# Patient Record
Sex: FEMALE | Race: WHITE | Employment: FULL TIME | URBAN - METROPOLITAN AREA
[De-identification: names, ages, dates, MRNs, and addresses within clinical notes are randomized per-mention and may not be internally consistent; named-entity substitution may affect disease eponyms.]

---

## 2019-12-31 ENCOUNTER — HOSPITAL ENCOUNTER (EMERGENCY)
Age: 49
Discharge: HOME | End: 2019-12-31
Attending: EMERGENCY MEDICINE
Payer: SELF-PAY

## 2019-12-31 ENCOUNTER — APPOINTMENT (OUTPATIENT)
Dept: CT IMAGING | Age: 49
End: 2019-12-31
Attending: EMERGENCY MEDICINE
Payer: SELF-PAY

## 2019-12-31 ENCOUNTER — APPOINTMENT (OUTPATIENT)
Dept: GENERAL RADIOLOGY | Age: 49
End: 2019-12-31
Attending: EMERGENCY MEDICINE
Payer: SELF-PAY

## 2019-12-31 VITALS
OXYGEN SATURATION: 100 % | DIASTOLIC BLOOD PRESSURE: 104 MMHG | HEART RATE: 78 BPM | TEMPERATURE: 98.6 F | BODY MASS INDEX: 40.48 KG/M2 | RESPIRATION RATE: 17 BRPM | HEIGHT: 62 IN | SYSTOLIC BLOOD PRESSURE: 176 MMHG | WEIGHT: 220 LBS

## 2019-12-31 DIAGNOSIS — N39.0 URINARY TRACT INFECTION WITHOUT HEMATURIA, SITE UNSPECIFIED: ICD-10-CM

## 2019-12-31 DIAGNOSIS — M54.6 ACUTE BILATERAL THORACIC BACK PAIN: Primary | ICD-10-CM

## 2019-12-31 LAB
ALBUMIN SERPL-MCNC: 3.8 G/DL (ref 3.4–5)
ALBUMIN/GLOB SERPL: 1 {RATIO} (ref 0.8–1.7)
ALP SERPL-CCNC: 118 U/L (ref 45–117)
ALT SERPL-CCNC: 88 U/L (ref 13–56)
ANION GAP SERPL CALC-SCNC: 9 MMOL/L (ref 3–18)
APPEARANCE UR: CLEAR
AST SERPL-CCNC: 54 U/L (ref 10–38)
BACTERIA URNS QL MICRO: ABNORMAL /HPF
BASOPHILS # BLD: 0.1 K/UL (ref 0–0.1)
BASOPHILS NFR BLD: 1 % (ref 0–2)
BILIRUB SERPL-MCNC: 0.4 MG/DL (ref 0.2–1)
BILIRUB UR QL: NEGATIVE
BNP SERPL-MCNC: 118 PG/ML (ref 0–450)
BUN SERPL-MCNC: 10 MG/DL (ref 7–18)
BUN/CREAT SERPL: 13 (ref 12–20)
CALCIUM SERPL-MCNC: 9.5 MG/DL (ref 8.5–10.1)
CHLORIDE SERPL-SCNC: 106 MMOL/L (ref 100–111)
CO2 SERPL-SCNC: 25 MMOL/L (ref 21–32)
COLOR UR: YELLOW
CREAT SERPL-MCNC: 0.76 MG/DL (ref 0.6–1.3)
DIFFERENTIAL METHOD BLD: ABNORMAL
EOSINOPHIL # BLD: 0.3 K/UL (ref 0–0.4)
EOSINOPHIL NFR BLD: 3 % (ref 0–5)
ERYTHROCYTE [DISTWIDTH] IN BLOOD BY AUTOMATED COUNT: 14.5 % (ref 11.6–14.5)
GLOBULIN SER CALC-MCNC: 3.8 G/DL (ref 2–4)
GLUCOSE SERPL-MCNC: 107 MG/DL (ref 74–99)
GLUCOSE UR STRIP.AUTO-MCNC: NEGATIVE MG/DL
HCG SERPL QL: NEGATIVE
HCT VFR BLD AUTO: 49 % (ref 35–45)
HGB BLD-MCNC: 16.5 G/DL (ref 12–16)
HGB UR QL STRIP: NEGATIVE
KETONES UR QL STRIP.AUTO: NEGATIVE MG/DL
LEUKOCYTE ESTERASE UR QL STRIP.AUTO: NEGATIVE
LIPASE SERPL-CCNC: 117 U/L (ref 73–393)
LYMPHOCYTES # BLD: 3.7 K/UL (ref 0.9–3.6)
LYMPHOCYTES NFR BLD: 38 % (ref 21–52)
MCH RBC QN AUTO: 30.6 PG (ref 24–34)
MCHC RBC AUTO-ENTMCNC: 33.7 G/DL (ref 31–37)
MCV RBC AUTO: 90.7 FL (ref 74–97)
MONOCYTES # BLD: 0.7 K/UL (ref 0.05–1.2)
MONOCYTES NFR BLD: 7 % (ref 3–10)
NEUTS SEG # BLD: 5 K/UL (ref 1.8–8)
NEUTS SEG NFR BLD: 51 % (ref 40–73)
NITRITE UR QL STRIP.AUTO: POSITIVE
PH UR STRIP: 6 [PH] (ref 5–8)
PLATELET # BLD AUTO: 247 K/UL (ref 135–420)
PMV BLD AUTO: 10.2 FL (ref 9.2–11.8)
POTASSIUM SERPL-SCNC: 4.1 MMOL/L (ref 3.5–5.5)
PROT SERPL-MCNC: 7.6 G/DL (ref 6.4–8.2)
PROT UR STRIP-MCNC: NEGATIVE MG/DL
RBC # BLD AUTO: 5.4 M/UL (ref 4.2–5.3)
RBC #/AREA URNS HPF: NEGATIVE /HPF (ref 0–5)
SODIUM SERPL-SCNC: 140 MMOL/L (ref 136–145)
SP GR UR REFRACTOMETRY: 1.02 (ref 1–1.03)
TROPONIN I SERPL-MCNC: <0.02 NG/ML (ref 0–0.04)
UROBILINOGEN UR QL STRIP.AUTO: 0.2 EU/DL (ref 0.2–1)
WBC # BLD AUTO: 9.7 K/UL (ref 4.6–13.2)
WBC URNS QL MICRO: ABNORMAL /HPF (ref 0–4)

## 2019-12-31 PROCEDURE — 71275 CT ANGIOGRAPHY CHEST: CPT

## 2019-12-31 PROCEDURE — 93005 ELECTROCARDIOGRAM TRACING: CPT

## 2019-12-31 PROCEDURE — 99285 EMERGENCY DEPT VISIT HI MDM: CPT

## 2019-12-31 PROCEDURE — 80053 COMPREHEN METABOLIC PANEL: CPT

## 2019-12-31 PROCEDURE — 74011250636 HC RX REV CODE- 250/636: Performed by: EMERGENCY MEDICINE

## 2019-12-31 PROCEDURE — 74011636320 HC RX REV CODE- 636/320: Performed by: EMERGENCY MEDICINE

## 2019-12-31 PROCEDURE — 81001 URINALYSIS AUTO W/SCOPE: CPT

## 2019-12-31 PROCEDURE — 96374 THER/PROPH/DIAG INJ IV PUSH: CPT

## 2019-12-31 PROCEDURE — 74011000258 HC RX REV CODE- 258: Performed by: EMERGENCY MEDICINE

## 2019-12-31 PROCEDURE — 84703 CHORIONIC GONADOTROPIN ASSAY: CPT

## 2019-12-31 PROCEDURE — 83880 ASSAY OF NATRIURETIC PEPTIDE: CPT

## 2019-12-31 PROCEDURE — 71046 X-RAY EXAM CHEST 2 VIEWS: CPT

## 2019-12-31 PROCEDURE — 83690 ASSAY OF LIPASE: CPT

## 2019-12-31 PROCEDURE — 84484 ASSAY OF TROPONIN QUANT: CPT

## 2019-12-31 PROCEDURE — 85025 COMPLETE CBC W/AUTO DIFF WBC: CPT

## 2019-12-31 RX ORDER — KETOROLAC TROMETHAMINE 30 MG/ML
15 INJECTION, SOLUTION INTRAMUSCULAR; INTRAVENOUS
Status: COMPLETED | OUTPATIENT
Start: 2019-12-31 | End: 2019-12-31

## 2019-12-31 RX ORDER — NITROFURANTOIN (MACROCRYSTALS) 100 MG/1
100 CAPSULE ORAL 2 TIMES DAILY
Qty: 6 CAP | Refills: 0 | Status: SHIPPED | OUTPATIENT
Start: 2019-12-31 | End: 2020-01-03

## 2019-12-31 RX ORDER — IBUPROFEN 800 MG/1
800 TABLET ORAL EVERY 8 HOURS
Qty: 15 TAB | Refills: 0 | Status: SHIPPED | OUTPATIENT
Start: 2019-12-31 | End: 2020-01-05

## 2019-12-31 RX ADMIN — SODIUM CHLORIDE 96 ML: 900 INJECTION, SOLUTION INTRAVENOUS at 12:52

## 2019-12-31 RX ADMIN — IOPAMIDOL 80 ML: 755 INJECTION, SOLUTION INTRAVENOUS at 12:51

## 2019-12-31 RX ADMIN — KETOROLAC TROMETHAMINE 15 MG: 30 INJECTION, SOLUTION INTRAMUSCULAR at 12:22

## 2019-12-31 NOTE — ED NOTES
I have reviewed discharge instructions with the patient. The patient verbalized understanding.   Pt discharged in NAD

## 2019-12-31 NOTE — DISCHARGE INSTRUCTIONS
Patient Education        Back Pain: Care Instructions  Your Care Instructions    Back pain has many possible causes. It is often related to problems with muscles and ligaments of the back. It may also be related to problems with the nerves, discs, or bones of the back. Moving, lifting, standing, sitting, or sleeping in an awkward way can strain the back. Sometimes you don't notice the injury until later. Arthritis is another common cause of back pain. Although it may hurt a lot, back pain usually improves on its own within several weeks. Most people recover in 12 weeks or less. Using good home treatment and being careful not to stress your back can help you feel better sooner. Follow-up care is a key part of your treatment and safety. Be sure to make and go to all appointments, and call your doctor if you are having problems. It's also a good idea to know your test results and keep a list of the medicines you take. How can you care for yourself at home? · Sit or lie in positions that are most comfortable and reduce your pain. Try one of these positions when you lie down:  ? Lie on your back with your knees bent and supported by large pillows. ? Lie on the floor with your legs on the seat of a sofa or chair. ? Lie on your side with your knees and hips bent and a pillow between your legs. ? Lie on your stomach if it does not make pain worse. · Do not sit up in bed, and avoid soft couches and twisted positions. Bed rest can help relieve pain at first, but it delays healing. Avoid bed rest after the first day of back pain. · Change positions every 30 minutes. If you must sit for long periods of time, take breaks from sitting. Get up and walk around, or lie in a comfortable position. · Try using a heating pad on a low or medium setting for 15 to 20 minutes every 2 or 3 hours. Try a warm shower in place of one session with the heating pad. · You can also try an ice pack for 10 to 15 minutes every 2 to 3 hours. Put a thin cloth between the ice pack and your skin. · Take pain medicines exactly as directed. ? If the doctor gave you a prescription medicine for pain, take it as prescribed. ? If you are not taking a prescription pain medicine, ask your doctor if you can take an over-the-counter medicine. · Take short walks several times a day. You can start with 5 to 10 minutes, 3 or 4 times a day, and work up to longer walks. Walk on level surfaces and avoid hills and stairs until your back is better. · Return to work and other activities as soon as you can. Continued rest without activity is usually not good for your back. · To prevent future back pain, do exercises to stretch and strengthen your back and stomach. Learn how to use good posture, safe lifting techniques, and proper body mechanics. When should you call for help? Call your doctor now or seek immediate medical care if:    · You have new or worsening numbness in your legs.     · You have new or worsening weakness in your legs. (This could make it hard to stand up.)     · You lose control of your bladder or bowels.    Watch closely for changes in your health, and be sure to contact your doctor if:    · You have a fever, lose weight, or don't feel well.     · You do not get better as expected. Where can you learn more? Go to http://jaqueline-vee.info/. Enter C531 in the search box to learn more about \"Back Pain: Care Instructions. \"  Current as of: June 26, 2019  Content Version: 12.2  © 4538-8042 [employer]. Care instructions adapted under license by [employer] (which disclaims liability or warranty for this information). If you have questions about a medical condition or this instruction, always ask your healthcare professional. [place] [address] any warranty or liability for your use of this information.          Patient Education        Urinary Tract Infection in Women: Care Instructions  Your Care Instructions    A urinary tract infection, or UTI, is a general term for an infection anywhere between the kidneys and the urethra (where urine comes out). Most UTIs are bladder infections. They often cause pain or burning when you urinate. UTIs are caused by bacteria and can be cured with antibiotics. Be sure to complete your treatment so that the infection goes away. Follow-up care is a key part of your treatment and safety. Be sure to make and go to all appointments, and call your doctor if you are having problems. It's also a good idea to know your test results and keep a list of the medicines you take. How can you care for yourself at home? · Take your antibiotics as directed. Do not stop taking them just because you feel better. You need to take the full course of antibiotics. · Drink extra water and other fluids for the next day or two. This may help wash out the bacteria that are causing the infection. (If you have kidney, heart, or liver disease and have to limit fluids, talk with your doctor before you increase your fluid intake.)  · Avoid drinks that are carbonated or have caffeine. They can irritate the bladder. · Urinate often. Try to empty your bladder each time. · To relieve pain, take a hot bath or lay a heating pad set on low over your lower belly or genital area. Never go to sleep with a heating pad in place. To prevent UTIs  · Drink plenty of water each day. This helps you urinate often, which clears bacteria from your system. (If you have kidney, heart, or liver disease and have to limit fluids, talk with your doctor before you increase your fluid intake.)  · Urinate when you need to. · Urinate right after you have sex. · Change sanitary pads often. · Avoid douches, bubble baths, feminine hygiene sprays, and other feminine hygiene products that have deodorants. · After going to the bathroom, wipe from front to back. When should you call for help?   Call your doctor now or seek immediate medical care if:    · Symptoms such as fever, chills, nausea, or vomiting get worse or appear for the first time.     · You have new pain in your back just below your rib cage. This is called flank pain.     · There is new blood or pus in your urine.     · You have any problems with your antibiotic medicine.    Watch closely for changes in your health, and be sure to contact your doctor if:    · You are not getting better after taking an antibiotic for 2 days.     · Your symptoms go away but then come back. Where can you learn more? Go to http://jaqueline-vee.info/. Enter R816 in the search box to learn more about \"Urinary Tract Infection in Women: Care Instructions. \"  Current as of: December 19, 2018  Content Version: 12.2  © 8188-0857 [employer]. Care instructions adapted under license by [employer] (which disclaims liability or warranty for this information). If you have questions about a medical condition or this instruction, always ask your healthcare professional. [address] any warranty or liability for your use of this information.

## 2019-12-31 NOTE — ED PROVIDER NOTES
EMERGENCY DEPARTMENT HISTORY AND PHYSICAL EXAM    12:06 PM      Date: 12/31/2019  Patient Name: Maureen Loera    History of Presenting Illness     Chief Complaint   Patient presents with    Abdominal Pain    Shortness of Breath    Cough    Urinary Frequency         History Provided By: patient    Additional History (Context): Maureen Loera is a 52 y.o. female presents with several days of shortness of breath right-sided flank pain and urinary frequency. No fever vomiting chest pain pain is moderate and waxing and waning no medications tried for this. The shortness of breath there is no cough. PCP: None    Chief Complaint:   Duration:    Timing:    Location:   Quality:   Severity:   Modifying Factors:   Associated Symptoms:           Past History     Past Medical History:  Past Medical History:   Diagnosis Date    Back pain     Displacement of lumbar intervertebral disc     DVT (deep venous thrombosis) (HCC)     High cholesterol     takes no meds for same.  Hypertension 05/02/2017    pt states she does not take meds for same.      Lumbar radiculitis     Lumbar sprain     Numbness     legs and arms       Past Surgical History:  Past Surgical History:   Procedure Laterality Date    HX APPENDECTOMY  2014    HX CHOLECYSTECTOMY  2013    HX GI      multiple esophageal surgeries in childhood    HX HEENT      \"surgery on esophagus\"    HX HYSTERECTOMY  2003    HX LUMBAR DISKECTOMY         Family History:  Family History   Problem Relation Age of Onset    Hypertension Mother     Hypertension Father     Heart Disease Father     Hypertension Maternal Grandmother     Heart Disease Maternal Grandmother     Hypertension Paternal Grandmother     Heart Disease Paternal Grandmother        Social History:  Social History     Tobacco Use    Smoking status: Current Every Day Smoker     Packs/day: 1.00     Years: 35.00     Pack years: 35.00    Smokeless tobacco: Former User     Quit date: 2/1/2017    Tobacco comment: pt states she smoked about 1.5 packs a day for 30-35 years and quit on 2-1-17   Substance Use Topics    Alcohol use: Yes     Alcohol/week: 4.0 standard drinks     Types: 4 Shots of liquor per week    Drug use: No       Allergies: Allergies   Allergen Reactions    Codeine Hives    Pcn [Penicillins] Shortness of Breath     Tongue swelling           Review of Systems     Review of Systems   Constitutional: Negative for diaphoresis and fever. HENT: Negative for congestion and sore throat. Eyes: Negative for pain and itching. Respiratory: Positive for shortness of breath. Negative for cough. Cardiovascular: Positive for chest pain. Negative for palpitations. Gastrointestinal: Negative for abdominal pain and diarrhea. Endocrine: Negative for polydipsia and polyuria. Genitourinary: Negative for dysuria and hematuria. Musculoskeletal: Positive for back pain. Negative for arthralgias and myalgias. Skin: Negative for rash and wound. Neurological: Negative for seizures and syncope. Hematological: Does not bruise/bleed easily. Psychiatric/Behavioral: Negative for agitation and hallucinations. Physical Exam       Patient Vitals for the past 12 hrs:   Temp Pulse Resp BP SpO2   12/31/19 1230  81 15 (!) 192/125 100 %   12/31/19 1200  74 24 (!) 196/132 98 %   12/31/19 1100  70 15 (!) 183/123 97 %   12/31/19 1045  65 14 (!) 170/93 98 %   12/31/19 1030  66 15 (!) 182/98 98 %   12/31/19 0945  69 22 (!) 175/109 97 %   12/31/19 0930  72 21 (!) 211/91 98 %   12/31/19 0915  67 18 169/86 97 %   12/31/19 0900  70 21 (!) 188/98 96 %   12/31/19 0841     99 %   12/31/19 0840    (!) 186/127    12/31/19 0821 98.6 °F (37 °C) 83 18 (!) 235/161 100 %       Physical Exam  Vitals signs and nursing note reviewed. Constitutional:       General: She is in acute distress (Moderate pain). Appearance: She is well-developed. HENT:      Head: Normocephalic and atraumatic. Eyes:      General: No scleral icterus. Conjunctiva/sclera: Conjunctivae normal.   Neck:      Musculoskeletal: Normal range of motion and neck supple. Vascular: No JVD. Cardiovascular:      Rate and Rhythm: Normal rate and regular rhythm. Heart sounds: Normal heart sounds. Comments: 4 intact extremity pulses  Pulmonary:      Effort: Pulmonary effort is normal.      Breath sounds: Normal breath sounds. Abdominal:      Palpations: Abdomen is soft. There is no mass. Tenderness: There is no tenderness. Musculoskeletal: Normal range of motion. Lymphadenopathy:      Cervical: No cervical adenopathy. Skin:     General: Skin is warm and dry. Neurological:      General: No focal deficit present. Mental Status: She is alert.            Diagnostic Study Results   Labs -  Recent Results (from the past 12 hour(s))   EKG, 12 LEAD, INITIAL    Collection Time: 12/31/19  8:39 AM   Result Value Ref Range    Ventricular Rate 73 BPM    Atrial Rate 73 BPM    P-R Interval 120 ms    QRS Duration 88 ms    Q-T Interval 404 ms    QTC Calculation (Bezet) 445 ms    Calculated P Axis 19 degrees    Calculated R Axis 24 degrees    Calculated T Axis 35 degrees    Diagnosis Normal sinus rhythm  Normal ECG      URINALYSIS W/ RFLX MICROSCOPIC    Collection Time: 12/31/19  9:00 AM   Result Value Ref Range    Color YELLOW      Appearance CLEAR      Specific gravity 1.017 1.005 - 1.030      pH (UA) 6.0 5.0 - 8.0      Protein NEGATIVE  NEG mg/dL    Glucose NEGATIVE  NEG mg/dL    Ketone NEGATIVE  NEG mg/dL    Bilirubin NEGATIVE  NEG      Blood NEGATIVE  NEG      Urobilinogen 0.2 0.2 - 1.0 EU/dL    Nitrites POSITIVE (A) NEG      Leukocyte Esterase NEGATIVE  NEG     CBC WITH AUTOMATED DIFF    Collection Time: 12/31/19  9:00 AM   Result Value Ref Range    WBC 9.7 4.6 - 13.2 K/uL    RBC 5.40 (H) 4.20 - 5.30 M/uL    HGB 16.5 (H) 12.0 - 16.0 g/dL    HCT 49.0 (H) 35.0 - 45.0 %    MCV 90.7 74.0 - 97.0 FL    MCH 30.6 24.0 - 34.0 PG    MCHC 33.7 31.0 - 37.0 g/dL    RDW 14.5 11.6 - 14.5 %    PLATELET 874 028 - 426 K/uL    MPV 10.2 9.2 - 11.8 FL    NEUTROPHILS 51 40 - 73 %    LYMPHOCYTES 38 21 - 52 %    MONOCYTES 7 3 - 10 %    EOSINOPHILS 3 0 - 5 %    BASOPHILS 1 0 - 2 %    ABS. NEUTROPHILS 5.0 1.8 - 8.0 K/UL    ABS. LYMPHOCYTES 3.7 (H) 0.9 - 3.6 K/UL    ABS. MONOCYTES 0.7 0.05 - 1.2 K/UL    ABS. EOSINOPHILS 0.3 0.0 - 0.4 K/UL    ABS. BASOPHILS 0.1 0.0 - 0.1 K/UL    DF AUTOMATED     METABOLIC PANEL, COMPREHENSIVE    Collection Time: 12/31/19  9:00 AM   Result Value Ref Range    Sodium 140 136 - 145 mmol/L    Potassium 4.1 3.5 - 5.5 mmol/L    Chloride 106 100 - 111 mmol/L    CO2 25 21 - 32 mmol/L    Anion gap 9 3.0 - 18 mmol/L    Glucose 107 (H) 74 - 99 mg/dL    BUN 10 7.0 - 18 MG/DL    Creatinine 0.76 0.6 - 1.3 MG/DL    BUN/Creatinine ratio 13 12 - 20      GFR est AA >60 >60 ml/min/1.73m2    GFR est non-AA >60 >60 ml/min/1.73m2    Calcium 9.5 8.5 - 10.1 MG/DL    Bilirubin, total 0.4 0.2 - 1.0 MG/DL    ALT (SGPT) 88 (H) 13 - 56 U/L    AST (SGOT) 54 (H) 10 - 38 U/L    Alk. phosphatase 118 (H) 45 - 117 U/L    Protein, total 7.6 6.4 - 8.2 g/dL    Albumin 3.8 3.4 - 5.0 g/dL    Globulin 3.8 2.0 - 4.0 g/dL    A-G Ratio 1.0 0.8 - 1.7     TROPONIN I    Collection Time: 12/31/19  9:00 AM   Result Value Ref Range    Troponin-I, QT <0.02 0.0 - 0.045 NG/ML   LIPASE    Collection Time: 12/31/19  9:00 AM   Result Value Ref Range    Lipase 117 73 - 393 U/L   HCG QL SERUM    Collection Time: 12/31/19  9:00 AM   Result Value Ref Range    HCG, Ql. NEGATIVE  NEG     NT-PRO BNP    Collection Time: 12/31/19  9:00 AM   Result Value Ref Range    NT pro- 0 - 450 PG/ML   URINE MICROSCOPIC ONLY    Collection Time: 12/31/19  9:00 AM   Result Value Ref Range    WBC 0 to 3 0 - 4 /hpf    RBC NEGATIVE  0 - 5 /hpf    Bacteria 4+ (A) NEG /hpf       Radiologic Studies -   CTA CHEST W OR W WO CONT   Final Result   Impression:   --------------      1.   No evidence of pulmonary thromboembolic disease. 2.  No infiltrate or other acute pulmonary abnormality. XR CHEST PA LAT   Final Result   Impression:      Slight coarsening of parenchymal markings in the left base which may reflect   interval atelectasis, scarring, or subtle infiltrate. Xr Chest Pa Lat    Result Date: 12/31/2019  HISTORY: -From Provider: cough -Additional: None Technique: CHEST PA AND LATERAL VIEWS Comparison study:02/23/10 FINDINGS: HEART AND MEDIASTINUM: Unremarkable. LUNGS AND PLEURAL SPACES: Slight coarsened parenchymal markings in the left base. No lyly consolidation, congestive heart failure, pleural effusion or pneumothorax. BONY THORAX AND SOFT TISSUES: Spondylosis. Impression: Slight coarsening of parenchymal markings in the left base which may reflect interval atelectasis, scarring, or subtle infiltrate. Cta Chest W Or W Wo Cont    Result Date: 12/31/2019  Examination:  CTA Chest CLINICAL HISTORY: Chest pain, acute, pulmonary embolism (PE) suspected -Additional: Cough, shortness of breath COMPARISON EXAMINATIONS: 02/24/10 TECHNIQUE: Thin section CT was performed through the chest during pulmonary arterial enhancement. MIP 3D reconstructions were generated to increase sensitivity in the detection of pulmonary emboli. One or more dose reduction techniques were used on this CT: automated exposure control, adjustment of the mAs and/or kVp according to patient size, and iterative reconstruction techniques. The specific techniques used on this CT exam have been documented in the patient's electronic medical record. Digital Imaging and Communications in Medicine (DICOM) format image data are available to nonaffiliated external healthcare facilities or entities on a secure, media free, reciprocally searchable basis with patient authorization for at least a 12-month period after this study. --- EXAM QUALITY --- 1. Overall exam quality-excellent: optimal 2.   Adequacy of pulmonary enhancement-optimal: more than enough enhancement to evaluate subsegmental vessels. Determined by main PA density 250 HU or greater 3. Adequacy of breath hold-optimal: no respiratory artifact 4. There are no significant noise, beam hardening, streak artifacts affecting scan quality. ---  PULMONARY CT ANGIOGRAM  --- PULMONARY VASCULATURE: The right and left central, primary segmental and subsegmental pulmonary arteries appear patent. There is no convincing evidence of intraluminal filling defect identified to suggest pulmonary embolism. THORACIC AORTA: Normal caliber thoracic aorta. No aortic aneurysm, intramural hematoma or dissection. ---  CT CHEST --- LUNG PARENCHYMA: There is mild bibasilar dependent atelectasis. The lung parenchyma appears otherwise clear. No pulmonary infiltration or consolidation identified. No pulmonary nodule or mass identified. PLEURAL SPACES: No fluid or pleural thickening. The fat-containing posterior right diaphragmatic hernia is again present and appears relatively stable MEDIASTINUM: No thoracic lymphadenopathy. No global cardiomegaly. No pericardial effusion. There is no evidence of right heart strain. The RV/LV ratio is normal. OSSEOUS STRUCTURES: No acute osseous abnormality. Mild thoracic degenerative disk disease. UPPER ABDOMEN: No acute abnormality. --------------    Impression: -------------- 1. No evidence of pulmonary thromboembolic disease. 2.  No infiltrate or other acute pulmonary abnormality. Medications ordered:   Medications   ketorolac (TORADOL) injection 15 mg (15 mg IntraVENous Given 12/31/19 1222)   iopamidol (ISOVUE-370) 76 % injection 100 mL (80 mL IntraVENous Given 12/31/19 1251)   sodium chloride 0.9 % bolus infusion 100 mL (96 mL IntraVENous Bolus 12/31/19 1252)         Medical Decision Making   Initial Medical Decision Making and DDx:  Doubt ACS, consider pneumonia PE none of which are present on evaluation.   Evidence of UTI do not suspect stone bowel obstruction bowel perforation diverticulitis appendicitis cholecystitis. Discussed antibiotics use of anti-inflammatories for presumed muscular back pain. ED Course: Progress Notes, Reevaluation, and Consults:  ED Course as of Dec 31 1328   Tue Dec 31, 2019   0907 Twelve-lead EKG at 839, normal sinus rhythm 73 no ischemic process. [CB]      ED Course User Index  [CB] Keyona Holder MD         I am the first provider for this patient. I reviewed the vital signs, available nursing notes, past medical history, past surgical history, family history and social history. Patient Vitals for the past 12 hrs:   Temp Pulse Resp BP SpO2   12/31/19 1230  81 15 (!) 192/125 100 %   12/31/19 1200  74 24 (!) 196/132 98 %   12/31/19 1100  70 15 (!) 183/123 97 %   12/31/19 1045  65 14 (!) 170/93 98 %   12/31/19 1030  66 15 (!) 182/98 98 %   12/31/19 0945  69 22 (!) 175/109 97 %   12/31/19 0930  72 21 (!) 211/91 98 %   12/31/19 0915  67 18 169/86 97 %   12/31/19 0900  70 21 (!) 188/98 96 %   12/31/19 0841     99 %   12/31/19 0840    (!) 186/127    12/31/19 0821 98.6 °F (37 °C) 83 18 (!) 235/161 100 %       Vital Signs-Reviewed the patient's vital signs. Pulse Oximetry Analysis, Cardiac Monitor, 12 lead ekg:      Interpreted by the EP. Records Reviewed: Nursing notes reviewed (Time of Review: 12:06 PM)    Procedures:   Critical Care Time:   Aspirin: (was aspirin given for stroke?)    Diagnosis     Clinical Impression:   1. Acute bilateral thoracic back pain    2.  Urinary tract infection without hematuria, site unspecified        Disposition: Discharged      Follow-up Information     Follow up With Specialties Details Why Contact [facility] EMERGENCY DEPT Emergency Medicine In 2 days  [address]  [phone]           Patient's Medications   Start Taking    IBUPROFEN (MOTRIN) 800 MG TABLET    Take 1 Tab by mouth every eight (8) hours for 5 days.    NITROFURANTOIN (MACRODANTIN) 100 MG CAPSULE    Take 1 Cap by mouth two (2) times a day for 3 days. PLEASE PRESCRIBE MACRODANTIN  MG TABLETS   Continue Taking    No medications on file   These Medications have changed    No medications on file   Stop Taking    LISINOPRIL-HYDROCHLOROTHIAZIDE (PRINZIDE, ZESTORETIC) 20-25 MG PER TABLET    Take 1 Tab by mouth two (2) times a day. VARENICLINE (CHANTIX STARTING MONTH BOX) 0.5 MG (11)- 1 MG (42) DSPK    Take 0.5 mg by mouth two (2) times daily (after meals).      _______________________________    Notes:    Crow Garcia MD using Dragon dictation      _______________________________

## 2020-01-01 LAB
ATRIAL RATE: 73 BPM
CALCULATED P AXIS, ECG09: 19 DEGREES
CALCULATED R AXIS, ECG10: 24 DEGREES
CALCULATED T AXIS, ECG11: 35 DEGREES
DIAGNOSIS, 93000: NORMAL
P-R INTERVAL, ECG05: 120 MS
Q-T INTERVAL, ECG07: 404 MS
QRS DURATION, ECG06: 88 MS
QTC CALCULATION (BEZET), ECG08: 445 MS
VENTRICULAR RATE, ECG03: 73 BPM

## 2020-02-23 ENCOUNTER — APPOINTMENT (OUTPATIENT)
Dept: GENERAL RADIOLOGY | Age: 50
End: 2020-02-23
Attending: EMERGENCY MEDICINE
Payer: SELF-PAY

## 2020-02-23 ENCOUNTER — HOSPITAL ENCOUNTER (OUTPATIENT)
Age: 50
Setting detail: OBSERVATION
Discharge: HOME | End: 2020-02-24
Attending: EMERGENCY MEDICINE | Admitting: HOSPITALIST
Payer: SELF-PAY

## 2020-02-23 DIAGNOSIS — J98.01 ACUTE BRONCHOSPASM: ICD-10-CM

## 2020-02-23 DIAGNOSIS — J06.9 ACUTE UPPER RESPIRATORY INFECTION: Primary | ICD-10-CM

## 2020-02-23 PROBLEM — E87.6 HYPOKALEMIA: Status: ACTIVE | Noted: 2020-02-23

## 2020-02-23 PROBLEM — E66.9 OBESITY (BMI 30-39.9): Status: ACTIVE | Noted: 2020-02-23

## 2020-02-23 PROBLEM — J20.9 ACUTE BRONCHITIS: Status: ACTIVE | Noted: 2020-02-23

## 2020-02-23 LAB
ALBUMIN SERPL-MCNC: 3.7 G/DL (ref 3.4–5)
ALBUMIN/GLOB SERPL: 0.8 {RATIO} (ref 0.8–1.7)
ALP SERPL-CCNC: 109 U/L (ref 45–117)
ALT SERPL-CCNC: 48 U/L (ref 13–56)
ANION GAP SERPL CALC-SCNC: 7 MMOL/L (ref 3–18)
APPEARANCE UR: CLEAR
AST SERPL-CCNC: 32 U/L (ref 10–38)
BASOPHILS # BLD: 0.1 K/UL (ref 0–0.1)
BASOPHILS NFR BLD: 1 % (ref 0–2)
BILIRUB SERPL-MCNC: 0.3 MG/DL (ref 0.2–1)
BILIRUB UR QL: NEGATIVE
BUN SERPL-MCNC: 7 MG/DL (ref 7–18)
BUN/CREAT SERPL: 7 (ref 12–20)
CALCIUM SERPL-MCNC: 8.7 MG/DL (ref 8.5–10.1)
CHLORIDE SERPL-SCNC: 103 MMOL/L (ref 100–111)
CO2 SERPL-SCNC: 27 MMOL/L (ref 21–32)
COLOR UR: YELLOW
CREAT SERPL-MCNC: 1 MG/DL (ref 0.6–1.3)
DIFFERENTIAL METHOD BLD: ABNORMAL
EOSINOPHIL # BLD: 0 K/UL (ref 0–0.4)
EOSINOPHIL NFR BLD: 0 % (ref 0–5)
ERYTHROCYTE [DISTWIDTH] IN BLOOD BY AUTOMATED COUNT: 15 % (ref 11.6–14.5)
FLUAV AG NPH QL IA: NEGATIVE
FLUBV AG NOSE QL IA: NEGATIVE
GLOBULIN SER CALC-MCNC: 4.7 G/DL (ref 2–4)
GLUCOSE SERPL-MCNC: 106 MG/DL (ref 74–99)
GLUCOSE UR STRIP.AUTO-MCNC: NEGATIVE MG/DL
HCT VFR BLD AUTO: 47 % (ref 35–45)
HGB BLD-MCNC: 15.7 G/DL (ref 12–16)
HGB UR QL STRIP: NEGATIVE
KETONES UR QL STRIP.AUTO: 15 MG/DL
LACTATE SERPL-SCNC: 1.5 MMOL/L (ref 0.4–2)
LEUKOCYTE ESTERASE UR QL STRIP.AUTO: NEGATIVE
LYMPHOCYTES # BLD: 2.5 K/UL (ref 0.9–3.6)
LYMPHOCYTES NFR BLD: 34 % (ref 21–52)
MCH RBC QN AUTO: 29.6 PG (ref 24–34)
MCHC RBC AUTO-ENTMCNC: 33.4 G/DL (ref 31–37)
MCV RBC AUTO: 88.7 FL (ref 74–97)
MONOCYTES # BLD: 0.9 K/UL (ref 0.05–1.2)
MONOCYTES NFR BLD: 12 % (ref 3–10)
NEUTS SEG # BLD: 3.8 K/UL (ref 1.8–8)
NEUTS SEG NFR BLD: 53 % (ref 40–73)
NITRITE UR QL STRIP.AUTO: NEGATIVE
PH UR STRIP: 5.5 [PH] (ref 5–8)
PLATELET # BLD AUTO: 204 K/UL (ref 135–420)
PMV BLD AUTO: 10.4 FL (ref 9.2–11.8)
POTASSIUM SERPL-SCNC: 3.4 MMOL/L (ref 3.5–5.5)
PROT SERPL-MCNC: 8.4 G/DL (ref 6.4–8.2)
PROT UR STRIP-MCNC: NEGATIVE MG/DL
RBC # BLD AUTO: 5.3 M/UL (ref 4.2–5.3)
SODIUM SERPL-SCNC: 137 MMOL/L (ref 136–145)
SP GR UR REFRACTOMETRY: 1.01 (ref 1–1.03)
UROBILINOGEN UR QL STRIP.AUTO: 0.2 EU/DL (ref 0.2–1)
WBC # BLD AUTO: 7.2 K/UL (ref 4.6–13.2)

## 2020-02-23 PROCEDURE — 81003 URINALYSIS AUTO W/O SCOPE: CPT

## 2020-02-23 PROCEDURE — 93005 ELECTROCARDIOGRAM TRACING: CPT

## 2020-02-23 PROCEDURE — 96375 TX/PRO/DX INJ NEW DRUG ADDON: CPT

## 2020-02-23 PROCEDURE — 94640 AIRWAY INHALATION TREATMENT: CPT

## 2020-02-23 PROCEDURE — 74011000250 HC RX REV CODE- 250: Performed by: EMERGENCY MEDICINE

## 2020-02-23 PROCEDURE — 99218 HC RM OBSERVATION: CPT

## 2020-02-23 PROCEDURE — 74011000250 HC RX REV CODE- 250

## 2020-02-23 PROCEDURE — 96376 TX/PRO/DX INJ SAME DRUG ADON: CPT

## 2020-02-23 PROCEDURE — 71045 X-RAY EXAM CHEST 1 VIEW: CPT

## 2020-02-23 PROCEDURE — 74011250636 HC RX REV CODE- 250/636: Performed by: HOSPITALIST

## 2020-02-23 PROCEDURE — 83605 ASSAY OF LACTIC ACID: CPT

## 2020-02-23 PROCEDURE — 99285 EMERGENCY DEPT VISIT HI MDM: CPT

## 2020-02-23 PROCEDURE — 87040 BLOOD CULTURE FOR BACTERIA: CPT

## 2020-02-23 PROCEDURE — 94761 N-INVAS EAR/PLS OXIMETRY MLT: CPT

## 2020-02-23 PROCEDURE — 74011250637 HC RX REV CODE- 250/637: Performed by: HOSPITALIST

## 2020-02-23 PROCEDURE — 74011000250 HC RX REV CODE- 250: Performed by: HOSPITALIST

## 2020-02-23 PROCEDURE — 74011250637 HC RX REV CODE- 250/637: Performed by: EMERGENCY MEDICINE

## 2020-02-23 PROCEDURE — 96372 THER/PROPH/DIAG INJ SC/IM: CPT

## 2020-02-23 PROCEDURE — 74011250636 HC RX REV CODE- 250/636: Performed by: EMERGENCY MEDICINE

## 2020-02-23 PROCEDURE — 80053 COMPREHEN METABOLIC PANEL: CPT

## 2020-02-23 PROCEDURE — 96365 THER/PROPH/DIAG IV INF INIT: CPT

## 2020-02-23 PROCEDURE — 85025 COMPLETE CBC W/AUTO DIFF WBC: CPT

## 2020-02-23 PROCEDURE — 87804 INFLUENZA ASSAY W/OPTIC: CPT

## 2020-02-23 RX ORDER — IPRATROPIUM BROMIDE AND ALBUTEROL SULFATE 2.5; .5 MG/3ML; MG/3ML
3 SOLUTION RESPIRATORY (INHALATION) ONCE
Status: COMPLETED | OUTPATIENT
Start: 2020-02-23 | End: 2020-02-23

## 2020-02-23 RX ORDER — DOXYCYCLINE 100 MG/1
100 CAPSULE ORAL EVERY 12 HOURS
Status: DISCONTINUED | OUTPATIENT
Start: 2020-02-23 | End: 2020-02-24 | Stop reason: HOSPADM

## 2020-02-23 RX ORDER — SODIUM CHLORIDE 0.9 % (FLUSH) 0.9 %
5-10 SYRINGE (ML) INJECTION AS NEEDED
Status: DISCONTINUED | OUTPATIENT
Start: 2020-02-23 | End: 2020-02-24 | Stop reason: HOSPADM

## 2020-02-23 RX ORDER — MAGNESIUM SULFATE HEPTAHYDRATE 40 MG/ML
2 INJECTION, SOLUTION INTRAVENOUS ONCE
Status: COMPLETED | OUTPATIENT
Start: 2020-02-23 | End: 2020-02-23

## 2020-02-23 RX ORDER — IPRATROPIUM BROMIDE AND ALBUTEROL SULFATE 2.5; .5 MG/3ML; MG/3ML
3 SOLUTION RESPIRATORY (INHALATION)
Status: COMPLETED | OUTPATIENT
Start: 2020-02-23 | End: 2020-02-23

## 2020-02-23 RX ORDER — IPRATROPIUM BROMIDE AND ALBUTEROL SULFATE 2.5; .5 MG/3ML; MG/3ML
3 SOLUTION RESPIRATORY (INHALATION)
Status: DISCONTINUED | OUTPATIENT
Start: 2020-02-23 | End: 2020-02-24 | Stop reason: HOSPADM

## 2020-02-23 RX ORDER — IPRATROPIUM BROMIDE AND ALBUTEROL SULFATE 2.5; .5 MG/3ML; MG/3ML
SOLUTION RESPIRATORY (INHALATION)
Status: COMPLETED
Start: 2020-02-23 | End: 2020-02-23

## 2020-02-23 RX ORDER — BENZONATATE 100 MG/1
100 CAPSULE ORAL
Status: DISCONTINUED | OUTPATIENT
Start: 2020-02-23 | End: 2020-02-24 | Stop reason: HOSPADM

## 2020-02-23 RX ORDER — ACETAMINOPHEN 500 MG
1000 TABLET ORAL
Status: COMPLETED | OUTPATIENT
Start: 2020-02-23 | End: 2020-02-23

## 2020-02-23 RX ORDER — ACETAMINOPHEN 325 MG/1
650 TABLET ORAL
Status: DISCONTINUED | OUTPATIENT
Start: 2020-02-23 | End: 2020-02-24 | Stop reason: HOSPADM

## 2020-02-23 RX ORDER — GUAIFENESIN 600 MG/1
600 TABLET, EXTENDED RELEASE ORAL EVERY 12 HOURS
Status: DISCONTINUED | OUTPATIENT
Start: 2020-02-23 | End: 2020-02-24 | Stop reason: HOSPADM

## 2020-02-23 RX ORDER — KETOROLAC TROMETHAMINE 30 MG/ML
15 INJECTION, SOLUTION INTRAMUSCULAR; INTRAVENOUS
Status: COMPLETED | OUTPATIENT
Start: 2020-02-23 | End: 2020-02-23

## 2020-02-23 RX ORDER — ENOXAPARIN SODIUM 100 MG/ML
40 INJECTION SUBCUTANEOUS EVERY 24 HOURS
Status: DISCONTINUED | OUTPATIENT
Start: 2020-02-23 | End: 2020-02-24 | Stop reason: HOSPADM

## 2020-02-23 RX ADMIN — ACETAMINOPHEN 1000 MG: 500 TABLET, FILM COATED ORAL at 10:36

## 2020-02-23 RX ADMIN — ACETAMINOPHEN 650 MG: 325 TABLET, FILM COATED ORAL at 22:19

## 2020-02-23 RX ADMIN — IPRATROPIUM BROMIDE AND ALBUTEROL SULFATE 3 ML: .5; 3 SOLUTION RESPIRATORY (INHALATION) at 15:25

## 2020-02-23 RX ADMIN — IPRATROPIUM BROMIDE AND ALBUTEROL SULFATE 3 ML: .5; 3 SOLUTION RESPIRATORY (INHALATION) at 13:06

## 2020-02-23 RX ADMIN — KETOROLAC TROMETHAMINE 15 MG: 30 INJECTION, SOLUTION INTRAMUSCULAR at 11:29

## 2020-02-23 RX ADMIN — IPRATROPIUM BROMIDE AND ALBUTEROL SULFATE 3 ML: .5; 3 SOLUTION RESPIRATORY (INHALATION) at 11:57

## 2020-02-23 RX ADMIN — IPRATROPIUM BROMIDE AND ALBUTEROL SULFATE 3 ML: .5; 3 SOLUTION RESPIRATORY (INHALATION) at 10:27

## 2020-02-23 RX ADMIN — IPRATROPIUM BROMIDE AND ALBUTEROL SULFATE 3 ML: .5; 3 SOLUTION RESPIRATORY (INHALATION) at 11:28

## 2020-02-23 RX ADMIN — IPRATROPIUM BROMIDE AND ALBUTEROL SULFATE 3 ML: .5; 3 SOLUTION RESPIRATORY (INHALATION) at 20:30

## 2020-02-23 RX ADMIN — ENOXAPARIN SODIUM 40 MG: 40 INJECTION, SOLUTION INTRAVENOUS; SUBCUTANEOUS at 14:56

## 2020-02-23 RX ADMIN — GUAIFENESIN 600 MG: 600 TABLET, EXTENDED RELEASE ORAL at 13:40

## 2020-02-23 RX ADMIN — SODIUM CHLORIDE 1000 ML: 900 INJECTION, SOLUTION INTRAVENOUS at 10:23

## 2020-02-23 RX ADMIN — BENZONATATE 100 MG: 100 CAPSULE ORAL at 18:13

## 2020-02-23 RX ADMIN — ACETAMINOPHEN 650 MG: 325 TABLET, FILM COATED ORAL at 14:56

## 2020-02-23 RX ADMIN — METHYLPREDNISOLONE SODIUM SUCCINATE 40 MG: 40 INJECTION, POWDER, FOR SOLUTION INTRAMUSCULAR; INTRAVENOUS at 23:28

## 2020-02-23 RX ADMIN — MAGNESIUM SULFATE HEPTAHYDRATE 2 G: 40 INJECTION, SOLUTION INTRAVENOUS at 10:36

## 2020-02-23 RX ADMIN — METHYLPREDNISOLONE SODIUM SUCCINATE 40 MG: 40 INJECTION, POWDER, FOR SOLUTION INTRAMUSCULAR; INTRAVENOUS at 18:13

## 2020-02-23 RX ADMIN — SODIUM CHLORIDE 1000 ML: 900 INJECTION, SOLUTION INTRAVENOUS at 13:00

## 2020-02-23 RX ADMIN — METHYLPREDNISOLONE SODIUM SUCCINATE 125 MG: 125 INJECTION, POWDER, FOR SOLUTION INTRAMUSCULAR; INTRAVENOUS at 10:24

## 2020-02-23 RX ADMIN — DOXYCYCLINE 100 MG: 100 CAPSULE ORAL at 13:40

## 2020-02-23 RX ADMIN — IPRATROPIUM BROMIDE AND ALBUTEROL SULFATE 3 ML: .5; 3 SOLUTION RESPIRATORY (INHALATION) at 23:42

## 2020-02-23 RX ADMIN — Medication 10 ML: at 23:29

## 2020-02-23 RX ADMIN — GUAIFENESIN 600 MG: 600 TABLET, EXTENDED RELEASE ORAL at 22:17

## 2020-02-23 RX ADMIN — IPRATROPIUM BROMIDE AND ALBUTEROL SULFATE 3 ML: 2.5; .5 SOLUTION RESPIRATORY (INHALATION) at 10:27

## 2020-02-23 RX ADMIN — DOXYCYCLINE 100 MG: 100 CAPSULE ORAL at 22:17

## 2020-02-23 RX ADMIN — IPRATROPIUM BROMIDE AND ALBUTEROL SULFATE 3 ML: .5; 3 SOLUTION RESPIRATORY (INHALATION) at 13:40

## 2020-02-23 NOTE — PROGRESS NOTES
1409: TRANSFER - IN REPORT:    Verbal report received from [facility] (name) on Margie Zamora  being received from ED(unit) for routine progression of care      Report consisted of patients Situation, Background, Assessment and   Recommendations(SBAR). Information from the following report(s) SBAR, Kardex, Procedure Summary, Intake/Output, MAR and Cardiac Rhythm SR/ST was reviewed with the receiving nurse. Opportunity for questions and clarification was provided. Assessment completed upon patients arrival to unit and care assumed. 1435: Patient arrived to the floor. Patient changed into gown, telemetry placed on patient and Vitals taken. Assessment and admission documentation complete. Medications given to patient. Patient educated to call prior to getting out of bed. Gripper socks placed on patient. Patient resting in bed, call light in reach and bed locked and in lowest position. 1515: Assisted patient to bedside coomode and back. 1555: Spoke with MD and informed him for K 3.4. no further orders were given. 1645: Patient resting in bed, call light in reach and bed locked and in lowest position. 1725: Patient resting in bed, call light in reach and bed locked and in lowest position. 1815: Medications given. Patient resting in bed, call light in reach and bed locked and in lowest position. 1915: Bedside shift change report given to Destiny HENRY  (oncoming nurse) by Jl Beasley RN  (offgoing nurse).  Report included the following information SBAR, Kardex, Procedure Summary, Intake/Output, MAR and Cardiac Rhythm SR.

## 2020-02-23 NOTE — PROGRESS NOTES
CM Consult for medical follow-up. Met with patient at Bedside. She verified face sheet information is correct. Does not have insurance. Says doesn't qualify. Is . Presented Care [facility] or [facility] to pt and she declined. She prefers to have appt at [facility] and is aware she will be required to pay copay/be billed. Handout with appt date/time/address/contact info given to patient as well as placed on follow-up.  Referred to Med assist.    Date/time: Wednesday, February 26, 2020 08:30 AM  Department [facility]_MAIN OFFICE[facility] ( [facility])  Appointment type NEW PT  Provider Shiv Milian RN    Outcomes Manager  [phone]  [phone]'

## 2020-02-23 NOTE — PROGRESS NOTES
Reason for Admission:   Acute Bronchitis                   RUR Score:     ED interview. No RUR              Plan for utilizing home health:   Unlikely                    Current Advanced Directive/Advance Care Plan:  Not on File                         Transition of Care Plan:  Home with spouse. Outpatient F/U    PCP (NEW PT APPT) made for 2/26/20@ 08:30. Pt has the appt info and also placed on follow-up. Pt lives with her spouse. She is independent with Ambulation and ADL's Is Self Pay. Referred to [address] [place] [address] earlier today and is aware that will have copay/cost of appt. NOK: Umu Carrera (spouse)    Patient has designated aJsbir Mart(spouse)  to participate in his/her discharge plan and to receive any needed information. Phone number:   [phone]      Patient and/or next of kin has been given the Outpatient Observation Information and Notification letter and all questions answered. Copy given to patient and copy to chart. Care Management Interventions  PCP Verified by CM: Yes(Dr Rosenberg(New pt appt made for 2/26/2020)  Mode of Transport at Discharge: (spouse)  Transition of Care Consult (CM Consult): Discharge Planning  Current Support Network: Lives with Spouse  Confirm Follow Up Transport: Family  The Plan for Transition of Care is Related to the Following Treatment Goals : PLAN Home  The Patient and/or Patient Representative was Provided with a Choice of Provider and Agrees with the Discharge Plan?: No  Name of the Patient Representative Who was Provided with a Choice of Provider and Agrees with the Discharge Plan: Agrees wit plan as home.  No provider choice at this time  Freedom of Choice List was Provided with Basic Dialogue that Supports the Patient's Individualized Plan of Care/Goals, Treatment Preferences and Shares the Quality Data Associated with the Providers?: No  Discharge Location  Discharge Placement: Home      Fabian Chaney RN    Outcomes Manager  [phone]  [phone]

## 2020-02-23 NOTE — ED TRIAGE NOTES
Pt reports non-productive cough times 2 days and shortness of breath that started last night. Pt also states she had a fever of 103 last night as well. Audible wheezing noted during triage.

## 2020-02-23 NOTE — ED NOTES
TRANSFER - ED to INPATIENT REPORT:      SBAR report made available to receiving floor on this patient being transferred to [address] [place] [address] ([facility])  for routine progression of care       Admitting diagnosis Acute bronchitis [J20.9]    Information from the following report(s) SBAR, ED Summary and MAR was made available to receiving floor. Lines:   Peripheral IV 02/23/20 Right Antecubital (Active)   Site Assessment Clean, dry, & intact 2/23/2020 10:16 AM   Phlebitis Assessment 0 2/23/2020 10:16 AM   Infiltration Assessment 0 2/23/2020 10:16 AM   Dressing Status Clean, dry, & intact 2/23/2020 10:16 AM   Dressing Type Transparent 2/23/2020 10:16 AM   Hub Color/Line Status Green;Patent; Flushed 2/23/2020 10:16 AM       Peripheral IV 02/23/20 Left Hand (Active)   Site Assessment Clean, dry, & intact 2/23/2020 10:16 AM   Phlebitis Assessment 0 2/23/2020 10:16 AM   Infiltration Assessment 0 2/23/2020 10:16 AM   Dressing Status Clean, dry, & intact 2/23/2020 10:16 AM   Dressing Type Transparent 2/23/2020 10:16 AM   Hub Color/Line Status Pink;Patent; Flushed 2/23/2020 10:16 AM        Patient is oriented to time, place, person and situation   Patient is  continent     Valuables transported with patient       MAP (Monitor): 102 =Monitored (most recent)  Vitals w/ MEWS Score (last day)     Date/Time MEWS Score Pulse Resp Temp BP Level of Consciousness SpO2    02/23/20 1359  2  90  24  98 °F (36.7 °C)  157/86  Alert  95 %    02/23/20 1345    94  (!) 32    157/86        02/23/20 1330    94  26    (!) 162/92        02/23/20 1315    94  29    162/82        02/23/20 1300    90  (!) 34    153/84    97 %    02/23/20 1245    94  27            02/23/20 1228        98 °F (36.7 °C)          02/23/20 1200    93      140/67    97 %    02/23/20 1130    97      155/83    96 %    02/23/20 1100    87          95 %    02/23/20 1045    81      150/82    93 %    02/23/20 1030    89      145/81    93 % 02/23/20 1015    93      128/70    96 %    02/23/20 09:56:44  5  (!) 104  24  (!) 101.3 °F (38.5 °C)  (!) 146/125  Alert  96 %

## 2020-02-23 NOTE — H&P
Internal Medicine History and Physical          Subjective     HPI: Olayinka Hutchison is a 52 y.o. female with no sig PMHx who presented to the ED with the acute onset of SOB. The patient admits to about 3 weeks of a non-productive cough without SOB. About 3 days ago she began to feel short of breath. This worsened daily up until presentation today. She states the breathing is the worst when exerting herself. She smokes about a pack a day but hasn't smoked for 5 days now. She admits to fever at home today. She denies any sick contacts. She has never had any pulmonary problems in the past. She feels congested but unable to bring anything up. She denies any recent travels. In the ED, she was not septic. She was not hypoxic but O2 sats hovered around 94% on room air. She was very tachypneic with accessory muscle use. She was given four duonebs in ED without much improvement in her respiratory rate. Influenza was negative. CXR was normal. Will admit under observation. PMHx:  Past Medical History:   Diagnosis Date    Back pain     Displacement of lumbar intervertebral disc     DVT (deep venous thrombosis) (HCC)     High cholesterol     takes no meds for same.  Hypertension 05/02/2017    pt states she does not take meds for same.      Lumbar radiculitis     Lumbar sprain     Numbness     legs and arms       PSurgHx:  Past Surgical History:   Procedure Laterality Date    HX APPENDECTOMY  2014    HX CHOLECYSTECTOMY  2013    HX GI      multiple esophageal surgeries in childhood    HX HEENT      \"surgery on esophagus\"    HX HYSTERECTOMY  2003    HX LUMBAR DISKECTOMY         SocialHx:  Social History     Socioeconomic History    Marital status: LEGALLY      Spouse name: Not on file    Number of children: Not on file    Years of education: Not on file    Highest education level: Not on file   Occupational History    Not on file   Social Needs    Financial resource strain: Not on file   [employer] insecurity:     Worry: Not on file     Inability: Not on file    Transportation needs:     Medical: Not on file     Non-medical: Not on file   Tobacco Use    Smoking status: Current Every Day Smoker     Packs/day: 1.00     Years: 35.00     Pack years: 35.00    Smokeless tobacco: Former User     Quit date: 2/1/2017    Tobacco comment: pt states she smoked about 1.5 packs a day for 30-35 years and quit on 2-1-17   Substance and Sexual Activity    Alcohol use: Yes     Alcohol/week: 4.0 standard drinks     Types: 4 Shots of liquor per week    Drug use: No    Sexual activity: Not on file   Lifestyle    Physical activity:     Days per week: Not on file     Minutes per session: Not on file    Stress: Not on file   Relationships    Social connections:     Talks on phone: Not on file     Gets together: Not on file     Attends Mu-ism service: Not on file     Active member of club or organization: Not on file     Attends meetings of clubs or organizations: Not on file     Relationship status: Not on file    Intimate partner violence:     Fear of current or ex partner: Not on file     Emotionally abused: Not on file     Physically abused: Not on file     Forced sexual activity: Not on file   Other Topics Concern    Not on file   Social History Narrative    Not on file       Allergies:   Allergies   Allergen Reactions    Codeine Hives    Pcn [Penicillins] Shortness of Breath     Tongue swelling         FamilyHx:  Family History   Problem Relation Age of Onset    Hypertension Mother     Hypertension Father     Heart Disease Father     Hypertension Maternal Grandmother     Heart Disease Maternal Grandmother     Hypertension Paternal Grandmother     Heart Disease Paternal Grandmother        None       Review of Systems:  CONST: Fever, weight loss, fatigue or chills  HEENT: Recent changes in vision, vertigo, epistaxis, dysphagia and hoarseness  CV: Chest pain, palpitations, edema and varicosities  RESP: Cough, shortness of breath, wheezing, hemoptysis, snoring and reactive airway disease  GI: Nausea, vomiting, abdominal pain, change in bowel habits, hematochezia, melena, and GERD   : Hematuria, dysuria, frequency, urgency, nocturia and stress urinary incontinence   MS: Weakness, joint pain and arthritis  ENDO: Polyuria, polydipsia, polyphagia, poor wound healing, heat intolerance, cold intolerance  LYMPH/HEME: Anemia, bruising and history of blood transfusions  INTEG: Dermatitis, abnormal moles  NEURO: Dizziness, headache, fainting, seizures and stroke  PSYCH: Anxiety and depression      Objective      Visit Vitals  /67   Pulse 93   Temp 98 °F (36.7 °C)   Resp 24   Wt 104.3 kg (230 lb)   SpO2 97%   BMI 42.07 kg/m²       Physical Exam:  General Appearance: Conversational dyspnea, tachypneic  ENT: normocephalic/atraumatic, moist mucus membranes  Lungs: Diffuse exp wheeze w/ poor air movement, no crackles  Cardiovascular: RRR, no m/r/g, capillary refill < 2 sec, B/L DP/PT pulses +3/4  Abdomen: soft, non-tender, normal bowel sounds  Extremities: no cyanosis, no peripheral edema  Neuro: moves all extremities, no focal deficits  Psych: appropriate affect, alert and oriented to person, place and time    Laboratory Studies:  CMP:   Lab Results   Component Value Date/Time     02/23/2020 10:10 AM    K 3.4 (L) 02/23/2020 10:10 AM     02/23/2020 10:10 AM    CO2 27 02/23/2020 10:10 AM    AGAP 7 02/23/2020 10:10 AM     (H) 02/23/2020 10:10 AM    BUN 7 02/23/2020 10:10 AM    CREA 1.00 02/23/2020 10:10 AM    GFRAA >60 02/23/2020 10:10 AM    GFRNA 59 (L) 02/23/2020 10:10 AM    CA 8.7 02/23/2020 10:10 AM    ALB 3.7 02/23/2020 10:10 AM    TP 8.4 (H) 02/23/2020 10:10 AM    GLOB 4.7 (H) 02/23/2020 10:10 AM    AGRAT 0.8 02/23/2020 10:10 AM    SGOT 32 02/23/2020 10:10 AM    ALT 48 02/23/2020 10:10 AM     CBC:   Lab Results   Component Value Date/Time    WBC 7.2 02/23/2020 10:10 AM    HGB 15.7 02/23/2020 10:10 AM    HCT 47.0 (H) 02/23/2020 10:10 AM     02/23/2020 10:10 AM     All Cardiac Markers in the last 24 hours: No results found for: CPK, CK, CKMMB, CKMB, RCK3, CKMBT, CKNDX, CKND1, JAYSON, TROPT, TROIQ, DANYELLE, TROPT, TNIPOC, BNP, BNPP    Imaging Reviewed:  Xr Chest Port    Result Date: 2/23/2020  EXAM: One view chest x-ray CLINICAL INDICATION/HISTORY: Dyspnea and chest pain. COMPARISON: 12/31/2019. TECHNIQUE: Single AP view of the chest was obtained. _______________ FINDINGS: HEART, VESSELS, MEDIASTINUM: Heart size is normal. No vascular congestion. There is atherosclerosis of the thoracic aorta. LUNGS, PLEURAL SPACES: The lungs are clear. No effusion or pneumothorax. BONY THORAX, SOFT TISSUES: Unremarkable. _______________     IMPRESSION: No acute cardiopulmonary process. Assessment/Plan     Active Hospital Problems    Diagnosis Date Noted    Acute bronchitis 02/23/2020    Hypokalemia 02/23/2020    Obesity (BMI 30-39.9) 02/23/2020     - Suspect bacterial bronchitis, possible atypical infection   - Duonebs q4h  - IV steroids  - Doxy for 5 days  - Tessalon  - Repldorian rodriguez  - Recommend outpatient pulm follow up for PFTs as this may point to underlying COPD diagnosis given hx of smoking  - Tobacco cessation  - Cont acceptable home medications for chronic conditions   - DVT protocol    I have personally reviewed all pertinent labs, films and EKGs that have officially resulted. I reviewed available electronic documentation outlining the initial presentation as well as the emergency room physician's encounter.     Lizbet Crisostomo DO  Internal Medicine, Hospitalist  Pager: [phone] [facility]

## 2020-02-23 NOTE — ED PROVIDER NOTES
EMERGENCY DEPARTMENT HISTORY AND PHYSICAL EXAM    10:05 AM      Date: 2/23/2020  Patient Name: Ronald Calvillo    History of Presenting Illness     Chief Complaint   Patient presents with    Shortness of Breath    Cough         History Provided By: Patient      Additional History (Context): Ronald Calvillo is a 52 y.o. female who presents with cough. Patient states she has been sick for approximately 2 weeks that she has had increased cough and fever for the last few days she denies any productive cough states she is having chest pain with that she denies any nausea vomiting associated with it. She is remarkable for tobacco she has not been able to smoke because she is so short of breath over the last days. Denies any other recreational drug use. PCP: Barrett Villatoro MD      Current Facility-Administered Medications   Medication Dose Route Frequency Provider Last Rate Last Dose    sodium chloride (NS) flush 5-10 mL  5-10 mL IntraVENous PRN Benitez Roblero MD        sodium chloride 0.9 % bolus infusion 1,000 mL  1,000 mL IntraVENous ONCE Benitez Roblero MD 1,000 mL/hr at 02/23/20 1300 1,000 mL at 02/23/20 1300    doxycycline (MONODOX) capsule 100 mg  100 mg Oral Q12H Para Guile H, DO        albuterol-ipratropium (DUO-NEB) 2.5 MG-0.5 MG/3 ML  3 mL Nebulization Q4H RT Para Guile H, DO        methylPREDNISolone (PF) (SOLU-MEDROL) injection 40 mg  40 mg IntraVENous Q6H Para Guile H, DO        benzonatate (TESSALON) capsule 100 mg  100 mg Oral TID PRN Marleen Cantu DO        guaiFENesin ER [facility]) tablet 600 mg  600 mg Oral Q12H Marleen Cantu DO           Past History     Past Medical History:  Past Medical History:   Diagnosis Date    Back pain     Displacement of lumbar intervertebral disc     DVT (deep venous thrombosis) (HCC)     High cholesterol     takes no meds for same.  Hypertension 05/02/2017    pt states she does not take meds for same.      Lumbar radiculitis     Lumbar sprain     Numbness     legs and arms       Past Surgical History:  Past Surgical History:   Procedure Laterality Date    HX APPENDECTOMY  2014    HX CHOLECYSTECTOMY  2013    HX GI      multiple esophageal surgeries in childhood    HX HEENT      \"surgery on esophagus\"    HX HYSTERECTOMY  2003    HX LUMBAR DISKECTOMY         Family History:  Family History   Problem Relation Age of Onset    Hypertension Mother     Hypertension Father     Heart Disease Father     Hypertension Maternal Grandmother     Heart Disease Maternal Grandmother     Hypertension Paternal Grandmother     Heart Disease Paternal Grandmother        Social History:  Social History     Tobacco Use    Smoking status: Current Every Day Smoker     Packs/day: 1.00     Years: 35.00     Pack years: 35.00    Smokeless tobacco: Former User     Quit date: 2/1/2017    Tobacco comment: pt states she smoked about 1.5 packs a day for 30-35 years and quit on 2-1-17   Substance Use Topics    Alcohol use: Yes     Alcohol/week: 4.0 standard drinks     Types: 4 Shots of liquor per week    Drug use: No       Allergies: Allergies   Allergen Reactions    Codeine Hives    Pcn [Penicillins] Shortness of Breath     Tongue swelling           Review of Systems       Review of Systems   Constitutional: Positive for activity change, appetite change, chills, fatigue and fever. Negative for diaphoresis. HENT: Positive for congestion. Eyes: Positive for visual disturbance. Respiratory: Positive for cough, chest tightness, shortness of breath and wheezing. Cardiovascular: Positive for chest pain. Gastrointestinal: Negative for abdominal pain, diarrhea, nausea and vomiting. Musculoskeletal: Negative for back pain. Skin: Negative for rash. Neurological: Negative for dizziness, syncope and weakness. All other systems reviewed and are negative.         Physical Exam     Visit Vitals  /67   Pulse 93   Temp 98 °F (36.7 °C)   Resp 24   Wt 104.3 kg (230 lb)   SpO2 97%   BMI 42.07 kg/m²       Physical Exam  Vitals signs and nursing note reviewed. Constitutional:       General: She is not in acute distress. Appearance: She is well-developed. She is not ill-appearing or toxic-appearing. HENT:      Head: Normocephalic and atraumatic. Mouth/Throat:      Mouth: Mucous membranes are moist.   Eyes:      General: No scleral icterus. Conjunctiva/sclera: Conjunctivae normal.      Pupils: Pupils are equal, round, and reactive to light. Neck:      Musculoskeletal: Normal range of motion and neck supple. Cardiovascular:      Rate and Rhythm: Normal rate and regular rhythm. Heart sounds: Normal heart sounds. No murmur. Pulmonary:      Effort: Tachypnea and accessory muscle usage present. No respiratory distress. Breath sounds: Wheezing present. No rhonchi or rales. Abdominal:      General: Bowel sounds are normal. There is no distension. Palpations: Abdomen is soft. Tenderness: There is no abdominal tenderness. Musculoskeletal:      Right lower leg: No edema. Left lower leg: No edema. Lymphadenopathy:      Cervical: No cervical adenopathy. Skin:     General: Skin is warm and dry. Findings: No rash. Neurological:      Mental Status: She is alert and oriented to person, place, and time.       Coordination: Coordination normal.   Psychiatric:         Behavior: Behavior normal.           Diagnostic Study Results     Labs -  Recent Results (from the past 12 hour(s))   EKG, 12 LEAD, INITIAL    Collection Time: 02/23/20  9:46 AM   Result Value Ref Range    Ventricular Rate 100 BPM    Atrial Rate 100 BPM    P-R Interval 128 ms    QRS Duration 78 ms    Q-T Interval 348 ms    QTC Calculation (Bezet) 448 ms    Calculated P Axis 39 degrees    Calculated R Axis 36 degrees    Calculated T Axis 42 degrees    Diagnosis       Normal sinus rhythm  Normal ECG  When compared with ECG of 31-DEC-2019 08:39,  No significant change was found     LACTIC ACID    Collection Time: 02/23/20 10:10 AM   Result Value Ref Range    Lactic acid 1.5 0.4 - 2.0 MMOL/L   METABOLIC PANEL, COMPREHENSIVE    Collection Time: 02/23/20 10:10 AM   Result Value Ref Range    Sodium 137 136 - 145 mmol/L    Potassium 3.4 (L) 3.5 - 5.5 mmol/L    Chloride 103 100 - 111 mmol/L    CO2 27 21 - 32 mmol/L    Anion gap 7 3.0 - 18 mmol/L    Glucose 106 (H) 74 - 99 mg/dL    BUN 7 7.0 - 18 MG/DL    Creatinine 1.00 0.6 - 1.3 MG/DL    BUN/Creatinine ratio 7 (L) 12 - 20      GFR est AA >60 >60 ml/min/1.73m2    GFR est non-AA 59 (L) >60 ml/min/1.73m2    Calcium 8.7 8.5 - 10.1 MG/DL    Bilirubin, total 0.3 0.2 - 1.0 MG/DL    ALT (SGPT) 48 13 - 56 U/L    AST (SGOT) 32 10 - 38 U/L    Alk. phosphatase 109 45 - 117 U/L    Protein, total 8.4 (H) 6.4 - 8.2 g/dL    Albumin 3.7 3.4 - 5.0 g/dL    Globulin 4.7 (H) 2.0 - 4.0 g/dL    A-G Ratio 0.8 0.8 - 1.7     CBC WITH AUTOMATED DIFF    Collection Time: 02/23/20 10:10 AM   Result Value Ref Range    WBC 7.2 4.6 - 13.2 K/uL    RBC 5.30 4.20 - 5.30 M/uL    HGB 15.7 12.0 - 16.0 g/dL    HCT 47.0 (H) 35.0 - 45.0 %    MCV 88.7 74.0 - 97.0 FL    MCH 29.6 24.0 - 34.0 PG    MCHC 33.4 31.0 - 37.0 g/dL    RDW 15.0 (H) 11.6 - 14.5 %    PLATELET 717 287 - 779 K/uL    MPV 10.4 9.2 - 11.8 FL    NEUTROPHILS 53 40 - 73 %    LYMPHOCYTES 34 21 - 52 %    MONOCYTES 12 (H) 3 - 10 %    EOSINOPHILS 0 0 - 5 %    BASOPHILS 1 0 - 2 %    ABS. NEUTROPHILS 3.8 1.8 - 8.0 K/UL    ABS. LYMPHOCYTES 2.5 0.9 - 3.6 K/UL    ABS. MONOCYTES 0.9 0.05 - 1.2 K/UL    ABS. EOSINOPHILS 0.0 0.0 - 0.4 K/UL    ABS.  BASOPHILS 0.1 0.0 - 0.1 K/UL    DF AUTOMATED     INFLUENZA A & B AG (RAPID TEST)    Collection Time: 02/23/20 10:20 AM   Result Value Ref Range    Influenza A Antigen NEGATIVE  NEG      Influenza B Antigen NEGATIVE  NEG     URINALYSIS W/ RFLX MICROSCOPIC    Collection Time: 02/23/20 12:40 PM   Result Value Ref Range    Color YELLOW      Appearance CLEAR Specific gravity 1.011 1.005 - 1.030      pH (UA) 5.5 5.0 - 8.0      Protein NEGATIVE  NEG mg/dL    Glucose NEGATIVE  NEG mg/dL    Ketone 15 (A) NEG mg/dL    Bilirubin NEGATIVE  NEG      Blood NEGATIVE  NEG      Urobilinogen 0.2 0.2 - 1.0 EU/dL    Nitrites NEGATIVE  NEG      Leukocyte Esterase NEGATIVE  NEG         Radiologic Studies -   XR CHEST PORT   Final Result   IMPRESSION:      No acute cardiopulmonary process. Medical Decision Making   I am the first provider for this patient. I reviewed the vital signs, available nursing notes, past medical history, past surgical history, family history and social history. Vital Signs-Reviewed the patient's vital signs. EKG: EKG shows sinus rhythm at a rate of 100 no acute ST-T wave changes QTC is 448    Records Reviewed: Nursing Notes and Old Medical Records (Time of Review: 10:05 AM)    ED Course: Progress Notes, Reevaluation, and Consults:      Provider Notes (Medical Decision Making):   MDM  Number of Diagnoses or Management Options  Acute bronchospasm:   Acute upper respiratory infection:   Diagnosis management comments: Fever chills nonproductive cough with increased wheeze tachypneic tachycardic at present. I will start fluids Tylenol rapid flu evaluate possible pneumonia. Treat bronchospasm Solu-Medrol duo nebs magnesium    12:33 PM  Heart rate improved to 95 patient still has an increased respiratory rate in the mid 20s with continued expiratory wheeze oxygen sats anywhere from 92 to 94% patient has continued accessory muscle use. Will give fourth neb repeat fluid bolus in the emergency department consider admission    12:48 PM  Pt becomes more dyspneic with movement continue wheeze discussed with Dr. Ameirca Dumont for possible admission    1:13 PM  Pt admitted per Dr America Dumont.         Amount and/or Complexity of Data Reviewed  Clinical lab tests: ordered and reviewed  Tests in the radiology section of CPT®: ordered and reviewed            Critical Care Time:       Diagnosis     Clinical Impression:   1. Acute upper respiratory infection    2. Acute bronchospasm        Disposition: admit    Follow-up Information     Follow up With Specialties Details Why Contact Ephraim Street MD Internal Medicine Go on 2/26/2020 APPT TIME 08:30 AM. Arrive 15 min early. Bring ID and prepare to pay copayment Rita  [facility] [employer]  [phone]             Patient's Medications    No medications on file     _______________________________    Please note that this dictation was completed with [employer], the computer voice recognition software. Quite often unanticipated grammatical, syntax, homophones, and other interpretive errors are inadvertently transcribed by the computer software. Please disregard these errors. Please excuse any errors that have escaped final proofreading.

## 2020-02-23 NOTE — ED NOTES
Pt transported to [phone] for admission. Pt received by Stephen HENRY. Pt in no distress at time of admission.

## 2020-02-24 VITALS
BODY MASS INDEX: 41.5 KG/M2 | WEIGHT: 225.53 LBS | HEIGHT: 62 IN | TEMPERATURE: 97.9 F | DIASTOLIC BLOOD PRESSURE: 85 MMHG | RESPIRATION RATE: 20 BRPM | OXYGEN SATURATION: 94 % | HEART RATE: 78 BPM | SYSTOLIC BLOOD PRESSURE: 158 MMHG

## 2020-02-24 LAB
ATRIAL RATE: 100 BPM
ATRIAL RATE: 81 BPM
CALCULATED P AXIS, ECG09: 39 DEGREES
CALCULATED P AXIS, ECG09: 39 DEGREES
CALCULATED R AXIS, ECG10: 36 DEGREES
CALCULATED R AXIS, ECG10: 43 DEGREES
CALCULATED T AXIS, ECG11: 42 DEGREES
CALCULATED T AXIS, ECG11: 43 DEGREES
DIAGNOSIS, 93000: NORMAL
DIAGNOSIS, 93000: NORMAL
P-R INTERVAL, ECG05: 128 MS
P-R INTERVAL, ECG05: 140 MS
Q-T INTERVAL, ECG07: 348 MS
Q-T INTERVAL, ECG07: 426 MS
QRS DURATION, ECG06: 78 MS
QRS DURATION, ECG06: 92 MS
QTC CALCULATION (BEZET), ECG08: 448 MS
QTC CALCULATION (BEZET), ECG08: 494 MS
VENTRICULAR RATE, ECG03: 100 BPM
VENTRICULAR RATE, ECG03: 81 BPM

## 2020-02-24 PROCEDURE — 74011250636 HC RX REV CODE- 250/636: Performed by: HOSPITALIST

## 2020-02-24 PROCEDURE — 74011250637 HC RX REV CODE- 250/637: Performed by: INTERNAL MEDICINE

## 2020-02-24 PROCEDURE — 94761 N-INVAS EAR/PLS OXIMETRY MLT: CPT

## 2020-02-24 PROCEDURE — 96376 TX/PRO/DX INJ SAME DRUG ADON: CPT

## 2020-02-24 PROCEDURE — 74011000250 HC RX REV CODE- 250: Performed by: HOSPITALIST

## 2020-02-24 PROCEDURE — 94640 AIRWAY INHALATION TREATMENT: CPT

## 2020-02-24 PROCEDURE — 99218 HC RM OBSERVATION: CPT

## 2020-02-24 PROCEDURE — 74011250637 HC RX REV CODE- 250/637: Performed by: HOSPITALIST

## 2020-02-24 PROCEDURE — 90471 IMMUNIZATION ADMIN: CPT

## 2020-02-24 PROCEDURE — 90686 IIV4 VACC NO PRSV 0.5 ML IM: CPT | Performed by: HOSPITALIST

## 2020-02-24 RX ORDER — DOXYCYCLINE 100 MG/1
100 CAPSULE ORAL EVERY 12 HOURS
Qty: 10 CAP | Refills: 0 | Status: SHIPPED | OUTPATIENT
Start: 2020-02-24 | End: 2020-02-29

## 2020-02-24 RX ORDER — BENZONATATE 100 MG/1
100 CAPSULE ORAL
Qty: 30 CAP | Refills: 0 | Status: SHIPPED | OUTPATIENT
Start: 2020-02-24 | End: 2020-03-02

## 2020-02-24 RX ADMIN — IPRATROPIUM BROMIDE AND ALBUTEROL SULFATE 3 ML: .5; 3 SOLUTION RESPIRATORY (INHALATION) at 12:59

## 2020-02-24 RX ADMIN — BENZOCAINE AND MENTHOL 1 LOZENGE: 15; 3.6 LOZENGE ORAL at 01:26

## 2020-02-24 RX ADMIN — METHYLPREDNISOLONE SODIUM SUCCINATE 40 MG: 40 INJECTION, POWDER, FOR SOLUTION INTRAMUSCULAR; INTRAVENOUS at 06:45

## 2020-02-24 RX ADMIN — ACETAMINOPHEN 650 MG: 325 TABLET, FILM COATED ORAL at 06:47

## 2020-02-24 RX ADMIN — BENZOCAINE AND MENTHOL 1 LOZENGE: 15; 3.6 LOZENGE ORAL at 11:13

## 2020-02-24 RX ADMIN — BENZONATATE 100 MG: 100 CAPSULE ORAL at 06:45

## 2020-02-24 RX ADMIN — BENZOCAINE AND MENTHOL 1 LOZENGE: 15; 3.6 LOZENGE ORAL at 06:51

## 2020-02-24 RX ADMIN — GUAIFENESIN 600 MG: 600 TABLET, EXTENDED RELEASE ORAL at 09:11

## 2020-02-24 RX ADMIN — INFLUENZA VIRUS VACCINE 0.5 ML: 15; 15; 15; 15 SUSPENSION INTRAMUSCULAR at 09:11

## 2020-02-24 RX ADMIN — METHYLPREDNISOLONE SODIUM SUCCINATE 40 MG: 40 INJECTION, POWDER, FOR SOLUTION INTRAMUSCULAR; INTRAVENOUS at 11:13

## 2020-02-24 RX ADMIN — IPRATROPIUM BROMIDE AND ALBUTEROL SULFATE 3 ML: .5; 3 SOLUTION RESPIRATORY (INHALATION) at 04:56

## 2020-02-24 RX ADMIN — IPRATROPIUM BROMIDE AND ALBUTEROL SULFATE 3 ML: .5; 3 SOLUTION RESPIRATORY (INHALATION) at 10:15

## 2020-02-24 RX ADMIN — DOXYCYCLINE 100 MG: 100 CAPSULE ORAL at 11:13

## 2020-02-24 NOTE — PROGRESS NOTES
Respiratory Therapy Assessment Care Plan    Patient:  Mike Lynch 52 y.o. female 2/24/2020 10:21 AM    Acute bronchitis [J20.9]      Chest X-RAY:   Results from Hospital Encounter encounter on 02/23/20   XR CHEST PORT    Impression IMPRESSION:    No acute cardiopulmonary process. Results from Hospital Encounter encounter on 12/31/19   XR CHEST PA LAT    Impression Impression:    Slight coarsening of parenchymal markings in the left base which may reflect  interval atelectasis, scarring, or subtle infiltrate. Results from [place] encounter on 07/13/19   XR CHEST SNGL V    Impression Impression: Normal study.              Vital Signs:     Visit Vitals  BP (!) 157/112 (BP 1 Location: Left arm, BP Patient Position: At rest)   Pulse 76   Temp 97.8 °F (36.6 °C)   Resp 20   Ht 5' 2\" (1.575 m)   Wt 102.3 kg (225 lb 8.5 oz)   SpO2 95%   BMI 41.25 kg/m²         Indications for treatment: Acute Bronchitis with Hacking Cough / Smoker      Plan of care: Duoneb Q4 / Smoking Sensation         Goal: Improved Cough / patient to consider smoking sensation

## 2020-02-24 NOTE — PROGRESS NOTES
To Whom it May Concern,    Ms Joey Obando was admitted at [facility] for Medical care from 2/23/20 until 2/24/20. She can return to work 2/26/20. Please call for questions, [phone].     Sincerely,        Hernan Hernandez MD

## 2020-02-24 NOTE — PROGRESS NOTES
Discharge instructions provided to pt on behalf of primary nurse Gena Arndt RN. Written prescriptions for tessalon pearls and doxycycline given to the pt. Follow up appointment reviewed. Peripheral iv and telemetry monitor removed. Pt calling someone to pick her up.

## 2020-02-24 NOTE — ROUTINE PROCESS
1918: Assumed care. AAOX4. On RA. HUGHES noted . Denies any pain at this time. Call light within reach. 2219: Due meds given. Medicated for headache.  
 
2225: Patient c/o sore throat. Paged MD on call. 2328: Due med given. 0010: No change from previous assessment. 7073: Dr. Laya Guzman called back. Order received for Lozenges. 0200: Sleeping. 4068: No change from previous assessment. 6075: Slept good thru night. Needs attended. Due meds given. Medicated for cough, chest discomfort from coughing & for sore throat. 5440: Bedside and Verbal shift change report given to Gena RN (oncoming nurse) by me (offgoing nurse). Report included the following information SBAR, Kardex, Intake/Output, MAR and Recent Results.

## 2020-02-24 NOTE — PROGRESS NOTES
Problem: Falls - Risk of  Goal: *Absence of Falls  Description  Document Judyt President Fall Risk and appropriate interventions in the flowsheet.   Outcome: Progressing Towards Goal  Note: Fall Risk Interventions:       Mentation Interventions: Adequate sleep, hydration, pain control, Door open when patient unattended, Room close to nurse's station         Elimination Interventions: Call light in reach              Problem: Patient Education: Go to Patient Education Activity  Goal: Patient/Family Education  Outcome: Progressing Towards Goal     Problem: Pain  Goal: *Control of Pain  Outcome: Progressing Towards Goal     Problem: Patient Education: Go to Patient Education Activity  Goal: Patient/Family Education  Outcome: Progressing Towards Goal     Problem: Breathing Pattern - Ineffective  Goal: *Absence of hypoxia  Outcome: Progressing Towards Goal  Goal: *Use of effective breathing techniques  Outcome: Progressing Towards Goal     Problem: Patient Education: Go to Patient Education Activity  Goal: Patient/Family Education  Outcome: Progressing Towards Goal

## 2020-02-24 NOTE — PROGRESS NOTES
Bedside shift change report given to CARL Avery (oncoming nurse) by Jannet Blanc RN (offgoing nurse). Report included the following information SBAR, Kardex, Intake/Output, MAR and Recent Results.      0911 -- AM medications given, well tolerated, will continue to monitor.      1150 -- Reassessment completed, no change in patient condition, will continue to monitor.      1407 -- Patient discharged.

## 2020-02-24 NOTE — PROGRESS NOTES
Problem: Discharge Planning  Goal: *Discharge to safe environment  Outcome: Resolved/Met    Home    Care Management Interventions  PCP Verified by CM: Yes(Dr Rosenberg(New pt appt made for 2/26/2020)  Mode of Transport at Discharge: (spouse)  Transition of Care Consult (CM Consult): Other(home)  Current Support Network: Lives with Spouse  Confirm Follow Up Transport: Family  The Plan for Transition of Care is Related to the Following Treatment Goals : home  The Patient and/or Patient Representative was Provided with a Choice of Provider and Agrees with the Discharge Plan?: No  Name of the Patient Representative Who was Provided with a Choice of Provider and Agrees with the Discharge Plan: Agrees wit plan as home.  No provider choice at this time  Freedom of Choice List was Provided with Basic Dialogue that Supports the Patient's Individualized Plan of Care/Goals, Treatment Preferences and Shares the Quality Data Associated with the Providers?: No  Discharge Location  Discharge Placement: Home

## 2020-02-24 NOTE — PROGRESS NOTES
conducted an initial consultation and Spiritual Assessment for Flo Luna, who is a 52 y.o.,female. Patients Primary Language is: [place]. According to the patients EMR Gnosticist Affiliation is: No Scientologist. The reason the Patient came to the hospital is:   Patient Active Problem List    Diagnosis Date Noted    Acute bronchitis 02/23/2020    Hypokalemia 02/23/2020    Obesity (BMI 30-39.9) 02/23/2020        The  provided the following Interventions:  Initiated a relationship of care and support with patient in room 3036 today. Listened empathically as patient talked about her being here and how tired she currently is between the constant visits buy the staff and her own not being able to sleep due to the coughing. She said \"I just need to lay down and get some good sleep\" then I might consider getting better. Provided information about Spiritual Care Services. Offered prayer and assurance of continued prayers on patients behalf. The following outcomes were achieved:  Patient shared limited information about her medical narrative and spiritual journey/beliefs. Patient processed feeling about current hospitalization. Patient expressed gratitude for pastoral care visit. Assessment:  Patient does not have any Rastafarian/cultural needs that will affect patients preferences in health care. There are no further spiritual or Rastafarian issues which require Spiritual Care Services interventions at this time. Plan:  Chaplains will continue to follow and will provide pastoral care on an as needed/requested basis    . Matt Tran   Spiritual Care   [phone]

## 2020-02-24 NOTE — DISCHARGE INSTRUCTIONS
DISCHARGE SUMMARY from Nurse    PATIENT INSTRUCTIONS:    After general anesthesia or intravenous sedation, for 24 hours or while taking prescription Narcotics:  · Limit your activities  · Do not drive and operate hazardous machinery  · Do not make important personal or business decisions  · Do  not drink alcoholic beverages  · If you have not urinated within 8 hours after discharge, please contact your surgeon on call. Report the following to your surgeon:  · Excessive pain, swelling, redness or odor of or around the surgical area  · Temperature over 100.5  · Nausea and vomiting lasting longer than 4 hours or if unable to take medications  · Any signs of decreased circulation or nerve impairment to extremity: change in color, persistent  numbness, tingling, coldness or increase pain  · Any questions    What to do at Home:  Recommended activity: Activity as tolerated    If you experience any of the following symptoms fever, chills, or worsening shortness of breath please follow up with primary care physician/emergency room. *  Please give a list of your current medications to your Primary Care Provider. *  Please update this list whenever your medications are discontinued, doses are      changed, or new medications (including over-the-counter products) are added. *  Please carry medication information at all times in case of emergency situations. These are general instructions for a healthy lifestyle:    No smoking/ No tobacco products/ Avoid exposure to second hand smoke  Surgeon General's Warning:  Quitting smoking now greatly reduces serious risk to your health.     Obesity, smoking, and sedentary lifestyle greatly increases your risk for illness    A healthy diet, regular physical exercise & weight monitoring are important for maintaining a healthy lifestyle    You may be retaining fluid if you have a history of heart failure or if you experience any of the following symptoms:  Weight gain of 3 pounds or more overnight or 5 pounds in a week, increased swelling in our hands or feet or shortness of breath while lying flat in bed. Please call your doctor as soon as you notice any of these symptoms; do not wait until your next office visit. The discharge information has been reviewed with the patient. The patient verbalized understanding. Discharge medications reviewed with the patient and appropriate educational materials and side effects teaching were provided. Patient armband removed and shredded.

## 2020-02-26 ENCOUNTER — OFFICE VISIT (OUTPATIENT)
Dept: FAMILY MEDICINE CLINIC | Age: 50
End: 2020-02-26

## 2020-02-26 ENCOUNTER — HOSPITAL ENCOUNTER (OUTPATIENT)
Dept: LAB | Age: 50
Discharge: HOME | End: 2020-02-26
Payer: SELF-PAY

## 2020-02-26 VITALS
DIASTOLIC BLOOD PRESSURE: 74 MMHG | WEIGHT: 220 LBS | HEART RATE: 89 BPM | RESPIRATION RATE: 16 BRPM | OXYGEN SATURATION: 94 % | BODY MASS INDEX: 40.48 KG/M2 | HEIGHT: 62 IN | TEMPERATURE: 97.4 F | SYSTOLIC BLOOD PRESSURE: 122 MMHG

## 2020-02-26 DIAGNOSIS — R06.2 WHEEZING: Primary | ICD-10-CM

## 2020-02-26 DIAGNOSIS — Z86.718 HISTORY OF DVT IN ADULTHOOD: ICD-10-CM

## 2020-02-26 DIAGNOSIS — R06.2 WHEEZING: ICD-10-CM

## 2020-02-26 DIAGNOSIS — R06.02 SHORTNESS OF BREATH: ICD-10-CM

## 2020-02-26 LAB
ANION GAP SERPL CALC-SCNC: 7 MMOL/L (ref 3–18)
BNP SERPL-MCNC: 230 PG/ML (ref 0–450)
BUN SERPL-MCNC: 13 MG/DL (ref 7–18)
BUN/CREAT SERPL: 13 (ref 12–20)
CALCIUM SERPL-MCNC: 8.7 MG/DL (ref 8.5–10.1)
CHLORIDE SERPL-SCNC: 105 MMOL/L (ref 100–111)
CO2 SERPL-SCNC: 27 MMOL/L (ref 21–32)
CREAT SERPL-MCNC: 0.97 MG/DL (ref 0.6–1.3)
D DIMER PPP FEU-MCNC: 1.46 UG/ML(FEU)
GLUCOSE SERPL-MCNC: 102 MG/DL (ref 74–99)
POTASSIUM SERPL-SCNC: 3.9 MMOL/L (ref 3.5–5.5)
SODIUM SERPL-SCNC: 139 MMOL/L (ref 136–145)

## 2020-02-26 PROCEDURE — 85379 FIBRIN DEGRADATION QUANT: CPT

## 2020-02-26 PROCEDURE — 80048 BASIC METABOLIC PNL TOTAL CA: CPT

## 2020-02-26 PROCEDURE — 83880 ASSAY OF NATRIURETIC PEPTIDE: CPT

## 2020-02-26 PROCEDURE — 36415 COLL VENOUS BLD VENIPUNCTURE: CPT

## 2020-02-26 RX ORDER — METHYLPREDNISOLONE 4 MG/1
TABLET ORAL
Qty: 1 DOSE PACK | Refills: 0 | Status: SHIPPED | OUTPATIENT
Start: 2020-02-26 | End: 2020-03-15

## 2020-02-26 RX ORDER — ALBUTEROL SULFATE 0.83 MG/ML
2.5 SOLUTION RESPIRATORY (INHALATION) ONCE
Qty: 1 EACH | Refills: 0
Start: 2020-02-26 | End: 2020-02-26

## 2020-02-26 NOTE — PROGRESS NOTES
1. Have you been to the ER, urgent care clinic since your last visit? Hospitalized since your last visit? Yes, went to [facility] on 2/23/2020 for SOB and cough. 2. Have you seen or consulted any other health care providers outside of the [facility] since your last visit? Include any pap smears or colon screening.  No.    Chief Complaint   [facility]   [facility]   [facility] Follow Up     went to [facility] on [facility]/2020     Breathing Problem    Cough

## 2020-02-26 NOTE — LETTER
NOTIFICATION RETURN TO WORK / SCHOOL 
 
2/26/2020 9:21 AM 
 
 
To Whom It May Concern: 
 
Van Mason is currently under the care of Nara Motta. Patient was seen in my office on 2/26/19 and may not return to work until 2/29/20 If there are questions or concerns please have the patient contact our office.  
 
 
 
Sincerely, 
 
 
 
 
Darreld MD Lew

## 2020-02-26 NOTE — PROGRESS NOTES
Marianna Allen is a 52 y.o. female and presents with New Patient; Hospital Follow Up (went to [facility] on 2/23/2020. x2weeks); Breathing Problem; and Cough (right side pain when she cough and chest. )       2 weeks of sob and cough- given abx and inhaler. No improvement. +tobacco.  She reports feeling sick with fever at the start of this but these have now resolved.   + h/o DVT but no PE per pt. this was in distant past.  She was treated with anticoagulants at that point she reports but was able to come off of them. No recurrent episodes and she has had 3 CTA exams done in her chart over approximately the past 10 years that I am able to review which did not show a pulmonary embolus. No lower extremity swelling. No period of immobilization or long travel prior to this  CTA chest done 12/31/19- no PE  Also xray 2/23/20 was normal.       Diagnoses and all orders for this visit:    1. Wheezing-shortness of breath appears to be due to her active wheezing and bronchoconstriction at this point. She does not appear to have an active DVT on exam and oxygen levels are acceptable so will treat with bronchodilators and steroid pack and if she experiences any worsening she will go to the emergency room. We will however check a d-dimer as well  -     albuterol (PROVENTIL VENTOLIN) 2.5 mg /3 mL (0.083 %) nebu; 3 mL by Nebulization route once for 1 dose. -     ALBUTEROL, INHAL. SOL., FDA-APPROVED FINAL, NON-COMPOUND UNIT DOSE, 1 MG  -     INHAL RX, AIRWAY OBST/DX SPUTUM INDUCT  -     methylPREDNISolone (MEDROL DOSEPACK) 4 mg tablet; Use as per package directions  -     D DIMER; Future  -     NT-PRO BNP; Future  -     METABOLIC PANEL, BASIC; Future    2. History of DVT in adulthood  -     D DIMER; Future    3. Shortness of breath  -     D DIMER; Future  -     NT-PRO BNP;  Future  -     METABOLIC PANEL, BASIC; Future        RTC in 2 weeks  Orders Placed This Encounter    INHAL RX, AIRWAY OBST/DX SPUTUM INDUCT ([id number])    D DIMER     Standing Status:   Future     Number of Occurrences:   1     Standing Expiration Date:   2021    NT-PRO BNP     Standing Status:   Future     Number of Occurrences:   1     Standing Expiration Date:   8191    METABOLIC PANEL, BASIC     Standing Status:   Future     Number of Occurrences:   1     Standing Expiration Date:   2021    ALBUTEROL, INHAL. LENMishel()     Order Specific Question:   Dose     Answer:   2.5mg/3ml     Order Specific Question:   Site     Answer:   OTHER     Comments:   Oral Inhalation     Order Specific Question:   Expiration Date     Answer:   2021     Order Specific Question:   Lot#     Answer:   [id number]     Order Specific Question:        Answer:   Nephron     Order Specific Question:   Charge Quantity? Answer:   1     Order Specific Question:   Perfomed by/Witnessed by: Answer:   Ana Jeffries CMA     Order Specific Question:   NDC#     Answer:   [id number] []    albuterol (PROVENTIL VENTOLIN) 2.5 mg /3 mL (0.083 %) nebu     Sig: 3 mL by Nebulization route once for 1 dose. Dispense:  1 Each     Refill:  0    methylPREDNISolone (MEDROL DOSEPACK) 4 mg tablet     Sig: Use as per package directions     Dispense:  1 Dose Pack     Refill:  0               ROS:        SH:  Social History     Tobacco Use    Smoking status: Former Smoker     Packs/day: 1.00     Years: 35.00     Pack years: 35.00     Last attempt to quit: 2020     Years since quittin.0    Smokeless tobacco: Former User     Quit date: 2017    Tobacco comment: pt states she smoked about 1.5 packs a day for 30-35 years and quit on 17   Substance Use Topics    Alcohol use:  Yes     Alcohol/week: 4.0 standard drinks     Types: 4 Shots of liquor per week    Drug use: No         Medications/Allergies:  Current Outpatient Medications on File Prior to Visit   Medication Sig Dispense Refill    benzonatate (TESSALON) 100 mg capsule Take 1 Cap by mouth three (3) times daily as needed for Cough for up to 7 days. 30 Cap 0    doxycycline (MONODOX) 100 mg capsule Take 1 Cap by mouth every twelve (12) hours for 5 days. 10 Cap 0     No current facility-administered medications on file prior to visit. Allergies   Allergen Reactions    Codeine Hives    Pcn [Penicillins] Shortness of Breath     Tongue swelling         Objective:  Visit Vitals  /74   Pulse 89   Temp 97.4 °F (36.3 °C) (Oral)   Resp 16   Ht 5' 2\" (1.575 m)   Wt 220 lb (99.8 kg)   SpO2 93%   BMI 40.24 kg/m²    Body mass index is 40.24 kg/m². Constitutional: Well developed, nourished, no distress, alert, obese   HENT: Exterior ears and tympanic membranes normal bilaterally. Supple neck. No thyromegaly or lymphadenopathy. Oropharynx clear and moist mucous membranes. Eyes: Conjunctiva normal. PERRL. CV: S1, S2.  RRR. No murmurs/rubs. No thrills palpated. No carotid bruits. Intact distal pulses. No edema. Pulm: No abnormalities on inspection. Bilateral expiratory wheezes but no crackles Normal effort. GI: Soft, nontender, nondistended. Normal active bowel sounds. No  masses on palpation. No hepatosplenomegaly.

## 2020-02-26 NOTE — PATIENT INSTRUCTIONS
Wheezing or Bronchoconstriction: Care Instructions  Your Care Instructions  Wheezing is a whistling noise made during breathing. It occurs when the small airways, or bronchial tubes, that lead to your lungs swell or contract (spasm) and become narrow. This narrowing is called bronchoconstriction. When your airways constrict, it is hard for air to pass through and this makes it hard for you to breathe. Wheezing and bronchoconstriction can be caused by many problems, including:  · An infection such as the flu or a cold. · Allergies such as hay fever. · Diseases such as asthma or chronic obstructive pulmonary disease. · Smoking. Treatment for your wheezing depends on what is causing the problem. Your wheezing may get better without treatment. But you may need to pay attention to things that cause your wheezing and avoid them. Or you may need medicine to help treat the wheezing and to reduce the swelling or to relieve spasms in your lungs. Follow-up care is a key part of your treatment and safety. Be sure to make and go to all appointments, and call your doctor if you are having problems. It is also a good idea to know your test results and keep a list of the medicines you take. How can you care for yourself at home? · Take your medicine exactly as prescribed. Call your doctor if you think you are having a problem with your medicine. You will get more details on the specific medicine your doctor prescribes. · If your doctor prescribed antibiotics, take them as directed. Do not stop taking them just because you feel better. You need to take the full course of antibiotics. · Breathe moist air from a humidifier, hot shower, or sink filled with hot water. This may help ease your symptoms and make it easier for you to breathe. · If you have congestion in your nose and throat, drinking plenty of fluids, especially hot fluids, may help relieve your symptoms.  If you have kidney, heart, or liver disease and have to limit fluids, talk with your doctor before you increase the amount of fluids you drink. · If you have mucus in your airways, it may help to breathe deeply and cough. · Do not smoke or allow others to smoke around you. Smoking can make your wheezing worse. If you need help quitting, talk to your doctor about stop-smoking programs and medicines. These can increase your chances of quitting for good. · Avoid things that may cause your wheezing. These may include colds, smoke, air pollution, dust, pollen, pets, cockroaches, stress, and cold air. When should you call for help? Call 911 anytime you think you may need emergency care. For example, call if:    · You have severe trouble breathing.     · You passed out (lost consciousness).    Call your doctor now or seek immediate medical care if:    · You cough up yellow, dark brown, or bloody mucus (sputum).     · You have new or worse shortness of breath.     · Your wheezing is not getting better or it gets worse after you start taking your medicine.    Watch closely for changes in your health, and be sure to contact your doctor if:    · You do not get better as expected. Where can you learn more? Go to http://jaqueline-vee.info/. Enter 454 4294 in the search box to learn more about \"Wheezing or Bronchoconstriction: Care Instructions. \"  Current as of: June 9, 2019  Content Version: 12.2  © 4562-8252 [employer]. Care instructions adapted under license by [employer] (which disclaims liability or warranty for this information). If you have questions about a medical condition or this instruction, always ask your healthcare professional. [place] [address] any warranty or liability for your use of this information. Shortness of Breath: Care Instructions  Your Care Instructions  Shortness of breath has many causes. Sometimes conditions such as anxiety can lead to shortness of breath.  Some people get mild shortness of breath when they exercise. Trouble breathing also can be a symptom of a serious problem, such as asthma, lung disease, emphysema, heart problems, and pneumonia. If your shortness of breath continues, you may need tests and treatment. Watch for any changes in your breathing and other symptoms. Follow-up care is a key part of your treatment and safety. Be sure to make and go to all appointments, and call your doctor if you are having problems. It's also a good idea to know your test results and keep a list of the medicines you take. How can you care for yourself at home? · Do not smoke or allow others to smoke around you. If you need help quitting, talk to your doctor about stop-smoking programs and medicines. These can increase your chances of quitting for good. · Get plenty of rest and sleep. · Take your medicines exactly as prescribed. Call your doctor if you think you are having a problem with your medicine. · Find healthy ways to deal with stress. ? Exercise daily. ? Get plenty of sleep. ? Eat regularly and well. When should you call for help? Call 911 anytime you think you may need emergency care. For example, call if:    · You have severe shortness of breath.     · You have symptoms of a heart attack. These may include:  ? Chest pain or pressure, or a strange feeling in the chest.  ? Sweating. ? Shortness of breath. ? Nausea or vomiting. ? Pain, pressure, or a strange feeling in the back, neck, jaw, or upper belly or in one or both shoulders or arms. ? Lightheadedness or sudden weakness. ? A fast or irregular heartbeat. After you call 911, the  may tell you to chew 1 adult-strength or 2 to 4 low-dose aspirin. Wait for an ambulance. Do not try to drive yourself.    Call your doctor now or seek immediate medical care if:    · Your shortness of breath gets worse or you start to wheeze.  Wheezing is a high-pitched sound when you breathe.     · You wake up at night out of breath or have to prop your head up on several pillows to breathe.     · You are short of breath after only light activity or while at rest.    Watch closely for changes in your health, and be sure to contact your doctor if:    · You do not get better over the next 1 to 2 days. Where can you learn more? Go to http://jaqueline-vee.info/. Enter S780 in the search box to learn more about \"Shortness of Breath: Care Instructions. \"  Current as of: June 9, 2019  Content Version: 12.2  © 4715-7589 [employer]. Care instructions adapted under license by [employer] (which disclaims liability or warranty for this information). If you have questions about a medical condition or this instruction, always ask your healthcare professional. [address] any warranty or liability for your use of this information.

## 2020-02-28 ENCOUNTER — OFFICE VISIT (OUTPATIENT)
Dept: FAMILY MEDICINE CLINIC | Age: 50
End: 2020-02-28

## 2020-02-28 VITALS
DIASTOLIC BLOOD PRESSURE: 80 MMHG | HEART RATE: 76 BPM | RESPIRATION RATE: 16 BRPM | BODY MASS INDEX: 40.48 KG/M2 | HEIGHT: 62 IN | WEIGHT: 220 LBS | OXYGEN SATURATION: 94 % | TEMPERATURE: 97.1 F | SYSTOLIC BLOOD PRESSURE: 134 MMHG

## 2020-02-28 DIAGNOSIS — E66.01 OBESITY, MORBID (HCC): ICD-10-CM

## 2020-02-28 RX ORDER — ALBUTEROL SULFATE 90 UG/1
2 AEROSOL, METERED RESPIRATORY (INHALATION)
COMMUNITY
End: 2020-04-22 | Stop reason: SDUPTHER

## 2020-02-28 RX ORDER — SULFAMETHOXAZOLE AND TRIMETHOPRIM 800; 160 MG/1; MG/1
1 TABLET ORAL 2 TIMES DAILY
Qty: 14 TAB | Refills: 0 | Status: SHIPPED | OUTPATIENT
Start: 2020-02-28 | End: 2020-03-06

## 2020-02-28 NOTE — PATIENT INSTRUCTIONS
Please add the advair discus one puff twice per day for 2 weeks. Sulfamethoxazole/Trimethoprim (By mouth) Sulfamethoxazole (sul-fa-meth-OX-a-zole), Trimethoprim (trye-METH-oh-prim) Treats or prevents infections. Brand Name(s): Bactrim, Bactrim DS, SMZ-TMP Pediatric, Sulfatrim, Sulfatrim Pediatric There may be other brand names for this medicine. When This Medicine Should Not Be Used: This medicine is not right for everyone. Do not use it if you had an allergic reaction to trimethoprim, sulfamethoxazole, or any sulfa drug. Do not use this medicine if you are pregnant, if you have anemia caused by low levels of folic acid, or if you have a history of drug-induced thrombocytopenia. How to Use This Medicine:  
Liquid, Tablet · Your doctor will tell you how much medicine to use. Do not use more than directed. · Measure the oral liquid medicine with a marked measuring spoon, oral syringe, or medicine cup. · Drink extra fluids so you will urinate more often and help prevent kidney problems. · Take all of the medicine in your prescription to clear up your infection, even if you feel better after the first few doses. · Missed dose: Take a dose as soon as you remember. If it is almost time for your next dose, wait until then and take a regular dose. Do not take extra medicine to make up for a missed dose. · Store the medicine in a closed container at room temperature, away from heat, moisture, and direct light. Do not freeze the oral liquid. Drugs and Foods to Avoid: Ask your doctor or pharmacist before using any other medicine, including over-the-counter medicines, vitamins, and herbal products. · Some medicines can affect how this medicine works. Tell your doctor if you also use the following:  
¨ amantadine, cyclosporine, digoxin, indomethacin, memantine, methotrexate, phenytoin, pyrimethamine, or warfarin ¨ an ACE inhibitor, diabetes medicine (glipizide, glyburide, metformin, pioglitazone, repaglinide, rosiglitazone), a diuretic (water pill, such as hydrochlorothiazide), or a tricyclic antidepressant Warnings While Using This Medicine: · It is not safe to take this medicine during pregnancy. It could harm an unborn baby. Tell your doctor right away if you become pregnant. · Tell your doctor if you are breastfeeding, or if you have kidney disease, liver disease, diabetes, malabsorption or malnutrition, folate deficiency, porphyria, thyroid problems, or a history of alcoholism. Tell your doctor if you have asthma or severe allergies, especially if you are allergic to any medicines. It is important for your doctor to know if you have HIV or AIDS, because this medicine might work differently for you. · This medicine may cause a severe allergic reaction. · This medicine may lower the number of platelets in your body, which are necessary for proper blood clotting. This may cause you to bleed or get infections more easily. Talk with your doctor if you have concerns about this. · This medicine can cause diarrhea. Call your doctor if the diarrhea becomes severe, does not stop, or is bloody. Do not take any medicine to stop diarrhea until you have talked to your doctor. Diarrhea can occur 2 months or more after you stop taking this medicine. · Tell any doctor or dentist who treats you that you are using this medicine. This medicine may affect certain medical test results. · Your doctor will do lab tests at regular visits to check on the effects of this medicine. Keep all appointments. · Keep all medicine out of the reach of children. Never share your medicine with anyone. Possible Side Effects While Using This Medicine:  
Call your doctor right away if you notice any of these side effects: · Allergic reaction: Itching or hives, swelling in your face or hands, swelling or tingling in your mouth or throat, chest tightness, trouble breathing · Blistering, peeling, or red skin rash · Dark urine or pale stools, nausea, vomiting, loss of appetite, stomach pain, yellow skin or eyes · Chest pain, cough, or trouble breathing · Confusion, weakness · Muscle twitching · Severe diarrhea, stomach pain, cramps, bloating · Skin rash, purple spots on your skin, or very pale or yellow skin · Sore throat, fever, muscle pain · Uneven heartbeat, numbness or tingling in your hands, feet, or lips · Unusual bleeding, bruising, or weakness If you notice these less serious side effects, talk with your doctor: · Mild nausea, vomiting, or loss of appetite If you notice other side effects that you think are caused by this medicine, tell your doctor. Call your doctor for medical advice about side effects. You may report side effects to FDA at 0-609-SHG-5000 © 2017 [facility] Information is for End User's use only and may not be sold, redistributed or otherwise used for commercial purposes. The above information is an  only. It is not intended as medical advice for individual conditions or treatments. Talk to your doctor, nurse or pharmacist before following any medical regimen to see if it is safe and effective for you. Reactive Airway Disease: Care Instructions Your Care Instructions Reactive airway disease is a breathing problem that appears as wheezing, a whistling noise in your airways. It may be caused by a viral or bacterial infection, allergies, tobacco smoke, or something else in the environment. When you are around these triggers, your body releases chemicals that make the airways get tight. Reactive airway disease is a lot like asthma. Both can cause wheezing. But asthma is ongoing, while reactive airway disease may occur only now and then. Tests can be done to tell whether you have asthma. You may take the same medicines used to treat asthma. Good home care and follow-up care with your doctor can help you recover. Follow-up care is a key part of your treatment and safety. Be sure to make and go to all appointments, and call your doctor if you are having problems. It's also a good idea to know your test results and keep a list of the medicines you take. How can you care for yourself at home? · Take your medicines exactly as prescribed. Call your doctor if you think you are having a problem with your medicine. · Do not smoke or allow others to smoke around you. If you need help quitting, talk to your doctor about stop-smoking programs and medicines. These can increase your chances of quitting for good. · If you know what caused your wheezing (such as perfume or the odor of household chemicals), try to avoid it in the future. · Wash your hands several times a day, and consider using hand gels or wipes that contain alcohol. This can prevent colds and other infections. When should you call for help? Call 911 anytime you think you may need emergency care. For example, call if: 
  · You have severe trouble breathing.  
 Watch closely for changes in your health, and be sure to contact your doctor if: 
  · You cough up yellow, dark brown, or bloody mucus.  
  · You have a fever.  
  · Your wheezing gets worse. Where can you learn more? Go to http://jaqueline-vee.info/. Enter G498 in the search box to learn more about \"Reactive Airway Disease: Care Instructions. \" Current as of: June 9, 2019 Content Version: 12.2 © 3239-8194 [employer]. Care instructions adapted under license by [employer] (which disclaims liability or warranty for this information). If you have questions about a medical condition or this instruction, always ask your healthcare professional. [place] [address] any warranty or liability for your use of this information.

## 2020-02-28 NOTE — PROGRESS NOTES
Stephanie Corrales is a 52 y.o. female and presents with Follow Up Chronic Condition; Wheezing (coughing. ); Shortness of Breath (no changes. ); Other (h/0 DVT); Results (labs); and Sore Throat (dry throat)       Subjective:  Continues to feel shortness of breath but wheezing has improved. Throat is mildly sore but no other new symptoms. No fevers or chills no chest pain. No lower extremity edema. She still feels unable to return to work at [employer]      Assessment/Plan:    Wells score only 1.5 (I feel wheezing/reactive airway is better explanation for sob)-we will continue to treat as reactive airway disease. She has Advair from her  at home and will use this for 2 weeks. She will return for any new symptoms or worsening. We will give course Bactrim given ongoing shortness of breath but chest x-ray did not reveal pneumonia. Oxygen levels remain acceptable today      Diagnoses and all orders for this visit:    1. Obesity, morbid ([address]    Other orders  -     trimethoprim-sulfamethoxazole (BACTRIM DS, SEPTRA DS) 160-800 mg per tablet; Take 1 Tab by mouth two (2) times a day for 7 days. RTC in   Orders Placed This Encounter    albuterol (PROVENTIL HFA, VENTOLIN HFA, PROAIR HFA) 90 mcg/actuation inhaler     Sig: Take 2 Puffs by inhalation every four (4) hours as needed for Wheezing.  trimethoprim-sulfamethoxazole (BACTRIM DS, SEPTRA DS) 160-800 mg per tablet     Sig: Take 1 Tab by mouth two (2) times a day for 7 days. Dispense:  14 Tab     Refill:  0               ROS:  Negative except as mentioned above  Cardiac- no chest pain or palpitations  GI- no n/v or diarrhea.       SH:  Social History     Tobacco Use    Smoking status: Former Smoker     Packs/day: 1.00     Years: 35.00     Pack years: 35.00     Last attempt to quit: 2020     Years since quittin.0    Smokeless tobacco: Former User     Quit date: 2017    Tobacco comment: pt states she smoked about 1.5 packs a day for 30-35 years and quit on 2-1-17   Substance Use Topics    Alcohol use: Yes     Alcohol/week: 4.0 standard drinks     Types: 4 Shots of liquor per week    Drug use: No         Medications/Allergies:  Current Outpatient Medications on File Prior to Visit   Medication Sig Dispense Refill    albuterol (PROVENTIL HFA, VENTOLIN HFA, PROAIR HFA) 90 mcg/actuation inhaler Take 2 Puffs by inhalation every four (4) hours as needed for Wheezing.  methylPREDNISolone (MEDROL DOSEPACK) 4 mg tablet Use as per package directions 1 Dose Pack 0    benzonatate (TESSALON) 100 mg capsule Take 1 Cap by mouth three (3) times daily as needed for Cough for up to 7 days. 30 Cap 0    doxycycline (MONODOX) 100 mg capsule Take 1 Cap by mouth every twelve (12) hours for 5 days. 10 Cap 0     No current facility-administered medications on file prior to visit. Allergies   Allergen Reactions    Codeine Hives    Pcn [Penicillins] Shortness of Breath     Tongue swelling         Objective:  Visit Vitals  /80   Pulse 76   Temp 97.1 °F (36.2 °C) (Oral)   Resp 16   Ht 5' 2\" (1.575 m)   Wt 220 lb (99.8 kg)   SpO2 94%   BMI 40.24 kg/m²    Body mass index is 40.24 kg/m². Constitutional: Well developed, nourished, no distress, alert, nontoxic   HENT: Exterior ears and tympanic membranes normal bilaterally. Supple neck. No thyromegaly or lymphadenopathy. Oropharynx clear and moist mucous membranes. Eyes: Conjunctiva normal. PERRL. CV: S1, S2.  RRR. No murmurs/rubs. No thrills palpated. No carotid bruits. Intact distal pulses. No edema. Pulm: No abnormalities on inspection. Clear to auscultation bilaterally. No wheezing/rhonchi. Normal effort. GI: Soft, nontender, nondistended. Normal active bowel sounds. No  masses on palpation. No hepatosplenomegaly.

## 2020-02-28 NOTE — PROGRESS NOTES
1. Have you been to the ER, urgent care clinic since your last visit? Hospitalized since your last visit? No.     2. Have you seen or consulted any other health care providers outside of the [address] [place] [address] since your last visit? Include any pap smears or colon screening. No.     Chief Complaint   Patient presents with    Follow Up Chronic Condition    Wheezing     coughing.  Shortness of Breath     no changes.      Other     h/0 DVT    Results     labs    Sore Throat     dry throat

## 2020-02-29 LAB
BACTERIA SPEC CULT: NORMAL
BACTERIA SPEC CULT: NORMAL
SERVICE CMNT-IMP: NORMAL
SERVICE CMNT-IMP: NORMAL

## 2020-03-14 NOTE — DISCHARGE SUMMARY
[address]    Name:  Werner Bang  MR#:   [id number]  :  1970  ACCOUNT #:  [de-identified]  ADMIT DATE:  2020  DISCHARGE DATE:  2020    ADMITTING DIAGNOSES:  Acute bronchitis. HISTORY OF PRESENT ILLNESS:  The patient is a 63-year-old female who presented to the emergency department with shortness of breath. This has been going on for about 3 weeks prior to admission. It worsened significantly on the day of admission. In the emergency department, she had tachypnea and was using accessory muscles for breathing. She was admitted for ongoing management. HOSPITAL COURSE:  The patient was treated with antibiotics and IV fluids. Her respiratory status improved rapidly. Her tachycardia also improved. By , she was considered to be ready for discharge. She was discharged home. DISCHARGE DIAGNOSES:  1. Bronchitis, improving. 2.  Tachycardia, improved. CONDITION ON DISCHARGE:  Improved. ACTIVITY:  Ad alyse. FOLLOWUP:  Followup is with her primary care provider in one week, the patient will arrange. DISCHARGE MEDICATIONS:  1. Tessalon 100 mg by mouth three times daily as needed for cough. 2.  Doxycycline 100 mg by mouth two times daily for five days. DIET:  Regular. DISPOSITION:  Home.     Total Discharge time 35 minutes      Jeni Faria MD      [id number]/  D:  2020 22:08  T:  2020 4:35  JOB #:  [id number]  CC:  Lore Osorio MD

## 2020-03-15 ENCOUNTER — HOSPITAL ENCOUNTER (INPATIENT)
Age: 50
LOS: 2 days | Discharge: HOME | DRG: 246 | End: 2020-03-17
Attending: EMERGENCY MEDICINE | Admitting: HOSPITALIST
Payer: SELF-PAY

## 2020-03-15 ENCOUNTER — APPOINTMENT (OUTPATIENT)
Dept: GENERAL RADIOLOGY | Age: 50
DRG: 246 | End: 2020-03-15
Attending: PHYSICIAN ASSISTANT
Payer: SELF-PAY

## 2020-03-15 DIAGNOSIS — I21.09 ST ELEVATION MYOCARDIAL INFARCTION (STEMI) OF ANTERIOR WALL (HCC): Primary | ICD-10-CM

## 2020-03-15 DIAGNOSIS — R77.8 ELEVATED TROPONIN I LEVEL: ICD-10-CM

## 2020-03-15 DIAGNOSIS — R07.9 ACUTE CHEST PAIN: ICD-10-CM

## 2020-03-15 DIAGNOSIS — I21.3 STEMI (ST ELEVATION MYOCARDIAL INFARCTION) (HCC): ICD-10-CM

## 2020-03-15 PROBLEM — Z72.0 TOBACCO USE: Status: ACTIVE | Noted: 2020-03-15

## 2020-03-15 PROBLEM — I10 HYPERTENSION: Status: ACTIVE | Noted: 2017-05-02

## 2020-03-15 LAB
ALBUMIN SERPL-MCNC: 3.6 G/DL (ref 3.4–5)
ALBUMIN SERPL-MCNC: 3.7 G/DL (ref 3.4–5)
ALBUMIN/GLOB SERPL: 0.9 {RATIO} (ref 0.8–1.7)
ALBUMIN/GLOB SERPL: 0.9 {RATIO} (ref 0.8–1.7)
ALP SERPL-CCNC: 106 U/L (ref 45–117)
ALP SERPL-CCNC: 107 U/L (ref 45–117)
ALT SERPL-CCNC: 50 U/L (ref 13–56)
ALT SERPL-CCNC: 50 U/L (ref 13–56)
ANION GAP SERPL CALC-SCNC: 5 MMOL/L (ref 3–18)
ANION GAP SERPL CALC-SCNC: 8 MMOL/L (ref 3–18)
APPEARANCE UR: CLEAR
AST SERPL-CCNC: 46 U/L (ref 10–38)
AST SERPL-CCNC: 49 U/L (ref 10–38)
BASOPHILS # BLD: 0.1 K/UL (ref 0–0.1)
BASOPHILS NFR BLD: 1 % (ref 0–2)
BILIRUB DIRECT SERPL-MCNC: 0.1 MG/DL (ref 0–0.2)
BILIRUB SERPL-MCNC: 0.5 MG/DL (ref 0.2–1)
BILIRUB SERPL-MCNC: 0.5 MG/DL (ref 0.2–1)
BILIRUB UR QL: NEGATIVE
BUN SERPL-MCNC: 7 MG/DL (ref 7–18)
BUN SERPL-MCNC: 9 MG/DL (ref 7–18)
BUN/CREAT SERPL: 12 (ref 12–20)
BUN/CREAT SERPL: 9 (ref 12–20)
CALCIUM SERPL-MCNC: 8.3 MG/DL (ref 8.5–10.1)
CALCIUM SERPL-MCNC: 9.1 MG/DL (ref 8.5–10.1)
CHLORIDE SERPL-SCNC: 104 MMOL/L (ref 100–111)
CHLORIDE SERPL-SCNC: 108 MMOL/L (ref 100–111)
CO2 SERPL-SCNC: 21 MMOL/L (ref 21–32)
CO2 SERPL-SCNC: 26 MMOL/L (ref 21–32)
COLOR UR: YELLOW
CREAT SERPL-MCNC: 0.57 MG/DL (ref 0.6–1.3)
CREAT SERPL-MCNC: 1.03 MG/DL (ref 0.6–1.3)
DIFFERENTIAL METHOD BLD: ABNORMAL
EOSINOPHIL # BLD: 0.1 K/UL (ref 0–0.4)
EOSINOPHIL NFR BLD: 1 % (ref 0–5)
ERYTHROCYTE [DISTWIDTH] IN BLOOD BY AUTOMATED COUNT: 15.1 % (ref 11.6–14.5)
ERYTHROCYTE [DISTWIDTH] IN BLOOD BY AUTOMATED COUNT: 15.2 % (ref 11.6–14.5)
GLOBULIN SER CALC-MCNC: 3.8 G/DL (ref 2–4)
GLOBULIN SER CALC-MCNC: 4.1 G/DL (ref 2–4)
GLUCOSE SERPL-MCNC: 117 MG/DL (ref 74–99)
GLUCOSE SERPL-MCNC: 85 MG/DL (ref 74–99)
GLUCOSE UR STRIP.AUTO-MCNC: NEGATIVE MG/DL
HCG SERPL-ACNC: <1 MIU/ML (ref 0–10)
HCG UR QL: NEGATIVE
HCT VFR BLD AUTO: 41.9 % (ref 35–45)
HCT VFR BLD AUTO: 44.4 % (ref 35–45)
HGB BLD-MCNC: 14 G/DL (ref 12–16)
HGB BLD-MCNC: 14.6 G/DL (ref 12–16)
HGB UR QL STRIP: NEGATIVE
KETONES UR QL STRIP.AUTO: NEGATIVE MG/DL
LEUKOCYTE ESTERASE UR QL STRIP.AUTO: NEGATIVE
LYMPHOCYTES # BLD: 2 K/UL (ref 0.9–3.6)
LYMPHOCYTES NFR BLD: 20 % (ref 21–52)
MCH RBC QN AUTO: 29.3 PG (ref 24–34)
MCH RBC QN AUTO: 29.7 PG (ref 24–34)
MCHC RBC AUTO-ENTMCNC: 32.9 G/DL (ref 31–37)
MCHC RBC AUTO-ENTMCNC: 33.4 G/DL (ref 31–37)
MCV RBC AUTO: 88.8 FL (ref 74–97)
MCV RBC AUTO: 89.2 FL (ref 74–97)
MONOCYTES # BLD: 0.8 K/UL (ref 0.05–1.2)
MONOCYTES NFR BLD: 8 % (ref 3–10)
NEUTS SEG # BLD: 7.2 K/UL (ref 1.8–8)
NEUTS SEG NFR BLD: 70 % (ref 40–73)
NITRITE UR QL STRIP.AUTO: NEGATIVE
PH UR STRIP: 6.5 [PH] (ref 5–8)
PLATELET # BLD AUTO: 223 K/UL (ref 135–420)
PLATELET # BLD AUTO: 242 K/UL (ref 135–420)
PMV BLD AUTO: 10.2 FL (ref 9.2–11.8)
PMV BLD AUTO: 10.4 FL (ref 9.2–11.8)
POTASSIUM SERPL-SCNC: 3.9 MMOL/L (ref 3.5–5.5)
POTASSIUM SERPL-SCNC: 4 MMOL/L (ref 3.5–5.5)
PROT SERPL-MCNC: 7.4 G/DL (ref 6.4–8.2)
PROT SERPL-MCNC: 7.8 G/DL (ref 6.4–8.2)
PROT UR STRIP-MCNC: NEGATIVE MG/DL
RBC # BLD AUTO: 4.72 M/UL (ref 4.2–5.3)
RBC # BLD AUTO: 4.98 M/UL (ref 4.2–5.3)
SODIUM SERPL-SCNC: 135 MMOL/L (ref 136–145)
SODIUM SERPL-SCNC: 137 MMOL/L (ref 136–145)
SP GR UR REFRACTOMETRY: >1.03 (ref 1–1.03)
TROPONIN I BLD-MCNC: 0.93 NG/ML (ref 0–0.08)
TROPONIN I SERPL-MCNC: 2.8 NG/ML (ref 0–0.04)
TROPONIN I SERPL-MCNC: 74.6 NG/ML (ref 0–0.04)
TROPONIN I SERPL-MCNC: 90.6 NG/ML (ref 0–0.04)
TSH SERPL DL<=0.05 MIU/L-ACNC: 1.2 UIU/ML (ref 0.36–3.74)
UROBILINOGEN UR QL STRIP.AUTO: 0.2 EU/DL (ref 0.2–1)
WBC # BLD AUTO: 10.1 K/UL (ref 4.6–13.2)
WBC # BLD AUTO: 11 K/UL (ref 4.6–13.2)

## 2020-03-15 PROCEDURE — 77030013761 HC KT HRT LFT ANGI -B: Performed by: INTERNAL MEDICINE

## 2020-03-15 PROCEDURE — C1874 STENT, COATED/COV W/DEL SYS: HCPCS | Performed by: INTERNAL MEDICINE

## 2020-03-15 PROCEDURE — 74011000250 HC RX REV CODE- 250: Performed by: INTERNAL MEDICINE

## 2020-03-15 PROCEDURE — 84443 ASSAY THYROID STIM HORMONE: CPT

## 2020-03-15 PROCEDURE — 85025 COMPLETE CBC W/AUTO DIFF WBC: CPT

## 2020-03-15 PROCEDURE — 74011250637 HC RX REV CODE- 250/637: Performed by: PHYSICIAN ASSISTANT

## 2020-03-15 PROCEDURE — 74011636320 HC RX REV CODE- 636/320: Performed by: INTERNAL MEDICINE

## 2020-03-15 PROCEDURE — 74011250637 HC RX REV CODE- 250/637: Performed by: INTERNAL MEDICINE

## 2020-03-15 PROCEDURE — B2151ZZ FLUOROSCOPY OF LEFT HEART USING LOW OSMOLAR CONTRAST: ICD-10-PCS | Performed by: INTERNAL MEDICINE

## 2020-03-15 PROCEDURE — 74011250636 HC RX REV CODE- 250/636: Performed by: INTERNAL MEDICINE

## 2020-03-15 PROCEDURE — 99152 MOD SED SAME PHYS/QHP 5/>YRS: CPT | Performed by: INTERNAL MEDICINE

## 2020-03-15 PROCEDURE — 77030012597: Performed by: INTERNAL MEDICINE

## 2020-03-15 PROCEDURE — 4A023N7 MEASUREMENT OF CARDIAC SAMPLING AND PRESSURE, LEFT HEART, PERCUTANEOUS APPROACH: ICD-10-PCS | Performed by: INTERNAL MEDICINE

## 2020-03-15 PROCEDURE — 77030016699 HC CATH ANGI DX INFN1 CARD -A: Performed by: INTERNAL MEDICINE

## 2020-03-15 PROCEDURE — 74011250636 HC RX REV CODE- 250/636

## 2020-03-15 PROCEDURE — 77030013797 HC KT TRNSDUC PRSSR EDWD -A: Performed by: INTERNAL MEDICINE

## 2020-03-15 PROCEDURE — 81003 URINALYSIS AUTO W/O SCOPE: CPT

## 2020-03-15 PROCEDURE — 84702 CHORIONIC GONADOTROPIN TEST: CPT

## 2020-03-15 PROCEDURE — 65660000001 HC RM ICU INTERMED STEPDOWN

## 2020-03-15 PROCEDURE — 80053 COMPREHEN METABOLIC PANEL: CPT

## 2020-03-15 PROCEDURE — 36415 COLL VENOUS BLD VENIPUNCTURE: CPT

## 2020-03-15 PROCEDURE — 77030019905 HC CATH URETH INTMIT MDII -A

## 2020-03-15 PROCEDURE — C1769 GUIDE WIRE: HCPCS | Performed by: INTERNAL MEDICINE

## 2020-03-15 PROCEDURE — 81025 URINE PREGNANCY TEST: CPT

## 2020-03-15 PROCEDURE — 85027 COMPLETE CBC AUTOMATED: CPT

## 2020-03-15 PROCEDURE — 96374 THER/PROPH/DIAG INJ IV PUSH: CPT

## 2020-03-15 PROCEDURE — 80076 HEPATIC FUNCTION PANEL: CPT

## 2020-03-15 PROCEDURE — 74011250637 HC RX REV CODE- 250/637: Performed by: EMERGENCY MEDICINE

## 2020-03-15 PROCEDURE — 84484 ASSAY OF TROPONIN QUANT: CPT

## 2020-03-15 PROCEDURE — 77030008543 HC TBNG MON PRSS MRTM -A: Performed by: INTERNAL MEDICINE

## 2020-03-15 PROCEDURE — 51798 US URINE CAPACITY MEASURE: CPT

## 2020-03-15 PROCEDURE — C1894 INTRO/SHEATH, NON-LASER: HCPCS | Performed by: INTERNAL MEDICINE

## 2020-03-15 PROCEDURE — 92941 PRQ TRLML REVSC TOT OCCL AMI: CPT | Performed by: INTERNAL MEDICINE

## 2020-03-15 PROCEDURE — 99153 MOD SED SAME PHYS/QHP EA: CPT | Performed by: INTERNAL MEDICINE

## 2020-03-15 PROCEDURE — 74011250636 HC RX REV CODE- 250/636: Performed by: EMERGENCY MEDICINE

## 2020-03-15 PROCEDURE — 77030012468 HC VLV BLEEDBK CNTRL ABBT -B: Performed by: INTERNAL MEDICINE

## 2020-03-15 PROCEDURE — 71045 X-RAY EXAM CHEST 1 VIEW: CPT

## 2020-03-15 PROCEDURE — 74011250637 HC RX REV CODE- 250/637: Performed by: HOSPITALIST

## 2020-03-15 PROCEDURE — C1725 CATH, TRANSLUMIN NON-LASER: HCPCS | Performed by: INTERNAL MEDICINE

## 2020-03-15 PROCEDURE — C1760 CLOSURE DEV, VASC: HCPCS | Performed by: INTERNAL MEDICINE

## 2020-03-15 PROCEDURE — 93454 CORONARY ARTERY ANGIO S&I: CPT | Performed by: INTERNAL MEDICINE

## 2020-03-15 PROCEDURE — 77030003629 HC NDL PERC VASC COOK -A: Performed by: INTERNAL MEDICINE

## 2020-03-15 PROCEDURE — 93005 ELECTROCARDIOGRAM TRACING: CPT

## 2020-03-15 PROCEDURE — 027035Z DILATION OF CORONARY ARTERY, ONE ARTERY WITH TWO DRUG-ELUTING INTRALUMINAL DEVICES, PERCUTANEOUS APPROACH: ICD-10-PCS | Performed by: INTERNAL MEDICINE

## 2020-03-15 PROCEDURE — B2111ZZ FLUOROSCOPY OF MULTIPLE CORONARY ARTERIES USING LOW OSMOLAR CONTRAST: ICD-10-PCS | Performed by: INTERNAL MEDICINE

## 2020-03-15 PROCEDURE — 77030013519 HC DEV INFL BASIX MRTM -B: Performed by: INTERNAL MEDICINE

## 2020-03-15 PROCEDURE — 74011000258 HC RX REV CODE- 258: Performed by: INTERNAL MEDICINE

## 2020-03-15 PROCEDURE — 96375 TX/PRO/DX INJ NEW DRUG ADDON: CPT

## 2020-03-15 PROCEDURE — C1887 CATHETER, GUIDING: HCPCS | Performed by: INTERNAL MEDICINE

## 2020-03-15 PROCEDURE — 99285 EMERGENCY DEPT VISIT HI MDM: CPT

## 2020-03-15 DEVICE — XIENCE SIERRA™ EVEROLIMUS ELUTING CORONARY STENT SYSTEM 3.00 MM X 33 MM / RAPID-EXCHANGE
Type: IMPLANTABLE DEVICE | Status: FUNCTIONAL
Brand: XIENCE SIERRA™

## 2020-03-15 DEVICE — XIENCE SIERRA™ EVEROLIMUS ELUTING CORONARY STENT SYSTEM 3.50 MM X 18 MM / RAPID-EXCHANGE
Type: IMPLANTABLE DEVICE | Status: FUNCTIONAL
Brand: XIENCE SIERRA™

## 2020-03-15 DEVICE — XIENCE SIERRA™ EVEROLIMUS ELUTING CORONARY STENT SYSTEM 3.00 MM X 18 MM / RAPID-EXCHANGE
Type: IMPLANTABLE DEVICE | Status: FUNCTIONAL
Brand: XIENCE SIERRA™

## 2020-03-15 RX ORDER — SODIUM CHLORIDE 450 MG/100ML
150 INJECTION, SOLUTION INTRAVENOUS CONTINUOUS
Status: DISPENSED | OUTPATIENT
Start: 2020-03-15 | End: 2020-03-15

## 2020-03-15 RX ORDER — SODIUM CHLORIDE 0.9 % (FLUSH) 0.9 %
5-40 SYRINGE (ML) INJECTION AS NEEDED
Status: DISCONTINUED | OUTPATIENT
Start: 2020-03-15 | End: 2020-03-17 | Stop reason: HOSPADM

## 2020-03-15 RX ORDER — ATORVASTATIN CALCIUM 80 MG/1
80 TABLET, FILM COATED ORAL
Status: DISCONTINUED | OUTPATIENT
Start: 2020-03-15 | End: 2020-03-17 | Stop reason: HOSPADM

## 2020-03-15 RX ORDER — HEPARIN SODIUM 1000 [USP'U]/ML
4000 INJECTION, SOLUTION INTRAVENOUS; SUBCUTANEOUS ONCE
Status: COMPLETED | OUTPATIENT
Start: 2020-03-15 | End: 2020-03-15

## 2020-03-15 RX ORDER — METOPROLOL SUCCINATE 25 MG/1
25 TABLET, EXTENDED RELEASE ORAL ONCE
Status: COMPLETED | OUTPATIENT
Start: 2020-03-15 | End: 2020-03-15

## 2020-03-15 RX ORDER — METOPROLOL SUCCINATE 50 MG/1
50 TABLET, EXTENDED RELEASE ORAL DAILY
Status: DISCONTINUED | OUTPATIENT
Start: 2020-03-16 | End: 2020-03-17 | Stop reason: HOSPADM

## 2020-03-15 RX ORDER — MORPHINE SULFATE 2 MG/ML
2 INJECTION, SOLUTION INTRAMUSCULAR; INTRAVENOUS ONCE
Status: ACTIVE | OUTPATIENT
Start: 2020-03-15 | End: 2020-03-16

## 2020-03-15 RX ORDER — NITROGLYCERIN 0.4 MG/1
0.4 TABLET SUBLINGUAL AS NEEDED
Status: DISCONTINUED | OUTPATIENT
Start: 2020-03-15 | End: 2020-03-17 | Stop reason: HOSPADM

## 2020-03-15 RX ORDER — HEPARIN SODIUM 200 [USP'U]/100ML
INJECTION, SOLUTION INTRAVENOUS AS NEEDED
Status: DISCONTINUED | OUTPATIENT
Start: 2020-03-15 | End: 2020-03-15 | Stop reason: HOSPADM

## 2020-03-15 RX ORDER — NITROGLYCERIN 400 UG/1
1 SPRAY ORAL
Status: DISPENSED | OUTPATIENT
Start: 2020-03-15 | End: 2020-03-16

## 2020-03-15 RX ORDER — MORPHINE SULFATE 2 MG/ML
2-4 INJECTION, SOLUTION INTRAMUSCULAR; INTRAVENOUS
Status: DISCONTINUED | OUTPATIENT
Start: 2020-03-15 | End: 2020-03-17 | Stop reason: HOSPADM

## 2020-03-15 RX ORDER — MORPHINE SULFATE 2 MG/ML
2 INJECTION, SOLUTION INTRAMUSCULAR; INTRAVENOUS ONCE
Status: COMPLETED | OUTPATIENT
Start: 2020-03-15 | End: 2020-03-15

## 2020-03-15 RX ORDER — NALOXONE HYDROCHLORIDE 0.4 MG/ML
0.4 INJECTION, SOLUTION INTRAMUSCULAR; INTRAVENOUS; SUBCUTANEOUS AS NEEDED
Status: DISCONTINUED | OUTPATIENT
Start: 2020-03-15 | End: 2020-03-17 | Stop reason: HOSPADM

## 2020-03-15 RX ORDER — HEPARIN SODIUM 1000 [USP'U]/ML
INJECTION, SOLUTION INTRAVENOUS; SUBCUTANEOUS
Status: COMPLETED
Start: 2020-03-15 | End: 2020-03-15

## 2020-03-15 RX ORDER — MORPHINE SULFATE 2 MG/ML
INJECTION, SOLUTION INTRAMUSCULAR; INTRAVENOUS
Status: COMPLETED
Start: 2020-03-15 | End: 2020-03-15

## 2020-03-15 RX ORDER — ASPIRIN 325 MG
325 TABLET ORAL
Status: COMPLETED | OUTPATIENT
Start: 2020-03-15 | End: 2020-03-15

## 2020-03-15 RX ORDER — ADHESIVE BANDAGE
30 BANDAGE TOPICAL DAILY PRN
Status: DISCONTINUED | OUTPATIENT
Start: 2020-03-15 | End: 2020-03-17 | Stop reason: HOSPADM

## 2020-03-15 RX ORDER — GUAIFENESIN 100 MG/5ML
81 LIQUID (ML) ORAL DAILY
Status: DISCONTINUED | OUTPATIENT
Start: 2020-03-16 | End: 2020-03-17 | Stop reason: HOSPADM

## 2020-03-15 RX ORDER — BIVALIRUDIN 250 MG/5ML
INJECTION, POWDER, LYOPHILIZED, FOR SOLUTION INTRAVENOUS AS NEEDED
Status: DISCONTINUED | OUTPATIENT
Start: 2020-03-15 | End: 2020-03-15 | Stop reason: HOSPADM

## 2020-03-15 RX ORDER — FENTANYL CITRATE 50 UG/ML
25 INJECTION, SOLUTION INTRAMUSCULAR; INTRAVENOUS
Status: COMPLETED | OUTPATIENT
Start: 2020-03-15 | End: 2020-03-15

## 2020-03-15 RX ORDER — MIDAZOLAM HYDROCHLORIDE 1 MG/ML
INJECTION, SOLUTION INTRAMUSCULAR; INTRAVENOUS AS NEEDED
Status: DISCONTINUED | OUTPATIENT
Start: 2020-03-15 | End: 2020-03-15 | Stop reason: HOSPADM

## 2020-03-15 RX ORDER — DOCUSATE SODIUM 100 MG/1
100 CAPSULE, LIQUID FILLED ORAL 2 TIMES DAILY
Status: DISCONTINUED | OUTPATIENT
Start: 2020-03-15 | End: 2020-03-17 | Stop reason: HOSPADM

## 2020-03-15 RX ORDER — ASPIRIN 81 MG/1
81 TABLET ORAL DAILY
Status: DISCONTINUED | OUTPATIENT
Start: 2020-03-16 | End: 2020-03-15 | Stop reason: SDUPTHER

## 2020-03-15 RX ORDER — SODIUM CHLORIDE 0.9 % (FLUSH) 0.9 %
5-40 SYRINGE (ML) INJECTION EVERY 8 HOURS
Status: DISCONTINUED | OUTPATIENT
Start: 2020-03-15 | End: 2020-03-17 | Stop reason: HOSPADM

## 2020-03-15 RX ORDER — FENTANYL CITRATE 50 UG/ML
INJECTION, SOLUTION INTRAMUSCULAR; INTRAVENOUS AS NEEDED
Status: DISCONTINUED | OUTPATIENT
Start: 2020-03-15 | End: 2020-03-15 | Stop reason: HOSPADM

## 2020-03-15 RX ORDER — ACETAMINOPHEN 325 MG/1
650 TABLET ORAL
Status: DISCONTINUED | OUTPATIENT
Start: 2020-03-15 | End: 2020-03-17 | Stop reason: HOSPADM

## 2020-03-15 RX ORDER — LIDOCAINE HYDROCHLORIDE 10 MG/ML
INJECTION, SOLUTION EPIDURAL; INFILTRATION; INTRACAUDAL; PERINEURAL AS NEEDED
Status: DISCONTINUED | OUTPATIENT
Start: 2020-03-15 | End: 2020-03-15 | Stop reason: HOSPADM

## 2020-03-15 RX ORDER — MORPHINE SULFATE 2 MG/ML
2 INJECTION, SOLUTION INTRAMUSCULAR; INTRAVENOUS
Status: DISCONTINUED | OUTPATIENT
Start: 2020-03-15 | End: 2020-03-15

## 2020-03-15 RX ORDER — ACETAMINOPHEN 325 MG/1
650 TABLET ORAL
Status: DISCONTINUED | OUTPATIENT
Start: 2020-03-15 | End: 2020-03-15

## 2020-03-15 RX ORDER — NITROGLYCERIN 40 MG/100ML
0-25 INJECTION INTRAVENOUS
Status: DISCONTINUED | OUTPATIENT
Start: 2020-03-15 | End: 2020-03-17 | Stop reason: HOSPADM

## 2020-03-15 RX ADMIN — Medication 10 ML: at 21:06

## 2020-03-15 RX ADMIN — HEPARIN SODIUM 4000 UNITS: 1000 INJECTION, SOLUTION INTRAVENOUS; SUBCUTANEOUS at 10:45

## 2020-03-15 RX ADMIN — MORPHINE SULFATE 2 MG: 2 INJECTION, SOLUTION INTRAMUSCULAR; INTRAVENOUS at 21:00

## 2020-03-15 RX ADMIN — NITROGLYCERIN 10 MCG/MIN: 40 INJECTION INTRAVENOUS at 13:36

## 2020-03-15 RX ADMIN — MORPHINE SULFATE 2 MG: 2 INJECTION, SOLUTION INTRAMUSCULAR; INTRAVENOUS at 16:33

## 2020-03-15 RX ADMIN — METOPROLOL SUCCINATE 25 MG: 25 TABLET, EXTENDED RELEASE ORAL at 16:03

## 2020-03-15 RX ADMIN — MORPHINE SULFATE 2 MG: 2 INJECTION, SOLUTION INTRAMUSCULAR; INTRAVENOUS at 16:03

## 2020-03-15 RX ADMIN — NITROGLYCERIN 0.4 MG: 0.4 TABLET, ORALLY DISINTEGRATING SUBLINGUAL at 10:59

## 2020-03-15 RX ADMIN — Medication 10 ML: at 13:20

## 2020-03-15 RX ADMIN — ASPIRIN 325 MG ORAL TABLET 325 MG: 325 PILL ORAL at 10:32

## 2020-03-15 RX ADMIN — HEPARIN SODIUM 4000 UNITS: 1000 INJECTION INTRAVENOUS; SUBCUTANEOUS at 10:45

## 2020-03-15 RX ADMIN — ATORVASTATIN CALCIUM 80 MG: 80 TABLET, FILM COATED ORAL at 22:30

## 2020-03-15 RX ADMIN — FENTANYL CITRATE 25 MCG: 50 INJECTION INTRAMUSCULAR; INTRAVENOUS at 10:51

## 2020-03-15 RX ADMIN — ACETAMINOPHEN 650 MG: 325 TABLET, FILM COATED ORAL at 22:30

## 2020-03-15 NOTE — Clinical Note
Lesion: Located in the Mid RCA. Stent inserted. Multiple inflations used. First inflation pressure = 18 sanjay; inflation time: 15 sec.

## 2020-03-15 NOTE — CONSULTS
[facility] - Consult Note    Consultation request by Jaelyn Alvarez DO for advice/opinion related to evaluating STEMI (ST elevation myocardial infarction) [facility]) [I21.3]    Date of  Admission: 3/15/2020 10:28 AM   Primary Care Physician:  Joshua Collier MD     Assessment:     Patient Active Problem List   Diagnosis Code    Acute bronchitis J20.9    Hypokalemia E87.6    Obesity (BMI 30-39. 9) E66.9    Obesity, morbid (HCC) E66.01    STEMI (ST elevation myocardial infarction) (HCC) I21.3    Tobacco use Z72.0    Hypertension I10     Inferior STEMI  Dyslipidemia  Hypertension  Active tobacco use  Family history of CAD with recent death of her father from sudden cardiac death  History DVT     Plan:     She has multiple coronary risk factors including hypertension, hyperlipidemia, obesity, positive family history, and active tobacco use. She has ST elevations in the inferior leads consistent with active inferior STEMI. She has persistent chest pains. We will proceed with emergent heart catheterization and possible revascularization. This was discussed with the patient. All questions were answered. She is in agreement. In the emergency room, she received aspirin, heparin bolus, analgesic, and will be started on nitroglycerin drip if her blood pressure tolerates. She is not started on beta-blockers with recent history of bronchitis, and prior episodes of reactive airway disease. History of Present Illness: This is a 52 y.o. female admitted for STEMI (ST elevation myocardial infarction) ([facility] [address] [I21.3]. Patient complains of: Chest pain    Patient is a 77-year-old female with recent admission to the hospital for shortness of breath, possible bronchitis now presents to the emergency room with acute onset of midsternal chest pain. She states the chest pain started earlier today and is fairly constant.   She had waxing and waning chest pains yesterday but was not persistent and continuous. Because of persistent severe chest pains, she presented to the emergency room. She has baseline dyspnea on exertion and shortness of breath which is worsened since the chest pains began. Cardiac risk factors: smoking/ tobacco exposure, family history, dyslipidemia, obesity, hypertension      Review of Symptoms:  Except as stated above include:  Constitutional:  negative  Respiratory:  negative  Cardiovascular:  negative  Gastrointestinal: negative  Genitourinary:  negative  Musculoskeletal:  Negative  Neurological:  Negative  Dermatological:  Negative  Endocrinological: Negative  Psychological:  Negative    A comprehensive review of systems was negative except for that written in the HPI. Past Medical History:     Past Medical History:   Diagnosis Date    Back pain     Displacement of lumbar intervertebral disc     High cholesterol     takes no meds for same.  Hypertension 2017    pt states she does not take meds for same.  Lumbar radiculitis     Lumbar sprain     Numbness     legs and arms         Social History:     Social History     Socioeconomic History    Marital status:      Spouse name: Not on file    Number of children: Not on file    Years of education: Not on file    Highest education level: Not on file   Tobacco Use    Smoking status: Former Smoker     Packs/day: 1.00     Years: 35.00     Pack years: 35.00     Last attempt to quit: 2020     Years since quittin.0    Smokeless tobacco: Former User     Quit date: 2017    Tobacco comment: pt states she smoked about 1.5 packs a day for 30-35 years and quit on 17   Substance and Sexual Activity    Alcohol use:  Yes     Alcohol/week: 4.0 standard drinks     Types: 4 Shots of liquor per week    Drug use: No        Family History:     Family History   Problem Relation Age of Onset    Hypertension Mother     Hypertension Father     Heart Disease Father     Hypertension Maternal Grandmother  Heart Disease Maternal Grandmother     Hypertension Paternal Grandmother     Heart Disease Paternal Grandmother         Medications: Allergies   Allergen Reactions    Codeine Hives    Pcn [Penicillins] Shortness of Breath     Tongue swelling          Current Facility-Administered Medications   Medication Dose Route Frequency    nitroglycerin (NITROSTAT) tablet 0.4 mg  0.4 mg SubLINGual PRN     Current Outpatient Medications   Medication Sig    albuterol (PROVENTIL HFA, VENTOLIN HFA, PROAIR HFA) 90 mcg/actuation inhaler Take 2 Puffs by inhalation every four (4) hours as needed for Wheezing. Physical Exam:     Visit Vitals  /78   Pulse (!) 58   Temp 98.3 °F (36.8 °C)   Resp 29   SpO2 100%     BP Readings from Last 3 Encounters:   03/15/20 119/78   02/28/20 134/80   02/26/20 122/74     Pulse Readings from Last 3 Encounters:   03/15/20 (!) 58   02/28/20 76   02/26/20 89     Wt Readings from Last 3 Encounters:   02/28/20 99.8 kg (220 lb)   02/26/20 99.8 kg (220 lb)   02/24/20 102.3 kg (225 lb 8.5 oz)       General:  alert, cooperative, severe distress, appears stated age  Neck:  no JVD  Lungs:  clear to auscultation bilaterally  Heart:  regular rate and rhythm  Abdomen:  no guarding or rigidity  Extremities:  no edema  Skin: Warm and dry.  no hyperpigmentation, vitiligo, or suspicious lesions  Neuro: alert, oriented x3, affect appropriate, no focal neurological deficits, moves all extremities well, no involuntary movements  Psych: non focal     Data Review:     Recent Labs     03/15/20  1044   WBC 10.1   HGB 14.6   HCT 44.4        Recent Labs     03/15/20  1044   *   K 4.0      CO2 26   *   BUN 9   CREA 1.03   CA 9.1   ALB 3.7   SGOT 46*   ALT 50       Results for orders placed or performed during the hospital encounter of 03/15/20   EKG, 12 LEAD, INITIAL   Result Value Ref Range    Ventricular Rate 55 BPM    Atrial Rate 55 BPM    P-R Interval 124 ms    QRS Duration 88 ms    Q-T Interval 442 ms    QTC Calculation (Bezet) 422 ms    Calculated P Axis 6 degrees    Calculated R Axis 31 degrees    Calculated T Axis 55 degrees    Diagnosis       Sinus bradycardia with sinus arrhythmia  Nonspecific ST abnormality  Abnormal ECG  When compared with ECG of 23-FEB-2020 18:48,  ST elevation now present in Inferior leads  QT has shortened         All Cardiac Markers in the last 24 hours:  No results found for: CPK, CK, CKMMB, CKMB, RCK3, CKMBT, CKNDX, CKND1, JAYSON, TROPT, TROIQ, DANYELLE, TROPT, TNIPOC, BNP, BNPP    Last Lipid:    Lab Results   Component Value Date/Time    Cholesterol, total 257 (H) 08/01/2014 07:40 AM    HDL Cholesterol 52 08/01/2014 07:40 AM    LDL, calculated 171 (H) 08/01/2014 07:40 AM    Triglyceride 172 (H) 08/01/2014 07:40 AM    CHOL/HDL Ratio 4.9 (H) 08/01/2014 07:40 AM       Signed By: Claire Parekh MD     March 15, 2020

## 2020-03-15 NOTE — ED NOTES
Chest pain and back pain. States she started with a fever last night. I performed a brief evaluation, including history and physical, of the patient here in triage and I have determined that pt will need further treatment and evaluation from the main side ER physician. I have placed initial orders to help in expediting patients care. March 15, 2020 at 10:30 AM - GLORY Puente        There were no vitals taken for this visit.

## 2020-03-15 NOTE — H&P
Internal Medicine History and Physical          Subjective     HPI: Tae Keane is a 52 y.o. female with a PMHx of HTN not on meds who presented to the ED with the acute onset of mid-sternal chest pain and SOB. The patient states baseline health up until yesterday at work when she developed the symptoms. The chest pain continued through the day without radiation. She had associated SOB. The pain persisted into today but she also developed acute weakness. She states pain is felt in back as well. In the ED, she had EKG concerning for STEMI and code STEMI activated. She will need emergent cardio evaluation for cath lab needs. PMHx:  Past Medical History:   Diagnosis Date    Back pain     Displacement of lumbar intervertebral disc     High cholesterol     takes no meds for same.  Hypertension 2017    pt states she does not take meds for same.      Lumbar radiculitis     Lumbar sprain     Numbness     legs and arms       PSurgHx:  Past Surgical History:   Procedure Laterality Date    HX APPENDECTOMY      HX CHOLECYSTECTOMY      HX GI      multiple esophageal surgeries in childhood    HX HEENT      \"surgery on esophagus\"    HX HYSTERECTOMY      HX LUMBAR DISKECTOMY         SocialHx:  Social History     Socioeconomic History    Marital status:      Spouse name: Not on file    Number of children: Not on file    Years of education: Not on file    Highest education level: Not on file   Occupational History    Not on file   Social Needs    Financial resource strain: Not on file    Food insecurity     Worry: Not on file     Inability: Not on file    Transportation needs     Medical: Not on file     Non-medical: Not on file   Tobacco Use    Smoking status: Former Smoker     Packs/day: 1.00     Years: 35.00     Pack years: 35.00     Last attempt to quit: 2020     Years since quittin.0    Smokeless tobacco: Former User     Quit date: 2017    Tobacco comment: pt states she smoked about 1.5 packs a day for 30-35 years and quit on 2-1-17   Substance and Sexual Activity    Alcohol use: Yes     Alcohol/week: 4.0 standard drinks     Types: 4 Shots of liquor per week    Drug use: No    Sexual activity: Not on file   Lifestyle    Physical activity     Days per week: Not on file     Minutes per session: Not on file    Stress: Not on file   Relationships    Social connections     Talks on phone: Not on file     Gets together: Not on file     Attends Sabianism service: Not on file     Active member of club or organization: Not on file     Attends meetings of clubs or organizations: Not on file     Relationship status: Not on file    Intimate partner violence     Fear of current or ex partner: Not on file     Emotionally abused: Not on file     Physically abused: Not on file     Forced sexual activity: Not on file   Other Topics Concern    Not on file   Social History Narrative    Not on file       Allergies: Allergies   Allergen Reactions    Codeine Hives    Pcn [Penicillins] Shortness of Breath     Tongue swelling         FamilyHx:  Family History   Problem Relation Age of Onset    Hypertension Mother     Hypertension Father     Heart Disease Father     Hypertension Maternal Grandmother     Heart Disease Maternal Grandmother     Hypertension Paternal Grandmother     Heart Disease Paternal Grandmother        Prior to Admission Medications   Prescriptions Last Dose Informant Patient Reported? Taking? albuterol (PROVENTIL HFA, VENTOLIN HFA, PROAIR HFA) 90 mcg/actuation inhaler   Yes No   Sig: Take 2 Puffs by inhalation every four (4) hours as needed for Wheezing.       Facility-Administered Medications: None       Review of Systems:  CONST: Fever, weight loss, fatigue or chills  HEENT: Recent changes in vision, vertigo, epistaxis, dysphagia and hoarseness  CV: Chest pain, palpitations, edema and varicosities  RESP: Cough, shortness of breath, wheezing, hemoptysis, snoring and reactive airway disease  GI: Nausea, vomiting, abdominal pain, change in bowel habits, hematochezia, melena, and GERD   : Hematuria, dysuria, frequency, urgency, nocturia and stress urinary incontinence   MS: Weakness, joint pain and arthritis  ENDO: Polyuria, polydipsia, polyphagia, poor wound healing, heat intolerance, cold intolerance  LYMPH/HEME: Anemia, bruising and history of blood transfusions  INTEG: Dermatitis, abnormal moles  NEURO: Dizziness, headache, fainting, seizures and stroke  PSYCH: Anxiety and depression      Objective      Visit Vitals  /74   Pulse (!) 54   Temp 98.3 °F (36.8 °C)   Resp 22   SpO2 95%       Physical Exam:  General Appearance: Appears uncomforable, conversant  HENT: normocephalic/atraumatic, moist mucus membranes  Lungs: CTA with normal respiratory effort  Cardiovascular: RRR, no m/r/g, capillary refill < 2 sec, B/L DP/PT pulses +3/4  Abdomen: soft, non-tender, normal bowel sounds  Extremities: no cyanosis, no peripheral edema  Neuro: moves all extremities, no focal deficits  Psych: mildly agitated, alert and oriented to person, place and time    Laboratory Studies:  BMP: No results found for: NA, K, CL, CO2, AGAP, GLU, BUN, CREA, GFRAA, GFRNA  CBC:   Lab Results   Component Value Date/Time    WBC 10.1 03/15/2020 10:44 AM    HGB 14.6 03/15/2020 10:44 AM    HCT 44.4 03/15/2020 10:44 AM     03/15/2020 10:44 AM     All Cardiac Markers in the last 24 hours: No results found for: CPK, CK, CKMMB, CKMB, RCK3, CKMBT, CKNDX, CKND1, JAYSON, TROPT, TROIQ, DANYELLE, TROPT, TNIPOC, BNP, BNPP    Imaging Reviewed:  No results found. EKG:    Inferior leads with ST elevation changes (although not to diagnostic level)    Assessment/Plan     Active Hospital Problems    Diagnosis Date Noted    [facility]15/2020    Tobacco use 03/15/2020    Ob[facility], morbid [facility] 02/28/2020    Hypertension 05/02/2017     pt states she does not take meds for same. - Appears inferior wall MI  - Heparin drip  - ASA/statin  - Will need STAT intervention  - Trend trop, EKG  - Cardiology consulted  - Smoking cessation  - Cont acceptable home medications for chronic conditions   - DVT protocol    I have personally reviewed all pertinent labs, films and EKGs that have officially resulted. I reviewed available electronic documentation outlining the initial presentation as well as the emergency room physician's encounter.     Jaimee Matthews DO  Internal Medicine, Hospitalist  Pager: [phone] [facility]

## 2020-03-15 NOTE — ED PROVIDER NOTES
Date: 3/15/2020  Patient Name: Jluis Viramontes    History of Presenting Illness     Chief Complaint   Patient presents with    Chest Pain     History Provided By: Patient    HPI/Chief Complaint: (Context): Chief complaint chest pain approximately 2-hour  Feels like tightness or as if she swallowed Ice Cube is stuck in her chest.  Pain radiating to the back. Constant symptoms. No recent exertional symptoms no chest pain no abdominal pain no calf pain no history of PE or DVT no traveling recently  No upper respiratory infection no symptoms patient denies exposure to coronavirus  Patient states she has not had these symptoms recently her last cardiac work-up was in 2013 when she had chest pain otherwise she has not been having any symptoms  Patient is not a diabetic individual but does have hypertension and takes medication for it. Patient is no focal neurological deficits no upper respiratory symptoms no fever no lower back pain no bowel bladder incontinence no seizure history no trauma recently. ---  Patient arrival is by car, GREG level 3  Chest pain  Patient's blood pressure is 106/74, pulse is 54 sinus bradycardia  Patient's initial triage note is reviewed by the triage TONA and also by the nurse having chest pain and shortness of breath.   Patient's pain is 8 out of 10  Allergies penicillin codeine is appreciated  Patient takes albuterol for home meds  Medical history includes hypertension hypercholesterolemia  Surgical history for cholecystectomy hysterectomy appendectomy  Patient is a former smoker quit approximately month ago  Patient also alcohol use occasional  Patient's chart review was done as well patient was admitted approximately 2 weeks ago for upper respiratory infection      PCP: Beatriz Naidu MD    Current Facility-Administered Medications   Medication Dose Route Frequency Provider Last Rate Last Dose    nitroglycerin (NITROSTAT) tablet 0.4 mg  0.4 mg SubLINGual PRN Bib Schaefer MD   0.4 mg at 03/15/20 1059    lidocaine (PF) (XYLOCAINE) 10 mg/mL (1 %) injection    PRN Theo Acosta MD   9 mL at 03/15/20 1130    midazolam (VERSED) injection    PRN Theo Acosta MD   0.5 mg at 03/15/20 1206    fentaNYL citrate (PF) injection    PRN Kayla Bright MD   25 mcg at 03/15/20 1206    bivalirudin [facility]) injection    PRN Kayla Bright MD   10.53245 mg at 03/15/20 1135    bivalirudin (ANGIOMAX) 250 mg in 0.9% sodium chloride (MBP/ADV) 50 mL infusion    CONTINUOUS Theo Acosta MD 34.9 mL/hr at 03/15/20 1139 1.75 mg/kg/hr at 03/15/20 1139    ticagrelor (BRILINTA) tablet    PRN Kayla Bright MD   180 mg at 03/15/20 1215    iopamidoL (ISOVUE 300) 61 % contrast injection    PRN Kayla Bright MD   150 mL at 03/15/20 1216    heparin (PF) 2 units/ml in NS infusion    PRN Theo Acosta MD   1,000 mL at 03/15/20 1217    0.45% sodium chloride infusion 150 mL  150 mL IntraVENous CONTINUOUS Theo Acosta MD        sodium chloride (NS) flush 5-40 mL  5-40 mL IntraVENous Q8H Theo Peterson MD        sodium chloride (NS) flush 5-40 mL  5-40 mL IntraVENous PRN Theo Acosta MD        nitroglycerin (NITROLINGUAL) sublingual 0.4 mg/spray  1 Spray SubLINGual Q5MIN PRN Kayla Bright MD        [START ON 3/16/2020] aspirin delayed-release tablet 81 mg  81 mg Oral DAILY MD Yesy Lundyor [facility]) tablet 90 mg  90 mg Oral BID Kayla Bright MD        [facility]) tablet 180 mg  180 mg Oral Meg Theo Raman MD        atorvastatin (LIPITOR) tablet 80 mg  80 mg Oral QHS Theo Peterson MD        [START ON 3/16/2020] metoprolol succinate (TOPROL-XL) XL tablet 50 mg  50 mg Oral DAILY Kayla Bright MD           Past History     Past Medical History:  Past Medical History:   Diagnosis Date    Back pain     Displacement of lumbar intervertebral disc     High cholesterol     takes no meds for same.  Hypertension 05/02/2017    pt states she does not take meds for same.      Lumbar radiculitis     Lumbar sprain     Numbness     legs and arms       Past Surgical History:  Past Surgical History:   Procedure Laterality Date    HX APPENDECTOMY  2014    HX CHOLECYSTECTOMY      HX GI      multiple esophageal surgeries in childhood    HX HEENT      \"surgery on esophagus\"    HX HYSTERECTOMY  2003    HX LUMBAR DISKECTOMY         Family History:  Family History   Problem Relation Age of Onset    Hypertension Mother     Hypertension Father     Heart Disease Father     Hypertension Maternal Grandmother     Heart Disease Maternal Grandmother     Hypertension Paternal Grandmother     Heart Disease Paternal Grandmother        Social History:  Social History     Tobacco Use    Smoking status: Former Smoker     Packs/day: 1.00     Years: 35.00     Pack years: 35.00     Last attempt to quit: 2020     Years since quittin.0    Smokeless tobacco: Former User     Quit date: 2017    Tobacco comment: pt states she smoked about 1.5 packs a day for 30-35 years and quit on 17   Substance Use Topics    Alcohol use: Yes     Alcohol/week: 4.0 standard drinks     Types: 4 Shots of liquor per week    Drug use: No       Allergies: Allergies   Allergen Reactions    Codeine Hives    Pcn [Penicillins] Shortness of Breath     Tongue swelling           Review of Systems   Review of Systems   Constitutional: Negative for activity change, fatigue and fever. HENT: Negative for congestion and rhinorrhea. Eyes: Negative for visual disturbance. Respiratory: Positive for shortness of breath. Cardiovascular: Positive for chest pain. Negative for palpitations. Gastrointestinal: Negative for abdominal pain, diarrhea, nausea and vomiting. Genitourinary: Negative for dysuria and hematuria. Musculoskeletal: Negative for back pain. Skin: Negative for rash. Neurological: Negative for dizziness, weakness and light-headedness. Psychiatric/Behavioral: Negative for agitation.    All other systems reviewed and are negative. Physical Exam     Physical Exam  Vitals signs and nursing note reviewed. Constitutional:       General: She is not in acute distress. Appearance: She is well-developed. HENT:      Head: Normocephalic and atraumatic. Right Ear: External ear normal.      Left Ear: External ear normal.      Nose: Nose normal.   Eyes:      General: No scleral icterus. Conjunctiva/sclera: Conjunctivae normal.      Pupils: Pupils are equal, round, and reactive to light. Neck:      Musculoskeletal: Normal range of motion and neck supple. Thyroid: No thyromegaly. Vascular: No JVD. Trachea: No tracheal deviation. Cardiovascular:      Rate and Rhythm: Normal rate and regular rhythm. Heart sounds: Normal heart sounds. No murmur. No friction rub. Pulmonary:      Effort: Pulmonary effort is normal.      Breath sounds: Normal breath sounds. No stridor. Chest:      Chest wall: No tenderness. Abdominal:      General: Bowel sounds are normal. There is no distension. Palpations: Abdomen is soft. Tenderness: There is no abdominal tenderness. There is no guarding or rebound. Musculoskeletal: Normal range of motion. General: No tenderness. Lymphadenopathy:      Cervical: No cervical adenopathy. Skin:     General: Skin is warm and dry. Capillary Refill: Capillary refill takes less than 2 seconds. Neurological:      General: No focal deficit present. Mental Status: She is alert and oriented to person, place, and time. Cranial Nerves: No cranial nerve deficit. Coordination: Coordination normal.   Psychiatric:         Mood and Affect: Mood normal.         Behavior: Behavior normal.         Thought Content: Thought content normal.         Judgment: Judgment normal.         Medical Decision Making   I am the first provider for this patient.     I reviewed the vital signs, available nursing notes, past medical history, past surgical history, family history and social history. Provider Notes (Medical Decision Making): Patient presents to the emergency department acute chest pain and shortness of breath 2 hours  Was working. No history of recent exertional chest pain  I reviewed patient's EKG and is consistent with STEMI in the inferior wall with some nonspecific strain pattern that can be appreciated in the lateral leads as well  Patient is with continuous pain of activated code STEMI on initial evaluation the EKG EKG was repeated #2 shows more strain pattern in the lateral wall which is reciprocal change  Patient's chest x-ray is reviewed as well there is no significant mediastinal widening or any pericardial effusion or concern for pericardial or pleural effusion  Patient's troponin done in the emergency department shows troponin 0.93  Communicated the initial EKG with Dr. Betty Mclean at the time of the code STEMI  I also discussed the information with the ED staff and nursing  Patient is getting aspirin, fluids, heparin, nitro be tried as per Dr. Betty Mclean as the concern is inferior wall MI may lead to further hypotension. Patient has history of hypertension  I compared the EKG today with prior EKG from last Monday ST elevations in the inferior wall are new. Vital Signs-Reviewed the patient's vital signs. Pulse Oximetry Analysis -95%, room air, normal  Cardiac Monitor:  Rate/Rhythm 54, sinus bradycardia is appreciated  EKG: Interpreted by the EP.   EKG today done initial 1 at 1021 that was presented to me at 1028 patient with bradycardia that was appreciated sinus bradycardia, normal intervals and axes inferior wall is elevated in lead III and aVF  There is some slurring of the ST-T segment in lead I and aVL concern for reciprocal changes  This EKG is compared with February 23, 2020 and the inferior wall ST elevations are new and the slurring is deepened on the limb lead  The EKG was repeated at 1028 this EKG shows sinus rhythm at 64  There is ST elevations are present in lead III and aVF  ---------      Vitals:    03/15/20 1030 03/15/20 1035 03/15/20 1045 03/15/20 1059   BP: 113/72 106/74 119/78 119/78   Pulse: 63 (!) 54 (!) 58 (!) 58   Resp: 14 22 29    Temp:  98.3 °F (36.8 °C)     SpO2: 100% 95% 100%        Records Reviewed: Nursing Notes    ED Course: For Hospitalized Patients:    1. Hospitalization Decision Time:  The decision to hospitalize the patient was made by Dr. Muñiz Pac  2. Aspirin: Aspirin was given    11:03 AM  Dr. Daren Rolle is at bedside this point is looked at the EKGs evaluating the patient in the emergency department. ----  CRITICAL CARE NOTE :    12:24 PM      IMPENDING DETERIORATION -Cardiovascular    ASSOCIATED RISK FACTORS - Dysrhythmia    MANAGEMENT- Bedside Assessment    INTERPRETATION -  Xrays and ECG    INTERVENTIONS - hemodynamic mngmt and Metobolic interventions    CASE REVIEW - Hospitalist and Medical Sub-Specialist    TREATMENT RESPONSE -Improved    PERFORMED BY - Self        NOTES   :      I have spent 31 minutes of critical care time involved in lab review, consultations with specialist, family decision- making, bedside attention and documentation. During this entire length of time I was immediately available to the patient . Wander Leal MD      Diagnostic Study Results     Orders Placed This Encounter    NO VTE PROPHYLAXIS NEEDED     Standing Status:   Standing     Number of Occurrences:   1     Order Specific Question:   Due to     Answer:   Prophylaxis is Not Indicated    XR CHEST PORT     Standing Status:   Standing     Number of Occurrences:   1     Order Specific Question:   Reason for Exam     Answer:   chest pain     Order Specific Question:   Is Patient Pregnant?      Answer:   Unknown    CBC WITH AUTOMATED DIFF     Standing Status:   Standing     Number of Occurrences:   1    METABOLIC PANEL, COMPREHENSIVE     Standing Status:   Standing     Number of Occurrences:   1    URINALYSIS W/ RFLX MICROSCOPIC     Standing Status:   Standing     Number of Occurrences:   1    HCG URINE, QL     Standing Status:   Standing     Number of Occurrences:   1    Troponin I     Standing Status:   Standing     Number of Occurrences:   1    BETA HCG, QT     Standing Status:   Standing     Number of Occurrences:   1    TROPONIN I     Standing Status:   Standing     Number of Occurrences:   3    DIET CARDIAC Regular     Standing Status:   Standing     Number of Occurrences:   1     Order Specific Question:   Texture: Answer:   Regular    VITAL SIGNS - WITH SHEATH INTACT     Every 15 minutes X 4, then every 30 minutes X 2, then every hour until sheath removed in recovery. Standing Status:   Standing     Number of Occurrences:   1    VITAL SIGNS - POST SHEATH REMOVAL     Every 30 minutes X 2, then every hour X 2, then every 4 hours while awake. Standing Status:   Standing     Number of Occurrences:   1    CHECK PULSES - PERIPHERAL     Peripheral pulses. Standing Status:   Standing     Number of Occurrences:   1    VASCULAR ACCESS SITE ASSESSMENT     Standing Status:   Standing     Number of Occurrences:   6    INTAKE AND OUTPUT     Standing Status:   Standing     Number of Occurrences:   1    NOTIFY PROVIDER: SPECIFY If sublingual Nitroglycerin does NOT relieve chest pain OR if GREATER than ONE episode of chest pain even if the first dose of Nitroglycerin is effective. ONE TIME ASAP     Standing Status:   Standing     Number of Occurrences:   1     Order Specific Question:   Please describe the test or procedure you would like to order. Answer: If sublingual Nitroglycerin does NOT relieve chest pain OR if GREATER than ONE episode of chest pain even if the first dose of Nitroglycerin is effective.  BEDREST, COMPLETE (HOUR LIMITED)     Then resume activity.      Standing Status:   Standing     Number of Occurrences:   1    EKG, 12 LEAD, INITIAL     Standing Status:   Standing     Number of Occurrences:   1     Order Specific Question:   Reason for Exam:     Answer:   chest pain    EKG, 12 LEAD, SUBSEQUENT     Standing Status:   Standing     Number of Occurrences:   1     Order Specific Question:   Reason for Exam:     Answer:   abnormal EKG    EKG, 12 LEAD, SUBSEQUENT     Standing Status:   Standing     Number of Occurrences:   1     Order Specific Question:   Reason for Exam:     Answer:   Post PCI    EKG, 12 LEAD, INITIAL     Standing Status:   Standing     Number of Occurrences:   2     Order Specific Question:   Reason for Exam:     Answer:   mi    INSERT PERIPHERAL IV ONE TIME STAT     Standing Status:   Standing     Number of Occurrences:   1    aspirin tablet 325 mg    heparin (porcine) 1,000 unit/mL injection     Isidro Rivera   : cabinet override    fentaNYL citrate (PF) injection 25 mcg    heparin (porcine) 1,000 unit/mL injection 4,000 Units    nitroglycerin (NITROSTAT) tablet 0.4 mg    lidocaine (PF) (XYLOCAINE) 10 mg/mL (1 %) injection    midazolam (VERSED) injection    fentaNYL citrate (PF) injection    bivalirudin (ANGIOMAX) injection    bivalirudin (ANGIOMAX) 250 mg in 0.9% sodium chloride (MBP/ADV) 50 mL infusion    ticagrelor (BRILINTA) tablet    iopamidoL (ISOVUE 300) 61 % contrast injection    heparin (PF) 2 units/ml in NS infusion    0.45% sodium chloride infusion 150 mL    sodium chloride (NS) flush 5-40 mL    sodium chloride (NS) flush 5-40 mL    nitroglycerin (NITROLINGUAL) sublingual 0.4 mg/spray    aspirin delayed-release tablet 81 mg    ticagrelor (BRILINTA) tablet 90 mg    ticagrelor (BRILINTA) tablet 180 mg    atorvastatin (LIPITOR) tablet 80 mg    metoprolol succinate (TOPROL-XL) XL tablet 50 mg    IP CONSULT TO CARDIOLOGY     Standing Status:   Standing     Number of Occurrences:   1     Order Specific Question:   Reason for Consult:      Answer:   chest pain abn ekg concern stemi     Order Specific Question:   Did you call or speak to the consulting provider? Answer:   No     Order Specific Question:   Consult To     Answer:   manage     Order Specific Question:   Schedule When? Answer:   TODAY    INITIAL PHYSICIAN ORDER: INPATIENT Stepdown; 9. Other (further clarification in H&P documentation)     Standing Status:   Standing     Number of Occurrences:   1     Order Specific Question:   Status: Answer:   INPATIENT [101]     Order Specific Question:   Type of Bed     Answer:   Stepdown [17]     Order Specific Question:   Inpatient Hospitalization Certified Necessary for the Following Reasons     Answer:   9. Other (further clarification in H&P documentation)     Order Specific Question:   Admitting Diagnosis     Answer:   STEMI (ST elevation myocardial infarction) [facility]) [[id number]]     Order Specific Question:   Admitting Physician     Answer:   Abel Zamora [[id number]]     Order Specific Question:   Attending Physician     Answer:   Abel Zamora [[id number]]     Order Specific Question:   Estimated Length of Stay     Answer:   2 Midnights     Order Specific Question:   Discharge Plan:     Answer:   Home with Office Follow-up    CLOVIS Barkley     Per Therapist Discretion     Standing Status:   Standing     Number of Occurrences:   1     Order Specific Question:   Reason for Consult:      Answer:   Post PCI       Labs -     Recent Results (from the past 12 hour(s))   EKG, 12 LEAD, INITIAL    Collection Time: 03/15/20 10:21 AM   Result Value Ref Range    Ventricular Rate 55 BPM    Atrial Rate 55 BPM    P-R Interval 124 ms    QRS Duration 88 ms    Q-T Interval 442 ms    QTC Calculation (Bezet) 422 ms    Calculated P Axis 6 degrees    Calculated R Axis 31 degrees    Calculated T Axis 55 degrees    Diagnosis       Sinus bradycardia with sinus arrhythmia  Nonspecific ST abnormality  Abnormal ECG  When compared with ECG of 23-FEB-2020 18:48,  ST elevation now present in Inferior leads  QT has shortened     EKG, 12 LEAD, SUBSEQUENT    Collection Time: 03/15/20 10:28 AM   Result Value Ref Range    Ventricular Rate 64 BPM    Atrial Rate 64 BPM    P-R Interval 126 ms    QRS Duration 92 ms    Q-T Interval 430 ms    QTC Calculation (Bezet) 443 ms    Calculated P Axis 0 degrees    Calculated R Axis 29 degrees    Calculated T Axis 58 degrees    Diagnosis       Normal sinus rhythm with sinus arrhythmia  Nonspecific ST abnormality  Abnormal ECG  When compared with ECG of 15-MAR-2020 10:21,  No significant change was found     CBC WITH AUTOMATED DIFF    Collection Time: 03/15/20 10:44 AM   Result Value Ref Range    WBC 10.1 4.6 - 13.2 K/uL    RBC 4.98 4.20 - 5.30 M/uL    HGB 14.6 12.0 - 16.0 g/dL    HCT 44.4 35.0 - 45.0 %    MCV 89.2 74.0 - 97.0 FL    MCH 29.3 24.0 - 34.0 PG    MCHC 32.9 31.0 - 37.0 g/dL    RDW 15.1 (H) 11.6 - 14.5 %    PLATELET 855 681 - 666 K/uL    MPV 10.2 9.2 - 11.8 FL    NEUTROPHILS 70 40 - 73 %    LYMPHOCYTES 20 (L) 21 - 52 %    MONOCYTES 8 3 - 10 %    EOSINOPHILS 1 0 - 5 %    BASOPHILS 1 0 - 2 %    ABS. NEUTROPHILS 7.2 1.8 - 8.0 K/UL    ABS. LYMPHOCYTES 2.0 0.9 - 3.6 K/UL    ABS. MONOCYTES 0.8 0.05 - 1.2 K/UL    ABS. EOSINOPHILS 0.1 0.0 - 0.4 K/UL    ABS. BASOPHILS 0.1 0.0 - 0.1 K/UL    DF AUTOMATED     METABOLIC PANEL, COMPREHENSIVE    Collection Time: 03/15/20 10:44 AM   Result Value Ref Range    Sodium 135 (L) 136 - 145 mmol/L    Potassium 4.0 3.5 - 5.5 mmol/L    Chloride 104 100 - 111 mmol/L    CO2 26 21 - 32 mmol/L    Anion gap 5 3.0 - 18 mmol/L    Glucose 117 (H) 74 - 99 mg/dL    BUN 9 7.0 - 18 MG/DL    Creatinine 1.03 0.6 - 1.3 MG/DL    BUN/Creatinine ratio 9 (L) 12 - 20      GFR est AA >60 >60 ml/min/1.73m2    GFR est non-AA 57 (L) >60 ml/min/1.73m2    Calcium 9.1 8.5 - 10.1 MG/DL    Bilirubin, total 0.5 0.2 - 1.0 MG/DL    ALT (SGPT) 50 13 - 56 U/L    AST (SGOT) 46 (H) 10 - 38 U/L    Alk.  phosphatase 107 45 - 117 U/L    Protein, total 7.8 6.4 - 8.2 g/dL    Albumin 3.7 3.4 - 5.0 g/dL    Globulin 4.1 (H) 2.0 - 4.0 g/dL    A-G Ratio 0.9 0.8 - 1.7     TROPONIN I    Collection Time: 03/15/20 10:44 AM   Result Value Ref Range    Troponin-I, QT 2.80 (HH) 0.0 - 0.045 NG/ML   BETA HCG, QT    Collection Time: 03/15/20 10:44 AM   Result Value Ref Range    Beta HCG, QT <1 0 - 10 MIU/ML       Radiologic Studies -   XR CHEST PORT   Final Result   IMPRESSION:         1. Possible left lower lobe atelectasis. CT Results  (Last 48 hours)    None        CXR Results  (Last 48 hours)               03/15/20 1052  XR CHEST PORT Final result    Impression:  IMPRESSION:           1. Possible left lower lobe atelectasis. Narrative:  EXAM: CHEST RADIOGRAPH       CLINICAL INDICATION/HISTORY: chest pain     > Additional: None       COMPARISON: 2/23/2020. TECHNIQUE: Portable frontal view of the chest       _______________       FINDINGS:       SUPPORT DEVICES: None. HEART AND MEDIASTINUM: Cardiomegaly. Remaining mediastinal contours are stable. LUNGS AND PLEURAL SPACES: No evidence for pneumothorax or pleural effusion. Subtle opacities in the left lower lobe. BONY THORAX AND SOFT TISSUES: Multilevel spinal degenerative changes. _______________                 Discharge     Clinical Impression:   1. ST elevation myocardial infarction (STEMI) of anterior wall (HCC)    2. Elevated troponin I level    3. Acute chest pain    4.  STEMI (ST elevation myocardial infarction) ([facility] [address]      Disposition:  ADMIT    It should be noted that I will be the provider of record for this patient  Radha Spear MD      Follow-up Information    None         Current Discharge Medication List

## 2020-03-15 NOTE — Clinical Note
Lesion: Located in the Proximal RCA. Multiple inflations used. First inflation pressure = 15 sanjay; inflation time: 22 sec. Second inflation pressure: 20 sanjay; inflation time: 15 sec.

## 2020-03-15 NOTE — ED NOTES
Pt taken up to cath lab. Orders relayed to Cath team per Dr. Sammy Duran for pt. Pt states \" Im not allergic to codeine and a doctor has to remove it\".

## 2020-03-15 NOTE — PROGRESS NOTES
[facility] - Progress Note  Admit Date: 3/15/2020    Called by nursing regarding CP multiple times, urgently came to bedside to evaluate  EKGs reviewed and unchanged, tele/ vitals stable and pt NAD at my arrival/ laying flat  Pt had 2 days of CP prior to coming to hospital, inferior STEMI s/p PCI to RCA, cath reviewed below  Continues to c/o CP, nitro gtt increased, po BB given and now pain improved after 4 mg morphine    Bedside echocardiogram independently performed in short axis view to specifically look at inferior wall and no obvious WMA, EF appears preserved. At this time, will continue morphine q 4-6 hrs as this has been helping and pt was comfortable with this plan    03/15/20   CARDIAC PROCEDURE 03/15/2020 3/15/2020    Narrative · Acute inferior STEMI with ongoing chest pain and borderline shock. · Diffuse three-vessel CAD. · Culprit is mid % occlusion with distal segment collateralized   from the left coronary system. · LAD long 85-90% stenosis in mid segment. · Circumflex with diffuse 20 to 30% with mid/distal focal 75% stenosis. · Successful angioplasty and stent of mid % occlusion to residual   0% with resumption of LAURA-3 flow. · The proximal RCA focal 85% stenosis stented to 0% using 3.5 mm TEJAL. · Patient will need staged LAD stent either during this hospitalization or   within the next 3-4 weeks. CABG was not entertained with the fact that she   was having acute inferior STEMI with borderline cardiogenic shock, and   severe, persistent chest pains.         Signed by: Briseyda Verdin MD     >45 mins spent, and interventionalist partner notified,   would not take back to cath, but will closely monitor pain over night    Signed By: Jovita Sanchez DO     March 15, 2020

## 2020-03-15 NOTE — Clinical Note
Balloon inflated to 6 sanjay for 5 sec and deflated,  Balloon inflated to 8atm for 4 sec and deflated,  Balloon inflated to 12atm for 6 sec and deflated

## 2020-03-15 NOTE — Clinical Note
Lesion located in the Mid RCA. Balloon inserted. Balloon inflated using single inflation technique. Lesion #1: Pressure = 12 sanjay; Duration = 20 sec.

## 2020-03-15 NOTE — ED NOTES
Pt stated that she was working when she had sever stabbing sharp pain that goes from chest to around back.

## 2020-03-15 NOTE — ED TRIAGE NOTES
\"I have chest pain. I was seen here recently for respiratory infection and now I have bad shortness of breath. I started feeling bad yesterday and today I'm worse. \"

## 2020-03-15 NOTE — Clinical Note
TRANSFER - OUT REPORT:     Verbal report given to: [facility] ICU RN. Report consisted of patient's Situation, Background, Assessment and   Recommendations(SBAR). Opportunity for questions and clarification was provided. Patient transported with a Cardiac Cath Tech / Patient Care Tech and Monitor. Patient transported to: 2606.

## 2020-03-15 NOTE — PROGRESS NOTES
Problem: Discharge Planning  Goal: *Discharge to safe environment  3/15/2020 1319 by Nury Horn RN  Outcome: Progressing Towards Goal  3/15/2020 1318 by Nury Horn RN  Outcome: Progressing Towards Goal     Problem: Pressure Injury - Risk of  Goal: *Prevention of pressure injury  Description: Document Pipe Scale and appropriate interventions in the flowsheet.   3/15/2020 1319 by Nury Horn RN  Outcome: Progressing Towards Goal  Note: Pressure Injury Interventions:  Sensory Interventions: Assess changes in LOC, Assess need for specialty bed, Keep linens dry and wrinkle-free    Moisture Interventions: Absorbent underpads, Apply protective barrier, creams and emollients, Assess need for specialty bed    Activity Interventions: Assess need for specialty bed, PT/OT evaluation, Pressure redistribution bed/mattress(bed type)    Mobility Interventions: Assess need for specialty bed, Float heels, HOB 30 degrees or less    Nutrition Interventions: Document food/fluid/supplement intake, Discuss nutritional consult with provider    Friction and Shear Interventions: Apply protective barrier, creams and emollients, Feet elevated on foot rest, HOB 30 degrees or less             3/15/2020 1318 by Nury Horn RN  Outcome: Progressing Towards Goal  Note: Pressure Injury Interventions:  Sensory Interventions: Assess changes in LOC, Assess need for specialty bed, Keep linens dry and wrinkle-free    Moisture Interventions: Absorbent underpads, Apply protective barrier, creams and emollients, Assess need for specialty bed    Activity Interventions: Assess need for specialty bed, PT/OT evaluation, Pressure redistribution bed/mattress(bed type)    Mobility Interventions: Assess need for specialty bed, Float heels, HOB 30 degrees or less    Nutrition Interventions: Document food/fluid/supplement intake, Discuss nutritional consult with provider    Friction and Shear Interventions: Apply protective barrier, creams and emollients, Feet elevated on foot rest, HOB 30 degrees or less                Problem: Patient Education: Go to Patient Education Activity  Goal: Patient/Family Education  3/15/2020 1319 by Kareem Gomez RN  Outcome: Progressing Towards Goal  3/15/2020 1318 by Kareem Gomez RN  Outcome: Progressing Towards Goal     Problem: Falls - Risk of  Goal: *Absence of Falls  Description: Document Brinda Shine Fall Risk and appropriate interventions in the flowsheet.   3/15/2020 1319 by Kareem Gomez RN  Outcome: Progressing Towards Goal  Note: Fall Risk Interventions:  Mobility Interventions: Assess mobility with egress test, Bed/chair exit alarm              Elimination Interventions: Bed/chair exit alarm, Call light in reach           3/15/2020 1318 by Kareem Gomez RN  Outcome: Progressing Towards Goal  Note: Fall Risk Interventions:  Mobility Interventions: Assess mobility with egress test, Bed/chair exit alarm              Elimination Interventions: Bed/chair exit alarm, Call light in reach              Problem: Patient Education: Go to Patient Education Activity  Goal: Patient/Family Education  3/15/2020 1319 by Kareem Gomez RN  Outcome: Progressing Towards Goal  3/15/2020 1318 by Kareem Gomez RN  Outcome: Progressing Towards Goal     Problem: Patient Education: Go to Patient Education Activity  Goal: Patient/Family Education  Outcome: Progressing Towards Goal     Problem: Cath Lab Procedures: Post-Cath Day of Procedure (Initiate SCIP Measures for Post-Op Care)  Goal: Off Pathway (Use only if patient is Off Pathway)  Outcome: Progressing Towards Goal  Goal: Activity/Safety  Outcome: Progressing Towards Goal  Goal: Consults, if ordered  Outcome: Progressing Towards Goal  Goal: Diagnostic Test/Procedures  Outcome: Progressing Towards Goal  Goal: Nutrition/Diet  Outcome: Progressing Towards Goal  Goal: Discharge Planning  Outcome: Progressing Towards Goal  Goal: Medications  Outcome: Progressing Towards Goal  Goal: Respiratory  Outcome: Progressing Towards Goal  Goal: Treatments/Interventions/Procedures  Outcome: Progressing Towards Goal  Goal: Psychosocial  Outcome: Progressing Towards Goal  Goal: *Procedure site is without bleeding and signs of infection six hours post sheath removal  Outcome: Progressing Towards Goal  Goal: *Hemodynamically stable  Outcome: Progressing Towards Goal  Goal: *Optimal pain control at patient's stated goal  Outcome: Progressing Towards Goal     Problem: Cath Lab Procedures: Post-Cath Day 1  Goal: Off Pathway (Use only if patient is Off Pathway)  Outcome: Progressing Towards Goal  Goal: Activity/Safety  Outcome: Progressing Towards Goal  Goal: Diagnostic Test/Procedures  Outcome: Progressing Towards Goal  Goal: Nutrition/Diet  Outcome: Progressing Towards Goal  Goal: Discharge Planning  Outcome: Progressing Towards Goal  Goal: Medications  Outcome: Progressing Towards Goal  Goal: Respiratory  Outcome: Progressing Towards Goal  Goal: Treatments/Interventions/Procedures  Outcome: Progressing Towards Goal  Goal: Psychosocial  Outcome: Progressing Towards Goal     Problem: Cath Lab Procedures: Discharge Outcomes  Goal: *Stable cardiac rhythm  Outcome: Progressing Towards Goal  Goal: *Hemodynamically stable  Outcome: Progressing Towards Goal  Goal: *Optimal pain control at patient's stated goal  Outcome: Progressing Towards Goal  Goal: *Pulses palpable, skin color within defined limits, skin temperature warm  Outcome: Progressing Towards Goal  Goal: *Lungs clear or at baseline  Outcome: Progressing Towards Goal  Goal: *Demonstrates ability to perform prescribed activity without shortness of breath or discomfort  Outcome: Progressing Towards Goal  Goal: *Verbalizes home exercise program, activity guidelines, cardiac precautions  Outcome: Progressing Towards Goal  Goal: *Verbalizes understanding and describes prescribed diet  Outcome: Progressing Towards Goal  Goal: *Verbalizes understanding and describes medication purposes and frequencies  Outcome: Progressing Towards Goal  Goal: *Identifies cardiac risk factors  Outcome: Progressing Towards Goal  Goal: *No signs and symptoms of infection or wound complications  Outcome: Progressing Towards Goal  Goal: *Anxiety reduced or absent  Outcome: Progressing Towards Goal  Goal: *Verbalizes and demonstrates incision care  Outcome: Progressing Towards Goal  Goal: *Understands and describes signs and symptoms to report to providers(Stroke Metric)  Outcome: Progressing Towards Goal  Goal: *Describes follow-up/return visits to physicians  Outcome: Progressing Towards Goal  Goal: *Describes available resources and support systems  Outcome: Progressing Towards Goal  Goal: *Influenza immunization  Outcome: Progressing Towards Goal  Goal: *Pneumococcal immunization  Outcome: Progressing Towards Goal

## 2020-03-15 NOTE — PROGRESS NOTES
Physical Exam  Vitals signs reviewed. Constitutional:       Appearance: She is obese. HENT:      Head: Normocephalic. Nose: Nose normal.      Mouth/Throat:      Mouth: Mucous membranes are dry. Eyes:      Pupils: Pupils are equal, round, and reactive to light. Neck:      Musculoskeletal: Normal range of motion. Cardiovascular:      Rate and Rhythm: Normal rate and regular rhythm. Pulmonary:      Effort: Pulmonary effort is normal.      Breath sounds: Normal breath sounds. Abdominal:      General: Bowel sounds are normal.      Palpations: Abdomen is soft. Skin:     General: Skin is warm and dry. Neurological:      Mental Status: She is alert and oriented to person, place, and time. GCS: GCS eye subscore is 4. GCS verbal subscore is 5. Cranial Nerves: Cranial nerves are intact. Sensory: Sensation is intact. Motor: Motor function is intact. Coordination: Coordination is intact.    Psychiatric:         Mood and Affect: Mood normal.     Dual Skin assessment completed with Ligia Hughes RN

## 2020-03-15 NOTE — PROGRESS NOTES
Problem: Discharge Planning  Goal: *Discharge to safe environment  Outcome: Progressing Towards Goal    Home    Care Management Interventions  PCP Verified by CM: Ruma Mercado)  Transition of Care Consult (CM Consult): Discharge Planning  Current Support Network: Lives with Spouse  Confirm Follow Up Transport: Family  The Plan for Transition of Care is Related to the Following Treatment Goals : home  Discharge Location  Discharge Placement: Home     Reason for Readmission:     Chest pain and fever         RUR Score/Risk Level:    8     PCP: Silvia Hua   Are you a current patient: Yes   Approximate date of last visit:   2/26/2020 and 2/28 2020          Did you attend your follow up appointment (s): If not, why not: yes         Resources/supports as identified by patient/family:   spouse       Top Challenges facing patient (as identified by patient/family and CM): Finances/Medication cost?    No insurance   Transportation      family  Support system or lack thereof? spouse  Living arrangements? Lives with spouse        Self-care/ADLs/Cognition? Independent with ADL's       Current Advanced Directive/Advance Care Plan:  Not on file           Plan for utilizing home health:   Not home bound             Transition of Care Plan:    Based on readmission, the patient's previous Plan of Care   has been evaluated and/or modified. The current Transition of Care Plan is: home      verified her demographics, insurance ( none) and PCP as correct on the facesheet. Independent with ADL's no DME's. Patient has designated ___spouse ____Nadir Nathan [phone]_________________ to participate in his/her discharge plan and to receive any needed information. Plans to return home upon discharge and transportation will be provided by family.

## 2020-03-15 NOTE — Clinical Note
Pt in supine position. Pt immediately reassessed prioir to sedation administration. Pt received ASA and heparin bolus in ED. Safety belt on pt.

## 2020-03-15 NOTE — PROGRESS NOTES
1230:   Received report from \[facility]\"" (cath Lab),  BP:  159/101; HR:  72:  O2:  100% RA,  NSR w/ ST Elevation. Patient had sheath removed at approx. 1230.      1240:  Pt. Arrived to unit from Cath lab. A&Ox4,  Obeys commands,  Equal pedal pulses bilaterally. Right femoral dressing clean, dry and intact, no bleeding noted. C/o ongoing upper back and chest pain 6/10.    1330:  Order received to start Nitro drip for pain. 1336:  Nitro drip started;  Dual check with Kj Carlisle RN. Patient states 6/10 pain in upper back and chest.  No change from previously; VSS.      1445:  Patient states upper back and chest pain is still a 6/10. Nitro titrated to 15mcg per order;  VSS.    1530:  Patient c/o 10/10 sharp, stabbing chest pain that radiates to back. 1600:  New orders received from Dr. Diane James. 1618:  8/10 sharp, stabbing pain radiating to back. Patient states,  \"Feels like a truck is hitting me. \"    1630:  New orders received. 1700:  Dr. Diane James at evaluated patient at bedside; new orders received. 1900:  Bedside and Verbal shift change report given to Ni Kelly RN and Eric Mckenna RN (oncoming nurse) by Jose Snell and Kj Carlisle RN (offgoing nurse). Report included the following information SBAR, Procedure Summary, Intake/Output, MAR, Recent Results, Cardiac Rhythm NSR; ST-Elevation and Alarm Parameters .

## 2020-03-15 NOTE — PROGRESS NOTES
Problem: Discharge Planning  Goal: *Discharge to safe environment  Outcome: Progressing Towards Goal     Problem: Pressure Injury - Risk of  Goal: *Prevention of pressure injury  Description: Document Pipe Scale and appropriate interventions in the flowsheet. Outcome: Progressing Towards Goal  Note: Pressure Injury Interventions:  Sensory Interventions: Assess changes in LOC, Assess need for specialty bed, Keep linens dry and wrinkle-free    Moisture Interventions: Absorbent underpads, Apply protective barrier, creams and emollients, Assess need for specialty bed    Activity Interventions: Assess need for specialty bed, PT/OT evaluation, Pressure redistribution bed/mattress(bed type)    Mobility Interventions: Assess need for specialty bed, Float heels, HOB 30 degrees or less    Nutrition Interventions: Document food/fluid/supplement intake, Discuss nutritional consult with provider    Friction and Shear Interventions: Apply protective barrier, creams and emollients, Feet elevated on foot rest, HOB 30 degrees or less                Problem: Patient Education: Go to Patient Education Activity  Goal: Patient/Family Education  Outcome: Progressing Towards Goal     Problem: Falls - Risk of  Goal: *Absence of Falls  Description: Document Jordan Fall Risk and appropriate interventions in the flowsheet.   Outcome: Progressing Towards Goal  Note: Fall Risk Interventions:  Mobility Interventions: Assess mobility with egress test, Bed/chair exit alarm              Elimination Interventions: Bed/chair exit alarm, Call light in reach              Problem: Patient Education: Go to Patient Education Activity  Goal: Patient/Family Education  Outcome: Progressing Towards Goal

## 2020-03-16 ENCOUNTER — APPOINTMENT (OUTPATIENT)
Dept: NON INVASIVE DIAGNOSTICS | Age: 50
DRG: 246 | End: 2020-03-16
Attending: INTERNAL MEDICINE
Payer: SELF-PAY

## 2020-03-16 LAB
ATRIAL RATE: 55 BPM
ATRIAL RATE: 56 BPM
ATRIAL RATE: 60 BPM
ATRIAL RATE: 64 BPM
ATRIAL RATE: 67 BPM
ATRIAL RATE: 68 BPM
ATRIAL RATE: 73 BPM
ATRIAL RATE: 79 BPM
CALCULATED P AXIS, ECG09: -10 DEGREES
CALCULATED P AXIS, ECG09: 0 DEGREES
CALCULATED P AXIS, ECG09: 1 DEGREES
CALCULATED P AXIS, ECG09: 17 DEGREES
CALCULATED P AXIS, ECG09: 23 DEGREES
CALCULATED P AXIS, ECG09: 26 DEGREES
CALCULATED P AXIS, ECG09: 29 DEGREES
CALCULATED P AXIS, ECG09: 6 DEGREES
CALCULATED R AXIS, ECG10: 14 DEGREES
CALCULATED R AXIS, ECG10: 2 DEGREES
CALCULATED R AXIS, ECG10: 28 DEGREES
CALCULATED R AXIS, ECG10: 29 DEGREES
CALCULATED R AXIS, ECG10: 3 DEGREES
CALCULATED R AXIS, ECG10: 31 DEGREES
CALCULATED R AXIS, ECG10: 5 DEGREES
CALCULATED R AXIS, ECG10: 9 DEGREES
CALCULATED T AXIS, ECG11: 12 DEGREES
CALCULATED T AXIS, ECG11: 14 DEGREES
CALCULATED T AXIS, ECG11: 37 DEGREES
CALCULATED T AXIS, ECG11: 41 DEGREES
CALCULATED T AXIS, ECG11: 55 DEGREES
CALCULATED T AXIS, ECG11: 58 DEGREES
CALCULATED T AXIS, ECG11: 68 DEGREES
CALCULATED T AXIS, ECG11: 70 DEGREES
CHOLEST SERPL-MCNC: 184 MG/DL
DIAGNOSIS, 93000: NORMAL
HDLC SERPL-MCNC: 34 MG/DL (ref 40–60)
HDLC SERPL: 5.4 {RATIO} (ref 0–5)
LDLC SERPL CALC-MCNC: 110 MG/DL (ref 0–100)
LIPID PROFILE,FLP: ABNORMAL
P-R INTERVAL, ECG05: 114 MS
P-R INTERVAL, ECG05: 118 MS
P-R INTERVAL, ECG05: 120 MS
P-R INTERVAL, ECG05: 124 MS
P-R INTERVAL, ECG05: 124 MS
P-R INTERVAL, ECG05: 126 MS
Q-T INTERVAL, ECG07: 406 MS
Q-T INTERVAL, ECG07: 420 MS
Q-T INTERVAL, ECG07: 420 MS
Q-T INTERVAL, ECG07: 424 MS
Q-T INTERVAL, ECG07: 430 MS
Q-T INTERVAL, ECG07: 436 MS
Q-T INTERVAL, ECG07: 442 MS
Q-T INTERVAL, ECG07: 458 MS
QRS DURATION, ECG06: 86 MS
QRS DURATION, ECG06: 88 MS
QRS DURATION, ECG06: 88 MS
QRS DURATION, ECG06: 92 MS
QTC CALCULATION (BEZET), ECG08: 420 MS
QTC CALCULATION (BEZET), ECG08: 422 MS
QTC CALCULATION (BEZET), ECG08: 443 MS
QTC CALCULATION (BEZET), ECG08: 443 MS
QTC CALCULATION (BEZET), ECG08: 450 MS
QTC CALCULATION (BEZET), ECG08: 458 MS
QTC CALCULATION (BEZET), ECG08: 462 MS
QTC CALCULATION (BEZET), ECG08: 465 MS
TRIGL SERPL-MCNC: 200 MG/DL (ref ?–150)
VENTRICULAR RATE, ECG03: 55 BPM
VENTRICULAR RATE, ECG03: 56 BPM
VENTRICULAR RATE, ECG03: 60 BPM
VENTRICULAR RATE, ECG03: 64 BPM
VENTRICULAR RATE, ECG03: 67 BPM
VENTRICULAR RATE, ECG03: 68 BPM
VENTRICULAR RATE, ECG03: 73 BPM
VENTRICULAR RATE, ECG03: 79 BPM
VLDLC SERPL CALC-MCNC: 40 MG/DL

## 2020-03-16 PROCEDURE — 80061 LIPID PANEL: CPT

## 2020-03-16 PROCEDURE — 77030040361 HC SLV COMPR DVT MDII -B

## 2020-03-16 PROCEDURE — 93005 ELECTROCARDIOGRAM TRACING: CPT

## 2020-03-16 PROCEDURE — 51798 US URINE CAPACITY MEASURE: CPT

## 2020-03-16 PROCEDURE — 93306 TTE W/DOPPLER COMPLETE: CPT

## 2020-03-16 PROCEDURE — 65660000001 HC RM ICU INTERMED STEPDOWN

## 2020-03-16 PROCEDURE — 74011250637 HC RX REV CODE- 250/637: Performed by: INTERNAL MEDICINE

## 2020-03-16 PROCEDURE — 74011250637 HC RX REV CODE- 250/637: Performed by: HOSPITALIST

## 2020-03-16 PROCEDURE — 74011250636 HC RX REV CODE- 250/636: Performed by: INTERNAL MEDICINE

## 2020-03-16 RX ORDER — DIPHENHYDRAMINE HYDROCHLORIDE 50 MG/ML
25 INJECTION, SOLUTION INTRAMUSCULAR; INTRAVENOUS
Status: DISCONTINUED | OUTPATIENT
Start: 2020-03-16 | End: 2020-03-17 | Stop reason: HOSPADM

## 2020-03-16 RX ORDER — OXYCODONE AND ACETAMINOPHEN 5; 325 MG/1; MG/1
1 TABLET ORAL
Status: DISCONTINUED | OUTPATIENT
Start: 2020-03-16 | End: 2020-03-17 | Stop reason: HOSPADM

## 2020-03-16 RX ADMIN — TICAGRELOR 90 MG: 90 TABLET ORAL at 00:33

## 2020-03-16 RX ADMIN — Medication 10 ML: at 21:39

## 2020-03-16 RX ADMIN — METOPROLOL SUCCINATE 50 MG: 50 TABLET, FILM COATED, EXTENDED RELEASE ORAL at 08:43

## 2020-03-16 RX ADMIN — TICAGRELOR 90 MG: 90 TABLET ORAL at 09:51

## 2020-03-16 RX ADMIN — MORPHINE SULFATE 2 MG: 2 INJECTION, SOLUTION INTRAMUSCULAR; INTRAVENOUS at 01:01

## 2020-03-16 RX ADMIN — MORPHINE SULFATE 2 MG: 2 INJECTION, SOLUTION INTRAMUSCULAR; INTRAVENOUS at 05:08

## 2020-03-16 RX ADMIN — TICAGRELOR 90 MG: 90 TABLET ORAL at 19:36

## 2020-03-16 RX ADMIN — Medication 10 ML: at 13:50

## 2020-03-16 RX ADMIN — ACETAMINOPHEN 650 MG: 325 TABLET, FILM COATED ORAL at 02:13

## 2020-03-16 RX ADMIN — Medication 10 ML: at 21:40

## 2020-03-16 RX ADMIN — ATORVASTATIN CALCIUM 80 MG: 80 TABLET, FILM COATED ORAL at 21:37

## 2020-03-16 RX ADMIN — Medication 10 ML: at 05:12

## 2020-03-16 RX ADMIN — DOCUSATE SODIUM 100 MG: 100 CAPSULE, LIQUID FILLED ORAL at 08:42

## 2020-03-16 RX ADMIN — ASPIRIN 81 MG 81 MG: 81 TABLET ORAL at 08:43

## 2020-03-16 RX ADMIN — MORPHINE SULFATE 2 MG: 2 INJECTION, SOLUTION INTRAMUSCULAR; INTRAVENOUS at 13:36

## 2020-03-16 RX ADMIN — ACETAMINOPHEN 650 MG: 325 TABLET, FILM COATED ORAL at 20:41

## 2020-03-16 NOTE — PROGRESS NOTES
Spoke briefly with patient. Self pay. Works at [employer]. Was seen by [facility] and does not qualify for Medicaid Exp. FAA given. Is on no meds at home. Pays OOP to see her PCP. Anticipate Brilinta script at Discharge. Brilinta Card given to patient and advised that can assist with script to [facility] at discharge.     Desiree Cristina RN    Outcomes Manager  [phone]  [phone]

## 2020-03-16 NOTE — PROGRESS NOTES
Internal Medicine Progress Note    Patient's Name: Deepti Maldonado Date: 3/15/2020  Length of Stay: 1      Assessment/Plan     Active Hospital Problems    Diagnosis Date Noted    STEMI (ST elevation myocardial infarction) ([facility] [address] 03/15/2020    Tobacco use 03/15/2020    Obesity, morbid ([facility] [address] 02/28/2020    Hypertension 05/02/2017     pt states she does not take meds for same. - s/p RCA stent  - Cont Brilinta/ASA  - Cont BB  - Cont statin  - Transfer to tele  - Tobacco cessation  - Cont acceptable home medications for chronic conditions   - DVT protocol    I have personally reviewed all pertinent labs and films that have officially resulted over the last 24 hours. I have personally checked for all pending labs that are awaiting final results. Subjective     Pt s/e @ bedside  No major events overnight  Feeling better  Chest pain improved  Denies CP or SOB    Objective     Visit Vitals  /84   Pulse 81   Temp 97.8 °F (36.6 °C)   Resp 18   Wt 99.8 kg (220 lb)   SpO2 99%   Breastfeeding No   BMI 40.24 kg/m²       Physical Exam:  General Appearance: NAD, conversant  Lungs: CTA with normal respiratory effort  CV: RRR, no m/r/g  Abdomen: soft, non-tender, normal bowel sounds  Extremities: no cyanosis, no peripheral edema  Neuro: No focal deficits, motor/sensory intact    Lab/Data Reviewed:  BMP:   Lab Results   Component Value Date/Time     03/15/2020 04:13 PM    K 3.9 03/15/2020 04:13 PM     03/15/2020 04:13 PM    CO2 21 03/15/2020 04:13 PM    AGAP 8 03/15/2020 04:13 PM    GLU 85 03/15/2020 04:13 PM    BUN 7 03/15/2020 04:13 PM    CREA 0.57 (L) 03/15/2020 04:13 PM    GFRAA >60 03/15/2020 04:13 PM    GFRNA >60 03/15/2020 04:13 PM     CBC:   Lab Results   Component Value Date/Time    WBC 11.0 03/15/2020 04:13 PM    HGB 14.0 03/15/2020 04:13 PM    HCT 41.9 03/15/2020 04:13 PM     03/15/2020 04:13 PM       Imaging Reviewed:  No results found.     Medications Reviewed:  Current Facility-Administered Medications   Medication Dose Route Frequency    nitroglycerin (NITROSTAT) tablet 0.4 mg  0.4 mg SubLINGual PRN    sodium chloride (NS) flush 5-40 mL  5-40 mL IntraVENous Q8H    sodium chloride (NS) flush 5-40 mL  5-40 mL IntraVENous PRN    magnesium hydroxide (MILK OF MAGNESIA) 400 mg/5 mL oral suspension 30 mL  30 mL Oral DAILY PRN    docusate sodium (COLACE) capsule 100 mg  100 mg Oral BID    aspirin chewable tablet 81 mg  81 mg Oral DAILY    acetaminophen (TYLENOL) tablet 650 mg  650 mg Oral Q4H PRN    sodium chloride (NS) flush 5-40 mL  5-40 mL IntraVENous Q8H    sodium chloride (NS) flush 5-40 mL  5-40 mL IntraVENous PRN    atorvastatin (LIPITOR) tablet 80 mg  80 mg Oral QHS    metoprolol succinate (TOPROL-XL) XL tablet 50 mg  50 mg Oral DAILY    ticagrelor (BRILINTA) tablet 90 mg  90 mg Oral BID    nitroglycerin (TRIDIL) 400 mcg/ml infusion  0-25 mcg/min IntraVENous TITRATE    naloxone (NARCAN) injection 0.4 mg  0.4 mg IntraVENous PRN    morphine injection 2-4 mg  2-4 mg IntraVENous Q4H PRN           Maryann Vera,   Internal Medicine, Hospitalist  Pager: [phone]  [address] [place] [address]

## 2020-03-16 NOTE — PROGRESS NOTES
Problem: Discharge Planning  Goal: *Discharge to safe environment  Outcome: Progressing Towards Goal     Problem: Pressure Injury - Risk of  Goal: *Prevention of pressure injury  Description: Document Pipe Scale and appropriate interventions in the flowsheet. Outcome: Progressing Towards Goal  Note: Pressure Injury Interventions:  Sensory Interventions: Assess changes in LOC, Keep linens dry and wrinkle-free    Moisture Interventions: Absorbent underpads, Apply protective barrier, creams and emollients    Activity Interventions: Pressure redistribution bed/mattress(bed type), PT/OT evaluation    Mobility Interventions: Pressure redistribution bed/mattress (bed type)    Nutrition Interventions: Document food/fluid/supplement intake    Friction and Shear Interventions: Apply protective barrier, creams and emollients, Foam dressings/transparent film/skin sealants                Problem: Patient Education: Go to Patient Education Activity  Goal: Patient/Family Education  Outcome: Progressing Towards Goal     Problem: Falls - Risk of  Goal: *Absence of Falls  Description: Document Jordan Fall Risk and appropriate interventions in the flowsheet.   Outcome: Progressing Towards Goal  Note: Fall Risk Interventions:  Mobility Interventions: Bed/chair exit alarm, Patient to call before getting OOB              Elimination Interventions: Bed/chair exit alarm, Call light in reach, Toileting schedule/hourly rounds              Problem: Patient Education: Go to Patient Education Activity  Goal: Patient/Family Education  Outcome: Progressing Towards Goal     Problem: Patient Education: Go to Patient Education Activity  Goal: Patient/Family Education  Outcome: Progressing Towards Goal     Problem: Cath Lab Procedures: Post-Cath Day of Procedure (Initiate SCIP Measures for Post-Op Care)  Goal: Off Pathway (Use only if patient is Off Pathway)  Outcome: Progressing Towards Goal  Goal: Activity/Safety  Outcome: Progressing Towards Goal  Goal: Consults, if ordered  Outcome: Progressing Towards Goal  Goal: Diagnostic Test/Procedures  Outcome: Progressing Towards Goal  Goal: Nutrition/Diet  Outcome: Progressing Towards Goal  Goal: Discharge Planning  Outcome: Progressing Towards Goal  Goal: Medications  Outcome: Progressing Towards Goal  Goal: Respiratory  Outcome: Progressing Towards Goal  Goal: Treatments/Interventions/Procedures  Outcome: Progressing Towards Goal  Goal: Psychosocial  Outcome: Progressing Towards Goal  Goal: *Procedure site is without bleeding and signs of infection six hours post sheath removal  Outcome: Progressing Towards Goal  Goal: *Hemodynamically stable  Outcome: Progressing Towards Goal  Goal: *Optimal pain control at patient's stated goal  Outcome: Progressing Towards Goal     Problem: Cath Lab Procedures: Post-Cath Day 1  Goal: Off Pathway (Use only if patient is Off Pathway)  Outcome: Progressing Towards Goal  Goal: Activity/Safety  Outcome: Progressing Towards Goal  Goal: Diagnostic Test/Procedures  Outcome: Progressing Towards Goal  Goal: Nutrition/Diet  Outcome: Progressing Towards Goal  Goal: Discharge Planning  Outcome: Progressing Towards Goal  Goal: Medications  Outcome: Progressing Towards Goal  Goal: Respiratory  Outcome: Progressing Towards Goal  Goal: Treatments/Interventions/Procedures  Outcome: Progressing Towards Goal  Goal: Psychosocial  Outcome: Progressing Towards Goal     Problem: Cath Lab Procedures: Discharge Outcomes  Goal: *Stable cardiac rhythm  Outcome: Progressing Towards Goal  Goal: *Hemodynamically stable  Outcome: Progressing Towards Goal  Goal: *Optimal pain control at patient's stated goal  Outcome: Progressing Towards Goal  Goal: *Pulses palpable, skin color within defined limits, skin temperature warm  Outcome: Progressing Towards Goal  Goal: *Lungs clear or at baseline  Outcome: Progressing Towards Goal  Goal: *Demonstrates ability to perform prescribed activity without shortness of breath or discomfort  Outcome: Progressing Towards Goal  Goal: *Verbalizes home exercise program, activity guidelines, cardiac precautions  Outcome: Progressing Towards Goal  Goal: *Verbalizes understanding and describes prescribed diet  Outcome: Progressing Towards Goal  Goal: *Verbalizes understanding and describes medication purposes and frequencies  Outcome: Progressing Towards Goal  Goal: *Identifies cardiac risk factors  Outcome: Progressing Towards Goal  Goal: *No signs and symptoms of infection or wound complications  Outcome: Progressing Towards Goal  Goal: *Anxiety reduced or absent  Outcome: Progressing Towards Goal  Goal: *Verbalizes and demonstrates incision care  Outcome: Progressing Towards Goal  Goal: *Understands and describes signs and symptoms to report to providers(Stroke Metric)  Outcome: Progressing Towards Goal  Goal: *Describes follow-up/return visits to physicians  Outcome: Progressing Towards Goal  Goal: *Describes available resources and support systems  Outcome: Progressing Towards Goal  Goal: *Influenza immunization  Outcome: Progressing Towards Goal  Goal: *Pneumococcal immunization  Outcome: Progressing Towards Goal

## 2020-03-16 NOTE — PROGRESS NOTES
Reason for Admission:   STEMI                    RUR Score:      14%               Plan for utilizing home health:     [facility]     PCP: First and Last name:   Alcides Prieto   Name of Practice: [facility]   Are you a current patient: Yes/No: Yes   Approximate date of last visit: UNK                  Current Advanced Directive/Advance Care Plan:                          Transition of Care Plan: Plan will be home with [facility]. Patient refusing to complete assessment. Per nurses patient upset because she wants to go home.

## 2020-03-16 NOTE — PROGRESS NOTES
1915- Bedside shift change report given to Isabela Sousa RN (oncoming nurse) by Fabricio Cooney, Via Rea Bello, CARL (offgoing nurse). Report included the following information SBAR, ED Summary, Intake/Output, MAR, Recent Results and Cardiac Rhythm NSR. Assessment completed, patient complains of back pain 4/10. Oxygenation 100% on 3L NC. All drips and orders verified at this time. 2040- Patient voided on bedpan, 275mL. 2100- Patient previously repositioned for back pain. Patient back pain continues 7/10. PRN medication given. 2230- Patient placed on bedpan again, 250mL voided. Patient states back pain and headache 4/10 pain. PRN tylenol given. Will continue to monitor. 0000- Reassessment completed. Patient lying in bed with eyes closed. EKG performed as per order. VSS on monitor. 0100- Patient complaining of 3/10 chest pain and 5/10 back pain. PRN pain medication given. 3274- Patient states she was awoken by chest pain 6/10. RN titrate up to 25mcg/min on nitro gtt. NC placed on patient at 3L, bedside EKG performed. 0200- Patient resting in bed with eyes closed. Patient states pain is the same. 0230- Patient in bed with eyes closed. VSS on monitor. Therapeutic music channel turned on tv.     0400- Reassessment completed. No changes noted. Patient in bed sleeping. VSS will continue to monitor. 0600- Placed patient on the bedpan for a second time, failed first attempt. If failed a second time, bladder scanner will be utilized. 7506- Patient voided 275mL in bedpan. 0700- Bedside shift change report given to Fabricio Cooney, Via Rea Bello, RN (oncoming nurse) by Isabela Sousa RN (offgoing nurse). Report included the following information SBAR, Kardex, ED Summary, Intake/Output, MAR, Accordion, Recent Results and Cardiac Rhythm NSR.

## 2020-03-16 NOTE — DIABETES MGMT
NUTRITIONAL ASSESSMENT GLYCEMIC CONTROL/ PLAN OF CARE     Joey Obando           52 y.o.           3/15/2020                 1. ST elevation myocardial infarction (STEMI) of anterior wall (HCC)    2. Elevated troponin I level    3. Acute chest pain    4. STEMI (ST elevation myocardial infarction) ([address]       INTERVENTIONS/PLAN:   1. On-going lipid and weight control nutrition education. Written materials provided for home. 2. Monitor po intake, labs and weights. ASSESSMENT:   Pt is 200% ideal weight; pt appears well nourished and predict po intake will be good. Although pt reported 2 to 18 lb weight loss at admission, weight history and documented weights do not support this. Net weight gain since summer 2019 is 10 lbs and pt's weight has been stable since Jan 2020. Lipids noted. Pt able to v erbalize understanding of meal planning for lipid lowering and weight control. Nutrition Diagnoses: Altered nutrition related labs due to hyperlipidemia as evidenced by LDL - 110 mg/dL and triglyceride - 200 mg/dL. Morbid obesity due to excess energy intake as evidenced by BMI - 40.2 kg/m2. SUBJECTIVE/OBJECTIVE:   Information obtained from: chart review, ICU rounds, pt  3/16:  Pt reports her appetite was not good PTA due to not feeling well after tyrell \"the flu\" about 2 weeks ago. She states she has maybe lost a few lbs. MST received for weight loss of 2-13 lbs and poor appetite. She reports her appetite is improved today. Pt reports she does not have issues with chewing or swallowing and she does not have any known food allergies. She states her  has diabetes, they have attended education classes together and she knows meal planning guidelines  (does not drink sweetened beverage, eat fried foods or high Na foods, avoids fast food). Diet: full liquids at breakfast, to be advanced to cardiac, regular texture at lunch meal.    No data found.     Medications: [x]                Reviewed Pertinent:  Lipitor 80 mg/d   Most Recent POC Glucose:   Recent Labs     03/15/20  1613 03/15/20  1044   GLU 85 117*         Labs:   No results found for: HBA1C, HGBE8, [id number]  Lab Results   Component Value Date/Time    Sodium 137 03/15/2020 04:13 PM    Potassium 3.9 03/15/2020 04:13 PM    Chloride 108 03/15/2020 04:13 PM    CO2 21 03/15/2020 04:13 PM    Anion gap 8 03/15/2020 04:13 PM    Glucose 85 03/15/2020 04:13 PM    BUN 7 03/15/2020 04:13 PM    Creatinine 0.57 (L) 03/15/2020 04:13 PM    Calcium 8.3 (L) 03/15/2020 04:13 PM    Albumin 3.7 03/15/2020 10:44 AM    Albumin 3.6 03/15/2020 10:44 AM     Lab Results   Component Value Date/Time    Cholesterol, total 184 03/16/2020 02:30 AM    HDL Cholesterol 34 (L) 03/16/2020 02:30 AM    LDL, calculated 110 (H) 03/16/2020 02:30 AM    VLDL, calculated 40 03/16/2020 02:30 AM    Triglyceride 200 (H) 03/16/2020 02:30 AM    CHOL/HDL Ratio 5.4 (H) 03/16/2020 02:30 AM       Anthropometrics: IBW : 49.9 kg (110 lb), % IBW (Calculated): 200 %, BMI (calculated): 40.2  Wt Readings from Last 1 Encounters:   03/15/20 99.8 kg (220 lb)     Last Weight Metrics:  Weight Loss Metrics 3/15/2020 2/28/2020 2/26/2020 2/24/2020 12/31/2019 7/13/2019 10/3/2018   Today's Wt 220 lb 220 lb 220 lb 225 lb 8.5 oz 220 lb 210 lb 210 lb   BMI 40.24 kg/m2 40.24 kg/m2 40.24 kg/m2 41.25 kg/m2 40.24 kg/m2 38.41 kg/m2 38.41 kg/m2      Ht Readings from Last 1 Encounters:   03/15/20 5' 2\" (1.575 m)     Estimated Nutrition Needs:  1896 Kcals/day, Protein (g): 80 g Fluid (ml): 1900 ml  Based on:   [x]          Actual BW    []          ABW   []            Adjusted BW         Nutrition Interventions:  Nutrition education (lipid and weight control)  Goal:   Pt will be aware of nutrition and meal planning guidelines for lipid lowering and weight control by 3/23/20.       Nutrition Monitoring and Evaluation      [x]     Monitor po intake on meal rounds  [x]     Continue inpatient monitoring and intervention  [] Other:    Nutrition Risk:  []   High     []  Moderate    [x]  Minimal/Uncompromised    Kev Gomez RD, CDE   Office:  [facility] Pager:  [phone]

## 2020-03-16 NOTE — PROGRESS NOTES
Physical Exam  Constitutional:          Dual Skin assessment completed with [facility] RN

## 2020-03-16 NOTE — PROGRESS NOTES
Cardiac rehab visit and patient education completed. Discussed importance of heart healthy diet and regular exercise. Discussed smoking cessation and the outpatient cardiac rehab program.  Provided patient with a list of cardiac rehab sites available in the area for patient to choose from.

## 2020-03-16 NOTE — PROGRESS NOTES
[facility] -Rounding note      Date of  Admission: 3/15/2020 10:28 AM   Primary Care Physician:  Rob Cheema MD     Assessment:     Patient Active Problem List   Diagnosis Code    Acute bronchitis J20.9    Hypokalemia E87.6    Obesity (BMI 30-39. 9) E66.9    Obesity, morbid (HCC) E66.01    STEMI (ST elevation myocardial infarction) ([facility] [address] I21.3    Tobacco use Z72.0    Hypertension I10     Inferior STEMI  Dyslipidemia,   Hypertension, /84 this morning  Active tobacco use, counseled  Family history of CAD with recent death of her father from sudden cardiac death  History DVT     Plan:   -Counseled regarding tobacco cessation  -We will give info for cardiac rehab following hospitalization  -Initially, she was not started on beta-blockers with recent history of bronchitis, and prior episodes of reactive airway disease. Tolerating Toprol-XL so far. -Personally reviewed coronary angiograms and catheter intervention.  -Minimal chest discomfort at this point. Could be transferred to telemetry from cardiac standpoint   History of Present Illness: This is a 52 y.o. female admitted for STEMI (ST elevation myocardial infarction) ([facility] [address] [I21.3]. Patient complains of: Chest pain    Patient is a 80-year-old female with recent admission to the hospital for shortness of breath, possible bronchitis now presents to the emergency room with acute onset of midsternal chest pain. She states the chest pain started earlier today and is fairly constant. She had waxing and waning chest pains yesterday but was not persistent and continuous. Because of persistent severe chest pains, she presented to the emergency room. She has baseline dyspnea on exertion and shortness of breath which is worsened since the chest pains began.     Cardiac risk factors: smoking/ tobacco exposure, family history, dyslipidemia, obesity, hypertension      Review of Symptoms:  Except as stated above include:  Constitutional:  negative  Respiratory:  negative  Cardiovascular:  negative  Gastrointestinal: negative  Genitourinary:  negative  Musculoskeletal:  Negative  Neurological:  Negative  Dermatological:  Negative  Endocrinological: Negative  Psychological:  Negative    A comprehensive review of systems was negative except for that written in the HPI. Past Medical History:     Past Medical History:   Diagnosis Date    Back pain     Displacement of lumbar intervertebral disc     High cholesterol     takes no meds for same.  Hypertension 05/02/2017    pt states she does not take meds for same.  Lumbar radiculitis     Lumbar sprain     Numbness     legs and arms        Medications:      Allergies   Allergen Reactions    Codeine Hives    Pcn [Penicillins] Shortness of Breath     Tongue swelling          Current Facility-Administered Medications   Medication Dose Route Frequency    nitroglycerin (NITROSTAT) tablet 0.4 mg  0.4 mg SubLINGual PRN    sodium chloride (NS) flush 5-40 mL  5-40 mL IntraVENous Q8H    sodium chloride (NS) flush 5-40 mL  5-40 mL IntraVENous PRN    magnesium hydroxide (MILK OF MAGNESIA) 400 mg/5 mL oral suspension 30 mL  30 mL Oral DAILY PRN    docusate sodium (COLACE) capsule 100 mg  100 mg Oral BID    aspirin chewable tablet 81 mg  81 mg Oral DAILY    acetaminophen (TYLENOL) tablet 650 mg  650 mg Oral Q4H PRN    sodium chloride (NS) flush 5-40 mL  5-40 mL IntraVENous Q8H    sodium chloride (NS) flush 5-40 mL  5-40 mL IntraVENous PRN    nitroglycerin (NITROLINGUAL) sublingual 0.4 mg/spray  1 Spray SubLINGual Q5MIN PRN    atorvastatin (LIPITOR) tablet 80 mg  80 mg Oral QHS    metoprolol succinate (TOPROL-XL) XL tablet 50 mg  50 mg Oral DAILY    ticagrelor (BRILINTA) tablet 90 mg  90 mg Oral BID    nitroglycerin (TRIDIL) 400 mcg/ml infusion  0-25 mcg/min IntraVENous TITRATE    naloxone (NARCAN) injection 0.4 mg  0.4 mg IntraVENous PRN    morphine injection 2-4 mg  2-4 mg IntraVENous Q4H PRN         Physical Exam:     Visit Vitals  /84   Pulse 81   Temp 97.8 °F (36.6 °C)   Resp 18   Wt 99.8 kg (220 lb)   SpO2 99%   Breastfeeding No   BMI 40.24 kg/m²     BP Readings from Last 3 Encounters:   03/16/20 125/84   02/28/20 134/80   02/26/20 122/74     Pulse Readings from Last 3 Encounters:   03/16/20 81   02/28/20 76   02/26/20 89     Wt Readings from Last 3 Encounters:   03/15/20 99.8 kg (220 lb)   02/28/20 99.8 kg (220 lb)   02/26/20 99.8 kg (220 lb)       General:  alert, cooperative, severe distress, appears stated age  Neck:  no JVD  Lungs:  clear to auscultation bilaterally  Heart:  regular rate and rhythm  Abdomen:  no guarding or rigidity  Extremities:  no edema  Skin: Warm and dry.  no hyperpigmentation, vitiligo, or suspicious lesions  Neuro: alert, oriented x3, affect appropriate, no focal neurological deficits, moves all extremities well, no involuntary movements  Psych: non focal     Data Review:     Recent Labs     03/15/20  1613 03/15/20  1044   WBC 11.0 10.1   HGB 14.0 14.6   HCT 41.9 44.4    242     Recent Labs     03/15/20  1613 03/15/20  1044    135*   K 3.9 4.0    104   CO2 21 26   GLU 85 117*   BUN 7 9   CREA 0.57* 1.03   CA 8.3* 9.1   ALB  --  3.6  3.7   SGOT  --  49*  46*   ALT  --  50  50       Results for orders placed or performed during the hospital encounter of 03/15/20   EKG, 12 LEAD, INITIAL   Result Value Ref Range    Ventricular Rate 56 BPM    Atrial Rate 56 BPM    P-R Interval 114 ms    QRS Duration 86 ms    Q-T Interval 436 ms    QTC Calculation (Bezet) 420 ms    Calculated P Axis 17 degrees    Calculated R Axis 14 degrees    Calculated T Axis 70 degrees    Diagnosis       Age and gender specific ECG analysis  Sinus bradycardia with sinus arrhythmia  Inferior infarct (cited on or before 15-MAR-2020)  ACUTE MI  Consider right ventricular involvement in acute inferior infarct  Abnormal ECG  When compared with ECG of 15-MAR-2020 13:12,  T wave inversion no longer evident in Inferior leads         All Cardiac Markers in the last 24 hours:    Lab Results   Component Value Date/Time    TROIQ 74.60 () 03/15/2020 08:03 PM    TROIQ 90.60 () 03/15/2020 04:13 PM    TROIQ 2.80 () 03/15/2020 10:44 AM    TNIPOC 0.93 ([facility]) 03/15/2020 10:41 AM       Last Lipid:    Lab Results   Component Value Date/Time    Cholesterol, total 257 (H) 08/01/2014 07:40 AM    HDL Cholesterol 52 08/01/2014 07:40 AM    LDL, calculated 171 (H) 08/01/2014 07:40 AM    Triglyceride 172 (H) 08/01/2014 07:40 AM    CHOL/HDL Ratio 4.9 (H) 08/01/2014 07:40 AM       Signed By: Sabina Alex MD     March 16, 2020

## 2020-03-16 NOTE — PROGRESS NOTES
0700:  Received report from Sussy Noel, CARL and Teresa Burden RN.  VSS;  Patient awake and watching tv. No complaints at this time. 0930:  Dr. Susanna Epps (Cardiology) at bedside. 10:00:  Patient c/o pain 5/10, same ongoing pain. Sharp, stabbing, substernal with radiation to the back. Patient can tolerate pain at this moment will inform someone if pain is intolerable. 1200:  Dr. Blanca Salgado at bedside. 1336:  Patient states 8/10 sharp, stabbing pain that radiates to back during echo;  2 mg of morphine given. 1348:  Patient states 6/10 pain is getting better. 1700:  Patient up in chair at bedside. Says pain in back is much better now that she is up in the chair. 1714:  Nitro drip stopped. 1730:  Patient assisted to bathroom. Tolerated well.    1930: Bedside and Verbal shift change report given to Antwon Ojeda RN (oncoming nurse) by CARL Trujillo and Gale Roberson RN (offgoing nurse). Report included the following information SBAR, Intake/Output, MAR, Recent Results, Cardiac Rhythm NSR and Alarm Parameters .

## 2020-03-16 NOTE — PROGRESS NOTES
1900: Bedside and Verbal shift change report given to Rinku Coles and Javier Rodriguez (oncoming nurse) by Ligia Hughes and [address] [facility] (offgoing nurse). Report included the following information SBAR, Kardex, Intake/Output, MAR, Accordion, Recent Results and Quality Measures. Pt alert and oriented, complaining of pain of 3/3. Resting in bed    1930: daughter called and updated. Will continue to monitor. 0000: Reassessment, no changes noted     0400: Reassessment completed, pt sleeping in bed. Pain management continues to be a struggle     0700: Bedside and Verbal shift change report given to Ligia Hughes and [address] [facility] (oncoming nurse) by Rinku Coles and Castillo Kramer (offgoing nurse). Report included the following information SBAR, Kardex, Intake/Output, MAR, Accordion and Quality Measures.

## 2020-03-17 LAB
ECHO AO ASC DIAM: 3.63 CM
ECHO AO ROOT DIAM: 3.78 CM
ECHO LA MAJOR AXIS: 3.69 CM
ECHO LA TO AORTIC ROOT RATIO: 0.98
ECHO LV EDV A2C: 138.8 ML
ECHO LV EDV A4C: 167.1 ML
ECHO LV EDV BP: 154.6 ML (ref 56–104)
ECHO LV EDV INDEX A4C: 83.7 ML/M2
ECHO LV EDV INDEX BP: 77.4 ML/M2
ECHO LV EDV NDEX A2C: 69.5 ML/M2
ECHO LV EDV TEICHHOLZ: 0.64 ML
ECHO LV EJECTION FRACTION A2C: 50 %
ECHO LV EJECTION FRACTION A4C: 54 %
ECHO LV EJECTION FRACTION BIPLANE: 52.2 % (ref 55–100)
ECHO LV ESV A2C: 68.8 ML
ECHO LV ESV A4C: 77.5 ML
ECHO LV ESV BP: 73.9 ML (ref 19–49)
ECHO LV ESV INDEX A2C: 34.4 ML/M2
ECHO LV ESV INDEX A4C: 38.8 ML/M2
ECHO LV ESV INDEX BP: 37 ML/M2
ECHO LV ESV TEICHHOLZ: 0.45 ML
ECHO LV INTERNAL DIMENSION DIASTOLIC: 4.67 CM (ref 3.9–5.3)
ECHO LV INTERNAL DIMENSION SYSTOLIC: 4.03 CM
ECHO LV IVSD: 1.15 CM (ref 0.6–0.9)
ECHO LV MASS 2D: 221.5 G (ref 67–162)
ECHO LV MASS INDEX 2D: 110.9 G/M2 (ref 43–95)
ECHO LV POSTERIOR WALL DIASTOLIC: 1.08 CM (ref 0.6–0.9)
ECHO LVOT DIAM: 2 CM
ECHO LVOT PEAK GRADIENT: 3.4 MMHG
ECHO LVOT PEAK VELOCITY: 92.85 CM/S
ECHO LVOT SV: 59.6 ML
ECHO LVOT VTI: 18.88 CM
ECHO MV A VELOCITY: 73.91 CM/S
ECHO MV E DECELERATION TIME (DT): 179.9 MS
ECHO MV E VELOCITY: 82.64 CM/S
ECHO MV E/A RATIO: 1.12
ECHO RA MINOR AXIS: 3.97 CM
LVFS 2D: 13.74 %
LVOT MG: 1.85 MMHG
LVOT MV: 0.64 CM/S
LVSV (MOD BI): 38.61 ML
LVSV (MOD SINGLE 4C): 42.89 ML
LVSV (MOD SINGLE): 33.5 ML
LVSV (TEICH): 14.19 ML
MV DEC SLOPE: 4.59

## 2020-03-17 PROCEDURE — 51798 US URINE CAPACITY MEASURE: CPT

## 2020-03-17 PROCEDURE — 74011250637 HC RX REV CODE- 250/637: Performed by: HOSPITALIST

## 2020-03-17 PROCEDURE — 74011250637 HC RX REV CODE- 250/637: Performed by: INTERNAL MEDICINE

## 2020-03-17 RX ORDER — ATORVASTATIN CALCIUM 80 MG/1
80 TABLET, FILM COATED ORAL
Qty: 30 TAB | Refills: 0 | Status: SHIPPED | OUTPATIENT
Start: 2020-03-17 | End: 2020-04-22 | Stop reason: SDUPTHER

## 2020-03-17 RX ORDER — METOPROLOL SUCCINATE 50 MG/1
50 TABLET, EXTENDED RELEASE ORAL DAILY
Qty: 30 TAB | Refills: 0 | Status: SHIPPED | OUTPATIENT
Start: 2020-03-18 | End: 2020-04-22 | Stop reason: SDUPTHER

## 2020-03-17 RX ORDER — LISINOPRIL 2.5 MG/1
2.5 TABLET ORAL DAILY
Qty: 30 TAB | Refills: 0 | OUTPATIENT
Start: 2020-03-18 | End: 2021-02-04

## 2020-03-17 RX ORDER — GUAIFENESIN 100 MG/5ML
81 LIQUID (ML) ORAL DAILY
Qty: 30 TAB | Refills: 0 | Status: ON HOLD | OUTPATIENT
Start: 2020-03-18 | End: 2022-06-02 | Stop reason: SDUPTHER

## 2020-03-17 RX ORDER — LISINOPRIL 5 MG/1
2.5 TABLET ORAL DAILY
Status: DISCONTINUED | OUTPATIENT
Start: 2020-03-18 | End: 2020-03-17 | Stop reason: HOSPADM

## 2020-03-17 RX ADMIN — Medication 10 ML: at 05:50

## 2020-03-17 RX ADMIN — TICAGRELOR 90 MG: 90 TABLET ORAL at 09:39

## 2020-03-17 RX ADMIN — OXYCODONE HYDROCHLORIDE AND ACETAMINOPHEN 1 TABLET: 5; 325 TABLET ORAL at 05:54

## 2020-03-17 RX ADMIN — METOPROLOL SUCCINATE 50 MG: 50 TABLET, FILM COATED, EXTENDED RELEASE ORAL at 09:39

## 2020-03-17 RX ADMIN — ASPIRIN 81 MG 81 MG: 81 TABLET ORAL at 09:39

## 2020-03-17 RX ADMIN — DOCUSATE SODIUM 100 MG: 100 CAPSULE, LIQUID FILLED ORAL at 09:39

## 2020-03-17 RX ADMIN — OXYCODONE HYDROCHLORIDE AND ACETAMINOPHEN 1 TABLET: 5; 325 TABLET ORAL at 00:07

## 2020-03-17 NOTE — PROGRESS NOTES
Pt dc'd home, no services ordered, to f/u with cardiac rehab,outpt. brilinta card given to pt yesterday  Went to find out cost of meds to help pt , pt already picked up meds from pharmacy, took Adamaris Gomez is returning tomorrow to get the rest of her meds per [facility]. Called [facility], total cost of scripts 40.94. fax'd paperwork to them, confirmation received. Left message on ms derrick tanner that scripts have been paid for, please return and  meds.      Plan home    Care Management Interventions  PCP Verified by CM: Joshua Valencia)  Transition of Care Consult (CM Consult): Discharge Planning  Current Support Network: Lives with Spouse  Confirm Follow Up Transport: Family  The Plan for Transition of Care is Related to the Following Treatment Goals : home  Discharge Location  Discharge Placement: Home

## 2020-03-17 NOTE — PROGRESS NOTES
Problem: Discharge Planning  Goal: *Discharge to safe environment  Outcome: Progressing Towards Goal     Problem: Pressure Injury - Risk of  Goal: *Prevention of pressure injury  Description: Document Pipe Scale and appropriate interventions in the flowsheet. Outcome: Progressing Towards Goal  Note: Pressure Injury Interventions:  Sensory Interventions: Assess changes in LOC, Assess need for specialty bed, Avoid rigorous massage over bony prominences    Moisture Interventions: Absorbent underpads, Apply protective barrier, creams and emollients, Assess need for specialty bed    Activity Interventions: Assess need for specialty bed, PT/OT evaluation    Mobility Interventions: Pressure redistribution bed/mattress (bed type), Assess need for specialty bed, HOB 30 degrees or less    Nutrition Interventions: Document food/fluid/supplement intake, Discuss nutritional consult with provider    Friction and Shear Interventions: Apply protective barrier, creams and emollients, Feet elevated on foot rest, HOB 30 degrees or less                Problem: Patient Education: Go to Patient Education Activity  Goal: Patient/Family Education  Outcome: Progressing Towards Goal     Problem: Falls - Risk of  Goal: *Absence of Falls  Description: Document Jordan Fall Risk and appropriate interventions in the flowsheet.   Outcome: Progressing Towards Goal  Note: Fall Risk Interventions:  Mobility Interventions: Bed/chair exit alarm         Medication Interventions: Assess postural VS orthostatic hypotension, Bed/chair exit alarm, Evaluate medications/consider consulting pharmacy, Patient to call before getting OOB    Elimination Interventions: Bed/chair exit alarm, Call light in reach, Patient to call for help with toileting needs              Problem: Patient Education: Go to Patient Education Activity  Goal: Patient/Family Education  Outcome: Progressing Towards Goal     Problem: Patient Education: Go to Patient Education Activity  Goal: Patient/Family Education  Outcome: Progressing Towards Goal     Problem: Cath Lab Procedures: Post-Cath Day of Procedure (Initiate SCIP Measures for Post-Op Care)  Goal: Off Pathway (Use only if patient is Off Pathway)  Outcome: Progressing Towards Goal  Goal: Activity/Safety  Outcome: Progressing Towards Goal  Goal: Consults, if ordered  Outcome: Progressing Towards Goal  Goal: Diagnostic Test/Procedures  Outcome: Progressing Towards Goal  Goal: Nutrition/Diet  Outcome: Progressing Towards Goal  Goal: Discharge Planning  Outcome: Progressing Towards Goal  Goal: Medications  Outcome: Progressing Towards Goal  Goal: Respiratory  Outcome: Progressing Towards Goal  Goal: Treatments/Interventions/Procedures  Outcome: Progressing Towards Goal  Goal: Psychosocial  Outcome: Progressing Towards Goal  Goal: *Procedure site is without bleeding and signs of infection six hours post sheath removal  Outcome: Progressing Towards Goal  Goal: *Hemodynamically stable  Outcome: Progressing Towards Goal  Goal: *Optimal pain control at patient's stated goal  Outcome: Progressing Towards Goal     Problem: Cath Lab Procedures: Post-Cath Day 1  Goal: Off Pathway (Use only if patient is Off Pathway)  Outcome: Progressing Towards Goal  Goal: Activity/Safety  Outcome: Progressing Towards Goal  Goal: Diagnostic Test/Procedures  Outcome: Progressing Towards Goal  Goal: Nutrition/Diet  Outcome: Progressing Towards Goal  Goal: Discharge Planning  Outcome: Progressing Towards Goal  Goal: Medications  Outcome: Progressing Towards Goal  Goal: Respiratory  Outcome: Progressing Towards Goal  Goal: Treatments/Interventions/Procedures  Outcome: Progressing Towards Goal  Goal: Psychosocial  Outcome: Progressing Towards Goal     Problem: Cath Lab Procedures: Discharge Outcomes  Goal: *Stable cardiac rhythm  Outcome: Progressing Towards Goal  Goal: *Hemodynamically stable  Outcome: Progressing Towards Goal  Goal: *Optimal pain control at patient's stated goal  Outcome: Progressing Towards Goal  Goal: *Pulses palpable, skin color within defined limits, skin temperature warm  Outcome: Progressing Towards Goal  Goal: *Lungs clear or at baseline  Outcome: Progressing Towards Goal  Goal: *Demonstrates ability to perform prescribed activity without shortness of breath or discomfort  Outcome: Progressing Towards Goal  Goal: *Verbalizes home exercise program, activity guidelines, cardiac precautions  Outcome: Progressing Towards Goal  Goal: *Verbalizes understanding and describes prescribed diet  Outcome: Progressing Towards Goal  Goal: *Verbalizes understanding and describes medication purposes and frequencies  Outcome: Progressing Towards Goal  Goal: *Identifies cardiac risk factors  Outcome: Progressing Towards Goal  Goal: *No signs and symptoms of infection or wound complications  Outcome: Progressing Towards Goal  Goal: *Anxiety reduced or absent  Outcome: Progressing Towards Goal  Goal: *Verbalizes and demonstrates incision care  Outcome: Progressing Towards Goal  Goal: *Understands and describes signs and symptoms to report to providers(Stroke Metric)  Outcome: Progressing Towards Goal  Goal: *Describes follow-up/return visits to physicians  Outcome: Progressing Towards Goal  Goal: *Describes available resources and support systems  Outcome: Progressing Towards Goal  Goal: *Influenza immunization  Outcome: Progressing Towards Goal  Goal: *Pneumococcal immunization  Outcome: Progressing Towards Goal

## 2020-03-17 NOTE — DISCHARGE INSTRUCTIONS
Patient Education        DISCHARGE SUMMARY from Nurse    PATIENT INSTRUCTIONS:    After general anesthesia or intravenous sedation, for 24 hours or while taking prescription Narcotics:  · Limit your activities  · Do not drive and operate hazardous machinery  · Do not make important personal or business decisions  · Do  not drink alcoholic beverages  · If you have not urinated within 8 hours after discharge, please contact your surgeon on call. Report the following to your surgeon:  · Excessive pain, swelling, redness or odor of or around the surgical area  · Temperature over 100.5  · Nausea and vomiting lasting longer than 4 hours or if unable to take medications  · Any signs of decreased circulation or nerve impairment to extremity: change in color, persistent  numbness, tingling, coldness or increase pain  · Any questions    What to do at Home:      *  Please give a list of your current medications to your Primary Care Provider. *  Please update this list whenever your medications are discontinued, doses are      changed, or new medications (including over-the-counter products) are added. *  Please carry medication information at all times in case of emergency situations. These are general instructions for a healthy lifestyle:    No smoking/ No tobacco products/ Avoid exposure to second hand smoke  Surgeon General's Warning:  Quitting smoking now greatly reduces serious risk to your health. Obesity, smoking, and sedentary lifestyle greatly increases your risk for illness    A healthy diet, regular physical exercise & weight monitoring are important for maintaining a healthy lifestyle    You may be retaining fluid if you have a history of heart failure or if you experience any of the following symptoms:  Weight gain of 3 pounds or more overnight or 5 pounds in a week, increased swelling in our hands or feet or shortness of breath while lying flat in bed.   Please call your doctor as soon as you notice any of these symptoms; do not wait until your next office visit. Patient armband removed and shredded      The discharge information has been reviewed with the patient. The patient verbalized understanding. Discharge medications reviewed with the patient and appropriate educational materials and side effects teaching were provided. ___________________________________________________________________________________________________________________________________  ACE Inhibitors: Care Instructions  Your Care Instructions    ACE (angiotensin-converting enzyme) inhibitors lower blood pressure. They also treat heart failure and prevent heart attacks and strokes. They block an enzyme that makes blood vessels narrow. As a result, the blood vessels relax and widen. This lowers blood pressure. These medicines also put more water and salt into the urine. This lowers blood pressure too. These medicines are a good choice for people with diabetes. They don't affect blood sugar levels, and they may protect the kidneys. Examples include:  · Benazepril (Lotensin). · Lisinopril (Prinivil, Zestril). · Ramipril (Altace). Before you start taking this medicine, make sure your doctor knows if you take other medicines, especially water pills (diuretics) or potassium tablets. And tell your doctor if you use a salt substitute. You should not take an ACE inhibitor if you are pregnant or planning to become pregnant. You may need regular blood tests. Follow-up care is a key part of your treatment and safety. Be sure to make and go to all appointments, and call your doctor if you are having problems. It's also a good idea to know your test results and keep a list of the medicines you take. How can you care for yourself at home? · Take your medicines exactly as prescribed. Be sure to take your medicine every day. Call your doctor if you think you are having a problem with your medicine. · ACE inhibitors can cause a dry cough.  If the cough is bad, talk to your doctor. You may need to try a different medicine. · ACE inhibitors can cause swelling of your lips, tongue, or face. If the swelling is severe, you may need treatment right away. Severe swelling can make it hard to breathe, but this is very rare. · Check with your doctor or pharmacist before you use any other medicines. This includes over-the-counter medicines. Make sure your doctor knows all of the medicines, vitamins, herbal products, and supplements you take. Taking some medicines together can cause problems. When should you call for help? Call your doctor now or seek immediate medical care if:    · You have swelling of your lips, tongue, or face.     · You think you are having problems with your medicine.    Watch closely for changes in your health, and be sure to contact your doctor if you have any problems. Where can you learn more? Go to http://jaqueline-vee.info/  Enter [id number] in the search box to learn more about \"ACE Inhibitors: Care Instructions. \"  Current as of: December 15, 2019Content Version: 12.4  © 6384-0630 [employer]. Care instructions adapted under license by [employer] (which disclaims liability or warranty for this information). If you have questions about a medical condition or this instruction, always ask your healthcare professional. [address] any warranty or liability for your use of this information.

## 2020-03-17 NOTE — PROGRESS NOTES
Bedside shift change report given to Helio Taylor (oncoming nurse) by Mark Anthony Steven and [facility] (offgoing nurse). Report included the following information SBAR, Kardex, Procedure Summary, Intake/Output, MAR, Accordion, Recent Results, Med Rec Status, Cardiac Rhythm NSR, Alarm Parameters  and Dual Neuro Assessment. 2000 Assessment. A&Ox4. Calm and kind to staff. VSS. 8/10 pain at cath site, Tylenol given. Walks around room and to commode. Voided 300cc clear yellow urine without assistance. 0000 Assessment. Client to bathroom. 8/10 lower R back pain. Hx of fusion. Dr. Adiel Araujo at nursing desk, orders provided for percocet, given. 0100 Reassessed pain, client sleeping at this time. 0400 Assessment. Up to commode. Voided. 0600 Requesting to move to bedside chair, ambulates without assistance. 8/10 back pain. Percocet given. 0700 Bedside shift change report given to Christo Taveras (oncoming nurse) by Sander Rucker (offgoing nurse). Report included the following information SBAR, Kardex, Procedure Summary, Intake/Output, MAR, Accordion, Recent Results, Med Rec Status, Cardiac Rhythm NSR, Alarm Parameters  and Dual Neuro Assessment.

## 2020-03-17 NOTE — DISCHARGE SUMMARY
Internal Medicine Discharge Summary        Patient: Edgar Olguin    YOB: 1970    Age:  52 y.o. Admit Date: 3/15/2020    Discharge Date: 3/17/2020    LOS:  LOS: 2 days     Discharge To: Home    Consults: Cardiology    Admission Diagnoses: STEMI (ST elevation myocardial infarction) ([facility] [I21.3]    Discharge Diagnoses:    Problem List as of 3/17/2020 Date Reviewed: 2/28/2020          Codes Class Noted - Resolved    * (Principal) STEMI (ST elevation myocardial infarction) ([facility] ICD-10-CM: I21.3  ICD-9-CM: 410.90  3/15/2020 - Present        Tobacco use ICD-10-CM: Z72.0  ICD-9-CM: 305.1  3/15/2020 - Present        Obesity, morbid ([facility] ICD-10-CM: E66.01  ICD-9-CM: 278.01  2/28/2020 - Present        Acute bronchitis ICD-10-CM: J20.9  ICD-9-CM: 466.0  2/23/2020 - Present        Hypokalemia ICD-10-CM: E87.6  ICD-9-CM: 276.8  2/23/2020 - Present        Obesity (BMI 30-39. 9) ICD-10-CM: E66.9  ICD-9-CM: 278.00  2/23/2020 - Present        Hypertension ICD-10-CM: I10  ICD-9-CM: 401.9  5/2/2017 - Present    Overview Signed 3/15/2020 10:56 AM by Roby Johnsonelor, DO     pt states she does not take meds for same. Discharge Condition:  Improved    Procedures: LHC w/ TEJAL to RCA         HPI: Edgar Olguin is a 52 y.o. female with a PMHx of HTN not on meds who presented to the ED with the acute onset of mid-sternal chest pain and SOB. The patient states baseline health up until yesterday at work when she developed the symptoms. The chest pain continued through the day without radiation. She had associated SOB. The pain persisted into today but she also developed acute weakness. She states pain is felt in back as well. In the ED, she had EKG concerning for STEMI and code STEMI activated. She will need emergent cardio evaluation for cath lab needs. Hospital Course:    Acute inferior wall STEMI - Patient taken to cath emergently. Had TEJAL placed to RCA with 0% residual stenosis. Evidence of diffuse 3 vessel disease. Tolerated procedure well. Echo obtained showing EF 50-55% with mildly dilated LA. . Cardiology recommended toprol XL, lisinopril. She was transitioned from heparin drip to ASA/brilinta. She was started on high dose statin as well. She will staged for LAD stent within next 2-3 weeks. CABG was not entertained with the fact that she was having acute inferior STEMI with borderline cardiogenic shock, and severe, persistent chest pains. Cardiology cleared her for discharge. Counseled regarding tobacco cessation. She was no longer having chest pain or SOB at time of discharge. The rest of the patient's chronic conditions were managed appropriately during their admission. They were medically stable at the time of discharge.     Visit Vitals  /80   Pulse 66   Temp 98.2 °F (36.8 °C)   Resp 22   Ht 5' 2\" (1.575 m)   Wt 100.6 kg (221 lb 12.5 oz)   SpO2 96%   Breastfeeding No   BMI 40.56 kg/m²       Physical Exam at Discharge:  General Appearance: NAD, conversant  HENT: normocephalic/atraumatic, moist mucus membranes  Lungs: CTA with normal respiratory effort  CV: RRR, no m/r/g  Abdomen: soft, non-tender, normal bowel sounds  Extremities: no cyanosis, no peripheral edema  Neuro: moves all extremities, no focal deficits  Psych: appropriate affect, alert and oriented to person, place and time    Labs Prior to Discharge:  Labs: Results:       Chemistry Recent Labs     03/15/20  1613 03/15/20  1044   GLU 85 117*    135*   K 3.9 4.0    104   CO2 21 26   BUN 7 9   CREA 0.57* 1.03   CA 8.3* 9.1   AGAP 8 5   BUCR 12 9*   AP  --  106  107   TP  --  7.4  7.8   ALB  --  3.6  3.7   GLOB  --  3.8  4.1*   AGRAT  --  0.9  0.9      CBC w/Diff Recent Labs     03/15/20  1613 03/15/20  1044   WBC 11.0 10.1   RBC 4.72 4.98   HGB 14.0 14.6   HCT 41.9 44.4    242   GRANS  --  70   LYMPH  --  20*   EOS  --  1      Cardiac Enzymes No results for input(s): CPK, CKND1, JAYSON in the last 72 hours. No lab exists for component: CKRMB, TROIP   Coagulation No results for input(s): PTP, INR, APTT, INREXT in the last 72 hours. Lipid Panel Lab Results   Component Value Date/Time    Cholesterol, total 184 03/16/2020 02:30 AM    HDL Cholesterol 34 (L) 03/16/2020 02:30 AM    LDL, calculated 110 (H) 03/16/2020 02:30 AM    VLDL, calculated 40 03/16/2020 02:30 AM    Triglyceride 200 (H) 03/16/2020 02:30 AM    CHOL/HDL Ratio 5.4 (H) 03/16/2020 02:30 AM      BNP No results for input(s): BNPP in the last 72 hours. Liver Enzymes Recent Labs     03/15/20  1044   TP 7.4  7.8   ALB 3.6  3.7     107   SGOT 49*  46*      Thyroid Studies Lab Results   Component Value Date/Time    TSH 1.20 03/15/2020 10:44 AM            Significant Imaging:  Xr Chest Port    Result Date: 3/15/2020  EXAM: CHEST RADIOGRAPH CLINICAL INDICATION/HISTORY: chest pain   > Additional: None COMPARISON: 2/23/2020. TECHNIQUE: Portable frontal view of the chest _______________ FINDINGS: SUPPORT DEVICES: None. HEART AND MEDIASTINUM: Cardiomegaly. Remaining mediastinal contours are stable. LUNGS AND PLEURAL SPACES: No evidence for pneumothorax or pleural effusion. Subtle opacities in the left lower lobe. BONY THORAX AND SOFT TISSUES: Multilevel spinal degenerative changes. _______________     IMPRESSION: 1. Possible left lower lobe atelectasis. Xr Chest Port    Result Date: 2/23/2020  EXAM: One view chest x-ray CLINICAL INDICATION/HISTORY: Dyspnea and chest pain. COMPARISON: 12/31/2019. TECHNIQUE: Single AP view of the chest was obtained. _______________ FINDINGS: HEART, VESSELS, MEDIASTINUM: Heart size is normal. No vascular congestion. There is atherosclerosis of the thoracic aorta. LUNGS, PLEURAL SPACES: The lungs are clear. No effusion or pneumothorax. BONY THORAX, SOFT TISSUES: Unremarkable. _______________     IMPRESSION: No acute cardiopulmonary process.            Discharge Medications:     Current Discharge Medication List      START taking these medications    Details   aspirin 81 mg chewable tablet Take 1 Tab by mouth daily. Qty: 30 Tab, Refills: 0      atorvastatin (LIPITOR) 80 mg tablet Take 1 Tab by mouth nightly. Qty: 30 Tab, Refills: 0      metoprolol succinate (TOPROL-XL) 50 mg XL tablet Take 1 Tab by mouth daily. Qty: 30 Tab, Refills: 0      ticagrelor (BRILINTA) 90 mg tablet Take 1 Tab by mouth two (2) times a day. Qty: 60 Tab, Refills: 0      lisinopriL (PRINIVIL, ZESTRIL) 2.5 mg tablet Take 1 Tab by mouth daily. Qty: 30 Tab, Refills: 0         CONTINUE these medications which have NOT CHANGED    Details   albuterol (PROVENTIL HFA, VENTOLIN HFA, PROAIR HFA) 90 mcg/actuation inhaler Take 2 Puffs by inhalation every four (4) hours as needed for Wheezing. Activity: No heavy lifting, pushing, pulling until cardiology clearance    Diet: Cardiac Diet    Wound Care: None needed    Follow-up:   Please follow up with your PCP within 7 days to discuss your recent hospitalization. Patient to arrange.   Cardiology in 2-3 weeks         Total time spent including time spent on final examination and discharge discussion, discharge documentation and records reviewed and medication reconciliation: > 30 minutes    Smiley Vasquez DO  Internal Medicine, Hospitalist  Pager: [phone]

## 2020-03-17 NOTE — PROGRESS NOTES
[facility] -Rounding note      Date of  Admission: 3/15/2020 10:28 AM   Primary Care Physician:  Lul Galarza MD      I saw, evaluated, interviewed and examined the patient personally. I agree with the findings and plan of care as documented below with PA-C note  Patient with inferior ST elevation MI status post RCA stent. Also has left-sided disease. Currently no angina. Agree with aspirin, Brilinta, statin, beta-blocker  Would start low-dose ACE inhibitor for hypertension and recent MI  EF is normal  Advised against tobacco smoking. She is motivated to quit smoking  Cardiac rehab as outpatient  Once discharged, will need to come follow-up in the office in 2 to 3 weeks    Abigail Lau MD   \   Assessment:   -Inferior STEMI s/p RCA TEJAL stent with 0% residual stenosis with diffuse 3 vessel disease;    -ECHO this admission EF 50-55%, mildly dilated LA   -Dyslipidemia,   -Hypertension,   -Active tobacco use, counseled  -Family history of CAD with recent death of her father from sudden cardiac death  -History DVT     Plan:     -Patient will need staged LAD stent either during this hospitalization or within the next 3-4 weeks. CABG was not entertained with the fact that she was having acute inferior STEMI with borderline cardiogenic shock, and severe, persistent chest pains.  -Counseled regarding tobacco cessation  -We will give info for cardiac rehab following hospitalization  -Toprol XL tolerated in setting of prior reactive airway disease and hx of bronchitis   -Stable for d/c from cardiology standpoint   -Appropriate discharge medications: ASA, Brilinta, Lipitor, Toprol XL and acei     Subjective:    No new c/o. Denies CP or SOB. History of Present Illness: This is a [place] y.o. female admitted for STEMI (ST elevation myocardial infarction) ([facility] [I21.3].     Patient complains of: Chest pain    Patient is a 61-year-old female with recent admission to the hospital for shortness of breath, possible bronchitis now presents to the emergency room with acute onset of midsternal chest pain. She states the chest pain started earlier today and is fairly constant. She had waxing and waning chest pains yesterday but was not persistent and continuous. Because of persistent severe chest pains, she presented to the emergency room. She has baseline dyspnea on exertion and shortness of breath which is worsened since the chest pains began. Cardiac risk factors: smoking/ tobacco exposure, family history, dyslipidemia, obesity, hypertension      Review of Symptoms:  Except as stated above include:  Constitutional:  negative  Respiratory:  negative  Cardiovascular:  negative  Gastrointestinal: negative  Genitourinary:  negative  Musculoskeletal:  Negative  Neurological:  Negative  Dermatological:  Negative  Endocrinological: Negative  Psychological:  Negative    A comprehensive review of systems was negative except for that written in the HPI. Past Medical History:     Past Medical History:   Diagnosis Date    Back pain     Displacement of lumbar intervertebral disc     High cholesterol     takes no meds for same.  Hypertension 05/02/2017    pt states she does not take meds for same.  Lumbar radiculitis     Lumbar sprain     Numbness     legs and arms        Medications:      Allergies   Allergen Reactions    Codeine Hives    Pcn [Penicillins] Shortness of Breath     Tongue swelling          Current Facility-Administered Medications   Medication Dose Route Frequency    oxyCODONE-acetaminophen (PERCOCET) 5-325 mg per tablet 1 Tab  1 Tab Oral Q6H PRN    diphenhydrAMINE (BENADRYL) injection 25 mg  25 mg IntraVENous Q6H PRN    nitroglycerin (NITROSTAT) tablet 0.4 mg  0.4 mg SubLINGual PRN    sodium chloride (NS) flush 5-40 mL  5-40 mL IntraVENous Q8H    sodium chloride (NS) flush 5-40 mL  5-40 mL IntraVENous PRN    magnesium hydroxide (MILK OF MAGNESIA) 400 mg/5 mL oral suspension 30 mL  30 mL Oral DAILY PRN    docusate sodium (COLACE) capsule 100 mg  100 mg Oral BID    aspirin chewable tablet 81 mg  81 mg Oral DAILY    acetaminophen (TYLENOL) tablet 650 mg  650 mg Oral Q4H PRN    sodium chloride (NS) flush 5-40 mL  5-40 mL IntraVENous Q8H    sodium chloride (NS) flush 5-40 mL  5-40 mL IntraVENous PRN    atorvastatin (LIPITOR) tablet 80 mg  80 mg Oral QHS    metoprolol succinate (TOPROL-XL) XL tablet 50 mg  50 mg Oral DAILY    ticagrelor (BRILINTA) tablet 90 mg  90 mg Oral BID    nitroglycerin (TRIDIL) 400 mcg/ml infusion  0-25 mcg/min IntraVENous TITRATE    naloxone (NARCAN) injection 0.4 mg  0.4 mg IntraVENous PRN    morphine injection 2-4 mg  2-4 mg IntraVENous Q4H PRN         Physical Exam:     Visit Vitals  BP (!) 139/107   Pulse 78   Temp 98.2 °F (36.8 °C)   Resp 14   Ht 5' 2\" (1.575 m)   Wt 221 lb 12.5 oz (100.6 kg)   SpO2 97%   Breastfeeding No   BMI 40.56 kg/m²     BP Readings from Last 3 Encounters:   03/17/20 (!) 139/107   02/28/20 134/80   02/26/20 122/74     Pulse Readings from Last 3 Encounters:   03/17/20 78   02/28/20 76   02/26/20 89     Wt Readings from Last 3 Encounters:   03/17/20 221 lb 12.5 oz (100.6 kg)   02/28/20 220 lb (99.8 kg)   02/26/20 220 lb (99.8 kg)       General:  alert, cooperative, severe distress, appears stated age  Neck:  no JVD, supple   Lungs:  clear to auscultation bilaterally  Heart:  regular rate and rhythm  Abdomen:  no guarding or rigidity  Extremities:  no edema     Data Review:     Recent Labs     03/15/20  1613 03/15/20  1044   WBC 11.0 10.1   HGB 14.0 14.6   HCT 41.9 44.4    242     Recent Labs     03/15/20  1613 03/15/20  1044    135*   K 3.9 4.0    104   CO2 21 26   GLU 85 117*   BUN 7 9   CREA 0.57* 1.03   CA 8.3* 9.1   ALB  --  3.6  3.7   SGOT  --  49*  46*   ALT  --  50  50       Results for orders placed or performed during the hospital encounter of 03/15/20   EKG, 12 LEAD, INITIAL   Result Value Ref Range Ventricular Rate 56 BPM    Atrial Rate 56 BPM    P-R Interval 114 ms    QRS Duration 86 ms    Q-T Interval 436 ms    QTC Calculation (Bezet) 420 ms    Calculated P Axis 17 degrees    Calculated R Axis 14 degrees    Calculated T Axis 70 degrees    Diagnosis       Sinus bradycardia with sinus arrhythmia  Inferior infarct (cited on or before 15-MAR-2020)  ACUTE MI  Consider right ventricular involvement in acute inferior infarct  Abnormal ECG  When compared with ECG of 15-MAR-2020 13:12,  No significant change was found  Confirmed by Wade Tuttle ([id number] [id number]) on 3/16/2020 3:01:22 PM         All Cardiac Markers in the last 24 hours:    No results found for: CPK, CK, CKMMB, CKMB, RCK3, CKMBT, CKNDX, CKND1, JAYSON, TROPT, TROIQ, DANYELLE, TROPT, TNIPOC, BNP, BNPP    Last Lipid:    Lab Results   Component Value Date/Time    Cholesterol, total 184 03/16/2020 02:30 AM    HDL Cholesterol 34 (L) 03/16/2020 02:30 AM    LDL, calculated 110 (H) 03/16/2020 02:30 AM    Triglyceride 200 (H) 03/16/2020 02:30 AM    CHOL/HDL Ratio 5.4 (H) 03/16/2020 02:30 AM       Signed By: Melinda Decker     March 17, 2020

## 2020-03-17 NOTE — PROGRESS NOTES
0800- Assumed care - pt denies any complaints - Vitals stable   1000- Up to [facility] with  assistence - Ate fair at breakfast -   1200- 1230-Discharged instructions given - Aware to  prescripitons from [facility] - Note given to pt for work - pt discharged in satisfactory condition via [facility]

## 2020-03-19 VITALS
WEIGHT: 221.78 LBS | HEIGHT: 62 IN | SYSTOLIC BLOOD PRESSURE: 146 MMHG | HEART RATE: 66 BPM | BODY MASS INDEX: 40.81 KG/M2 | RESPIRATION RATE: 22 BRPM | TEMPERATURE: 98.5 F | OXYGEN SATURATION: 96 % | DIASTOLIC BLOOD PRESSURE: 80 MMHG

## 2020-03-23 NOTE — Clinical Note
Called the patient at [phone]. no answer.     Left voicemail on answering machine to call back.       Stent inserted.  Removed undeployed

## 2020-03-30 ENCOUNTER — VIRTUAL VISIT (OUTPATIENT)
Dept: FAMILY MEDICINE CLINIC | Age: 50
End: 2020-03-30

## 2020-03-30 DIAGNOSIS — Z72.0 TOBACCO ABUSE: ICD-10-CM

## 2020-03-30 DIAGNOSIS — E66.01 OBESITY, MORBID (HCC): ICD-10-CM

## 2020-03-30 DIAGNOSIS — I10 ESSENTIAL HYPERTENSION: ICD-10-CM

## 2020-03-30 DIAGNOSIS — I25.10 CORONARY ARTERY DISEASE INVOLVING NATIVE CORONARY ARTERY OF NATIVE HEART WITHOUT ANGINA PECTORIS: Primary | ICD-10-CM

## 2020-03-30 NOTE — PROGRESS NOTES
Jluis Viramontes is a 52 y.o. female and presents with Hospital Follow Up Eldon Estrada to [facility] on 3/15/2020 for STEMI. Got discharged on 3/17/2020) and Shortness of Breath       Subjective:    Hospitalized for CAD/STEMI- had TEJAL to RCA- was having SOB/lightheaded suddenly. Some pain at incision for cath and bruising, no redness or d/c.   htn-taking lisinopril    Assessment/Plan:      CAD/STEMI- s/p TEJAL discussed- continue brilinta, ACE inh, Beta blocker, and asa reviewed - keep cardiology follow up. Tobacco- has stopped completely. Obesity- goal is 1 lbs/week with caloric restriction. htn- taking lisinopril- may need to increase dose- she will try to get a bp monitor for home and call us with readings. Diagnoses and all orders for this visit:    1. Coronary artery disease involving native coronary artery of native heart without angina pectoris  Comments:  S/P TEJAL to RCA 3/2020- sees Dr. Glory Kraus. 2. Tobacco abuse    3. Obesity, morbid ([address]    4. Essential hypertension        RTC in 3 months              ROS:  Negative except as mentioned above  Cardiac- no chest pain or palpitations  Pulmonary- no sob or wheezes  GI- no n/v or diarrhea. SH:  Social History     Tobacco Use    Smoking status: Former Smoker     Packs/day: 1.00     Years: 35.00     Pack years: 35.00     Last attempt to quit: 2020     Years since quittin.1    Smokeless tobacco: Former User     Quit date: 2017    Tobacco comment: pt states she smoked about 1.5 packs a day for 30-35 years and quit on 17   Substance Use Topics    Alcohol use: Yes     Alcohol/week: 4.0 standard drinks     Types: 4 Shots of liquor per week    Drug use: No         Medications/Allergies:  Current Outpatient Medications on File Prior to Visit   Medication Sig Dispense Refill    aspirin 81 mg chewable tablet Take 1 Tab by mouth daily. 30 Tab 0    atorvastatin (LIPITOR) 80 mg tablet Take 1 Tab by mouth nightly.  30 Tab 0    metoprolol succinate (TOPROL-XL) 50 mg XL tablet Take 1 Tab by mouth daily. 30 Tab 0    ticagrelor (BRILINTA) 90 mg tablet Take 1 Tab by mouth two (2) times a day. 60 Tab 0    lisinopriL (PRINIVIL, ZESTRIL) 2.5 mg tablet Take 1 Tab by mouth daily. 30 Tab 0    albuterol (PROVENTIL HFA, VENTOLIN HFA, PROAIR HFA) 90 mcg/actuation inhaler Take 2 Puffs by inhalation every four (4) hours as needed for Wheezing. No current facility-administered medications on file prior to visit. Allergies   Allergen Reactions    Codeine Hives    Pcn [Penicillins] Shortness of Breath     Tongue swelling         Objective: There were no vitals taken for this visit. There is no height or weight on file to calculate BMI. Constitutional: Well developed, nourished, no distress, alert     Esther Lino is a 52 y.o. female who was seen by synchronous (real-time) audio-video technology on 3/30/2020. Consent:  She and/or her healthcare decision maker is aware that this patient-initiated Telehealth encounter is a billable service, with coverage as determined by her insurance carrier. She is aware that she may receive a bill and has provided verbal consent to proceed: Yes    I was in the office while conducting this encounter. Assessment & Plan:   Diagnoses and all orders for this visit:    1. Coronary artery disease involving native coronary artery of native heart without angina pectoris  Comments:  S/P TEJAL to [facility] 3/2020- sees Dr. Myrtle Palencia. 2. Tobacco abuse    3. Obesity, morbid ([facility] [address]    4. Essential hypertension              Coding Help - Use CPT Codes 96855-05728, 46951-71986 for Established and New Patients respectively, either employing EM elements or Time rules. Other codes (example consult codes) may also apply. 712  Subjective:   Esther Lino was seen for Hospital Follow Up Olga Lidia Streeter to [facility] on 3/15/2020 for STEMI.  Got discharged on 3/17/2020) and Shortness of Breath      Prior to Admission medications Medication Sig Start Date End Date Taking? Authorizing Provider   aspirin 81 mg chewable tablet Take 1 Tab by mouth daily. 3/18/20  Yes Valentino Curry H, DO   atorvastatin (LIPITOR) 80 mg tablet Take 1 Tab by mouth nightly. 3/17/20  Yes Valentino Curry H, DO   metoprolol succinate (TOPROL-XL) 50 mg XL tablet Take 1 Tab by mouth daily. 3/18/20  Yes Valentino Curry H, DO   ticagrelor (BRILINTA) 90 mg tablet Take 1 Tab by mouth two (2) times a day. 3/17/20  Yes Valentino Curry H, DO   lisinopriL (PRINIVIL, ZESTRIL) 2.5 mg tablet Take 1 Tab by mouth daily. 3/18/20  Yes Valentino Curry H, DO   albuterol (PROVENTIL HFA, VENTOLIN HFA, PROAIR HFA) 90 mcg/actuation inhaler Take 2 Puffs by inhalation every four (4) hours as needed for Wheezing. Provider, Historical     Allergies   Allergen Reactions    Codeine Hives    Pcn [Penicillins] Shortness of Breath     Tongue swelling         Patient Active Problem List   Diagnosis Code    Acute bronchitis J20.9    Hypokalemia E87.6    Obesity (BMI 30-39. 9) E66.9    Obesity, morbid ([facility]) E66.01    STEMI (ST elevation myocardial infarction) ([facility]) I21.3    Tobacco use Z72.0    Hypertension I10     Patient Active Problem List    Diagnosis Date Noted    STEMI (ST elevation myocardial infarction) ([facility] 03/15/2020    Tobacco use 03/15/2020    Obesity, morbid ([facility] 02/28/2020    Acute bronchitis 02/23/2020    Hypokalemia 02/23/2020    Obesity (BMI 30-39.9) 02/23/2020    Hypertension 05/02/2017     Current Outpatient Medications   Medication Sig Dispense Refill    aspirin 81 mg chewable tablet Take 1 Tab by mouth daily. 30 Tab 0    atorvastatin (LIPITOR) 80 mg tablet Take 1 Tab by mouth nightly. 30 Tab 0    metoprolol succinate (TOPROL-XL) 50 mg XL tablet Take 1 Tab by mouth daily. 30 Tab 0    ticagrelor (BRILINTA) 90 mg tablet Take 1 Tab by mouth two (2) times a day. 60 Tab 0    lisinopriL (PRINIVIL, ZESTRIL) 2.5 mg tablet Take 1 Tab by mouth daily.  27 Tab 0    albuterol (PROVENTIL HFA, VENTOLIN HFA, PROAIR HFA) 90 mcg/actuation inhaler Take 2 Puffs by inhalation every four (4) hours as needed for Wheezing. Allergies   Allergen Reactions    Codeine Hives    Pcn [Penicillins] Shortness of Breath     Tongue swelling       Past Medical History:   Diagnosis Date    Back pain     Displacement of lumbar intervertebral disc     High cholesterol     takes no meds for same.  Hypertension 2017    pt states she does not take meds for same.  Lumbar radiculitis     Lumbar sprain     Numbness     legs and arms     Past Surgical History:   Procedure Laterality Date    HX APPENDECTOMY      HX CHOLECYSTECTOMY      HX GI      multiple esophageal surgeries in childhood    HX HEENT      \"surgery on esophagus\"    HX HYSTERECTOMY      HX LUMBAR DISKECTOMY       Family History   Problem Relation Age of Onset    Hypertension Mother     Hypertension Father     Heart Disease Father     Hypertension Maternal Grandmother     Heart Disease Maternal Grandmother     Hypertension Paternal Grandmother     Heart Disease Paternal Grandmother      Social History     Tobacco Use    Smoking status: Former Smoker     Packs/day: 1.00     Years: 35.00     Pack years: 35.00     Last attempt to quit: 2020     Years since quittin.1    Smokeless tobacco: Former User     Quit date: 2017    Tobacco comment: pt states she smoked about 1.5 packs a day for 30-35 years and quit on 17   Substance Use Topics    Alcohol use: Yes     Alcohol/week: 4.0 standard drinks     Types: 4 Shots of liquor per week       ROS    PHYSICAL EXAMINATION:  [ INSTRUCTIONS:  \"[x]\" Indicates a positive item  \"[]\" Indicates a negative item  -- DELETE ALL ITEMS NOT EXAMINED]  Vital Signs: (As obtained by patient/caregiver at home)  There were no vitals taken for this visit.      Constitutional: [x] Appears well-developed and well-nourished [x] No apparent distress [] Abnormal -     Mental status: [x] Alert and awake  [x] Oriented to person/place/time [x] Able to follow commands    [] Abnormal -     Eyes:   EOM    [x]  Normal    [] Abnormal -   Sclera  [x]  Normal    [] Abnormal -          Discharge [x]  None visible   [] Abnormal -     HENT: [x] Normocephalic, atraumatic  [] Abnormal -   [x] Mouth/Throat: Mucous membranes are moist    External Ears [x] Normal  [] Abnormal -    Neck: [x] No visualized mass [] Abnormal -     Pulmonary/Chest: [x] Respiratory effort normal   [x] No visualized signs of difficulty breathing or respiratory distress        [] Abnormal -      Musculoskeletal:   [x] Normal gait with no signs of ataxia         [x] Normal range of motion of neck        [] Abnormal -     Neurological:        [x] No Facial Asymmetry (Cranial nerve 7 motor function) (limited exam due to video visit)          [x] No gaze palsy        [] Abnormal -          Skin:        [x] No significant exanthematous lesions or discoloration noted on facial skin         [] Abnormal -            Psychiatric:       [x] Normal Affect [] Abnormal -        [x] No Hallucinations    Other pertinent observable physical exam findings:-        We discussed the expected course, resolution and complications of the diagnosis(es) in detail. Medication risks, benefits, costs, interactions, and alternatives were discussed as indicated. I advised her to contact the office if her condition worsens, changes or fails to improve as anticipated. She expressed understanding with the diagnosis(es) and plan. Pursuant to the emergency declaration under the [facility] waiver authority and the [employer] Act, this Virtual  Visit was conducted, with patient's consent, to reduce the patient's risk of exposure to COVID-19 and provide continuity of care for an established patient.      Services were provided through a video synchronous discussion virtually to substitute for in-person clinic visit.     Kay Choudhary MD

## 2020-05-19 ENCOUNTER — VIRTUAL VISIT (OUTPATIENT)
Dept: FAMILY MEDICINE CLINIC | Age: 50
End: 2020-05-19

## 2020-05-19 NOTE — PROGRESS NOTES
A user error has taken place: encounter opened in error, closed for administrative reasons. I called patient several times [facility] ethis morning regarding her VV with Dr. Petra Cevallos at 1pm but never returned any of my calls. I also left a detailed voicemail message if her blood pressure is high abd having headaches to go to the nearest ER. If not call us back to reschedule missed appointment.

## 2020-07-03 ENCOUNTER — APPOINTMENT (OUTPATIENT)
Dept: CT IMAGING | Age: 50
End: 2020-07-03
Attending: EMERGENCY MEDICINE
Payer: SELF-PAY

## 2020-07-03 ENCOUNTER — APPOINTMENT (OUTPATIENT)
Dept: GENERAL RADIOLOGY | Age: 50
End: 2020-07-03
Attending: EMERGENCY MEDICINE
Payer: SELF-PAY

## 2020-07-03 ENCOUNTER — HOSPITAL ENCOUNTER (EMERGENCY)
Age: 50
Discharge: HOME | End: 2020-07-03
Attending: EMERGENCY MEDICINE
Payer: SELF-PAY

## 2020-07-03 VITALS
TEMPERATURE: 98 F | BODY MASS INDEX: 43.22 KG/M2 | DIASTOLIC BLOOD PRESSURE: 104 MMHG | SYSTOLIC BLOOD PRESSURE: 163 MMHG | OXYGEN SATURATION: 97 % | WEIGHT: 236.3 LBS | HEART RATE: 69 BPM | RESPIRATION RATE: 20 BRPM

## 2020-07-03 DIAGNOSIS — R07.9 ACUTE CHEST PAIN: Primary | ICD-10-CM

## 2020-07-03 DIAGNOSIS — R79.89 ELEVATED BRAIN NATRIURETIC PEPTIDE (BNP) LEVEL: ICD-10-CM

## 2020-07-03 DIAGNOSIS — R06.02 SOB (SHORTNESS OF BREATH): ICD-10-CM

## 2020-07-03 LAB
ALBUMIN SERPL-MCNC: 3.4 G/DL (ref 3.4–5)
ALBUMIN/GLOB SERPL: 0.8 {RATIO} (ref 0.8–1.7)
ALP SERPL-CCNC: 103 U/L (ref 45–117)
ALT SERPL-CCNC: 42 U/L (ref 13–56)
ANION GAP SERPL CALC-SCNC: 4 MMOL/L (ref 3–18)
AST SERPL-CCNC: 23 U/L (ref 10–38)
BASOPHILS # BLD: 0.1 K/UL (ref 0–0.1)
BASOPHILS NFR BLD: 1 % (ref 0–2)
BILIRUB SERPL-MCNC: 0.4 MG/DL (ref 0.2–1)
BNP SERPL-MCNC: 904 PG/ML (ref 0–900)
BUN SERPL-MCNC: 9 MG/DL (ref 7–18)
BUN/CREAT SERPL: 15 (ref 12–20)
CALCIUM SERPL-MCNC: 8.8 MG/DL (ref 8.5–10.1)
CHLORIDE SERPL-SCNC: 108 MMOL/L (ref 100–111)
CK MB CFR SERPL CALC: 1.6 % (ref 0–4)
CK MB SERPL-MCNC: 1.4 NG/ML (ref 5–25)
CK SERPL-CCNC: 85 U/L (ref 26–192)
CO2 SERPL-SCNC: 25 MMOL/L (ref 21–32)
CREAT SERPL-MCNC: 0.61 MG/DL (ref 0.6–1.3)
DIFFERENTIAL METHOD BLD: ABNORMAL
EOSINOPHIL # BLD: 0.2 K/UL (ref 0–0.4)
EOSINOPHIL NFR BLD: 2 % (ref 0–5)
ERYTHROCYTE [DISTWIDTH] IN BLOOD BY AUTOMATED COUNT: 15.9 % (ref 11.6–14.5)
GLOBULIN SER CALC-MCNC: 4.1 G/DL (ref 2–4)
GLUCOSE SERPL-MCNC: 113 MG/DL (ref 74–99)
HCT VFR BLD AUTO: 44.2 % (ref 35–45)
HGB BLD-MCNC: 14.8 G/DL (ref 12–16)
LYMPHOCYTES # BLD: 3.3 K/UL (ref 0.9–3.6)
LYMPHOCYTES NFR BLD: 35 % (ref 21–52)
MCH RBC QN AUTO: 29.7 PG (ref 24–34)
MCHC RBC AUTO-ENTMCNC: 33.5 G/DL (ref 31–37)
MCV RBC AUTO: 88.8 FL (ref 74–97)
MONOCYTES # BLD: 0.8 K/UL (ref 0.05–1.2)
MONOCYTES NFR BLD: 8 % (ref 3–10)
NEUTS SEG # BLD: 5.2 K/UL (ref 1.8–8)
NEUTS SEG NFR BLD: 54 % (ref 40–73)
PLATELET # BLD AUTO: 236 K/UL (ref 135–420)
PMV BLD AUTO: 10.5 FL (ref 9.2–11.8)
POTASSIUM SERPL-SCNC: 3.8 MMOL/L (ref 3.5–5.5)
PROT SERPL-MCNC: 7.5 G/DL (ref 6.4–8.2)
RBC # BLD AUTO: 4.98 M/UL (ref 4.2–5.3)
SODIUM SERPL-SCNC: 137 MMOL/L (ref 136–145)
TROPONIN I SERPL-MCNC: 0.02 NG/ML (ref 0–0.04)
WBC # BLD AUTO: 9.6 K/UL (ref 4.6–13.2)

## 2020-07-03 PROCEDURE — 71275 CT ANGIOGRAPHY CHEST: CPT

## 2020-07-03 PROCEDURE — 99285 EMERGENCY DEPT VISIT HI MDM: CPT

## 2020-07-03 PROCEDURE — 80053 COMPREHEN METABOLIC PANEL: CPT

## 2020-07-03 PROCEDURE — 93005 ELECTROCARDIOGRAM TRACING: CPT

## 2020-07-03 PROCEDURE — 74011636320 HC RX REV CODE- 636/320: Performed by: EMERGENCY MEDICINE

## 2020-07-03 PROCEDURE — 83880 ASSAY OF NATRIURETIC PEPTIDE: CPT

## 2020-07-03 PROCEDURE — 71045 X-RAY EXAM CHEST 1 VIEW: CPT

## 2020-07-03 PROCEDURE — 85025 COMPLETE CBC W/AUTO DIFF WBC: CPT

## 2020-07-03 PROCEDURE — 74011000250 HC RX REV CODE- 250: Performed by: EMERGENCY MEDICINE

## 2020-07-03 PROCEDURE — 82550 ASSAY OF CK (CPK): CPT

## 2020-07-03 PROCEDURE — 87635 SARS-COV-2 COVID-19 AMP PRB: CPT

## 2020-07-03 PROCEDURE — 74011000258 HC RX REV CODE- 258: Performed by: EMERGENCY MEDICINE

## 2020-07-03 RX ORDER — SODIUM CHLORIDE 9 MG/ML
100 INJECTION, SOLUTION INTRAVENOUS
Status: COMPLETED | OUTPATIENT
Start: 2020-07-03 | End: 2020-07-03

## 2020-07-03 RX ORDER — LIDOCAINE 50 MG/G
PATCH TOPICAL
Qty: 30 EACH | Refills: 0 | Status: SHIPPED | OUTPATIENT
Start: 2020-07-03 | End: 2021-02-04 | Stop reason: CLARIF

## 2020-07-03 RX ORDER — FUROSEMIDE 40 MG/1
20 TABLET ORAL 2 TIMES DAILY
Qty: 5 TAB | Refills: 0 | Status: SHIPPED | OUTPATIENT
Start: 2020-07-03 | End: 2020-07-08

## 2020-07-03 RX ORDER — LIDOCAINE 4 G/100G
1 PATCH TOPICAL EVERY 24 HOURS
Status: DISCONTINUED | OUTPATIENT
Start: 2020-07-03 | End: 2020-07-03 | Stop reason: HOSPADM

## 2020-07-03 RX ADMIN — IOPAMIDOL 75 ML: 755 INJECTION, SOLUTION INTRAVENOUS at 09:16

## 2020-07-03 RX ADMIN — SODIUM CHLORIDE 92 ML: 900 INJECTION, SOLUTION INTRAVENOUS at 09:16

## 2020-07-03 NOTE — ED PROVIDER NOTES
Date: 7/3/2020  Patient Name: Rafa Barr    History of Presenting Illness     Chief Complaint   Patient presents with    Chest Pain    Shortness of Breath       History Provided By: Patient      HPI/Chief Complaint: (Context):who presents with chest pain substernal shortness of breath as well  Shortness of breath 2 days chest pain 1 day radiating to the back  History of DVT in the past  No calf pain  Patient with history of MI with 2 stents already placed in March  Patient had a follow-up medical management was recommended discussed at that point. Patient denies any significant cough she does work at [employer] has a significant exposure to large volume of people but she wears a mask  Patient denies any fever chills abdominal pain vomiting diarrhea black or bloody stool focal arm or leg weakness  Patient has been well for 3 months no exertional symptoms patient is taking her Brilinta.     Sharp pain   moderate pain radiating to the back   constant symptoms   no acute alleviating exacerbating factors  ---  ---  Patient's triage note reviewed GREG level   Patient's chief complaint is chest pain or shortness of breath  Patient vitals are blood pressure 175/115 rest of vitals are stable in the emergency room 96% on room air  Patient states chest pain and shortness of breath on the triage note similar to her STEMI  Few days of shortness of breath with cough as well recently quit smoking smoking patient's allergy to penicillin codeine is appreciated  Pain scale 0-10  Patient's home medication include albuterol Lipitor Zestril Toprol medical history includes hypertension lumbar spine STEMI  Patient surgical history is cholecystectomy hysterectomy appendectomy lumbar discectomy  Social history is former smoker quit February 2020  Alcohol occasional use  Patient's chart review shows in March 15, 2020 patient was admitted for STEMI patient prior to that was acute respiratory infection in February patient's EKG from March 13 shows patient with ST elevation MI patient's echocardiogram from March 16, 2020 with -on March 13, 2020 patient had cardiac procedure done diffuse three-vessel disease was appreciated mid % occlusion appreciated LAD long 85 to 90% stenosis  Circumflex with diffuse 20 to 30% with mid distal focal 75% stenosis mid % occlusion was with residual 0% STEMI  LAURA-3 flow  Proximal RCA stent stented as well there was question of stenting her further in next 3 to 4 weeks. EF 50 to 55%, abnormal left ventricular wall motion grade 1 diastolic dysfunction  Normal cavity size    PCP: Catarina Lei MD    Current Facility-Administered Medications   Medication Dose Route Frequency Provider Last Rate Last Dose    lidocaine 4 % patch 1 Patch  1 Patch TransDERmal Q24H Terry Victoria MD   1 Patch at 07/03/20 0815     Current Outpatient Medications   Medication Sig Dispense Refill    furosemide (Lasix) 40 mg tablet Take 0.5 Tabs by mouth two (2) times a day for 10 doses. 5 Tab 0    albuterol (PROVENTIL HFA, VENTOLIN HFA, PROAIR HFA) 90 mcg/actuation inhaler Take 2 Puffs by inhalation every four (4) hours as needed for Wheezing. 1 Inhaler 1    metoprolol succinate (TOPROL-XL) 50 mg XL tablet Take 1 Tab by mouth daily. 30 Tab 5    ticagrelor (BRILINTA) 90 mg tablet Take 1 Tab by mouth two (2) times a day. 60 Tab 5    atorvastatin (LIPITOR) 80 mg tablet Take 1 Tab by mouth nightly. 30 Tab 5    aspirin 81 mg chewable tablet Take 1 Tab by mouth daily. 30 Tab 0    lisinopriL (PRINIVIL, ZESTRIL) 2.5 mg tablet Take 1 Tab by mouth daily. 30 Tab 0       Past History     Past Medical History:  Past Medical History:   Diagnosis Date    Back pain     Displacement of lumbar intervertebral disc     High cholesterol     takes no meds for same.  Hypertension 05/02/2017    pt states she does not take meds for same.      Lumbar radiculitis     Lumbar sprain     Numbness     legs and arms    STEMI (ST [facility] infarction[facility])        Past Surgical History:  Past Surgical History:   Procedure Laterality Date    HX APPENDECTOMY  2014    HX CHOLECYSTECTOMY  2013    HX GI      multiple esophageal surgeries in childhood    HX HEENT      \"surgery on esophagus\"    HX HYSTERECTOMY  2003    HX LUMBAR DISKECTOMY         Family History:  Family History   Problem Relation Age of Onset    Hypertension Mother     Hypertension Father     Heart Disease Father     Hypertension Maternal Grandmother     Heart Disease Maternal Grandmother     Hypertension Paternal Grandmother     Heart Disease Paternal Grandmother        Social History:  Social History     Tobacco Use    Smoking status: Former Smoker     Packs/day: 1.00     Years: 35.00     Pack years: 35.00     Last attempt to quit: 2020     Years since quittin.3    Smokeless tobacco: Former User     Quit date: 2017    Tobacco comment: pt states she smoked about 1.5 packs a day for 30-35 years and quit on 17   Substance Use Topics    Alcohol use: Yes     Alcohol/week: 4.0 standard drinks     Types: 4 Shots of liquor per week    Drug use: No       Allergies: Allergies   Allergen Reactions    Codeine Hives    Pcn [Penicillins] Shortness of Breath     Tongue swelling           Review of Systems   Review of Systems   Constitutional: Negative for activity change, fatigue and fever. HENT: Negative for congestion and rhinorrhea. Eyes: Negative for visual disturbance. Respiratory: Positive for shortness of breath. Cardiovascular: Positive for chest pain. Negative for palpitations. Gastrointestinal: Negative for abdominal pain, diarrhea, nausea and vomiting. Genitourinary: Negative for dysuria and hematuria. Musculoskeletal: Positive for back pain. Skin: Negative for rash. Neurological: Negative for dizziness, weakness and light-headedness. Psychiatric/Behavioral: Negative for agitation.    All other systems reviewed and are negative. Physical Exam     Physical Exam  Vitals signs and nursing note reviewed. Constitutional:       General: She is not in acute distress. Appearance: She is well-developed. HENT:      Head: Normocephalic and atraumatic. Right Ear: External ear normal.      Left Ear: External ear normal.      Nose: Nose normal.   Eyes:      General: No scleral icterus. Conjunctiva/sclera: Conjunctivae normal.      Pupils: Pupils are equal, round, and reactive to light. Neck:      Musculoskeletal: Normal range of motion and neck supple. Thyroid: No thyromegaly. Vascular: No JVD. Trachea: No tracheal deviation. Cardiovascular:      Rate and Rhythm: Normal rate and regular rhythm. Heart sounds: No murmur. No friction rub. Pulmonary:      Effort: Pulmonary effort is normal.      Breath sounds: Normal breath sounds. No stridor. Chest:      Chest wall: Tenderness present. Comments: Focal chest wall tenderness. Midline upper thoracic back pain. Abdominal:      General: Bowel sounds are normal. There is no distension. Palpations: Abdomen is soft. Tenderness: There is no abdominal tenderness. There is no guarding or rebound. Musculoskeletal: Normal range of motion. General: No tenderness. Lymphadenopathy:      Cervical: No cervical adenopathy. Skin:     General: Skin is warm and dry. Capillary Refill: Capillary refill takes less than 2 seconds. Neurological:      General: No focal deficit present. Mental Status: She is alert. Cranial Nerves: No cranial nerve deficit. Coordination: Coordination normal.   Psychiatric:         Behavior: Behavior normal.         Thought Content: Thought content normal.         Judgment: Judgment normal.         Medical Decision Making   I am the first provider for this patient.     I reviewed the vital signs, available nursing notes, past medical history, past surgical history, family history and social history. Provider Notes (Medical Decision Making):   Patient presents emergency department with chest pain back pain shortness of breath  History of MI  EKG today with no specific finding of STEMI  Cardiac markers will be done to rule out any coronary artery disease  I will check a BNP for heart failure as well  Chest x-ray does show some streaks on the x-ray concern for viral infection, patient is droplet plus precaution coronavirus testing has been sent she has an exposure at work although no acute other symptoms my concern still remains that patient may be COVID positive  I will also check a CTA for the patient patient is on Brilinta but my concern still remains that is constant chest pain and shortness of breath with history of DVT she is a high risk individual  I discussed all this information in detail with the patient. Vital Signs-Reviewed the patient's vital signs. Pulse Oximetry Analysis -95%, room air, normal  Cardiac Monitor:  Rate/Rhythm: 70, sinus rhythm    EKG: Interpreted by the EP. Time of the EKG is 753, 73 heart rate normal intervals and axes no sign of acute infarction or dysrhythmia there is T wave inversion and flattening in the inferior wall that is the place where patient had the MI on the prior EKG  This EKG is much improved than the March 15 EKG  Also I reviewed patient's cardiac stent placement which was RCA, LAD stent as well.          Vitals:    07/03/20 0905 07/03/20 0906 07/03/20 0915 07/03/20 0930   BP: (!) 176/97  (!) 158/105 (!) 144/111   Pulse:  68 64 62   Resp:       Temp:       SpO2:  98% 98% 97%   Weight:           Records Reviewed: Nursing Notes, Old Medical Records, Previous electrocardiograms and Previous Radiology Studies    ED Course:   ED Course as of Jul 03 1114   Fri Jul 03, 2020   0959 NT pro-BNP(!): 904 [AS]   0959 Glucose(!): 113 [AS]   6284 Labs are reviewed there is slight elevation BNP and troponin is normalPatient is constant chest pain since last night and shortness of breath I will check second troponin as well by my risk for having acute MI is low considering patient symptoms are persistent. Troponin-I, Qt.: 0.02 [AS]      ED Course User Index  [AS] Lisbeth Hartley MD           Diagnostic Study Results     Orders Placed This Encounter    XR CHEST PORT     Standing Status:   Standing     Number of Occurrences:   1     Order Specific Question:   Reason for Exam     Answer:   cp/cough     Order Specific Question:   Is Patient Pregnant? Answer:   Unknown    CTA CHEST W OR W WO CONT     Standing Status:   Standing     Number of Occurrences:   1     Order Specific Question:   Transport     Answer:   Stretcher [5]     Order Specific Question:   Is Patient Pregnant? Answer:   Unknown     Order Specific Question:   Reason for Exam     Answer:   chest pain, history of DVTs concern for pulmonary embolism with shortness of breath     Order Specific Question:   Does patient have history of Renal Disease? Answer:   No    CBC WITH AUTOMATED DIFF     Standing Status:   Standing     Number of Occurrences:   1    METABOLIC PANEL, COMPREHENSIVE     Standing Status:   Standing     Number of Occurrences:   1    CARDIAC PANEL,(CK, CKMB & TROPONIN)     Standing Status:   Standing     Number of Occurrences:   1    NT-PRO BNP     Standing Status:   Standing     Number of Occurrences:   1    NOVEL CORONAVIRUS (COVID-19)     Standing Status:   Standing     Number of Occurrences:   1     Order Specific Question:   Status     Answer:   Symptomatic/Infection Suspected    Troponin I     Standing Status:   Standing     Number of Occurrences:   1    CARDIAC MONITORING     Standing Status:   Standing     Number of Occurrences:   1     Order Specific Question:   Type:      Answer:   Bedside    DROPLET PLUS     Standing Status:   Standing     Number of Occurrences:   1    EKG, 12 LEAD, INITIAL     Standing Status:   Standing     Number of Occurrences:   1     Order Specific Question:   Reason for Exam:     Answer:   Pain    EKG, 12 LEAD, INITIAL     Standing Status:   Standing     Number of Occurrences:   1     Order Specific Question:   Reason for Exam:     Answer:   cough chest pain    EKG, 12 LEAD, SUBSEQUENT     Standing Status:   Standing     Number of Occurrences:   1     Order Specific Question:   Reason for Exam:     Answer:   cp    lidocaine 4 % patch 1 Patch    iopamidoL (ISOVUE-370) 76 % injection 100 mL    0.9% sodium chloride infusion 100 mL    furosemide (Lasix) 40 mg tablet     Sig: Take 0.5 Tabs by mouth two (2) times a day for 10 doses. Dispense:  5 Tab     Refill:  0       Labs -     Recent Results (from the past 12 hour(s))   CBC WITH AUTOMATED DIFF    Collection Time: 07/03/20  7:45 AM   Result Value Ref Range    WBC 9.6 4.6 - 13.2 K/uL    RBC 4.98 4.20 - 5.30 M/uL    HGB 14.8 12.0 - 16.0 g/dL    HCT 44.2 35.0 - 45.0 %    MCV 88.8 74.0 - 97.0 FL    MCH 29.7 24.0 - 34.0 PG    MCHC 33.5 31.0 - 37.0 g/dL    RDW 15.9 (H) 11.6 - 14.5 %    PLATELET 070 460 - 587 K/uL    MPV 10.5 9.2 - 11.8 FL    NEUTROPHILS 54 40 - 73 %    LYMPHOCYTES 35 21 - 52 %    MONOCYTES 8 3 - 10 %    EOSINOPHILS 2 0 - 5 %    BASOPHILS 1 0 - 2 %    ABS. NEUTROPHILS 5.2 1.8 - 8.0 K/UL    ABS. LYMPHOCYTES 3.3 0.9 - 3.6 K/UL    ABS. MONOCYTES 0.8 0.05 - 1.2 K/UL    ABS. EOSINOPHILS 0.2 0.0 - 0.4 K/UL    ABS.  BASOPHILS 0.1 0.0 - 0.1 K/UL    DF AUTOMATED     METABOLIC PANEL, COMPREHENSIVE    Collection Time: 07/03/20  7:45 AM   Result Value Ref Range    Sodium 137 136 - 145 mmol/L    Potassium 3.8 3.5 - 5.5 mmol/L    Chloride 108 100 - 111 mmol/L    CO2 25 21 - 32 mmol/L    Anion gap 4 3.0 - 18 mmol/L    Glucose 113 (H) 74 - 99 mg/dL    BUN 9 7.0 - 18 MG/DL    Creatinine 0.61 0.6 - 1.3 MG/DL    BUN/Creatinine ratio 15 12 - 20      GFR est AA >60 >60 ml/min/1.73m2    GFR est non-AA >60 >60 ml/min/1.73m2    Calcium 8.8 8.5 - 10.1 MG/DL    Bilirubin, total 0.4 0.2 - 1.0 MG/DL    ALT (SGPT) 42 13 - 56 U/L    AST (SGOT) 23 10 - 38 U/L    Alk. phosphatase 103 45 - 117 U/L    Protein, total 7.5 6.4 - 8.2 g/dL    Albumin 3.4 3.4 - 5.0 g/dL    Globulin 4.1 (H) 2.0 - 4.0 g/dL    A-G Ratio 0.8 0.8 - 1.7     CARDIAC PANEL,(CK, CKMB & TROPONIN)    Collection Time: 07/03/20  7:45 AM   Result Value Ref Range    CK - MB 1.4 <3.6 ng/ml    CK-MB Index 1.6 0.0 - 4.0 %    CK 85 26 - 192 U/L    Troponin-I, QT 0.02 0.0 - 0.045 NG/ML   NT-PRO BNP    Collection Time: 07/03/20  8:05 AM   Result Value Ref Range    NT pro- (H) 0 - 900 PG/ML       Radiologic Studies -   CTA CHEST W OR W WO CONT   Final Result   IMPRESSION:      1. No evidence of pulmonary embolism or other acute cardiopulmonary   abnormality. XR CHEST PORT    (Results Pending)     CT Results  (Last 48 hours)               07/03/20 0915  CTA CHEST W OR W WO CONT Final result    Impression:  IMPRESSION:       1. No evidence of pulmonary embolism or other acute cardiopulmonary   abnormality. Narrative:  EXAM: CTA CHEST W OR W WO CONT       CLINICAL INDICATION/HISTORY: chest pain, history of DVTs concern for pulmonary   embolism with shortness of breath       COMPARISON: December 31, 2019       TECHNIQUE: Axial CT imaging from the thoracic inlet through the diaphragm with   intravenous contrast. Coronal and sagittal MIP reformats were generated. One or   more dose reduction techniques were used on this CT: automated exposure control,   adjustment of the mAs and/or kVp according to patient size, and iterative   reconstruction techniques. The specific techniques used on this CT exam have   been documented in the patient's electronic medical record.  Digital Imaging and   Communications in Medicine (DICOM) format image data are available to   nonaffiliated external healthcare facilities or entities on a secure, media   free, reciprocally searchable basis with patient authorization for at least a   12-month period after this study.           _______________       FINDINGS:       EXAM QUALITY: Adequate for diagnosis. PULMONARY ARTERIES: No evidence of pulmonary embolism. MEDIASTINUM: Normal heart size. No evidence of right heart strain. Mild   scattered aortic atherosclerosis. . No pericardial effusion. Reflux of contrast   in the IVC and hepatic veins is noted. LUNGS: Mild dependent bibasilar atelectasis. No suspicious nodule or mass. No   abnormal opacities. PLEURA: Small fat-containing diaphragmatic hernia remains unchanged on the   right. AIRWAY: Normal.       LYMPH NODES: Multiple prominent and borderline enlarged mediastinal lymph nodes   are noted which are nonspecific. UPPER ABDOMEN: Unremarkable. OTHER: No acute or aggressive osseous abnormalities identified. Multilevel   spondylosis. _______________               CXR Results  (Last 48 hours)    None        11:06 AM  I discussed with in detail with the patient. She is stating that she does not want second set of troponin EKG done her chest pain is for 24 hours  We did discuss CTA negative  We did discuss coronavirus in isolation she agrees with the plan as well  Patient denies any acute symptoms while she is waiting in the emergency department she is wanted to go home does not want me to finish her work-up  If any change or worsen she will return to the emergency department. Discharge Note:  11:11 AM  The pt is ready for discharge. The pt's signs, symptoms, diagnosis, and discharge instructions have been discussed and pt has conveyed their understanding. The pt is to follow up as recommended or return to ER should their symptoms worsen. Plan has been discussed and pt is in agreement.  ====        Discharge     Clinical Impression:   1. Acute chest pain    2.  SOB (shortness of breath)    3. Elevated brain natriuretic peptide (BNP) level        Disposition:  Home    It should be noted that I will be the provider of record for this patient  Yoli Dawkins MD      Follow-up Information    None         Current Discharge Medication List      START taking these medications    Details   furosemide (Lasix) 40 mg tablet Take 0.5 Tabs by mouth two (2) times a day for 10 doses.   Qty: 5 Tab, Refills: 0

## 2020-07-03 NOTE — ED NOTES
Pt taking off cardiac monitoring leads, states \"too hardto get up and go to the bathroom with all this on. \"  Pt encouraged to keep on.

## 2020-07-03 NOTE — ED TRIAGE NOTES
Pt aox3 ambulatory to ed for c/o chest pain and sob. Pt states chest pain since last night, states feels similar to her hx STEMI. States worsening sob over the last couple of days with productive cough. States quit smoking recently.

## 2020-07-03 NOTE — DISCHARGE INSTRUCTIONS
Patient Education        Chest Pain: Care Instructions  Your Care Instructions     There are many things that can cause chest pain. Some are not serious and will get better on their own in a few days. But some kinds of chest pain need more testing and treatment. Your doctor may have recommended a follow-up visit in the next 8 to 12 hours. If you are not getting better, you may need more tests or treatment. Even though your doctor has released you, you still need to watch for any problems. The doctor carefully checked you, but sometimes problems can develop later. If you have new symptoms or if your symptoms do not get better, get medical care right away. If you have worse or different chest pain or pressure that lasts more than 5 minutes or you passed out (lost consciousness), [id number] or seek other emergency help right away. A medical visit is only one step in your treatment. Even if you feel better, you still need to do what your doctor recommends, such as going to all suggested follow-up appointments and taking medicines exactly as directed. This will help you recover and help prevent future problems. How can you care for yourself at home? · Rest until you feel better. · Take your medicine exactly as prescribed. Call your doctor if you think you are having a problem with your medicine. · Do not drive after taking a prescription pain medicine. When should you call for help? [id number]:   · You passed out (lost consciousness). · You have severe difficulty breathing. · You have symptoms of a heart attack. These may include:  ? Chest pain or pressure, or a strange feeling in your chest.  ? Sweating. ? Shortness of breath. ? Nausea or vomiting. ? Pain, pressure, or a strange feeling in your back, neck, jaw, or upper belly or in one or both shoulders or arms. ? Lightheadedness or sudden weakness. ? A fast or irregular heartbeat.   After you call 911, the  may tell you to chew 1 adult-strength or 2 to 4 low-dose aspirin. Wait for an ambulance. Do not try to drive yourself. Call your doctor today if:   · You have any trouble breathing. · Your chest pain gets worse. · You are dizzy or lightheaded, or you feel like you may faint. · You are not getting better as expected. · You are having new or different chest pain. Where can you learn more? Go to http://jaqueline-vee.info/  Enter A120 in the search box to learn more about \"Chest Pain: Care Instructions. \"  Current as of: June 26, 2019               Content Version: 12.5  © 7159-1634 [employer]. Care instructions adapted under license by [employer] (which disclaims liability or warranty for this information). If you have questions about a medical condition or this instruction, always ask your healthcare professional. [place] [address] any warranty or liability for your use of this information. Patient Education        Coronavirus (NSSBP-80): Care Instructions  Overview  The coronavirus disease (COVID-19) is caused by a virus. Symptoms may include a fever, a cough, and shortness of breath. It mainly spreads person-to-person through droplets from coughing and sneezing. The virus also can spread when people are in close contact with someone who is infected. Most people have mild symptoms and can take care of themselves at home. If their symptoms get worse, they may need care in a hospital. There is no medicine to fight the virus. It's important to not spread the virus to others. If you have COVID-19, wear a face cover anytime you are around other people. You need to isolate yourself while you are sick. Your doctor or local public health official will tell you when you no longer need to be isolated. Leave your home only if you need to get medical care. Follow-up care is a key part of your treatment and safety.  Be sure to make and go to all appointments, and call your doctor if you are having problems. It's also a good idea to know your test results and keep a list of the medicines you take. How can you care for yourself at home? · Get extra rest. It can help you feel better. · Drink plenty of fluids. This helps replace fluids lost from fever. Fluids also help ease a scratchy throat. Water, soup, fruit juice, and hot tea with lemon are good choices. · Take acetaminophen (such as Tylenol) to reduce a fever. It may also help with muscle aches. Read and follow all instructions on the label. · Sponge your body with lukewarm water to help with fever. Don't use cold water or ice. · Use petroleum jelly on sore skin. This can help if the skin around your nose and lips becomes sore from rubbing a lot with tissues. Tips for isolation  · Wear a cloth face cover when you are around other people. It can help stop the spread of the virus when you cough or sneeze. · Limit contact with people in your home. If possible, stay in a separate bedroom and use a separate bathroom. · If you have to leave home, avoid crowds and try to stay at least 6 feet away from other people. · Avoid contact with pets and other animals. · Cover your mouth and nose with a tissue when you cough or sneeze. Then throw it in the trash right away. · Wash your hands often, especially after you cough or sneeze. Use soap and water, and scrub for at least 20 seconds. If soap and water aren't available, use an alcohol-based hand . · Don't share personal household items. These include bedding, towels, cups and glasses, and eating utensils. · [address] in the warmest water allowed for the fabric type, and dry it completely. It's okay to wash other people's laundry with yours. · Clean and disinfect your home every day. Use household  and disinfectant wipes or sprays. Take special care to clean things that you grab with your hands.  These include doorknobs, remote controls, phones, and handles on your refrigerator and microwave. And don't forget countertops, tabletops, bathrooms, and computer keyboards. When should you call for help? [id number] anytime you think you may need emergency care. For example, call if you have life-threatening symptoms, such as:  · You have severe trouble breathing. (You can't talk at all.)  · You have constant chest pain or pressure. · You are severely dizzy or lightheaded. · You are confused or can't think clearly. · Your face and lips have a blue color. · You pass out (lose consciousness) or are very hard to wake up. Call your doctor now or seek immediate medical care if:  · You have moderate trouble breathing. (You can't speak a full sentence.)  · You are coughing up blood (more than about 1 teaspoon). · You have signs of low blood pressure. These include feeling lightheaded; being too weak to stand; and having cold, pale, clammy skin. Watch closely for changes in your health, and be sure to contact your doctor if:  · Your symptoms get worse. · You are not getting better as expected. Call before you go to the doctor's office. Follow their instructions. And wear a cloth face cover. Current as of: May 8, 2020               Content Version: 12.5  © 2006-2020 Healthwise, Incorporated. Care instructions adapted under license by [employer] (which disclaims liability or warranty for this information). If you have questions about a medical condition or this instruction, always ask your healthcare professional. [place] [address] any warranty or liability for your use of this information. Patient Education        Shortness of Breath: Care Instructions  Your Care Instructions  Shortness of breath has many causes. Sometimes conditions such as anxiety can lead to shortness of breath. Some people get mild shortness of breath when they exercise.  Trouble breathing also can be a symptom of a serious problem, such as asthma, lung disease, emphysema, heart problems, and pneumonia. If your shortness of breath continues, you may need tests and treatment. Watch for any changes in your breathing and other symptoms. Follow-up care is a key part of your treatment and safety. Be sure to make and go to all appointments, and call your doctor if you are having problems. It's also a good idea to know your test results and keep a list of the medicines you take. How can you care for yourself at home? · Do not smoke or allow others to smoke around you. If you need help quitting, talk to your doctor about stop-smoking programs and medicines. These can increase your chances of quitting for good. · Get plenty of rest and sleep. · Take your medicines exactly as prescribed. Call your doctor if you think you are having a problem with your medicine. · Find healthy ways to deal with stress. ? Exercise daily. ? Get plenty of sleep. ? Eat regularly and well. When should you call for help? [id number] anytime you think you may need emergency care. For example, call if:  · You have severe shortness of breath. · You have symptoms of a heart attack. These may include:  ? Chest pain or pressure, or a strange feeling in the chest.  ? Sweating. ? Shortness of breath. ? Nausea or vomiting. ? Pain, pressure, or a strange feeling in the back, neck, jaw, or upper belly or in one or both shoulders or arms. ? Lightheadedness or sudden weakness. ? A fast or irregular heartbeat. After you call 911, the  may tell you to chew 1 adult-strength or 2 to 4 low-dose aspirin. Wait for an ambulance. Do not try to drive yourself. Call your doctor now or seek immediate medical care if:  · Your shortness of breath gets worse or you start to wheeze. Wheezing is a high-pitched sound when you breathe. · You wake up at night out of breath or have to prop your head up on several pillows to breathe.   · You are short of breath after only light activity or while at rest.  Watch closely for changes in your health, and be sure to contact your doctor if:  · You do not get better over the next 1 to 2 days. Where can you learn more? Go to http://jaqueline-vee.info/  Enter S780 in the search box to learn more about \"Shortness of Breath: Care Instructions. \"  Current as of: February 24, 2020               Content Version: 12.5  © 1571-8662 [employer]. Care instructions adapted under license by [employer] (which disclaims liability or warranty for this information). If you have questions about a medical condition or this instruction, always ask your healthcare professional. [place] [address] any warranty or liability for your use of this information.

## 2020-07-03 NOTE — LETTER
[facility] [place] EMERGENCY DEPT 
[address] 
[address] [facility] [address] [phone] Work/School Note Date: 7/3/2020 To Whom It May concern: 
 
Gretchen Braun was seen and treated today in the emergency room by the following provider(s): 
Attending Provider: Adalgisa Stewart MD. Gretchen Braun may return to work on 07/10/2020. Sincerely, Kita López MD

## 2020-07-04 LAB
ATRIAL RATE: 73 BPM
CALCULATED P AXIS, ECG09: 27 DEGREES
CALCULATED R AXIS, ECG10: 35 DEGREES
CALCULATED T AXIS, ECG11: 7 DEGREES
DIAGNOSIS, 93000: NORMAL
P-R INTERVAL, ECG05: 120 MS
Q-T INTERVAL, ECG07: 394 MS
QRS DURATION, ECG06: 90 MS
QTC CALCULATION (BEZET), ECG08: 434 MS
VENTRICULAR RATE, ECG03: 73 BPM

## 2020-07-05 LAB — SARS-COV-2, COV2NT: NOT DETECTED

## 2020-08-02 ENCOUNTER — APPOINTMENT (OUTPATIENT)
Dept: CT IMAGING | Age: 50
End: 2020-08-02
Attending: EMERGENCY MEDICINE

## 2020-08-02 ENCOUNTER — HOSPITAL ENCOUNTER (EMERGENCY)
Age: 50
Discharge: HOME | End: 2020-08-03
Attending: EMERGENCY MEDICINE

## 2020-08-02 DIAGNOSIS — R19.7 DIARRHEA, UNSPECIFIED TYPE: ICD-10-CM

## 2020-08-02 DIAGNOSIS — R11.2 NON-INTRACTABLE VOMITING WITH NAUSEA, UNSPECIFIED VOMITING TYPE: ICD-10-CM

## 2020-08-02 DIAGNOSIS — R10.13 ABDOMINAL PAIN, ACUTE, EPIGASTRIC: Primary | ICD-10-CM

## 2020-08-02 LAB
ALBUMIN SERPL-MCNC: 3.5 G/DL (ref 3.4–5)
ALBUMIN/GLOB SERPL: 0.8 {RATIO} (ref 0.8–1.7)
ALP SERPL-CCNC: 102 U/L (ref 45–117)
ALT SERPL-CCNC: 73 U/L (ref 13–56)
ANION GAP SERPL CALC-SCNC: 7 MMOL/L (ref 3–18)
AST SERPL-CCNC: 40 U/L (ref 10–38)
BASOPHILS # BLD: 0.1 K/UL (ref 0–0.1)
BASOPHILS NFR BLD: 1 % (ref 0–2)
BILIRUB SERPL-MCNC: 0.2 MG/DL (ref 0.2–1)
BUN SERPL-MCNC: 11 MG/DL (ref 7–18)
BUN/CREAT SERPL: 15 (ref 12–20)
CALCIUM SERPL-MCNC: 9.5 MG/DL (ref 8.5–10.1)
CHLORIDE SERPL-SCNC: 104 MMOL/L (ref 100–111)
CO2 SERPL-SCNC: 25 MMOL/L (ref 21–32)
CREAT SERPL-MCNC: 0.75 MG/DL (ref 0.6–1.3)
DIFFERENTIAL METHOD BLD: ABNORMAL
EOSINOPHIL # BLD: 0.3 K/UL (ref 0–0.4)
EOSINOPHIL NFR BLD: 2 % (ref 0–5)
ERYTHROCYTE [DISTWIDTH] IN BLOOD BY AUTOMATED COUNT: 14.6 % (ref 11.6–14.5)
GLOBULIN SER CALC-MCNC: 4.2 G/DL (ref 2–4)
GLUCOSE SERPL-MCNC: 106 MG/DL (ref 74–99)
HCT VFR BLD AUTO: 45.1 % (ref 35–45)
HGB BLD-MCNC: 15.3 G/DL (ref 12–16)
LIPASE SERPL-CCNC: 120 U/L (ref 73–393)
LYMPHOCYTES # BLD: 4.7 K/UL (ref 0.9–3.6)
LYMPHOCYTES NFR BLD: 42 % (ref 21–52)
MCH RBC QN AUTO: 29.9 PG (ref 24–34)
MCHC RBC AUTO-ENTMCNC: 33.9 G/DL (ref 31–37)
MCV RBC AUTO: 88.1 FL (ref 74–97)
MONOCYTES # BLD: 0.9 K/UL (ref 0.05–1.2)
MONOCYTES NFR BLD: 8 % (ref 3–10)
NEUTS SEG # BLD: 5.3 K/UL (ref 1.8–8)
NEUTS SEG NFR BLD: 47 % (ref 40–73)
PLATELET # BLD AUTO: 229 K/UL (ref 135–420)
PMV BLD AUTO: 10.2 FL (ref 9.2–11.8)
POTASSIUM SERPL-SCNC: 3.4 MMOL/L (ref 3.5–5.5)
PROT SERPL-MCNC: 7.7 G/DL (ref 6.4–8.2)
RBC # BLD AUTO: 5.12 M/UL (ref 4.2–5.3)
SODIUM SERPL-SCNC: 136 MMOL/L (ref 136–145)
TROPONIN I SERPL-MCNC: 0.02 NG/ML (ref 0–0.04)
WBC # BLD AUTO: 11.2 K/UL (ref 4.6–13.2)

## 2020-08-02 PROCEDURE — 74177 CT ABD & PELVIS W/CONTRAST: CPT

## 2020-08-02 PROCEDURE — 96375 TX/PRO/DX INJ NEW DRUG ADDON: CPT

## 2020-08-02 PROCEDURE — 96374 THER/PROPH/DIAG INJ IV PUSH: CPT

## 2020-08-02 PROCEDURE — 96376 TX/PRO/DX INJ SAME DRUG ADON: CPT

## 2020-08-02 PROCEDURE — 84484 ASSAY OF TROPONIN QUANT: CPT

## 2020-08-02 PROCEDURE — 85025 COMPLETE CBC W/AUTO DIFF WBC: CPT

## 2020-08-02 PROCEDURE — 74011636320 HC RX REV CODE- 636/320: Performed by: EMERGENCY MEDICINE

## 2020-08-02 PROCEDURE — 83690 ASSAY OF LIPASE: CPT

## 2020-08-02 PROCEDURE — 99284 EMERGENCY DEPT VISIT MOD MDM: CPT

## 2020-08-02 PROCEDURE — 96361 HYDRATE IV INFUSION ADD-ON: CPT

## 2020-08-02 PROCEDURE — 74011250636 HC RX REV CODE- 250/636: Performed by: EMERGENCY MEDICINE

## 2020-08-02 PROCEDURE — 93005 ELECTROCARDIOGRAM TRACING: CPT

## 2020-08-02 PROCEDURE — 80053 COMPREHEN METABOLIC PANEL: CPT

## 2020-08-02 RX ORDER — FAMOTIDINE 10 MG/ML
20 INJECTION INTRAVENOUS
Status: COMPLETED | OUTPATIENT
Start: 2020-08-02 | End: 2020-08-02

## 2020-08-02 RX ORDER — ONDANSETRON 2 MG/ML
4 INJECTION INTRAMUSCULAR; INTRAVENOUS
Status: COMPLETED | OUTPATIENT
Start: 2020-08-02 | End: 2020-08-02

## 2020-08-02 RX ORDER — HYDROMORPHONE HYDROCHLORIDE 1 MG/ML
0.5 INJECTION, SOLUTION INTRAMUSCULAR; INTRAVENOUS; SUBCUTANEOUS
Status: COMPLETED | OUTPATIENT
Start: 2020-08-02 | End: 2020-08-02

## 2020-08-02 RX ADMIN — IOPAMIDOL 100 ML: 612 INJECTION, SOLUTION INTRAVENOUS at 23:20

## 2020-08-02 RX ADMIN — FAMOTIDINE 20 MG: 10 INJECTION, SOLUTION INTRAVENOUS at 22:45

## 2020-08-02 RX ADMIN — SODIUM CHLORIDE 1000 ML: 900 INJECTION, SOLUTION INTRAVENOUS at 22:45

## 2020-08-02 RX ADMIN — SODIUM CHLORIDE 1000 ML: 900 INJECTION, SOLUTION INTRAVENOUS at 22:42

## 2020-08-02 RX ADMIN — ONDANSETRON 4 MG: 2 INJECTION INTRAMUSCULAR; INTRAVENOUS at 22:42

## 2020-08-02 RX ADMIN — HYDROMORPHONE HYDROCHLORIDE 0.5 MG: 1 INJECTION, SOLUTION INTRAMUSCULAR; INTRAVENOUS; SUBCUTANEOUS at 22:47

## 2020-08-02 NOTE — LETTER
NOTIFICATION RETURN TO WORK / SCHOOL 
 
8/3/2020 1:37 AM 
 
[facility] [address] [facility] [id number] To Whom It May Concern: 
 
Skyler Ramírez is currently under the care of [facility] EMERGENCY DEPT. She will return to work/school on: 8/5/20 If there are questions or concerns please have the patient contact our office. Sincerely, John Durham MD

## 2020-08-03 ENCOUNTER — TELEPHONE (OUTPATIENT)
Dept: CASE MANAGEMENT | Age: 50
End: 2020-08-03

## 2020-08-03 VITALS
DIASTOLIC BLOOD PRESSURE: 101 MMHG | HEART RATE: 78 BPM | TEMPERATURE: 97.3 F | WEIGHT: 210 LBS | SYSTOLIC BLOOD PRESSURE: 169 MMHG | BODY MASS INDEX: 38.64 KG/M2 | HEIGHT: 62 IN | RESPIRATION RATE: 27 BRPM | OXYGEN SATURATION: 94 %

## 2020-08-03 LAB
APPEARANCE UR: ABNORMAL
BILIRUB UR QL: NEGATIVE
COLOR UR: YELLOW
GLUCOSE UR STRIP.AUTO-MCNC: NEGATIVE MG/DL
HGB UR QL STRIP: NEGATIVE
KETONES UR QL STRIP.AUTO: NEGATIVE MG/DL
LEUKOCYTE ESTERASE UR QL STRIP.AUTO: NEGATIVE
NITRITE UR QL STRIP.AUTO: NEGATIVE
PH UR STRIP: 6.5 [PH] (ref 5–8)
PROT UR STRIP-MCNC: NEGATIVE MG/DL
SP GR UR REFRACTOMETRY: >1.03 (ref 1–1.03)
UROBILINOGEN UR QL STRIP.AUTO: 0.2 EU/DL (ref 0.2–1)

## 2020-08-03 PROCEDURE — 74011250636 HC RX REV CODE- 250/636: Performed by: EMERGENCY MEDICINE

## 2020-08-03 PROCEDURE — 74011250637 HC RX REV CODE- 250/637: Performed by: EMERGENCY MEDICINE

## 2020-08-03 PROCEDURE — 81003 URINALYSIS AUTO W/O SCOPE: CPT

## 2020-08-03 RX ORDER — KETOROLAC TROMETHAMINE 15 MG/ML
15 INJECTION, SOLUTION INTRAMUSCULAR; INTRAVENOUS
Status: COMPLETED | OUTPATIENT
Start: 2020-08-03 | End: 2020-08-03

## 2020-08-03 RX ORDER — ONDANSETRON 4 MG/1
4-8 TABLET, ORALLY DISINTEGRATING ORAL
Qty: 10 TAB | Refills: 0 | Status: SHIPPED | OUTPATIENT
Start: 2020-08-03 | End: 2021-02-04 | Stop reason: CLARIF

## 2020-08-03 RX ORDER — ONDANSETRON 2 MG/ML
4 INJECTION INTRAMUSCULAR; INTRAVENOUS
Status: COMPLETED | OUTPATIENT
Start: 2020-08-03 | End: 2020-08-03

## 2020-08-03 RX ORDER — DICYCLOMINE HYDROCHLORIDE 10 MG/1
20 CAPSULE ORAL
Status: COMPLETED | OUTPATIENT
Start: 2020-08-03 | End: 2020-08-03

## 2020-08-03 RX ORDER — HYDROMORPHONE HYDROCHLORIDE 1 MG/ML
0.5 INJECTION, SOLUTION INTRAMUSCULAR; INTRAVENOUS; SUBCUTANEOUS
Status: COMPLETED | OUTPATIENT
Start: 2020-08-03 | End: 2020-08-03

## 2020-08-03 RX ORDER — FAMOTIDINE 20 MG/1
20 TABLET, FILM COATED ORAL 2 TIMES DAILY
Qty: 10 TAB | Refills: 0 | Status: SHIPPED | OUTPATIENT
Start: 2020-08-03 | End: 2020-08-08

## 2020-08-03 RX ORDER — HYDROCODONE BITARTRATE AND ACETAMINOPHEN 5; 325 MG/1; MG/1
1 TABLET ORAL
Qty: 6 TAB | Refills: 0 | Status: SHIPPED | OUTPATIENT
Start: 2020-08-03 | End: 2020-08-06

## 2020-08-03 RX ADMIN — KETOROLAC TROMETHAMINE 15 MG: 15 INJECTION, SOLUTION INTRAMUSCULAR; INTRAVENOUS at 01:09

## 2020-08-03 RX ADMIN — DICYCLOMINE HYDROCHLORIDE 20 MG: 10 CAPSULE ORAL at 01:09

## 2020-08-03 RX ADMIN — HYDROMORPHONE HYDROCHLORIDE 0.5 MG: 1 INJECTION, SOLUTION INTRAMUSCULAR; INTRAVENOUS; SUBCUTANEOUS at 01:10

## 2020-08-03 RX ADMIN — ONDANSETRON 4 MG: 2 INJECTION INTRAMUSCULAR; INTRAVENOUS at 01:08

## 2020-08-03 NOTE — DISCHARGE INSTRUCTIONS
Patient Education        Abdominal Pain: Care Instructions  Your Care Instructions     Abdominal pain has many possible causes. Some aren't serious and get better on their own in a few days. Others need more testing and treatment. If your pain continues or gets worse, you need to be rechecked and may need more tests to find out what is wrong. You may need surgery to correct the problem. Don't ignore new symptoms, such as fever, nausea and vomiting, urination problems, pain that gets worse, and dizziness. These may be signs of a more serious problem. Your doctor may have recommended a follow-up visit in the next 8 to 12 hours. If you are not getting better, you may need more tests or treatment. The doctor has checked you carefully, but problems can develop later. If you notice any problems or new symptoms, get medical treatment right away. Follow-up care is a key part of your treatment and safety. Be sure to make and go to all appointments, and call your doctor if you are having problems. It's also a good idea to know your test results and keep a list of the medicines you take. How can you care for yourself at home? · Rest until you feel better. · To prevent dehydration, drink plenty of fluids, enough so that your urine is light yellow or clear like water. Choose water and other caffeine-free clear liquids until you feel better. If you have kidney, heart, or liver disease and have to limit fluids, talk with your doctor before you increase the amount of fluids you drink. · If your stomach is upset, eat mild foods, such as rice, dry toast or crackers, bananas, and applesauce. Try eating several small meals instead of two or three large ones. · Wait until 48 hours after all symptoms have gone away before you have spicy foods, alcohol, and drinks that contain caffeine. · Do not eat foods that are high in fat. · Avoid anti-inflammatory medicines such as aspirin, ibuprofen (Advil, Motrin), and naproxen (Aleve). These can cause stomach upset. Talk to your doctor if you take daily aspirin for another health problem. When should you call for help? [id number] anytime you think you may need emergency care. For example, call if:  · You passed out (lost consciousness). · You pass maroon or very bloody stools. · You vomit blood or what looks like coffee grounds. · You have new, severe belly pain. Call your doctor now or seek immediate medical care if:  · Your pain gets worse, especially if it becomes focused in one area of your belly. · You have a new or higher fever. · Your stools are black and look like tar, or they have streaks of blood. · You have unexpected vaginal bleeding. · You have symptoms of a urinary tract infection. These may include:  ? Pain when you urinate. ? Urinating more often than usual.  ? Blood in your urine. · You are dizzy or lightheaded, or you feel like you may faint. Watch closely for changes in your health, and be sure to contact your doctor if:  · You are not getting better after 1 day (24 hours). Where can you learn more? Go to http://jaquelineRoka Biosciencevee.info/  Enter C705 in the search box to learn more about \"Abdominal Pain: Care Instructions. \"  Current as of: June 26, 2019               Content Version: 12.5  © 6657-0914 [employer]. Care instructions adapted under license by [employer] (which disclaims liability or warranty for this information). If you have questions about a medical condition or this instruction, always ask your healthcare professional. [place] [address] any warranty or liability for your use of this information. Patient Education        Diarrhea: Care Instructions  Your Care Instructions        Diarrhea is loose, watery stools (bowel movements). The exact cause is often hard to find. Sometimes diarrhea is your body's way of getting rid of what caused an upset stomach.  Viruses, food poisoning, and many medicines can cause diarrhea. Some people get diarrhea in response to emotional stress, anxiety, or certain foods. Almost everyone has diarrhea now and then. It usually isn't serious, and your stools will return to normal soon. The important thing to do is replace the fluids you have lost, so you can prevent dehydration. The doctor has checked you carefully, but problems can develop later. If you notice any problems or new symptoms, get medical treatment right away. Follow-up care is a key part of your treatment and safety. Be sure to make and go to all appointments, and call your doctor if you are having problems. It's also a good idea to know your test results and keep a list of the medicines you take. How can you care for yourself at home? · Watch for signs of dehydration, which means your body has lost too much water. Dehydration is a serious condition and should be treated right away. Signs of dehydration are:  ? Increasing thirst and dry eyes and mouth. ? Feeling faint or lightheaded. ? A smaller amount of urine than normal.  · To prevent dehydration, drink plenty of fluids. Choose water and other caffeine-free clear liquids until you feel better. If you have kidney, heart, or liver disease and have to limit fluids, talk with your doctor before you increase the amount of fluids you drink. · Begin eating small amounts of mild foods the next day, if you feel like it. ? Try yogurt that has live cultures of Lactobacillus. (Check the label.)  ? Avoid spicy foods, fruits, alcohol, and caffeine until 48 hours after all symptoms are gone. ? Avoid chewing gum that contains sorbitol. ? Avoid dairy products (except for yogurt with Lactobacillus) while you have diarrhea and for 3 days after symptoms are gone. · The doctor may recommend that you take over-the-counter medicine, such as loperamide (Imodium), if you still have diarrhea after 6 hours. Read and follow all instructions on the label.  Do not use this medicine if you have bloody diarrhea, a high fever, or other signs of serious illness. Call your doctor if you think you are having a problem with your medicine. When should you call for help? [id number] anytime you think you may need emergency care. For example, call if:  · You passed out (lost consciousness). · Your stools are maroon or very bloody. Call your doctor now or seek immediate medical care if:  · You are dizzy or lightheaded, or you feel like you may faint. · Your stools are black and look like tar, or they have streaks of blood. · You have new or worse belly pain. · You have symptoms of dehydration, such as:  ? Dry eyes and a dry mouth. ? Passing only a little dark urine. ? Feeling thirstier than usual.  · You have a new or higher fever. Watch closely for changes in your health, and be sure to contact your doctor if:  · Your diarrhea is getting worse. · You see pus in the diarrhea. · You are not getting better after 2 days (48 hours). Where can you learn more? Go to http://jaquelineCityHawkvee.info/  Enter [id number] in the search box to learn more about \"Diarrhea: Care Instructions. \"  Current as of: June 26, 2019               Content Version: 12.5  © 6032-3737 Healthwise, [employer]iz[employer]. Care instructions adapted under license by [employer] (which disclaims liability or warranty for this information). If you have questions about a medical condition or this instruction, always ask your healthcare professional. [place] [address] any warranty or liability for your use of this information. Patient Education        Indigestion (Dyspepsia or Heartburn): Care Instructions  Your Care Instructions  Sometimes it can be hard to pinpoint the cause of indigestion. (It is also called dyspepsia or heartburn.) Most cases of an upset stomach with bloating, burning, burping, and nausea are minor and go away within several hours.  Home treatment and over-the-counter medicine often are able to control symptoms. But if you take medicine to relieve your indigestion without making diet and lifestyle changes, your symptoms are likely to return again and again. If you get indigestion often, it may be a sign of a more serious medical problem. Be sure to follow up with your doctor, who may want to do tests to be sure of the cause of your indigestion. Follow-up care is a key part of your treatment and safety. Be sure to make and go to all appointments, and call your doctor if you are having problems. It's also a good idea to know your test results and keep a list of the medicines you take. How can you care for yourself at home? · Your doctor may recommend over-the-counter medicine. For mild or occasional indigestion, antacids such as Gaviscon, Mylanta, Maalox, or Tums, may help. Be safe with medicines. Be careful when you take over-the-counter antacid medicines. Many of these medicines have aspirin in them. Read the label to make sure that you are not taking more than the recommended dose. Too much aspirin can be harmful. · Your doctor also may recommend over-the-counter acid reducers, such as Pepcid AC (famotidine), Tagamet HB (cimetidine), or Prilosec (omeprazole). Read and follow all instructions on the label. If you use these medicines often, talk with your doctor. · Change your eating habits. ? It's best to eat several small meals instead of two or three large meals. ? After you eat, wait 2 to 3 hours before you lie down. ? Chocolate, mint, and alcohol can make GERD worse. ? Spicy foods, foods that have a lot of acid (like tomatoes and oranges), and coffee can make GERD symptoms worse in some people. If your symptoms are worse after you eat a certain food, you may want to stop eating that food to see if your symptoms get better. · Do not smoke or chew tobacco. Smoking can make GERD worse.  If you need help quitting, talk to your doctor about stop-smoking programs and medicines. These can increase your chances of quitting for good. · If you have GERD symptoms at night, raise the head of your bed 6 to 8 inches. You can do this by putting the frame on blocks or placing a foam wedge under the head of your mattress. (Adding extra pillows does not work.)  · Do not wear tight clothing around your middle. · Lose weight if you need to. Losing just 5 to 10 pounds can help. · Do not take anti-inflammatory medicines, such as aspirin, ibuprofen (Advil, Motrin), or naproxen (Aleve). These can irritate the stomach. If you need a pain medicine, try acetaminophen (Tylenol), which does not cause stomach upset. When should you call for help? Call your doctor now or seek immediate medical care if:  · You have new or worse belly pain. · You are vomiting. Watch closely for changes in your health, and be sure to contact your doctor if:  · You have new or worse symptoms of indigestion. · You have trouble or pain swallowing. · You are losing weight. · You do not get better as expected. Where can you learn more? Go to http://jaqueline-vee.info/  Enter [id number] in the search box to learn more about \"Indigestion (Dyspepsia or Heartburn): Care Instructions. \"  Current as of: August 12, 2019               Content Version: 12.5  © 4840-1185 Healthwise, Incorporated. Care instructions adapted under license by [employer] (which disclaims liability or warranty for this information). If you have questions about a medical condition or this instruction, always ask your healthcare professional. [place] [address] any warranty or liability for your use of this information.

## 2020-08-03 NOTE — TELEPHONE ENCOUNTER
Date/Time:  8/3/2020 10:12 AM  Attempted to reach patient by telephone. Left HIPPA compliant message requesting a return call. Will attempt to reach patient again.

## 2020-08-03 NOTE — ED TRIAGE NOTES
Pt arrives with flu like sx's starting last evening. Pt c/o N/V/D and headache. Pt worried because recent MI and  with DM.

## 2020-08-03 NOTE — ED NOTES
I have reviewed discharge instruction and prescriptions with patient. Patient verbalized understanding and has no further questions at this time. Education taught and patient verbalized understanding of education. Teach back method used. Left hand IV removed, catheter tip intact on removal.  Armband removed and shredded per patients request.    Patients pain 5/10. Belongings given to patient. Patient discharged with family to home.

## 2020-08-03 NOTE — ED PROVIDER NOTES
Jasmyn Charles is a 48 y.o. female with history of coronary artery disease, previous appendectomy, cholecystectomy, hysterectomy with complaints of onset of mid abdominal pain and cramping that started low bit over a day ago with nausea and vomiting then followed with diarrhea. Patient's been able to keep much down the way of any medications or fluids. There is no blood in her stool or emesis. She has no melena. She denies cough or chest pain. She has had chills and sweats but no documented fever. No sore throat. No recent exposure to anyone with a GI illness. Or recent antibiotics. Symptoms are exacerbated with p.o. intake. She has decreased urinary output denies any dysuria frequency urgency. The history is provided by the patient and medical records. Past Medical History:   Diagnosis Date    Back pain     Displacement of lumbar intervertebral disc     High cholesterol     takes no meds for same.  Hypertension 05/02/2017    pt states she does not take meds for same.      Lumbar radiculitis     Lumbar sprain     Numbness     legs and arms    STEMI (ST elevation [facility] infarction) [facility])        Past Surgical History:   Procedure Laterality Date    HX APPENDECTOMY  2014    HX CHOLECYSTECTOMY  2013    HX GI      multiple esophageal surgeries in childhood    HX HEENT      \"surgery on esophagus\"    HX HYSTERECTOMY  2003    HX LUMBAR DISKECTOMY           Family History:   Problem Relation Age of Onset    Hypertension Mother     Hypertension Father     Heart Disease Father     Hypertension Maternal Grandmother     Heart Disease Maternal Grandmother     Hypertension Paternal Grandmother     Heart Disease Paternal Grandmother        Social History     Socioeconomic History    Marital status:      Spouse name: Not on file    Number of children: Not on file    Years of education: Not on file    Highest education level: Not on file   Occupational History    Not on file Social Needs    Financial resource strain: Not on file    Food insecurity     Worry: Not on file     Inability: Not on file    Transportation needs     Medical: Not on file     Non-medical: Not on file   Tobacco Use    Smoking status: Former Smoker     Packs/day: 1.00     Years: 35.00     Pack years: 35.00     Last attempt to quit: 2020     Years since quittin.4    Smokeless tobacco: Former User     Quit date: 2017    Tobacco comment: pt states she smoked about 1.5 packs a day for 30-35 years and quit on 17   Substance and Sexual Activity    Alcohol use: Yes     Alcohol/week: 4.0 standard drinks     Types: 4 Shots of liquor per week    Drug use: No    Sexual activity: Not on file   Lifestyle    Physical activity     Days per week: Not on file     Minutes per session: Not on file    Stress: Not on file   Relationships    Social connections     Talks on phone: Not on file     Gets together: Not on file     Attends Nondenominational service: Not on file     Active member of club or organization: Not on file     Attends meetings of clubs or organizations: Not on file     Relationship status: Not on file    Intimate partner violence     Fear of current or ex partner: Not on file     Emotionally abused: Not on file     Physically abused: Not on file     Forced sexual activity: Not on file   Other Topics Concern    Not on file   Social History Narrative    Not on file         ALLERGIES: Codeine and Pcn [penicillins]    Review of Systems   Constitutional: Positive for activity change, appetite change, chills and diaphoresis. Negative for fever. HENT: Negative for sore throat and trouble swallowing. Eyes: Negative for visual disturbance. Respiratory: Negative for cough and shortness of breath. Cardiovascular: Negative for chest pain. Gastrointestinal: Positive for abdominal distention and abdominal pain. Negative for blood in stool. Genitourinary: Positive for decreased urine volume. Negative for difficulty urinating. Musculoskeletal: Positive for myalgias. Negative for gait problem. Skin: Negative for rash. Allergic/Immunologic: Negative for immunocompromised state. Neurological: Negative for syncope. Psychiatric/Behavioral: Positive for sleep disturbance. Vitals:    08/02/20 2245 08/02/20 2315 08/02/20 2330 08/03/20 0100   BP: (!) 179/104 (!) 195/114 (!) 187/99 (!) 169/101   Pulse: 81 80 81 78   Resp: 24 23 28 27   Temp:       SpO2: 93% 95% 96% 94%   Weight:       Height:                Physical Exam  Vitals signs and nursing note reviewed. Constitutional:       General: She is in acute distress. Appearance: She is well-developed. She is obese. She is ill-appearing. She is not toxic-appearing or diaphoretic. HENT:      Head: Normocephalic and atraumatic. Right Ear: External ear normal.      Left Ear: External ear normal.      Nose: Nose normal.      Mouth/Throat:      Pharynx: Uvula midline. Eyes:      General: No scleral icterus. Conjunctiva/sclera: Conjunctivae normal.   Neck:      Musculoskeletal: Neck supple. Cardiovascular:      Rate and Rhythm: Normal rate and regular rhythm. Heart sounds: Normal heart sounds. Pulmonary:      Effort: Pulmonary effort is normal.      Breath sounds: Normal breath sounds. Abdominal:      General: Abdomen is protuberant. Palpations: Abdomen is soft. There is no hepatomegaly or splenomegaly. Tenderness: There is abdominal tenderness in the epigastric area and periumbilical area. There is guarding. There is no right CVA tenderness, left CVA tenderness or rebound. Negative signs include Vasquez's sign and McBurney's sign. Musculoskeletal:      Right lower leg: No edema. Left lower leg: No edema. Skin:     General: Skin is warm and dry. Capillary Refill: Capillary refill takes less than 2 seconds. Neurological:      Mental Status: She is alert and oriented to person, place, and time. Gait: Gait normal.   Psychiatric:         Behavior: Behavior normal.          MDM       Procedures    Vitals:  Patient Vitals for the past 12 hrs:   Temp Pulse Resp BP SpO2   08/03/20 0100  78 27 (!) 169/101 94 %   08/02/20 2330  81 28 (!) 187/99 96 %   08/02/20 2315  80 23 (!) 195/114 95 %   08/02/20 2245  81 24 (!) 179/104 93 %   08/02/20 2230    (!) 176/99 95 %   08/02/20 2200    (!) 191/117 98 %   08/02/20 2057 97.3 °F (36.3 °C) 87 20 (!) 163/145 98 %         Medications ordered:   Medications   sodium chloride 0.9 % bolus infusion 1,000 mL (1,000 mL IntraVENous New Bag 8/2/20 2242)   sodium chloride 0.9 % bolus infusion 1,000 mL (1,000 mL IntraVENous New Bag 8/2/20 2245)   HYDROmorphone (DILAUDID) syringe 0.5 mg (0.5 mg IntraVENous Given 8/2/20 2247)   ondansetron (ZOFRAN) injection 4 mg (4 mg IntraVENous Given 8/2/20 2242)   famotidine (PF) (PEPCID) injection 20 mg (20 mg IntraVENous Given 8/2/20 2245)   iopamidoL (ISOVUE 300) 61 % contrast injection 100 mL (100 mL IntraVENous Given 8/2/20 2320)   ketorolac (TORADOL) injection 15 mg (15 mg IntraVENous Given 8/3/20 0109)   dicyclomine (BENTYL) capsule 20 mg (20 mg Oral Given 8/3/20 0109)   HYDROmorphone (DILAUDID) syringe 0.5 mg (0.5 mg IntraVENous Given 8/3/20 0110)   ondansetron (ZOFRAN) injection 4 mg (4 mg IntraVENous Given 8/3/20 0108)         Lab findings:  Recent Results (from the past 12 hour(s))   EKG, 12 LEAD, INITIAL    Collection Time: 08/02/20 10:09 PM   Result Value Ref Range    Ventricular Rate 78 BPM    Atrial Rate 78 BPM    P-R Interval 114 ms    QRS Duration 88 ms    Q-T Interval 388 ms    QTC Calculation (Bezet) 442 ms    Calculated P Axis 25 degrees    Calculated R Axis 9 degrees    Calculated T Axis -7 degrees    Diagnosis       Poor data quality, interpretation may be adversely affected  Normal sinus rhythm  Possible Inferior infarct , age undetermined  Abnormal ECG  When compared with ECG of 03-JUL-2020 07:53,  Borderline criteria for Inferior infarct are now present  Nonspecific T wave abnormality now evident in Lateral leads     CBC WITH AUTOMATED DIFF    Collection Time: 08/02/20 10:23 PM   Result Value Ref Range    WBC 11.2 4.6 - 13.2 K/uL    RBC 5.12 4.20 - 5.30 M/uL    HGB 15.3 12.0 - 16.0 g/dL    HCT 45.1 (H) 35.0 - 45.0 %    MCV 88.1 74.0 - 97.0 FL    MCH 29.9 24.0 - 34.0 PG    MCHC 33.9 31.0 - 37.0 g/dL    RDW 14.6 (H) 11.6 - 14.5 %    PLATELET 054 460 - 560 K/uL    MPV 10.2 9.2 - 11.8 FL    NEUTROPHILS 47 40 - 73 %    LYMPHOCYTES 42 21 - 52 %    MONOCYTES 8 3 - 10 %    EOSINOPHILS 2 0 - 5 %    BASOPHILS 1 0 - 2 %    ABS. NEUTROPHILS 5.3 1.8 - 8.0 K/UL    ABS. LYMPHOCYTES 4.7 (H) 0.9 - 3.6 K/UL    ABS. MONOCYTES 0.9 0.05 - 1.2 K/UL    ABS. EOSINOPHILS 0.3 0.0 - 0.4 K/UL    ABS. BASOPHILS 0.1 0.0 - 0.1 K/UL    DF AUTOMATED     METABOLIC PANEL, COMPREHENSIVE    Collection Time: 08/02/20 10:23 PM   Result Value Ref Range    Sodium 136 136 - 145 mmol/L    Potassium 3.4 (L) 3.5 - 5.5 mmol/L    Chloride 104 100 - 111 mmol/L    CO2 25 21 - 32 mmol/L    Anion gap 7 3.0 - 18 mmol/L    Glucose 106 (H) 74 - 99 mg/dL    BUN 11 7.0 - 18 MG/DL    Creatinine 0.75 0.6 - 1.3 MG/DL    BUN/Creatinine ratio 15 12 - 20      GFR est AA >60 >60 ml/min/1.73m2    GFR est non-AA >60 >60 ml/min/1.73m2    Calcium 9.5 8.5 - 10.1 MG/DL    Bilirubin, total 0.2 0.2 - 1.0 MG/DL    ALT (SGPT) 73 (H) 13 - 56 U/L    AST (SGOT) 40 (H) 10 - 38 U/L    Alk.  phosphatase 102 45 - 117 U/L    Protein, total 7.7 6.4 - 8.2 g/dL    Albumin 3.5 3.4 - 5.0 g/dL    Globulin 4.2 (H) 2.0 - 4.0 g/dL    A-G Ratio 0.8 0.8 - 1.7     TROPONIN I    Collection Time: 08/02/20 10:23 PM   Result Value Ref Range    Troponin-I, QT 0.02 0.0 - 0.045 NG/ML   LIPASE    Collection Time: 08/02/20 10:23 PM   Result Value Ref Range    Lipase 120 73 - 393 U/L   URINALYSIS W/ RFLX MICROSCOPIC    Collection Time: 08/03/20 12:30 AM   Result Value Ref Range    Color YELLOW      Appearance CLOUDY Specific gravity >1.030 (H) 1.005 - 1.030    pH (UA) 6.5 5.0 - 8.0      Protein Negative NEG mg/dL    Glucose Negative NEG mg/dL    Ketone Negative NEG mg/dL    Bilirubin Negative NEG      Blood Negative NEG      Urobilinogen 0.2 0.2 - 1.0 EU/dL    Nitrites Negative NEG      Leukocyte Esterase Negative NEG         EKG interpretation by ED Physician:  Normal sinus rhythm with no acute ST or T wave changes. Rate 78 QRS 88   Not significantly changed    Cardiac monitor: Normal rate with regular rhythm and no ectopy  Pulse ox: 98% room air, normal      X-Ray, CT or other radiology findings or impressions:  CT ABD PELV W CONT    (Results Pending)   Mild amount of right perinephric fat stranding. Negative for hydronephrosis. Nonspecific but could represent mild pyelonephritis in the right setting. Clinically correlate. Otherwise no CT evidence of acute intra-abdominal intrapelvic abnormality. Progress notes, Consult notes or additional Procedure notes:   Patient still with some mid abdominal pain. Abdomen is soft. Likely more enteritis or viral process. Do not feel patient requires additional imaging or other work-up this time. Abdomen is soft with no significant tenderness    I have discussed with patient and/or family/sig other the results, interpretation of any imaging if performed, suspected diagnosis and treatment plan to include instructions regarding the diagnoses listed to which understanding was expressed with all questions answered      Reevaluation of patient:   stable    Disposition:  Diagnosis:   1. Abdominal pain, acute, epigastric    2. Non-intractable vomiting with nausea, unspecified vomiting type    3.  Diarrhea, unspecified type        Disposition: home    Follow-up Information     Follow up With Specialties Details Why Zachery Ceron MD Internal Medicine Schedule an appointment as soon as possible for a visit  [facility] [address]      [facility] EMERGENCY DEPT Emergency Medicine  If symptoms worsen [id number] [id number]             Patient's Medications   Start Taking    FAMOTIDINE (PEPCID) 20 MG TABLET    Take 1 Tab by mouth two (2) times a day for 5 days. HYDROCODONE-ACETAMINOPHEN (NORCO) 5-325 MG PER TABLET    Take 1 Tab by mouth every six (6) hours as needed for Pain for up to 3 days. Max Daily Amount: 4 Tabs. ONDANSETRON (ZOFRAN ODT) 4 MG DISINTEGRATING TABLET    Take 1-2 Tabs by mouth every eight (8) hours as needed for Nausea or Vomiting. Continue Taking    ALBUTEROL (PROVENTIL HFA, VENTOLIN HFA, PROAIR HFA) 90 MCG/ACTUATION INHALER    Take 2 Puffs by inhalation every four (4) hours as needed for Wheezing. ASPIRIN 81 MG CHEWABLE TABLET    Take 1 Tab by mouth daily. ATORVASTATIN (LIPITOR) 80 MG TABLET    Take 1 Tab by mouth nightly. LIDOCAINE (LIDODERM) 5 %    Apply patch to the affected area for 12 hours a day and remove for 12 hours a day. LISINOPRIL (PRINIVIL, ZESTRIL) 2.5 MG TABLET    Take 1 Tab by mouth daily. METOPROLOL SUCCINATE (TOPROL-XL) 50 MG XL TABLET    Take 1 Tab by mouth daily. TICAGRELOR (BRILINTA) 90 MG TABLET    Take 1 Tab by mouth two (2) times a day.    These Medications have changed    No medications on file   Stop Taking    No medications on file

## 2020-08-04 LAB
ATRIAL RATE: 78 BPM
CALCULATED P AXIS, ECG09: 25 DEGREES
CALCULATED R AXIS, ECG10: 9 DEGREES
CALCULATED T AXIS, ECG11: -7 DEGREES
DIAGNOSIS, 93000: NORMAL
P-R INTERVAL, ECG05: 114 MS
Q-T INTERVAL, ECG07: 388 MS
QRS DURATION, ECG06: 88 MS
QTC CALCULATION (BEZET), ECG08: 442 MS
VENTRICULAR RATE, ECG03: 78 BPM

## 2020-08-04 NOTE — TELEPHONE ENCOUNTER
Date/Time:  8/4/2020 1:41 PM  Attempted to reach patient by telephone. Left HIPPA compliant message requesting a return call.

## 2020-08-05 NOTE — TELEPHONE ENCOUNTER
Patient resolved from Transition of Care episode on 8/5/2020  Discussed COVID-19 related testing which was not done at this time. Test results were not done. Patient informed of results, if available? Unable to reach patient       No further outreach scheduled with this CTN/ACM/LPN/HC/ MA. Episode of Care resolved.       Called patient twice requesting a return call patient never returned my call

## 2020-12-27 PROCEDURE — 99283 EMERGENCY DEPT VISIT LOW MDM: CPT

## 2020-12-28 ENCOUNTER — APPOINTMENT (OUTPATIENT)
Dept: GENERAL RADIOLOGY | Age: 50
End: 2020-12-28
Attending: EMERGENCY MEDICINE

## 2020-12-28 ENCOUNTER — HOSPITAL ENCOUNTER (EMERGENCY)
Age: 50
Discharge: HOME | End: 2020-12-28
Attending: EMERGENCY MEDICINE

## 2020-12-28 VITALS
RESPIRATION RATE: 15 BRPM | HEART RATE: 80 BPM | TEMPERATURE: 98.1 F | SYSTOLIC BLOOD PRESSURE: 205 MMHG | WEIGHT: 240 LBS | BODY MASS INDEX: 44.16 KG/M2 | DIASTOLIC BLOOD PRESSURE: 139 MMHG | HEIGHT: 62 IN | OXYGEN SATURATION: 95 %

## 2020-12-28 DIAGNOSIS — E66.01 MORBID OBESITY DUE TO EXCESS CALORIES (HCC): ICD-10-CM

## 2020-12-28 DIAGNOSIS — I10 UNCONTROLLED HYPERTENSION: ICD-10-CM

## 2020-12-28 DIAGNOSIS — M25.551 ACUTE RIGHT HIP PAIN: Primary | ICD-10-CM

## 2020-12-28 PROCEDURE — 74011250637 HC RX REV CODE- 250/637: Performed by: EMERGENCY MEDICINE

## 2020-12-28 PROCEDURE — 73502 X-RAY EXAM HIP UNI 2-3 VIEWS: CPT

## 2020-12-28 PROCEDURE — 74011250636 HC RX REV CODE- 250/636: Performed by: EMERGENCY MEDICINE

## 2020-12-28 PROCEDURE — 96372 THER/PROPH/DIAG INJ SC/IM: CPT

## 2020-12-28 PROCEDURE — 99283 EMERGENCY DEPT VISIT LOW MDM: CPT

## 2020-12-28 RX ORDER — KETOROLAC TROMETHAMINE 30 MG/ML
60 INJECTION, SOLUTION INTRAMUSCULAR; INTRAVENOUS
Status: COMPLETED | OUTPATIENT
Start: 2020-12-28 | End: 2020-12-28

## 2020-12-28 RX ORDER — KETOROLAC TROMETHAMINE 10 MG/1
10 TABLET, FILM COATED ORAL EVERY 8 HOURS
Qty: 15 TAB | Refills: 0 | Status: SHIPPED | OUTPATIENT
Start: 2020-12-28 | End: 2021-01-02

## 2020-12-28 RX ORDER — CYCLOBENZAPRINE HCL 10 MG
10 TABLET ORAL
Status: COMPLETED | OUTPATIENT
Start: 2020-12-28 | End: 2020-12-28

## 2020-12-28 RX ORDER — CYCLOBENZAPRINE HCL 10 MG
10 TABLET ORAL
Qty: 15 TAB | Refills: 0 | Status: SHIPPED | OUTPATIENT
Start: 2020-12-28 | End: 2021-02-04 | Stop reason: CLARIF

## 2020-12-28 RX ADMIN — CYCLOBENZAPRINE 10 MG: 10 TABLET, FILM COATED ORAL at 01:42

## 2020-12-28 RX ADMIN — KETOROLAC TROMETHAMINE 60 MG: 30 INJECTION, SOLUTION INTRAMUSCULAR at 01:42

## 2020-12-28 NOTE — ED PROVIDER NOTES
HPI   Patient presents with a chief complaint of right lateral hip pain with radiation down to her foot which began yesterday. She states that she is taking over-the-counter ibuprofen as well as lidocaine patch which has not relieved her pain. She denies any recent falls or other injuries. Past Medical History:   Diagnosis Date    Back pain     Displacement of lumbar intervertebral disc     High cholesterol     takes no meds for same.  Hypertension 2017    pt states she does not take meds for same.      Lumbar radiculitis     Lumbar sprain     Numbness     legs and arms    STEMI (ST elevation myocardial infarction) [facility])        Past Surgical History:   Procedure Laterality Date    HX APPENDECTOMY      HX CHOLECYSTECTOMY      HX GI      multiple esophageal surgeries in childhood    HX HEENT      \"surgery on esophagus\"    HX HYSTERECTOMY      HX LUMBAR DISKECTOMY           Family History:   Problem Relation Age of Onset    Hypertension Mother     Hypertension Father     Heart Disease Father     Hypertension Maternal Grandmother     Heart Disease Maternal Grandmother     Hypertension Paternal Grandmother     Heart Disease Paternal Grandmother        Social History     Socioeconomic History    Marital status:      Spouse name: Not on file    Number of children: Not on file    Years of education: Not on file    Highest education level: Not on file   Occupational History    Not on file   Social Needs    Financial resource strain: Not on file    Food insecurity     Worry: Not on file     Inability: Not on file    Transportation needs     Medical: Not on file     Non-medical: Not on file   Tobacco Use    Smoking status: Former Smoker     Packs/day: 1.00     Years: 35.00     Pack years: 35.00     Quit date: 2020     Years since quittin.8    Smokeless tobacco: Former User     Quit date: 2017    Tobacco comment: pt states she smoked about 1.5 packs a day for 30-35 years and quit on 2-1-17   Substance and Sexual Activity    Alcohol use: Yes     Alcohol/week: 4.0 standard drinks     Types: 4 Shots of liquor per week    Drug use: No    Sexual activity: Not on file   Lifestyle    Physical activity     Days per week: Not on file     Minutes per session: Not on file    Stress: Not on file   Relationships    Social connections     Talks on phone: Not on file     Gets together: Not on file     Attends [employer]ology service: Not on file     Active member of club or organization: Not on file     Attends meetings of clubs or organizations: Not on file     Relationship status: Not on file    Intimate partner violence     Fear of current or ex partner: Not on file     Emotionally abused: Not on file     Physically abused: Not on file     Forced sexual activity: Not on file   Other Topics Concern    Not on file   Social History Narrative    Not on file         ALLERGIES: Codeine and Pcn [penicillins]    Review of Systems   Constitutional: Negative for chills, diaphoresis, fatigue, fever and unexpected weight change. HENT: Negative for congestion, dental problem, ear discharge, ear pain, hearing loss, nosebleeds, postnasal drip, sinus pressure, sore throat, trouble swallowing and voice change. Eyes: Negative for photophobia, pain, discharge, redness and visual disturbance. Respiratory: Negative for cough, chest tightness, shortness of breath, wheezing and stridor. Cardiovascular: Negative for chest pain, palpitations and leg swelling. Gastrointestinal: Negative for abdominal distention, abdominal pain, anal bleeding, blood in stool, constipation, diarrhea, nausea and vomiting. Endocrine: Negative for polydipsia, polyphagia and polyuria. Genitourinary: Negative for difficulty urinating, dyspareunia, dysuria, flank pain, frequency, genital sores, hematuria, menstrual problem, pelvic pain, urgency, vaginal bleeding, vaginal discharge and vaginal pain. Musculoskeletal: Positive for back pain (Chronic). Negative for arthralgias, joint swelling, myalgias, neck pain and neck stiffness. Skin: Negative for color change, rash and wound. Allergic/Immunologic: Negative for immunocompromised state. Neurological: Negative for dizziness, tremors, seizures, syncope, weakness, light-headedness, numbness and headaches. Hematological: Negative for adenopathy. Does not bruise/bleed easily. Psychiatric/Behavioral: Negative for agitation, confusion, decreased concentration, hallucinations, sleep disturbance and suicidal ideas. The patient is not nervous/anxious. All other systems reviewed and are negative. Vitals:    12/28/20 0011   BP: (!) 205/139   Pulse: 80   Resp: 15   Temp: 98.1 °F (36.7 °C)   SpO2: 95%   Weight: 108.9 kg (240 lb)   Height: 5' 2\" (1.575 m)            Physical Exam  Vitals signs and nursing note reviewed. Constitutional:       General: She is not in acute distress. Appearance: Normal appearance. She is well-developed. She is obese. She is not diaphoretic. HENT:      Head: Normocephalic and atraumatic. Right Ear: Tympanic membrane and external ear normal.      Left Ear: Tympanic membrane and external ear normal.      Nose: Nose normal.      Mouth/Throat:      Pharynx: No oropharyngeal exudate. Eyes:      General: No scleral icterus. Right eye: No discharge. Left eye: No discharge. Conjunctiva/sclera: Conjunctivae normal.      Pupils: Pupils are equal, round, and reactive to light. Neck:      Thyroid: No thyromegaly. Vascular: No JVD. Trachea: No tracheal deviation. Cardiovascular:      Rate and Rhythm: Normal rate and regular rhythm. Heart sounds: Normal heart sounds. No murmur. No friction rub. No gallop. Pulmonary:      Effort: Pulmonary effort is normal. No respiratory distress. Breath sounds: Normal breath sounds. No stridor. No wheezing or rales.    Chest:      Chest wall: No tenderness. Abdominal:      General: Bowel sounds are normal. There is no distension. Palpations: Abdomen is soft. There is no mass. Tenderness: There is no abdominal tenderness. There is no guarding or rebound. Genitourinary:     Vagina: Normal.   Musculoskeletal: Normal range of motion. General: No swelling, tenderness or deformity. Comments: Varicose veins noted in the right medial calf. Mild tenderness to the right lateral hip. No deformity or step-off noted. Lymphadenopathy:      Cervical: No cervical adenopathy. Skin:     General: Skin is warm and dry. Capillary Refill: Capillary refill takes less than 2 seconds. Coloration: Skin is not jaundiced or pale. Findings: No erythema or rash. Neurological:      General: No focal deficit present. Mental Status: She is alert and oriented to person, place, and time. Cranial Nerves: No cranial nerve deficit. Deep Tendon Reflexes: Reflexes are normal and symmetric.    Psychiatric:         Behavior: Behavior normal.         Judgment: Judgment normal.          MDM  Number of Diagnoses or Management Options  Diagnosis management comments: Differential diagnosis includes: Sciatica, arthritis       Amount and/or Complexity of Data Reviewed  Tests in the radiology section of CPT®: ordered and reviewed  Review and summarize past medical records: yes    Risk of Complications, Morbidity, and/or Mortality  Presenting problems: low  Diagnostic procedures: low  Management options: low           Procedures      Orders Placed This Encounter    XR HIP RT W OR WO PELV 2-3 VWS     Standing Status:   Standing     Number of Occurrences:   1     Order Specific Question:   Transport     Answer:   Stretcher [5]     Order Specific Question:   Reason for Exam     Answer:   Hip pain    ketorolac tromethamine (TORADOL) 60 mg/2 mL injection 60 mg    cyclobenzaprine (FLEXERIL) tablet 10 mg    cyclobenzaprine (FLEXERIL) 10 mg tablet Sig: Take 1 Tab by mouth three (3) times daily as needed for Muscle Spasm(s). Dispense:  15 Tab     Refill:  0    ketorolac (TORADOL) 10 mg tablet     Sig: Take 1 Tab by mouth every eight (8) hours for 5 days. Dispense:  15 Tab     Refill:  0     XR HIP RT W OR WO PELV 2-3 VWS    (Results Pending) -preliminary interpretation by Dr. Raymond Munoz -no acute process         2:39 AM Upon re-evaluation the patient's symptoms have improved. Pt has non-toxic appearance and condition is stable for discharge. She was informed of her results, instructed to f/u with her PCP and return to the ED upon worsening of symptoms. All questions and concerns were addressed. Diagnosis:   1. Acute right hip pain    2. Uncontrolled hypertension    3. Morbid obesity due to excess calories ([address]          Disposition: Discharge home    Follow-up Information    None         Patient's Medications   Start Taking    CYCLOBENZAPRINE (FLEXERIL) 10 MG TABLET    Take 1 Tab by mouth three (3) times daily as needed for Muscle Spasm(s). KETOROLAC (TORADOL) 10 MG TABLET    Take 1 Tab by mouth every eight (8) hours for 5 days. Continue Taking    ALBUTEROL (PROVENTIL HFA, VENTOLIN HFA, PROAIR HFA) 90 MCG/ACTUATION INHALER    Take 2 Puffs by inhalation every four (4) hours as needed for Wheezing. ASPIRIN 81 MG CHEWABLE TABLET    Take 1 Tab by mouth daily. ATORVASTATIN (LIPITOR) 80 MG TABLET    Take 1 Tab by mouth nightly. LIDOCAINE (LIDODERM) 5 %    Apply patch to the affected area for 12 hours a day and remove for 12 hours a day. LISINOPRIL (PRINIVIL, ZESTRIL) 2.5 MG TABLET    Take 1 Tab by mouth daily. METOPROLOL SUCCINATE (TOPROL-XL) 50 MG XL TABLET    Take 1 Tab by mouth daily. ONDANSETRON (ZOFRAN ODT) 4 MG DISINTEGRATING TABLET    Take 1-2 Tabs by mouth every eight (8) hours as needed for Nausea or Vomiting. TICAGRELOR (BRILINTA) 90 MG TABLET    Take 1 Tab by mouth two (2) times a day.    These Medications have changed    No medications on file   Stop Taking    No medications on file

## 2020-12-28 NOTE — LETTER
[facility] EMERGENCY DEPT 
[address] 
[address] [facility] [id number] [phone] Work/School Note Date: 12/27/2020 To Whom It May concern: 
 
Sofia Alston was seen and treated today in the emergency room by the following provider(s): 
Attending Provider: Leonora Finnegan DO. Sofia Alston may return to work on 12/29/20. Sincerely, Ceasar Fitzgerald RN

## 2021-02-04 ENCOUNTER — HOSPITAL ENCOUNTER (EMERGENCY)
Age: 51
Discharge: HOME | End: 2021-02-04
Attending: EMERGENCY MEDICINE
Payer: MEDICAID

## 2021-02-04 VITALS
WEIGHT: 243 LBS | DIASTOLIC BLOOD PRESSURE: 117 MMHG | TEMPERATURE: 98 F | BODY MASS INDEX: 45.88 KG/M2 | SYSTOLIC BLOOD PRESSURE: 150 MMHG | RESPIRATION RATE: 18 BRPM | HEART RATE: 78 BPM | OXYGEN SATURATION: 99 % | HEIGHT: 61 IN

## 2021-02-04 DIAGNOSIS — I10 UNCONTROLLED HYPERTENSION: Primary | ICD-10-CM

## 2021-02-04 LAB
ALBUMIN SERPL-MCNC: 3.9 G/DL (ref 3.4–5)
ALBUMIN/GLOB SERPL: 1 {RATIO} (ref 0.8–1.7)
ALP SERPL-CCNC: 119 U/L (ref 45–117)
ALT SERPL-CCNC: 74 U/L (ref 13–56)
ANION GAP SERPL CALC-SCNC: 8 MMOL/L (ref 3–18)
APPEARANCE UR: CLEAR
AST SERPL-CCNC: 55 U/L (ref 10–38)
BASOPHILS # BLD: 0.1 K/UL (ref 0–0.1)
BASOPHILS NFR BLD: 1 % (ref 0–2)
BILIRUB SERPL-MCNC: 0.5 MG/DL (ref 0.2–1)
BILIRUB UR QL: NEGATIVE
BUN SERPL-MCNC: 12 MG/DL (ref 7–18)
BUN/CREAT SERPL: 17 (ref 12–20)
CALCIUM SERPL-MCNC: 9.3 MG/DL (ref 8.5–10.1)
CHLORIDE SERPL-SCNC: 106 MMOL/L (ref 100–111)
CK MB CFR SERPL CALC: 1.4 % (ref 0–4)
CK MB SERPL-MCNC: 1.9 NG/ML (ref 5–25)
CK SERPL-CCNC: 135 U/L (ref 26–192)
CO2 SERPL-SCNC: 26 MMOL/L (ref 21–32)
COLOR UR: YELLOW
CREAT SERPL-MCNC: 0.7 MG/DL (ref 0.6–1.3)
DIFFERENTIAL METHOD BLD: ABNORMAL
EOSINOPHIL # BLD: 0.2 K/UL (ref 0–0.4)
EOSINOPHIL NFR BLD: 2 % (ref 0–5)
ERYTHROCYTE [DISTWIDTH] IN BLOOD BY AUTOMATED COUNT: 13.5 % (ref 11.6–14.5)
GLOBULIN SER CALC-MCNC: 3.9 G/DL (ref 2–4)
GLUCOSE SERPL-MCNC: 88 MG/DL (ref 74–99)
GLUCOSE UR STRIP.AUTO-MCNC: NEGATIVE MG/DL
HCT VFR BLD AUTO: 47.3 % (ref 35–45)
HGB BLD-MCNC: 15.4 G/DL (ref 12–16)
HGB UR QL STRIP: NEGATIVE
KETONES UR QL STRIP.AUTO: NEGATIVE MG/DL
LEUKOCYTE ESTERASE UR QL STRIP.AUTO: NEGATIVE
LYMPHOCYTES # BLD: 3.4 K/UL (ref 0.9–3.6)
LYMPHOCYTES NFR BLD: 41 % (ref 21–52)
MCH RBC QN AUTO: 29.4 PG (ref 24–34)
MCHC RBC AUTO-ENTMCNC: 32.6 G/DL (ref 31–37)
MCV RBC AUTO: 90.3 FL (ref 74–97)
MONOCYTES # BLD: 0.6 K/UL (ref 0.05–1.2)
MONOCYTES NFR BLD: 7 % (ref 3–10)
NEUTS SEG # BLD: 4.1 K/UL (ref 1.8–8)
NEUTS SEG NFR BLD: 49 % (ref 40–73)
NITRITE UR QL STRIP.AUTO: NEGATIVE
PH UR STRIP: 7.5 [PH] (ref 5–8)
PLATELET # BLD AUTO: 181 K/UL (ref 135–420)
PMV BLD AUTO: 11.2 FL (ref 9.2–11.8)
POTASSIUM SERPL-SCNC: 3.6 MMOL/L (ref 3.5–5.5)
PROT SERPL-MCNC: 7.8 G/DL (ref 6.4–8.2)
PROT UR STRIP-MCNC: NEGATIVE MG/DL
RBC # BLD AUTO: 5.24 M/UL (ref 4.2–5.3)
SODIUM SERPL-SCNC: 140 MMOL/L (ref 136–145)
SP GR UR REFRACTOMETRY: 1.01 (ref 1–1.03)
TROPONIN I SERPL-MCNC: 0.05 NG/ML (ref 0–0.04)
UROBILINOGEN UR QL STRIP.AUTO: 0.2 EU/DL (ref 0.2–1)
WBC # BLD AUTO: 8.3 K/UL (ref 4.6–13.2)

## 2021-02-04 PROCEDURE — 81003 URINALYSIS AUTO W/O SCOPE: CPT

## 2021-02-04 PROCEDURE — 99283 EMERGENCY DEPT VISIT LOW MDM: CPT

## 2021-02-04 PROCEDURE — 85025 COMPLETE CBC W/AUTO DIFF WBC: CPT

## 2021-02-04 PROCEDURE — 80053 COMPREHEN METABOLIC PANEL: CPT

## 2021-02-04 PROCEDURE — 82553 CREATINE MB FRACTION: CPT

## 2021-02-04 PROCEDURE — 93005 ELECTROCARDIOGRAM TRACING: CPT

## 2021-02-04 RX ORDER — LISINOPRIL 10 MG/1
10 TABLET ORAL DAILY
Qty: 90 TAB | Refills: 0 | Status: SHIPPED | OUTPATIENT
Start: 2021-02-04 | End: 2021-05-05

## 2021-02-04 RX ORDER — ALBUTEROL SULFATE 90 UG/1
2 AEROSOL, METERED RESPIRATORY (INHALATION)
Qty: 1 INHALER | Refills: 0 | Status: SHIPPED | OUTPATIENT
Start: 2021-02-04 | End: 2022-09-12 | Stop reason: SDUPTHER

## 2021-02-04 NOTE — ED TRIAGE NOTES
Patient sent by PCP Helio Stewart for HTN and wheezing. Patient states she was going in for routine check up. Says there is nothing different with her breathing that she is chronically short of breath after an MI in march. BP was 215/140 at office. Takes metoprolol 50mg daily for BP.

## 2021-02-04 NOTE — ED NOTES
Patient left without discharge instructions. This RN called patient and informed her of the albuterol and lisinopril sent to [facility] on [facility]. Patient verbalizes understanding and states she will go pick those up this afternoon.

## 2021-02-04 NOTE — ED PROVIDER NOTES
HPI   59-year-old  female presents with a chief complaint of elevated blood pressure and wheezing. Patient states that she was seen by her primary care provider who referred her to the emergency room due to her blood pressure being elevated at 215/140mmHg. patient reports intermittent shortness of breath but states that the symptoms are chronic after being diagnosed with a ST elevation MI in March 2020. Patient is currently taking metoprolol 50 mg twice daily and lisinopril 2.5 mg daily. Past Medical History:   Diagnosis Date    Back pain     Displacement of lumbar intervertebral disc     High cholesterol     takes no meds for same.  Hypertension 05/02/2017    pt states she does not take meds for same.      Lumbar radiculitis     Lumbar sprain     Numbness     legs and arms    STEMI (ST elevation myocardial infarction) [facility])        Past Surgical History:   Procedure Laterality Date    HX APPENDECTOMY  2014    HX CHOLECYSTECTOMY  2013    HX GI      multiple esophageal surgeries in childhood    HX HEENT      \"surgery on esophagus\"    HX HYSTERECTOMY  2003    HX LUMBAR DISKECTOMY           Family History:   Problem Relation Age of Onset    Hypertension Mother     Hypertension Father     Heart Disease Father     Hypertension Maternal Grandmother     Heart Disease Maternal Grandmother     Hypertension Paternal Grandmother     Heart Disease Paternal Grandmother        Social History     Socioeconomic History    Marital status:      Spouse name: Not on file    Number of children: Not on file    Years of education: Not on file    Highest education level: Not on file   Occupational History    Not on file   Social Needs    Financial resource strain: Not on file    Food insecurity     Worry: Not on file     Inability: Not on file    Transportation needs     Medical: Not on file     Non-medical: Not on file   Tobacco Use    Smoking status: Former Smoker     Packs/day: 1.00 Years: 35.00     Pack years: 35.00     Quit date: 2020     Years since quittin.9    Smokeless tobacco: Former User     Quit date: 2017    Tobacco comment: pt states she smoked about 1.5 packs a day for 30-35 years and quit on 17   Substance and Sexual Activity    Alcohol use: Yes     Alcohol/week: 4.0 standard drinks     Types: 4 Shots of liquor per week    Drug use: No    Sexual activity: Not on file   Lifestyle    Physical activity     Days per week: Not on file     Minutes per session: Not on file    Stress: Not on file   Relationships    Social connections     Talks on phone: Not on file     Gets together: Not on file     Attends Anabaptism service: Not on file     Active member of club or organization: Not on file     Attends meetings of clubs or organizations: Not on file     Relationship status: Not on file    Intimate partner violence     Fear of current or ex partner: Not on file     Emotionally abused: Not on file     Physically abused: Not on file     Forced sexual activity: Not on file   Other Topics Concern    Not on file   Social History Narrative    Not on file         ALLERGIES: Codeine and Pcn [penicillins]    Review of Systems   Constitutional: Negative for chills, diaphoresis, fatigue, fever and unexpected weight change. HENT: Negative for congestion, dental problem, ear discharge, ear pain, hearing loss, nosebleeds, postnasal drip, sinus pressure, sore throat, trouble swallowing and voice change. Eyes: Negative for photophobia, pain, discharge and redness. Respiratory: Negative for cough, chest tightness, shortness of breath, wheezing and stridor. Cardiovascular: Negative for chest pain, palpitations and leg swelling. Gastrointestinal: Negative for abdominal distention, abdominal pain, constipation, diarrhea, nausea and vomiting. Endocrine: Negative for polydipsia, polyphagia and polyuria.    Genitourinary: Negative for difficulty urinating, dyspareunia, dysuria, flank pain, frequency, hematuria, menstrual problem, pelvic pain, urgency and vaginal bleeding. Musculoskeletal: Negative for arthralgias, back pain, joint swelling, myalgias, neck pain and neck stiffness. Skin: Negative for color change, rash and wound. Allergic/Immunologic: Negative for environmental allergies, food allergies and immunocompromised state. Neurological: Negative for dizziness, tremors, seizures, syncope, weakness, light-headedness, numbness and headaches. Hematological: Negative for adenopathy. Does not bruise/bleed easily. Psychiatric/Behavioral: Negative for agitation, confusion, decreased concentration, hallucinations, sleep disturbance and suicidal ideas. The patient is not nervous/anxious. All other systems reviewed and are negative. Vitals:    02/04/21 1136 02/04/21 1141   BP:  (!) 150/117   Pulse: 78    Resp: 18    Temp: 98 °F (36.7 °C)    SpO2: 99%    Weight: 110.2 kg (243 lb)    Height: 5' 1\" (1.549 m)             Physical Exam  Vitals signs and nursing note reviewed. Constitutional:       General: She is not in acute distress. Appearance: Normal appearance. She is well-developed. She is obese. She is not diaphoretic. HENT:      Head: Normocephalic and atraumatic. Right Ear: External ear normal.      Left Ear: External ear normal.      Nose: Nose normal.      Mouth/Throat:      Pharynx: No oropharyngeal exudate. Eyes:      General: No scleral icterus. Right eye: No discharge. Left eye: No discharge. Conjunctiva/sclera: Conjunctivae normal.      Pupils: Pupils are equal, round, and reactive to light. Neck:      Musculoskeletal: Normal range of motion and neck supple. Thyroid: No thyromegaly. Vascular: No JVD. Trachea: No tracheal deviation. Cardiovascular:      Rate and Rhythm: Normal rate and regular rhythm. Heart sounds: Normal heart sounds. No murmur. No friction rub. No gallop. Pulmonary:      Effort: Pulmonary effort is normal. No respiratory distress. Breath sounds: Normal breath sounds. No stridor. No wheezing or rales. Chest:      Chest wall: No tenderness. Abdominal:      General: Bowel sounds are normal. There is no distension. Palpations: Abdomen is soft. There is no mass. Tenderness: There is no abdominal tenderness. There is no guarding or rebound. Genitourinary:     Vagina: Vaginal discharge present. Musculoskeletal: Normal range of motion. General: No tenderness. Lymphadenopathy:      Cervical: No cervical adenopathy. Skin:     General: Skin is warm and dry. Coloration: Skin is not pale. Findings: No erythema or rash. Neurological:      Mental Status: She is alert and oriented to person, place, and time. Cranial Nerves: No cranial nerve deficit. Deep Tendon Reflexes: Reflexes are normal and symmetric. Psychiatric:         Behavior: Behavior normal.         Judgment: Judgment normal.          MDM  Number of Diagnoses or Management Options  Uncontrolled hypertension  Diagnosis management comments: Differential diagnosis includes: Essential hypertension, noncompliance, metabolic derangement. Labs ordered and results noted below. Patient was seen by this writer in December 2020 and her blood pressure was also noted to be elevated at that time. She was instructed to follow-up with her primary care physician for further evaluation and adjustment of her medication. Patient states that her follow-up appointment was not scheduled until today. She is very frustrated that she had to wait so long to have a follow-up appointment. Her blood pressure in the emergency room today is 150/117. Patient is completely asymptomatic. I will plan to adjust her medication and have her follow-up with cardiology and her primary care physician.        Amount and/or Complexity of Data Reviewed  Clinical lab tests: reviewed and ordered  Tests in the medicine section of CPT®: ordered and reviewed  Review and summarize past medical records: yes  Discuss the patient with other providers: yes  Independent visualization of images, tracings, or specimens: yes    Risk of Complications, Morbidity, and/or Mortality  Presenting problems: moderate  Diagnostic procedures: moderate  Management options: moderate           Procedures      Orders Placed This Encounter    URINALYSIS W/ RFLX MICROSCOPIC     Standing Status:   Standing     Number of Occurrences:   1    CBC WITH AUTOMATED DIFF     Standing Status:   Standing     Number of Occurrences:   1    METABOLIC PANEL, COMPREHENSIVE     Standing Status:   Standing     Number of Occurrences:   1    CARDIAC PANEL,(CK, CKMB & TROPONIN)     Standing Status:   Standing     Number of Occurrences:   1    EKG, 12 LEAD, INITIAL     Standing Status:   Standing     Number of Occurrences:   1     Order Specific Question:   Reason for Exam:     Answer:   chest pain    lisinopriL (PriniviL) 10 mg tablet     Sig: Take 1 Tab by mouth daily for 90 days. Dispense:  90 Tab     Refill:  0    albuterol (Proventil HFA) 90 mcg/actuation inhaler     Sig: Take 2 Puffs by inhalation every four (4) hours as needed for Wheezing, Shortness of Breath or Respiratory Distress.      Dispense:  1 Inhaler     Refill:  0     Recent Results (from the past 12 hour(s))   EKG, 12 LEAD, INITIAL    Collection Time: 02/04/21 11:52 AM   Result Value Ref Range    Ventricular Rate 72 BPM    Atrial Rate 72 BPM    P-R Interval 118 ms    QRS Duration 88 ms    Q-T Interval 402 ms    QTC Calculation (Bezet) 440 ms    Calculated P Axis 30 degrees    Calculated R Axis 15 degrees    Calculated T Axis 28 degrees    Diagnosis       Normal sinus rhythm  Normal ECG  When compared with ECG of 02-AUG-2020 22:09,  Nonspecific T wave abnormality has replaced inverted T waves in Inferior   leads     URINALYSIS W/ RFLX MICROSCOPIC    Collection Time: 02/04/21 12:05 PM   Result Value Ref Range    Color YELLOW      Appearance CLEAR      Specific gravity 1.015 1.005 - 1.030      pH (UA) 7.5 5.0 - 8.0      Protein Negative NEG mg/dL    Glucose Negative NEG mg/dL    Ketone Negative NEG mg/dL    Bilirubin Negative NEG      Blood Negative NEG      Urobilinogen 0.2 0.2 - 1.0 EU/dL    Nitrites Negative NEG      Leukocyte Esterase Negative NEG     CBC WITH AUTOMATED DIFF    Collection Time: 02/04/21 12:38 PM   Result Value Ref Range    WBC 8.3 4.6 - 13.2 K/uL    RBC 5.24 4.20 - 5.30 M/uL    HGB 15.4 12.0 - 16.0 g/dL    HCT 47.3 (H) 35.0 - 45.0 %    MCV 90.3 74.0 - 97.0 FL    MCH 29.4 24.0 - 34.0 PG    MCHC 32.6 31.0 - 37.0 g/dL    RDW 13.5 11.6 - 14.5 %    PLATELET 476 350 - 387 K/uL    MPV 11.2 9.2 - 11.8 FL    NEUTROPHILS 49 40 - 73 %    LYMPHOCYTES 41 21 - 52 %    MONOCYTES 7 3 - 10 %    EOSINOPHILS 2 0 - 5 %    BASOPHILS 1 0 - 2 %    ABS. NEUTROPHILS 4.1 1.8 - 8.0 K/UL    ABS. LYMPHOCYTES 3.4 0.9 - 3.6 K/UL    ABS. MONOCYTES 0.6 0.05 - 1.2 K/UL    ABS. EOSINOPHILS 0.2 0.0 - 0.4 K/UL    ABS. BASOPHILS 0.1 0.0 - 0.1 K/UL    DF AUTOMATED     METABOLIC PANEL, COMPREHENSIVE    Collection Time: 02/04/21 12:38 PM   Result Value Ref Range    Sodium 140 136 - 145 mmol/L    Potassium 3.6 3.5 - 5.5 mmol/L    Chloride 106 100 - 111 mmol/L    CO2 26 21 - 32 mmol/L    Anion gap 8 3.0 - 18 mmol/L    Glucose 88 74 - 99 mg/dL    BUN 12 7.0 - 18 MG/DL    Creatinine 0.70 0.6 - 1.3 MG/DL    BUN/Creatinine ratio 17 12 - 20      GFR est AA >60 >60 ml/min/1.73m2    GFR est non-AA >60 >60 ml/min/1.73m2    Calcium 9.3 8.5 - 10.1 MG/DL    Bilirubin, total 0.5 0.2 - 1.0 MG/DL    ALT (SGPT) 74 (H) 13 - 56 U/L    AST (SGOT) 55 (H) 10 - 38 U/L    Alk.  phosphatase 119 (H) 45 - 117 U/L    Protein, total 7.8 6.4 - 8.2 g/dL    Albumin 3.9 3.4 - 5.0 g/dL    Globulin 3.9 2.0 - 4.0 g/dL    A-G Ratio 1.0 0.8 - 1.7     CARDIAC PANEL,(CK, CKMB & TROPONIN)    Collection Time: 02/04/21 12:38 PM   Result Value Ref Range    CK - MB 1.9 <3.6 ng/ml    CK-MB Index 1.4 0.0 - 4.0 %     26 - 192 U/L    Troponin-I, QT 0.05 (H) 0.0 - 0.045 NG/ML     Upon re-evaluation the patient's symptoms have improved. Pt has non-toxic appearance and condition is stable for discharge. Patient refused to wait for her chemistry and cardiac enzyme results. She expressed that she was upset that she was sent to the emergency room and that multiple doctors have told her different things guarding her clinical condition and blood pressure. Reviewed patient's prior records which revealed that she missed her follow-up appointment with Dr. Geovani Peck. Dr. Roge Irving discussed care with Lopez Ybarra (Cardiologist). Standard discussion; including history of patients chief complaint, available diagnostic results, and treatment course. He states that the patient can contact his office to schedule a follow-up appointment for evaluation of her blood pressure and further ongoing management. Patient left the ER prior to receiving her discharge paperwork. Her discharge paperwork was given to triage nurse Mamadou Sauceda with instructions to contact the patient to return to the emergency room or her discharge papers as well as to  her medication for lisinopril 10 mg p.o. daily. Diagnosis:   1.  Uncontrolled hypertension          Disposition: Discharge home    Follow-up Information     Follow up With Specialties Details Why Contact Info    Escobar Noble MD Internal Medicine Schedule an appointment as soon as possible for a visit in 1 week  [facility], [address] [place] [address] [address]      [facility] EMERGENCY DEPT Emergency Medicine  As needed, If symptoms worsen [address] [place] [address]    Ann Smith MD Cardiology, Internal Medicine Schedule an appointment as soon as possible for a visit in 2 days  [facility]  [address] [place] [id number] [phone]            Discharge Medication List as of 2/4/2021  1:25 PM      CONTINUE these medications which have CHANGED    Details   lisinopriL (PriniviL) 10 mg tablet Take 1 Tab by mouth daily for 90 days. , Normal, Disp-90 Tab, R-0      albuterol (Proventil HFA) 90 mcg/actuation inhaler Take 2 Puffs by inhalation every four (4) hours as needed for Wheezing, Shortness of Breath or Respiratory Distress., Normal, Disp-1 Inhaler, R-0         CONTINUE these medications which have NOT CHANGED    Details   metoprolol succinate (TOPROL-XL) 50 mg XL tablet Take 1 Tab by mouth daily. , Normal, Disp-30 Tab, R-5      ticagrelor (BRILINTA) 90 mg tablet Take 1 Tab by mouth two (2) times a day., Normal, Disp-60 Tab, R-5      atorvastatin (LIPITOR) 80 mg tablet Take 1 Tab by mouth nightly., Normal, Disp-30 Tab, R-5      aspirin 81 mg chewable tablet Take 1 Tab by mouth daily. , Normal, Disp-30 Tab, R-0

## 2021-02-05 LAB
ATRIAL RATE: 72 BPM
CALCULATED P AXIS, ECG09: 30 DEGREES
CALCULATED R AXIS, ECG10: 15 DEGREES
CALCULATED T AXIS, ECG11: 28 DEGREES
DIAGNOSIS, 93000: NORMAL
P-R INTERVAL, ECG05: 118 MS
Q-T INTERVAL, ECG07: 402 MS
QRS DURATION, ECG06: 88 MS
QTC CALCULATION (BEZET), ECG08: 440 MS
VENTRICULAR RATE, ECG03: 72 BPM

## 2021-10-13 ENCOUNTER — HOSPITAL ENCOUNTER (OUTPATIENT)
Dept: LAB | Age: 51
Discharge: HOME | End: 2021-10-13
Payer: MEDICAID

## 2021-10-13 LAB
ALBUMIN SERPL-MCNC: 3.5 G/DL (ref 3.4–5)
ALBUMIN/GLOB SERPL: 0.9 {RATIO} (ref 0.8–1.7)
ALP SERPL-CCNC: 92 U/L (ref 45–117)
ALT SERPL-CCNC: 35 U/L (ref 13–56)
ANION GAP SERPL CALC-SCNC: 8 MMOL/L (ref 3–18)
AST SERPL-CCNC: 18 U/L (ref 10–38)
BASOPHILS # BLD: 0.1 K/UL (ref 0–0.1)
BASOPHILS NFR BLD: 1 % (ref 0–2)
BILIRUB SERPL-MCNC: 0.4 MG/DL (ref 0.2–1)
BUN SERPL-MCNC: 14 MG/DL (ref 7–18)
BUN/CREAT SERPL: 18 (ref 12–20)
CALCIUM SERPL-MCNC: 9 MG/DL (ref 8.5–10.1)
CHLORIDE SERPL-SCNC: 101 MMOL/L (ref 100–111)
CHOLEST SERPL-MCNC: 243 MG/DL
CO2 SERPL-SCNC: 27 MMOL/L (ref 21–32)
CREAT SERPL-MCNC: 0.77 MG/DL (ref 0.6–1.3)
DIFFERENTIAL METHOD BLD: NORMAL
EOSINOPHIL # BLD: 0.2 K/UL (ref 0–0.4)
EOSINOPHIL NFR BLD: 3 % (ref 0–5)
ERYTHROCYTE [DISTWIDTH] IN BLOOD BY AUTOMATED COUNT: 14.3 % (ref 11.6–14.5)
GLOBULIN SER CALC-MCNC: 3.7 G/DL (ref 2–4)
GLUCOSE SERPL-MCNC: 187 MG/DL (ref 74–99)
HBA1C MFR BLD: 5.9 % (ref 4.2–5.6)
HCT VFR BLD AUTO: 40.7 % (ref 35–45)
HDLC SERPL-MCNC: 53 MG/DL (ref 40–60)
HDLC SERPL: 4.6 {RATIO} (ref 0–5)
HGB BLD-MCNC: 13.6 G/DL (ref 12–16)
LDLC SERPL CALC-MCNC: 150.2 MG/DL (ref 0–100)
LIPID PROFILE,FLP: ABNORMAL
LYMPHOCYTES # BLD: 3.4 K/UL (ref 0.9–3.6)
LYMPHOCYTES NFR BLD: 36 % (ref 21–52)
MCH RBC QN AUTO: 28.6 PG (ref 24–34)
MCHC RBC AUTO-ENTMCNC: 33.4 G/DL (ref 31–37)
MCV RBC AUTO: 85.5 FL (ref 78–100)
MONOCYTES # BLD: 0.7 K/UL (ref 0.05–1.2)
MONOCYTES NFR BLD: 7 % (ref 3–10)
NEUTS SEG # BLD: 4.9 K/UL (ref 1.8–8)
NEUTS SEG NFR BLD: 53 % (ref 40–73)
PLATELET # BLD AUTO: 234 K/UL (ref 135–420)
PMV BLD AUTO: 10.2 FL (ref 9.2–11.8)
POTASSIUM SERPL-SCNC: 3.4 MMOL/L (ref 3.5–5.5)
PROT SERPL-MCNC: 7.2 G/DL (ref 6.4–8.2)
RBC # BLD AUTO: 4.76 M/UL (ref 4.2–5.3)
SODIUM SERPL-SCNC: 136 MMOL/L (ref 136–145)
TRIGL SERPL-MCNC: 199 MG/DL (ref ?–150)
TSH SERPL DL<=0.05 MIU/L-ACNC: 0.79 UIU/ML (ref 0.36–3.74)
VLDLC SERPL CALC-MCNC: 39.8 MG/DL
WBC # BLD AUTO: 9.4 K/UL (ref 4.6–13.2)

## 2021-10-13 PROCEDURE — 83036 HEMOGLOBIN GLYCOSYLATED A1C: CPT

## 2021-10-13 PROCEDURE — 84443 ASSAY THYROID STIM HORMONE: CPT

## 2021-10-13 PROCEDURE — 80053 COMPREHEN METABOLIC PANEL: CPT

## 2021-10-13 PROCEDURE — 85025 COMPLETE CBC W/AUTO DIFF WBC: CPT

## 2021-10-13 PROCEDURE — 36415 COLL VENOUS BLD VENIPUNCTURE: CPT

## 2021-10-13 PROCEDURE — 80061 LIPID PANEL: CPT

## 2022-03-18 PROBLEM — E87.6 HYPOKALEMIA: Status: ACTIVE | Noted: 2020-02-23

## 2022-03-18 PROBLEM — Z72.0 TOBACCO USE: Status: ACTIVE | Noted: 2020-03-15

## 2022-03-19 PROBLEM — I10 HYPERTENSION: Status: ACTIVE | Noted: 2017-05-02

## 2022-03-19 PROBLEM — J20.9 ACUTE BRONCHITIS: Status: ACTIVE | Noted: 2020-02-23

## 2022-03-19 PROBLEM — E66.01 OBESITY, MORBID (HCC): Status: ACTIVE | Noted: 2020-02-28

## 2022-03-19 PROBLEM — I21.3 STEMI (ST ELEVATION MYOCARDIAL INFARCTION) (HCC): Status: ACTIVE | Noted: 2020-03-15

## 2022-03-20 PROBLEM — E66.9 OBESITY (BMI 30-39.9): Status: ACTIVE | Noted: 2020-02-23

## 2022-05-05 ENCOUNTER — OFFICE VISIT (OUTPATIENT)
Dept: CARDIOLOGY CLINIC | Age: 52
End: 2022-05-05
Payer: MEDICAID

## 2022-05-05 VITALS
BODY MASS INDEX: 45.65 KG/M2 | HEART RATE: 66 BPM | WEIGHT: 241.6 LBS | OXYGEN SATURATION: 98 % | DIASTOLIC BLOOD PRESSURE: 80 MMHG | SYSTOLIC BLOOD PRESSURE: 128 MMHG | TEMPERATURE: 97 F

## 2022-05-05 DIAGNOSIS — Z01.818 PREOP TESTING: ICD-10-CM

## 2022-05-05 DIAGNOSIS — I10 PRIMARY HYPERTENSION: Primary | ICD-10-CM

## 2022-05-05 DIAGNOSIS — I21.3 ST ELEVATION MYOCARDIAL INFARCTION (STEMI), UNSPECIFIED ARTERY (HCC): ICD-10-CM

## 2022-05-05 PROCEDURE — 93000 ELECTROCARDIOGRAM COMPLETE: CPT | Performed by: INTERNAL MEDICINE

## 2022-05-05 PROCEDURE — 99205 OFFICE O/P NEW HI 60 MIN: CPT | Performed by: INTERNAL MEDICINE

## 2022-05-05 RX ORDER — SODIUM CHLORIDE 0.9 % (FLUSH) 0.9 %
5-40 SYRINGE (ML) INJECTION AS NEEDED
Status: CANCELLED | OUTPATIENT
Start: 2022-05-05

## 2022-05-05 RX ORDER — CLOPIDOGREL BISULFATE 75 MG/1
75 TABLET ORAL DAILY
Qty: 90 TABLET | Refills: 3 | Status: SHIPPED | OUTPATIENT
Start: 2022-05-05 | End: 2022-06-03

## 2022-05-05 RX ORDER — SODIUM CHLORIDE 0.9 % (FLUSH) 0.9 %
5-40 SYRINGE (ML) INJECTION EVERY 8 HOURS
Status: CANCELLED | OUTPATIENT
Start: 2022-05-05

## 2022-05-05 RX ORDER — NITROGLYCERIN 0.4 MG/1
0.4 TABLET SUBLINGUAL
Qty: 25 TABLET | Refills: 5 | Status: SHIPPED | OUTPATIENT
Start: 2022-05-05 | End: 2022-06-23

## 2022-05-05 RX ORDER — ISOSORBIDE MONONITRATE 30 MG/1
30 TABLET, EXTENDED RELEASE ORAL
Qty: 90 TABLET | Refills: 3 | Status: SHIPPED | OUTPATIENT
Start: 2022-05-05 | End: 2022-06-03

## 2022-05-05 NOTE — PROGRESS NOTES
Identified pt with two pt identifiers(name and ). Reviewed record in preparation for visit and have obtained necessary documentation. Rafaela Canales presents today for No chief complaint on file. Pt c/o SOB         Rafaela Canales preferred language for health care discussion is english/other. Personal Protective Equipment:   Personal Protective Equipment was used including: mask-surgical and hands-gloves. Patient was placed on no precaution(s). Patient was masked. Precautions:   Patient currently on None  Patient currently roomed with door closed. Is someone accompanying this pt? No     Is the patient using any DME equipment during OV? No     Depression Screening:  3 most recent PHQ Screens 3/30/2020   Little interest or pleasure in doing things Not at all   Feeling down, depressed, irritable, or hopeless Not at all   Total Score PHQ 2 0       Learning Assessment:  No flowsheet data found. Abuse Screening:  Abuse Screening Questionnaire 2022   Do you ever feel afraid of your partner? N   Are you in a relationship with someone who physically or mentally threatens you? N   Is it safe for you to go home? Y       Fall Risk  Fall Risk Assessment, last 12 mths 3/30/2020   Able to walk? Yes   Fall in past 12 months? No       Pt currently taking Anticoagulant therapy? No   Pt currently taking Antiplatelet therapy? yes    Coordination of Care:  1. Have you been to the ER, urgent care clinic since your last visit? Hospitalized since your last visit? No     2. Have you seen or consulted any other health care providers outside of the [address] [place] [address] since your last visit? Include any pap smears or colon screening. No       Please see Red banners under Allergies and Med Rec to remove outside inquires. All correct information has been verified with patient and added to chart.      Medication's patient's would liked removed has been marked not taking to be removed per Verbal order and read back per Meryle Ran, MD

## 2022-05-05 NOTE — LETTER
5/5/2022    Patient: Bisi Huber   YOB: 1970   Date of Visit: 5/5/2022     Brianna Britt NP  [id number] [facility] [address]    Dear Brianna Britt NP,      Thank you for referring Ms. Bisi Huber to [facility] for evaluation. My notes for this consultation are attached. If you have questions, please do not hesitate to call me. I look forward to following your patient along with you.       Sincerely,    Rolly Woody MD

## 2022-05-05 NOTE — PROGRESS NOTES
[facility]    Ms. Elijah Martinez is a 66-year-old female with history of coronary artery disease status post inferior STEMI status post coronary stent, hypertension, hyperlipidemia, DJD, obesity    Patient is here today for cardiac evaluation. Patient had inferior STEMI in 03/2020 and underwent emergent cardiac catheterization. He she was found to have a three-vessel coronary disease. She had proximal as well as middistal RCA stenting with 3.0 x 33 mm as well as 3.0 x 18 mm Xience TEJAL. Patient is here today for follow-up appointment. For last several months patient has been having worsening dyspnea and fatigue with day-to-day activity. On occasion she has experienced exertional chest discomfort and tightness with some radiation to the face and the head. No left arm radiation. No nausea vomiting or diaphoresis. She is concerned with her worsening symptoms. She is taking all her medication as prescribed. She denies PND or lower extremity swelling  Denies any nausea, vomiting, abdominal pain, fever, chills, sputum production. No hematuria or other bleeding complaints    Past Medical History:   Diagnosis Date    Back pain     Displacement of lumbar intervertebral disc     High cholesterol     takes no meds for same.  Hypertension 05/02/2017    pt states she does not take meds for same.  Lumbar radiculitis     Lumbar sprain     Numbness     legs and arms    STEMI (ST elevation myocardial infarction) ([facility] [address]        Review of Systems:  Cardiac symptoms as noted above in HPI. All others negative. Denies fatigue, malaise, skin rash, joint pain, blurring vision, photophobia, neck pain, hemoptysis, chronic cough, nausea, vomiting, hematuria, burning micturition, BRBPR, chronic headaches. Current Outpatient Medications   Medication Sig    metoprolol succinate (TOPROL-XL) 50 mg XL tablet Take 1 Tab by mouth daily.     ticagrelor (BRILINTA) 90 mg tablet Take 1 Tab by mouth two (2) times a day.  atorvastatin (LIPITOR) 80 mg tablet Take 1 Tab by mouth nightly.  aspirin 81 mg chewable tablet Take 1 Tab by mouth daily.  albuterol (Proventil HFA) 90 mcg/actuation inhaler Take 2 Puffs by inhalation every four (4) hours as needed for Wheezing, Shortness of Breath or Respiratory Distress. No current facility-administered medications for this visit. Past Surgical History:   Procedure Laterality Date    HX APPENDECTOMY      HX CHOLECYSTECTOMY      HX GI      multiple esophageal surgeries in childhood    HX HEENT      \"surgery on esophagus\"    HX HYSTERECTOMY      HX LUMBAR DISKECTOMY         Allergies and Sensitivities:  Allergies   Allergen Reactions    Codeine Hives    Pcn [Penicillins] Shortness of Breath     Tongue swelling         Family History:  Family History   Problem Relation Age of Onset    Hypertension Mother     Hypertension Father     Heart Disease Father     Hypertension Maternal Grandmother     Heart Disease Maternal Grandmother     Hypertension Paternal Grandmother     Heart Disease Paternal Grandmother        Social History:  Social History     Tobacco Use    Smoking status: Former Smoker     Packs/day: 1.00     Years: 35.00     Pack years: 35.00     Quit date: 2020     Years since quittin.2    Smokeless tobacco: Former User     Quit date: 2017    Tobacco comment: pt states she smoked about 1.5 packs a day for 30-35 years and quit on 17   Substance Use Topics    Alcohol use: Yes     Alcohol/week: 4.0 standard drinks     Types: 4 Shots of liquor per week    Drug use: No     She  reports that she quit smoking about 2 years ago. She has a 35.00 pack-year smoking history. She quit smokeless tobacco use about 5 years ago. She  reports current alcohol use of about 4.0 standard drinks of alcohol per week.     Physical Exam:  BP Readings from Last 3 Encounters:   22 128/80   02/04/21 (!) 150/117   12/28/20 (!) 205/139         Pulse Readings from Last 3 Encounters:   05/05/22 66   02/04/21 78   12/28/20 80          Wt Readings from Last 3 Encounters:   05/05/22 109.6 kg (241 lb 9.6 oz)   02/04/21 110.2 kg (243 lb)   12/28/20 108.9 kg (240 lb)       Constitutional: Oriented to person, place, and time. HENT: Head: Normocephalic and atraumatic. Eyes: Conjunctivae and extraocular motions are normal.   Neck: No JVD present. Carotid bruit is not appreciated. Cardiovascular: Regular rhythm. No murmur, gallop or rubs appreciated  Lung: Breath sounds normal. No respiratory distress. No ronchi or rales appreciated  Abdominal: No tenderness. No rebound and no guarding. Musculoskeletal: There is no lower extremity edema. No cynosis  Lymphadenopathy:  No cervical or supraclavicular adenopathy appriciated. Neurological: No gross motor deficit noted. Skin: No visible skin rash noted. No Ear discharge noted    LABS:   @  Lab Results   Component Value Date/Time    WBC 9.4 10/13/2021 11:04 AM    HGB 13.6 10/13/2021 11:04 AM    HCT 40.7 10/13/2021 11:04 AM    PLATELET 966 11/58/7699 11:04 AM    MCV 85.5 10/13/2021 11:04 AM     Lab Results   Component Value Date/Time    Sodium 136 10/13/2021 11:04 AM    Potassium 3.4 (L) 10/13/2021 11:04 AM    Chloride 101 10/13/2021 11:04 AM    CO2 27 10/13/2021 11:04 AM    Glucose 187 (H) 10/13/2021 11:04 AM    BUN 14 10/13/2021 11:04 AM    Creatinine 0.77 10/13/2021 11:04 AM     Lipids Latest Ref Rng & Units 10/13/2021 3/16/2020   Chol, Total <200 MG/(H) 184   HDL 40 - 60 MG/DL 53 34(L)   LDL 0 - 100 MG/. 2(H) 110(H)   Trig <150 MG/(H) 200(H)   Chol/HDL Ratio 0 - 5.0   4.6 5.4(H)   Some recent data might be hidden     Lab Results   Component Value Date/Time    ALT (SGPT) 35 10/13/2021 11:04 AM     Lab Results   Component Value Date/Time    Hemoglobin A1c 5.9 (H) 10/13/2021 11:06 AM     Lab Results   Component Value Date/Time    TSH 0.79 10/13/2021 11:04 AM     EK2022: Sinus rhythm at 66 bpm.  Inferior Q waves. No ST changes of ischemia    03/15/20    ECHO ADULT COMPLETE 2020 3/17/2020  Interpretation Summary  · Normal cavity size and wall thickness. Low normal systolic function. Estimated left ventricular ejection fraction is 50 - 55%. Visually measured ejection fraction. Abnormal left ventricular wall motion. Mild (grade 1) left ventricular diastolic dysfunction. · Mildly dilated left atrium. · Trace mitral valve regurgitation is present. · There is no evidence of pulmonary hypertension. CATH (2020)  · Acute inferior STEMI with ongoing chest pain and borderline shock. · Diffuse three-vessel CAD. · Culprit is mid % occlusion with distal segment collateralized from the left coronary system. · LAD long 85-90% stenosis in mid segment. · Circumflex with diffuse 20 to 30% with mid/distal focal 75% stenosis. · Successful angioplasty and stent of mid % occlusion to residual 0% with resumption of LAURA-3 flow. · The proximal RCA focal 85% stenosis stented to 0% using 3.5 mm TEJAL. · Patient will need staged LAD stent either during this hospitalization or within the next 3-4 weeks. CABG was not entertained with the fact that she was having acute inferior STEMI with borderline cardiogenic shock, and severe, persistent chest pains. IMPRESSION & PLAN:  Ms. Alberto Costello is 59-year-old female with coronary disease, hypertension, hyperlipidemia    CAD:  Inferior STEMI in 2020 s/p proximal as well as middistal RCA 3.5 x 18 and 3.0 x 23 mm TEJAL. Patient had residual LCx and LAD disease however patient did not follow-up. Now patient has been having symptoms concerning for angina. Currently taking aspirin and Brilinta. Will change Brilinta to Plavix once current supply is finished  We will start patient on Imdur 30 mg daily.   Continue beta-blocker  We will provide sublingual nitroglycerin for as needed use  I reviewed EKG, echocardiogram and cardiac catheterization images from 2020 personally. She does have LCx and LAD disease. Will order echo to rule out hypertensive and ischemic cardiomyopathy with ongoing worsening dyspnea  She would benefit from coronary evaluation and possible revascularization because of ongoing symptoms. Discussed regarding management strategy which includes medical management vs. Ischemia evaluation ( non-invasive vs. Invasive). Risk, benefit and alternatives of each strategy discussed in detail. Risk, benefit, complication of LHC and possible PCI ( including but not limited to bleeding, vascular trauma requiring surgery,  infection, heart failure, stroke, MI, emergent bypass surgery, severe allergic reactions, kidney failure, dialysis and death ) were discussed with patient and willing to proceed with procedure. Will be using moderate sedation   By stating these are possible risks, this does not exclude the potential for additional risks not named here. Hypertension:  Currently on metoprolol. /80. Starting Imdur 30 mg daily because of ongoing chest pain. Echo ordered to rule out hypertensive cardiovascular heart disease    Hyperlipidemia:  Currently on atorvastatin 80 mg daily. Will order fasting lipid profile. Goal LDL should be less than 70. If not at goal.  Would consider PCSK9 inhibitor       This plan was discussed with patient who is in agreement. Thank you for allowing me to participate in patient care. Please feel free to call me if you have any question or concern. Fletcher Martinez MD  Please note: This document has been produced using voice recognition software. Unrecognized errors in transcription may be present.

## 2022-05-05 NOTE — PATIENT INSTRUCTIONS
Testing   Echo  **call office 3-5 days after testing is completed for results**     New Medication/Medication Changes  Nitro SUBL 0.4 mg as needed   Stop Brilinta when current Rx is completed   Start Plavix 75 mg daily   Imdur 30 mg daily         **please allow 24-48 hrs for medication to be escribed to pharmacy** If you need any refills on medications please contact your pharmacy so that the request can be escribed to the provider for review. Labs  Fasting Lipids      No appointment required for lab work  [address] [facility] [address] [facility], [address]  Hours are Mon-Fri 7:00 am-3:30 pm  **closed from 12:30 pm-1pm daily**        Instructions    Pre procedure labs(CBC, CMP, PT/INR) to be completed within 3-5 days prior to procedure. Labs drawn in [address] [place] [address]. Their hours of operation are Monday through Friday 7am-3:30 pm.  If you have and questions regarding directions please contact them at [phone]. Patients Name:  Rafaela Canales    1. You are scheduled to have a left and right heart cath on 05/20/2022  At 10:30 am. Please check in at 9:15 am.   **YOU WILL NEED SOMEONE TO DRIVE YOU HOME AFTER PROCEDURE. 2. Please go to [facility] and [place] in the outpatient parking lot that is located around to the back of the hospital and enter through the [facility] building. Once you enter through the [facility] check in with the  there. The  will either give you directions or assist you in getting to the cath holding area. 3. You are not to eat or drink anything after midnight the night before your procedure. Small sips of water to take your medications is ok. 4. If you are diabetic, do not take your insulin/sugar pill the morning of the procedure.     5. MEDICATION INSTRUCTIONS:   Please take your morning medications with the following special instructions:      [x]          Please make sure to take your Blood pressure medication : metoprolol      [x]          Take your Aspirin  6. We encourage families to wait in the waiting room on the first floor while the procedure is being done. The Doctor will come out and talk with you as soon as the procedure is over. 7. There is the possibility that you may spend the night in the hospital, depending on the results of the procedure. This will be determined after the procedure is done. If angioplasty or stent is planned, you will stay at least one day. 8. If you or your family have any questions, please call our office Monday Friday, 9:00 a. m.4:30 p.m.,  At [phone], and ask to speak to one of the nurses.

## 2022-05-16 ENCOUNTER — HOSPITAL ENCOUNTER (OUTPATIENT)
Dept: LAB | Age: 52
Discharge: HOME | End: 2022-05-16

## 2022-05-16 LAB — XX-LABCORP SPECIMEN COL,LCBCF: NORMAL

## 2022-05-16 PROCEDURE — 99001 SPECIMEN HANDLING PT-LAB: CPT

## 2022-05-17 LAB
ALBUMIN SERPL-MCNC: 4.1 G/DL (ref 3.8–4.9)
ALBUMIN/GLOB SERPL: 1.3 {RATIO} (ref 1.2–2.2)
ALP SERPL-CCNC: 102 IU/L (ref 44–121)
ALT SERPL-CCNC: 27 IU/L (ref 0–32)
AST SERPL-CCNC: 14 IU/L (ref 0–40)
BASOPHILS # BLD AUTO: 0.1 X10E3/UL (ref 0–0.2)
BASOPHILS NFR BLD AUTO: 1 %
BILIRUB SERPL-MCNC: 0.2 MG/DL (ref 0–1.2)
BUN SERPL-MCNC: 12 MG/DL (ref 6–24)
BUN/CREAT SERPL: 18 (ref 9–23)
CALCIUM SERPL-MCNC: 9.6 MG/DL (ref 8.7–10.2)
CHLORIDE SERPL-SCNC: 101 MMOL/L (ref 96–106)
CHOLEST SERPL-MCNC: 223 MG/DL (ref 100–199)
CO2 SERPL-SCNC: 20 MMOL/L (ref 20–29)
CREAT SERPL-MCNC: 0.66 MG/DL (ref 0.57–1)
EGFR: 106 ML/MIN/1.73
EOSINOPHIL # BLD AUTO: 0.4 X10E3/UL (ref 0–0.4)
EOSINOPHIL NFR BLD AUTO: 4 %
ERYTHROCYTE [DISTWIDTH] IN BLOOD BY AUTOMATED COUNT: 14 % (ref 11.7–15.4)
GLOBULIN SER CALC-MCNC: 3.1 G/DL (ref 1.5–4.5)
GLUCOSE SERPL-MCNC: 114 MG/DL (ref 65–99)
HCT VFR BLD AUTO: 40.3 % (ref 34–46.6)
HDLC SERPL-MCNC: 50 MG/DL
HGB BLD-MCNC: 13.7 G/DL (ref 11.1–15.9)
IMM GRANULOCYTES # BLD AUTO: 0 X10E3/UL (ref 0–0.1)
IMM GRANULOCYTES NFR BLD AUTO: 0 %
INR PPP: 0.9 (ref 0.9–1.2)
LDLC SERPL CALC-MCNC: 141 MG/DL (ref 0–99)
LYMPHOCYTES # BLD AUTO: 3.9 X10E3/UL (ref 0.7–3.1)
LYMPHOCYTES NFR BLD AUTO: 40 %
MCH RBC QN AUTO: 28.9 PG (ref 26.6–33)
MCHC RBC AUTO-ENTMCNC: 34 G/DL (ref 31.5–35.7)
MCV RBC AUTO: 85 FL (ref 79–97)
MONOCYTES # BLD AUTO: 0.7 X10E3/UL (ref 0.1–0.9)
MONOCYTES NFR BLD AUTO: 7 %
NEUTROPHILS # BLD AUTO: 4.6 X10E3/UL (ref 1.4–7)
NEUTROPHILS NFR BLD AUTO: 48 %
PLATELET # BLD AUTO: 234 X10E3/UL (ref 150–450)
POTASSIUM SERPL-SCNC: 4.2 MMOL/L (ref 3.5–5.2)
PROT SERPL-MCNC: 7.2 G/DL (ref 6–8.5)
PROTHROMBIN TIME: 9.7 SEC (ref 9.1–12)
RBC # BLD AUTO: 4.74 X10E6/UL (ref 3.77–5.28)
SODIUM SERPL-SCNC: 140 MMOL/L (ref 134–144)
TRIGL SERPL-MCNC: 177 MG/DL (ref 0–149)
VLDLC SERPL CALC-MCNC: 32 MG/DL (ref 5–40)
WBC # BLD AUTO: 9.5 X10E3/UL (ref 3.4–10.8)

## 2022-05-20 ENCOUNTER — APPOINTMENT (OUTPATIENT)
Dept: GENERAL RADIOLOGY | Age: 52
End: 2022-05-20
Attending: PHYSICIAN ASSISTANT
Payer: MEDICAID

## 2022-05-20 ENCOUNTER — APPOINTMENT (OUTPATIENT)
Dept: VASCULAR SURGERY | Age: 52
End: 2022-05-20
Attending: PHYSICIAN ASSISTANT
Payer: MEDICAID

## 2022-05-20 ENCOUNTER — HOSPITAL ENCOUNTER (OUTPATIENT)
Age: 52
Setting detail: OUTPATIENT SURGERY
Discharge: HOME | End: 2022-05-20
Attending: INTERNAL MEDICINE | Admitting: THORACIC SURGERY (CARDIOTHORACIC VASCULAR SURGERY)
Payer: MEDICAID

## 2022-05-20 ENCOUNTER — TELEPHONE (OUTPATIENT)
Dept: CARDIOTHORACIC SURGERY | Age: 52
End: 2022-05-20

## 2022-05-20 ENCOUNTER — APPOINTMENT (OUTPATIENT)
Dept: CT IMAGING | Age: 52
End: 2022-05-20
Attending: PHYSICIAN ASSISTANT
Payer: MEDICAID

## 2022-05-20 VITALS
DIASTOLIC BLOOD PRESSURE: 68 MMHG | OXYGEN SATURATION: 96 % | HEART RATE: 60 BPM | HEIGHT: 66 IN | BODY MASS INDEX: 30.79 KG/M2 | SYSTOLIC BLOOD PRESSURE: 157 MMHG | RESPIRATION RATE: 25 BRPM | WEIGHT: 191.58 LBS

## 2022-05-20 DIAGNOSIS — I20.0 UNSTABLE ANGINA (HCC): ICD-10-CM

## 2022-05-20 LAB
ALBUMIN SERPL-MCNC: 3.3 G/DL (ref 3.4–5)
ALBUMIN/GLOB SERPL: 1 {RATIO} (ref 0.8–1.7)
ALP SERPL-CCNC: 89 U/L (ref 45–117)
ALT SERPL-CCNC: 38 U/L (ref 13–56)
AST SERPL-CCNC: 24 U/L (ref 10–38)
BILIRUB DIRECT SERPL-MCNC: 0.1 MG/DL (ref 0–0.2)
BILIRUB SERPL-MCNC: 0.3 MG/DL (ref 0.2–1)
COVID-19 RAPID TEST, COVR: NOT DETECTED
GLOBULIN SER CALC-MCNC: 3.2 G/DL (ref 2–4)
HBA1C MFR BLD: 6.1 % (ref 4.2–5.6)
PROT SERPL-MCNC: 6.5 G/DL (ref 6.4–8.2)
SARS-COV-2, COV2: NORMAL
SOURCE, COVRS: NORMAL

## 2022-05-20 PROCEDURE — 83036 HEMOGLOBIN GLYCOSYLATED A1C: CPT

## 2022-05-20 PROCEDURE — 77030012597: Performed by: INTERNAL MEDICINE

## 2022-05-20 PROCEDURE — 93458 L HRT ARTERY/VENTRICLE ANGIO: CPT | Performed by: INTERNAL MEDICINE

## 2022-05-20 PROCEDURE — 74011000250 HC RX REV CODE- 250: Performed by: INTERNAL MEDICINE

## 2022-05-20 PROCEDURE — 77030015766: Performed by: INTERNAL MEDICINE

## 2022-05-20 PROCEDURE — 99204 OFFICE O/P NEW MOD 45 MIN: CPT | Performed by: THORACIC SURGERY (CARDIOTHORACIC VASCULAR SURGERY)

## 2022-05-20 PROCEDURE — 93880 EXTRACRANIAL BILAT STUDY: CPT

## 2022-05-20 PROCEDURE — 93970 EXTREMITY STUDY: CPT

## 2022-05-20 PROCEDURE — 74176 CT ABD & PELVIS W/O CONTRAST: CPT

## 2022-05-20 PROCEDURE — 71046 X-RAY EXAM CHEST 2 VIEWS: CPT

## 2022-05-20 PROCEDURE — 77030013797 HC KT TRNSDUC PRSSR EDWD -A: Performed by: INTERNAL MEDICINE

## 2022-05-20 PROCEDURE — 99152 MOD SED SAME PHYS/QHP 5/>YRS: CPT | Performed by: INTERNAL MEDICINE

## 2022-05-20 PROCEDURE — 80076 HEPATIC FUNCTION PANEL: CPT

## 2022-05-20 PROCEDURE — 70450 CT HEAD/BRAIN W/O DYE: CPT

## 2022-05-20 PROCEDURE — 74011000636 HC RX REV CODE- 636: Performed by: INTERNAL MEDICINE

## 2022-05-20 PROCEDURE — 74011250636 HC RX REV CODE- 250/636: Performed by: INTERNAL MEDICINE

## 2022-05-20 PROCEDURE — 77030029997 HC DEV COM RDL R BND TELE -B: Performed by: INTERNAL MEDICINE

## 2022-05-20 PROCEDURE — 77030013761 HC KT HRT LFT ANGI -B: Performed by: INTERNAL MEDICINE

## 2022-05-20 PROCEDURE — C1894 INTRO/SHEATH, NON-LASER: HCPCS | Performed by: INTERNAL MEDICINE

## 2022-05-20 PROCEDURE — 87635 SARS-COV-2 COVID-19 AMP PRB: CPT

## 2022-05-20 PROCEDURE — 71250 CT THORAX DX C-: CPT

## 2022-05-20 RX ORDER — SODIUM CHLORIDE 9 MG/ML
250 INJECTION, SOLUTION INTRAVENOUS AS NEEDED
Status: DISCONTINUED | OUTPATIENT
Start: 2022-05-20 | End: 2022-05-20 | Stop reason: HOSPADM

## 2022-05-20 RX ORDER — IODIXANOL 320 MG/ML
INJECTION, SOLUTION INTRAVASCULAR AS NEEDED
Status: DISCONTINUED | OUTPATIENT
Start: 2022-05-20 | End: 2022-05-20 | Stop reason: HOSPADM

## 2022-05-20 RX ORDER — VERAPAMIL HYDROCHLORIDE 2.5 MG/ML
INJECTION, SOLUTION INTRAVENOUS AS NEEDED
Status: DISCONTINUED | OUTPATIENT
Start: 2022-05-20 | End: 2022-05-20 | Stop reason: HOSPADM

## 2022-05-20 RX ORDER — MIDAZOLAM HYDROCHLORIDE 1 MG/ML
INJECTION, SOLUTION INTRAMUSCULAR; INTRAVENOUS AS NEEDED
Status: DISCONTINUED | OUTPATIENT
Start: 2022-05-20 | End: 2022-05-20 | Stop reason: HOSPADM

## 2022-05-20 RX ORDER — FENTANYL CITRATE 50 UG/ML
INJECTION, SOLUTION INTRAMUSCULAR; INTRAVENOUS AS NEEDED
Status: DISCONTINUED | OUTPATIENT
Start: 2022-05-20 | End: 2022-05-20 | Stop reason: HOSPADM

## 2022-05-20 RX ORDER — HEPARIN SODIUM 1000 [USP'U]/ML
INJECTION, SOLUTION INTRAVENOUS; SUBCUTANEOUS AS NEEDED
Status: DISCONTINUED | OUTPATIENT
Start: 2022-05-20 | End: 2022-05-20 | Stop reason: HOSPADM

## 2022-05-20 RX ORDER — LIDOCAINE HYDROCHLORIDE 10 MG/ML
INJECTION INFILTRATION; PERINEURAL AS NEEDED
Status: DISCONTINUED | OUTPATIENT
Start: 2022-05-20 | End: 2022-05-20 | Stop reason: HOSPADM

## 2022-05-20 NOTE — PROGRESS NOTES
[facility]         Per Dr. Christel Resendiz, patient preop orders placed. No amio due to bradycardia. Follow up with Dr. Christel Resendiz in office on Monday to review testing in preparation for planned CABG on 5/24/22.

## 2022-05-20 NOTE — Clinical Note
Contrast Dose Calculator:   Patient's age: 46.   Patient's sex: Female. Patient weight (kg) = 87. Creatinine level (mg/dL) = 0.66. Creatinine clearance (mL/min): 138.5. Contrast concentration (mg/mL) = 320. MACD = 320 mL. Max Contrast dose per Creatinine Cl calculator = 311.63 mL.

## 2022-05-20 NOTE — Clinical Note
TRANSFER - IN REPORT:     Verbal report received from: Brianna HENRY. Report consisted of patient's Situation, Background, Assessment and   Recommendations(SBAR). Opportunity for questions and clarification was provided. Assessment completed upon patient's arrival to unit and care assumed. Patient transported with a Cardiac Cath Tech / Patient Care Tech.

## 2022-05-20 NOTE — Clinical Note
TRANSFER - OUT REPORT:     Verbal report given to: Brianna. Report consisted of patient's Situation, Background, Assessment and   Recommendations(SBAR). Opportunity for questions and clarification was provided. Patient transported with a Cardiac Cath Tech / Patient Care Tech. Patient transported to: holding area.

## 2022-05-20 NOTE — PROGRESS NOTES
Blood Bank notified this RN that pt's type & screen was drawn incorrectly and needs to be redrawn. This RN communicated message to primary RN. This RN witnessed primary RN call PA to communicate pt still needs PFTs and type & screen done prior to scheduled surgery.

## 2022-05-20 NOTE — CONSULTS
Cardiovascular Specialists - Consult Note    Consultation request by Dr. Nida Salguero MD  for advice/opinion related to evaluating Unstable angina [facility]) [I20.0] and need for CABG    Date of  Admission: 5/20/2022  9:26 AM   Primary Care Physician:  Collette Pao, NP     Assessment:     Patient Active Problem List   Diagnosis Code    Acute bronchitis J20.9    Hypokalemia E87.6    Obesity (BMI 30-39. 9) E66.9    Obesity, morbid (HCC) E66.01    STEMI (ST elevation myocardial infarction) ([facility] [address] I21.3    Tobacco use Z72.0    Hypertension I10        Plan:        51 diabetic, pleasant woman with and unstable angina with chest pain during rest, 2-3 time per day for 2-3 months as well as chest pain with activity with cardiogenic shock 3 months ago, requiring right sided stent. She is hypertensive, has increased BMI, is a prior smoker, has increased cholesterol and triglyceride levels. She has had a left breat mass excised, which was benign and has not had any radiation to chest. She agrees to CABG but is concerned to find someone to  take care of her  with a prior stroke. Plan for CABG with include [place]-lad, but lad quite small and tortuous and thus intraoperatively will decide [place] to lad or diagonal, if [place] to lad then svg or radial to diagonal, svg-past ostial pda, possible sequence to plv, svg or radial if acceptible conduit to OM2. Work up in progress. STS score pending further work up----will relay in clinic.  Needs current RA abg as prior smoker, EKG as none currently on file and recent episode of cardiogenic shock, UA and urine culture, lfts as elevated in past, current A1C as elevated in past, covid test, dupplex lower extremities for vein size and r/o thrombus, carotid studies as concern for stroke, PFTs with DlCO as prior smoker and increased pulmonary risk postop, non contrast CT chest to examine for aortic calcifications and safe sites for cannulation, CT head  as concern for stroke, abdomen, pelvis to assess pelvic vasculature if needs femoral arterial line, IABP or other lines, current TTE. Discussed with Dr. Jose Sanchez recommend patient staying for unstable. If she desires not to stay, will obtain work up today and see in clinic Monday afternoon to discuss workup, final STS score, after [place] for CABG Tuesday. History of Present Illness: This is a 46 y.o. female [facility] [I20.0]. Cardiac risk factors: smoking/ tobacco exposure, dyslipidemia, diabetes mellitus, obesity, hypertension, stress, post-menopausal    Review of Symptoms:  Except as stated above include:  Constitutional:  Negative  Ears, nose, throat:  Negative  Respiratory:  negative  Cardiovascular: chest pain  Gastrointestinal: negative  Genitourinary:  negative  Musculoskeletal:  Negative  Neurological:  Negative  Dermatological:  Negative  Hematological: Negative  Endocrinological: Negative  Allergy: Negative  Psychological:  Negative    ROS as per hpi. Past Medical History:     Past Medical History:   Diagnosis Date    CAD (coronary artery disease)     Displacement of lumbar intervertebral disc     HLD (hyperlipidemia)     Hypertension     Lumbar radiculitis     Lumbar sprain     Numbness     legs and arms    [facility] 2020         Social History:     Social History     Socioeconomic History    Marital status:    Tobacco Use    Smoking status: Former Smoker     Packs/day: 1.00     Years: 35.00     Pack years: 35.00     Quit date: 2020     Years since quittin.2    Smokeless tobacco: Former User     Quit date: 2017    Tobacco comment: pt states she smoked about 1.5 packs a day for 30-35 years and quit on 17   Substance and Sexual Activity    Alcohol use:  Yes     Alcohol/week: 4.0 standard drinks     Types: 4 Shots of liquor per week    Drug use: No        Family History:     Family History   Problem Relation Age of Onset    Hypertension Mother     Hypertension Father     Heart Disease Father     Hypertension Maternal Grandmother     Heart Disease Maternal Grandmother     Hypertension Paternal Grandmother     Heart Disease Paternal Grandmother         Medications: Allergies   Allergen Reactions    Codeine Hives    Pcn [Penicillins] Shortness of Breath     Tongue swelling          No current facility-administered medications for this encounter. Physical Exam:     Visit Vitals  BP (!) 157/68   Pulse 60   Resp 25   Ht 5' 6\" (1.676 m)   Wt 86.9 kg (191 lb 9.3 oz)   SpO2 96%   Breastfeeding No   BMI 30.92 kg/m²     BP Readings from Last 3 Encounters:   05/20/22 (!) 157/68   05/05/22 128/80   02/04/21 (!) 150/117     Pulse Readings from Last 3 Encounters:   05/20/22 60   05/05/22 66   02/04/21 78     Wt Readings from Last 3 Encounters:   05/20/22 86.9 kg (191 lb 9.3 oz)   05/10/22 109.3 kg (241 lb)   05/05/22 109.6 kg (241 lb 9.6 oz)       General:  alert, cooperative, no distress, appears stated age  Neck:  nontender  Lungs:  clear to auscultation bilaterally  Heart:  regular rate and rhythm, S1, S2 normal, no murmur, click, rub or gallop  Abdomen:  abdomen is soft without significant tenderness, masses, organomegaly or guarding  Extremities:  extremities normal, atraumatic, no cyanosis or edema  Skin: Warm and dry. no hyperpigmentation, vitiligo, or suspicious lesions  Neuro: alert, oriented x3, affect appropriate, no focal neurological deficits, moves all extremities well, no involuntary movements, reflexes at knee and ankle intact  Psych: non focal     Data Review:     No results for input(s): WBC, HGB, HCT, PLT, HGBEXT, HCTEXT, PLTEXT in the last 72 hours. No results for input(s): NA, K, CL, CO2, GLU, BUN, CREA, CA, MG, PHOS, ALB, TBIL, ALT, INR, INREXT in the last 72 hours.     No lab exists for component: SGOT,  BNP    Results for orders placed or performed during the hospital encounter of 02/04/21   EKG, 12 LEAD, INITIAL   Result Value Ref Range    Ventricular Rate 72 BPM    Atrial Rate 72 BPM    P-R Interval 118 ms    QRS Duration 88 ms    Q-T Interval 402 ms    QTC Calculation (Bezet) 440 ms    Calculated P Axis 30 degrees    Calculated R Axis 15 degrees    Calculated T Axis 28 degrees    Diagnosis       Normal sinus rhythm  Normal ECG  When compared with ECG of 02-AUG-2020 22:09,  Nonspecific T wave abnormality has replaced inverted T waves in Inferior   leads  Confirmed by Kelley Okeefe ([id number]) on 2/5/2021 9:34:31 AM         All Cardiac Markers in the last 24 hours:  No results found for: CPK, CK, CKMMB, CKMB, RCK3, CKMBT, CKNDX, CKND1, JAYSON, TROPT, TROIQ, DANYELLE, TROPT, TNIPOC, BNP, BNPP    Last Lipid:    Lab Results   Component Value Date/Time    Cholesterol, total 223 (H) 05/16/2022 12:00 AM    HDL Cholesterol 50 05/16/2022 12:00 AM    LDL, calculated 141 (H) 05/16/2022 12:00 AM    LDL, calculated 150.2 (H) 10/13/2021 11:04 AM    Triglyceride 177 (H) 05/16/2022 12:00 AM    CHOL/HDL Ratio 4.6 10/13/2021 11:04 AM       Signed By: Hillary Breaux MD     May 20, 2022

## 2022-05-20 NOTE — ROUTINE PROCESS
Patient ambulated from waiting area without difficulty, placed on monitor 1. A&O c/o leg pain. ID, NPO status, allergies, home meds verified. PIV x2 inserted, blood work completed PTA. Groin prep completed, consent ready for signature.

## 2022-05-20 NOTE — TELEPHONE ENCOUNTER
Per Dr Alexia Rowe and Magy Kraus patient needs to be seen in the office with Dr Alexia Rowe on Monday. I have called patient and left a message on her voicemail stating that she has an appointment with Dr Silviano Chatterjee on 5/23/22 @ 2:45. OR form has been completed and is waiting for a signature from Dr Alexia Rowe. Called and left message on patients voicemail that she needs to hold her plavix after tonight, and we will see her in the office on Monday. I have also called down to cath holding and spoke with Pauline Islas the patients nurse and given her patients appointment and informed her about patients plavix. Form has been signed by Magy Kraus and as soon as authorization is done and approved I will fax the form over to radha.

## 2022-05-21 LAB
LEFT CCA DIST DIAS: 17.2 CM/S
LEFT CCA DIST SYS: 96.2 CM/S
LEFT CCA MID DIAS: 17.21 CM/S
LEFT CCA MID SYS: 89.68 CM/S
LEFT CCA PROX DIAS: 17.1 CM/S
LEFT CCA PROX SYS: 98.5 CM/S
LEFT ECA DIAS: 10.06 CM/S
LEFT ECA SYS: 81 CM/S
LEFT GSV ANKLE DIAM: 0.3 CM
LEFT GSV AT KNEE DIAM: 0.48 CM
LEFT GSV BK DIST DIAM: 0.3 CM
LEFT GSV BK MID DIAM: 0.3 CM
LEFT GSV BK PROX DIAM: 0.4 CM
LEFT GSV JUNC DIAM: 0.63 CM
LEFT GSV THIGH DIST DIAM: 0.43 CM
LEFT GSV THIGH MID DIAM: 0.54 CM
LEFT GSV THIGH PROX DIAM: 0.57 CM
LEFT ICA DIST DIAS: 14 CM/S
LEFT ICA DIST SYS: 40.4 CM/S
LEFT ICA MID DIAS: 16.5 CM/S
LEFT ICA MID SYS: 39.7 CM/S
LEFT ICA PROX DIAS: 23.8 CM/S
LEFT ICA PROX SYS: 57.8 CM/S
LEFT ICA/CCA SYS: 0.6 NO UNITS
LEFT SSV DIST DIAM: 0.29 CM
LEFT SSV MID DIAM: 0.25 CM
LEFT SSV PROX DIAM: 0.19 CM
LEFT VERTEBRAL DIAS: 17.2 CM/S
LEFT VERTEBRAL SYS: 51.3 CM/S
RIGHT CCA DIST DIAS: 15.6 CM/S
RIGHT CCA DIST SYS: 72.3 CM/S
RIGHT CCA MID DIAS: 13 CM/S
RIGHT CCA MID SYS: 57.49 CM/S
RIGHT CCA PROX DIAS: 20 CM/S
RIGHT CCA PROX SYS: 74.1 CM/S
RIGHT ECA DIAS: 16.31 CM/S
RIGHT ECA SYS: 84.9 CM/S
RIGHT GSV ANKLE DIAM: 0.24 CM
RIGHT GSV AT KNEE DIAM: 0.54 CM
RIGHT GSV BK DIST DIAM: 0.38 CM
RIGHT GSV BK MID DIAM: 0.4 CM
RIGHT GSV BK PROX DIAM: 0.59 CM
RIGHT GSV JUNC DIAM: 0.78 CM
RIGHT GSV THIGH DIST DIAM: 0.57 CM
RIGHT GSV THIGH MID DIAM: 0.69 CM
RIGHT GSV THIGH PROX DIAM: 0.64 CM
RIGHT ICA DIST DIAS: 20.2 CM/S
RIGHT ICA DIST SYS: 69.4 CM/S
RIGHT ICA MID DIAS: 9.7 CM/S
RIGHT ICA MID SYS: 47.4 CM/S
RIGHT ICA PROX DIAS: 16.1 CM/S
RIGHT ICA PROX SYS: 51.7 CM/S
RIGHT ICA/CCA SYS: 1 NO UNITS
RIGHT PERFORATOR 2 DIAM: 0.37 CM
RIGHT PERFORATOR DIAM: 0.34 CM
RIGHT SSV DIST DIAM: 0.21 CM
RIGHT SSV MID DIAM: 0.22 CM
RIGHT SSV PROX DIAM: 0.3 CM
RIGHT VERTEBRAL DIAS: 17.1 CM/S
RIGHT VERTEBRAL SYS: 47.6 CM/S
VAS LEFT SUBCLAVIAN PROX EDV: 0 CM/S
VAS LEFT SUBCLAVIAN PROX PSV: 104.7 CM/S
VAS RIGHT SUBCLAVIAN PROX EDV: 5.8 CM/S
VAS RIGHT SUBCLAVIAN PROX PSV: 78.2 CM/S

## 2022-05-22 DIAGNOSIS — I25.10 CORONARY ARTERY DISEASE DUE TO CALCIFIED CORONARY LESION: Primary | ICD-10-CM

## 2022-05-22 DIAGNOSIS — I25.84 CORONARY ARTERY DISEASE DUE TO CALCIFIED CORONARY LESION: Primary | ICD-10-CM

## 2022-05-23 ENCOUNTER — OFFICE VISIT (OUTPATIENT)
Dept: CARDIOTHORACIC SURGERY | Age: 52
End: 2022-05-23
Payer: MEDICAID

## 2022-05-23 VITALS
HEART RATE: 59 BPM | DIASTOLIC BLOOD PRESSURE: 88 MMHG | RESPIRATION RATE: 16 BRPM | OXYGEN SATURATION: 97 % | HEIGHT: 66 IN | SYSTOLIC BLOOD PRESSURE: 122 MMHG | BODY MASS INDEX: 39.21 KG/M2 | WEIGHT: 244 LBS

## 2022-05-23 DIAGNOSIS — I25.110 CORONARY ARTERY DISEASE INVOLVING NATIVE HEART WITH UNSTABLE ANGINA PECTORIS, UNSPECIFIED VESSEL OR LESION TYPE (HCC): Primary | ICD-10-CM

## 2022-05-23 DIAGNOSIS — Z01.818 PREOPERATIVE TESTING: ICD-10-CM

## 2022-05-23 PROCEDURE — 99215 OFFICE O/P EST HI 40 MIN: CPT | Performed by: THORACIC SURGERY (CARDIOTHORACIC VASCULAR SURGERY)

## 2022-05-23 RX ORDER — AMIODARONE HYDROCHLORIDE 200 MG/1
400 TABLET ORAL 2 TIMES DAILY WITH MEALS
Qty: 20 TABLET | Refills: 0 | Status: ON HOLD | OUTPATIENT
Start: 2022-05-23 | End: 2022-06-02 | Stop reason: ALTCHOICE

## 2022-05-23 RX ORDER — MUPIROCIN 20 MG/G
OINTMENT TOPICAL 2 TIMES DAILY
Qty: 22 G | Refills: 0 | Status: ON HOLD | COMMUNITY
Start: 2022-05-23 | End: 2022-06-02 | Stop reason: ALTCHOICE

## 2022-05-23 RX ORDER — CHLORHEXIDINE GLUCONATE 4 G/100ML
SOLUTION TOPICAL
Qty: 3 PACKET | Refills: 1 | Status: ON HOLD | COMMUNITY
Start: 2022-05-23 | End: 2022-06-02 | Stop reason: ALTCHOICE

## 2022-05-23 NOTE — PROGRESS NOTES
[facility]  -  Consult  5/23/2022    Kera García is a 46 y.o. female who is referred by Dr. Luis E Wood  being seen or f/u for CABG discussion with discussion of preop testing obtained since last visit. Assessment:    CAD   Obesity BMI > 30evere  BMI > 35 morbid BMI >40   BMI Readings from Last 2 Encounters:   05/23/22 30.92 kg/m²   05/20/22 30.92 kg/m²       Patient Active Problem List    Diagnosis Date Noted    STEMI (ST elevation myocardial infarction) ([facility] 03/15/2020    Tobacco use 03/15/2020    Obesity, morbid ([facility] [facility] 02/28/2020    Acute bronchitis 02/23/2020    Hypokalemia 02/23/2020    Obesity (BMI 30-39.9) 02/23/2020    Hypertension 05/02/2017     Cardiac risk factors:  smoking/ tobacco exposure, family history, dyslipidemia, diabetes mellitus, obesity, alcohol/drug abuse, pregnancy, hypertension, stress, chemotherapy, prior MI, valvular heart disease, thyroid dysfunction  Plan: We plan CABG x 4 with [address] , left radial and svg from left leg as right leg as gross visible varicosities,  with  Lima-lad, svg-pda, radial to either diagonal or om depending on length as both are 70% stenosed. 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 She has been having increasing rest angina, 3 x per day, and 3 days per week over the last 3 months. She had a STEMI several months ago and a stent was placed in the right system She has increased BMI of 31, is a prior smoker, Hct hhh, WBC hhh, Cr hhh for STS calculation. From surgical standpoint, CT scan of chest demonstrates an ascending aorta with areas free of calcification for cannulation.   Coronary catheterization performed by Dr. Luis E Wood reveals normal LM, Mid LAD diffuse calcified 85-90% stenosis,  ostial 60 to 70% diagonal  stenosis, proximal 70%, proximal and mid serial up to 60% fol 75% eccentric  stenosed proximal OM branch and Ostial/proximal and mid RCA stent patent with evidence of 50% narrowing in between the stent. 80-90% ostial/proximal RPDA stenosis. proximal/mid RPDA stenosis. In clinical Marco's test    Risks, benefits, alternatives were explained to the patient and her [facility]. She wishes to proceed. She has been booked for surgery for Friday. Jerene Purdue will be performed additional on the left hand as well as iv ng started prior to radial harvest.      1. hhhh(See STS Risk score at bottom of note.)  1. No apply Will need to discontinue ACEi/ARB 48h prior to OR to avoid perioperative vasoplegic syndrome. 2. Will also need the following tests to complete the workup and risk stratification.  COVID rule out   Type and Cross 2 units PRBC   CBC   Chem-7   Lipid panel   Coagulation profile / INR   Liver function tests   TSH   Prealbumin, albumin, Transferrin    HbA1c   Urinalysis   EKG   CXR   Chest CT scan   Echocardiogram   Cardiac catheterization   Carotid duplex scanning   Ankle-brachial indices   Venous mapping and marking   Pulmonary function testing   Room air arterial blood gas*   Amiodarone for Atrial Fibrillation prophylaxis x 5days    Subjective:     Chief Complaint   Patient presents with    Coronary Artery Disease     to discuss CABG       History of Present Illness:   Palak Pascal is a 46 y.o. female with history of chest pain at [facility] who presented with chest pain, chest pressure/discomfort, dyspnea, dyspnea on exertion, fatigue, orthopnea, paroxysmal nocturnal dyspnea, tachypnea. The chest painis moderate and is located substernally with radiation to the sternum. 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 It has been present for 3 months, and is increasing in frequency and severity, and is relieved by rest and does occur at rest, representing class III angina.   Exertional dyspnea is present, accompanying the chest pain pressure. She has also been experiencing chest pain, dyspnea on exertion, palpitations, but denies syncope. Past Medical History:   Diagnosis Date    CAD (coronary artery disease)     Displacement of lumbar intervertebral disc     HLD (hyperlipidemia)     Hypertension     Lumbar radiculitis     Lumbar sprain     Numbness     legs and arms    STEMI (ST elevation myocardial infarction) ([facility] [address] 03/2020       Past Surgical History:   Procedure Laterality Date    HX APPENDECTOMY  2014    HX CHOLECYSTECTOMY  2013    HX GI      multiple esophageal surgeries in childhood    HX HEENT      \"surgery on esophagus\"    HX HYSTERECTOMY  2003    HX LUMBAR DISKECTOMY         Social History:   She  reports that she quit smoking about 2 years ago. She has a 35.00 pack-year smoking history. She quit smokeless tobacco use about 5 years ago. She  reports current alcohol use of about 4.0 standard drinks of alcohol per week. Michelle Dilloniter- cigarette / Vaping Substance:Nicotine:   THC:   CBD:   Flavoring:   Other:    E-cigarette / Vaping Devices :Disposable:   ,   Pre-filled or Refillable Cartridge:   Refillable Tank:   Pre-filled Pod:   Other:       Family History:     Family History   Problem Relation Age of Onset    Hypertension Mother     Hypertension Father     Heart Disease Father     Hypertension Maternal Grandmother     Heart Disease Maternal Grandmother     Hypertension Paternal Grandmother     Heart Disease Paternal Grandmother     No family history pertinent to CAD. Allergies and Intolerances: Allergies   Allergen Reactions    Codeine Hives    Pcn [Penicillins] Shortness of Breath     Tongue swelling         Home Medications:     Current Outpatient Medications   Medication Sig Dispense Refill    nitroglycerin (NITROSTAT) 0.4 mg SL tablet 1 Tablet by SubLINGual route every five (5) minutes as needed for Chest Pain. Up to 3 doses.  25 Tablet 5    isosorbide mononitrate ER (IMDUR) 30 mg tablet Take 1 Tablet by mouth every morning. 90 Tablet 3    clopidogreL (Plavix) 75 mg tab Take 1 Tablet by mouth daily. 90 Tablet 3    albuterol (Proventil HFA) 90 mcg/actuation inhaler Take 2 Puffs by inhalation every four (4) hours as needed for Wheezing, Shortness of Breath or Respiratory Distress. 1 Inhaler 0    metoprolol succinate (TOPROL-XL) 50 mg XL tablet Take 1 Tab by mouth daily. 30 Tab 5    atorvastatin (LIPITOR) 80 mg tablet Take 1 Tab by mouth nightly. 30 Tab 5    aspirin 81 mg chewable tablet Take 1 Tab by mouth daily. 30 Tab 0     Cannot display prior to admission medications because the patient has not been admitted in this contact. Current Outpatient Medications   Medication Sig    nitroglycerin (NITROSTAT) 0.4 mg SL tablet 1 Tablet by SubLINGual route every five (5) minutes as needed for Chest Pain. Up to 3 doses.  isosorbide mononitrate ER (IMDUR) 30 mg tablet Take 1 Tablet by mouth every morning.  clopidogreL (Plavix) 75 mg tab Take 1 Tablet by mouth daily.  albuterol (Proventil HFA) 90 mcg/actuation inhaler Take 2 Puffs by inhalation every four (4) hours as needed for Wheezing, Shortness of Breath or Respiratory Distress.  metoprolol succinate (TOPROL-XL) 50 mg XL tablet Take 1 Tab by mouth daily.  atorvastatin (LIPITOR) 80 mg tablet Take 1 Tab by mouth nightly.  aspirin 81 mg chewable tablet Take 1 Tab by mouth daily.         Review of Systems: (NEGATIVE unless checked or in HPI.)   Cardiac:   [] Chest pain or chest pressure  [] Shortness of breath upon activity  [] Shortness of breath when lying flat  [] Irregular heart rhythm     Vascular:   [] Pain in calf, thigh, or hip brought on by walking  [] Pain in feet at night that wakes you up from your sleep  [] Blood clot in your veins  [] Leg swelling  [] bulging leg veins     Pulmonary:   [] Oxygen at home  [] Productive cough  [] Wheezing     Neurologic:   [] Sudden weakness in arms or legs  [] Sudden numbness in arms or legs  [] Sudden onset of difficult speaking or slurred speech  [] Temporary loss of vision in one eye  [] Problems with dizziness    Gastrointestinal:   [] Blood in stool  [] Vomited blood    Genitourinary:   [] Burning when urinating  [] Blood in urine     Psychiatric:   [] Major depression     Hematologic:   [] Bleeding problems  [] Problems with blood clotting  [] bulging leg veins  [] calf pain with exertion    Dermatologic:   [] Rashes or ulcers     Constitutional:   [] Fever or chills  [] weight gain or loss     Ear/Nose/Throat:   [] Dentition   [] Change in hearing  [] Nose bleeds  [] Sore throat     Musculoskeletal:   [] Back pain  [] Joint pain  [] Muscle pain    Physical Examination:     Visit Vitals  Ht 5' 6\" (1.676 m)   BMI 30.92 kg/m²     PHYSICAL EXAMINATION:  Vital signs:   BP Readings from Last 3 Encounters:   05/20/22 (!) 157/68   05/05/22 128/80   02/04/21 (!) 150/117     @NIWX(07)@  Wt Readings from Last 3 Encounters:   05/20/22 86.9 kg (191 lb 9.3 oz)   05/10/22 109.3 kg (241 lb)   05/05/22 109.6 kg (241 lb 9.6 oz)     Ht Readings from Last 3 Encounters:   05/23/22 5' 6\" (1.676 m)   05/20/22 5' 6\" (1.676 m)   05/10/22 5' 1\" (1.549 m)     Body surface area is 2.01 meters squared. General:   awake alert and oriented   Skin:   no rashes or skin lesions noted on limited exam   HEENT:  Normocephalic, atraumatic, PERRL, EOMI, no scleral icterus or pallor; no conjunctival hemmohage;  nasal and oral mucous are moist and without evidence of lesions. No thrush. Dentition good Neck supple, no bruits. Lymph Nodes:   no cervical, axillary or inguinal adenopathy   Lungs:   non-labored, bilaterally clear to aspiration- no crackles wheezes rales or rhonchi   Heart:  RRR, s1 and s2; no murmurs rubs or gallops, no edema, + pedal pulses   Abdomen:  soft, non-distended, active bowel sounds, no hepatomegaly, no splenomegaly. Appropriate surgical scars for stated surgeries.  Non-tender   Genitourinary: deferred   Extremities:   no clubbing, cyanosis; no joint effusions or swelling; Full ROM of all large joints to the upper and lower extremities; muscle mass appropriate for age   Neurologic:  No gross focal sensory abnormalities; 5/5 muscle strength to upper and lower extremities. Speech appropirate. Cranial nerves intact   Psychiatric:   appropriate and interactive. Laboratory Data:     Lab Results   Component Value Date/Time    WBC 9.5 05/16/2022 12:00 AM    HGB 13.7 05/16/2022 12:00 AM    HCT 40.3 05/16/2022 12:00 AM    PLATELET 617 40/49/7573 12:00 AM     Lab Results   Component Value Date/Time    Sodium 140 05/16/2022 12:00 AM    Potassium 4.2 05/16/2022 12:00 AM    Chloride 101 05/16/2022 12:00 AM    CO2 20 05/16/2022 12:00 AM    Glucose 114 (H) 05/16/2022 12:00 AM    BUN 12 05/16/2022 12:00 AM    Creatinine 0.66 05/16/2022 12:00 AM     Lab Results   Component Value Date/Time    Cholesterol, total 223 (H) 05/16/2022 12:00 AM    HDL Cholesterol 50 05/16/2022 12:00 AM    LDL, calculated 141 (H) 05/16/2022 12:00 AM    LDL, calculated 150.2 (H) 10/13/2021 11:04 AM    Triglyceride 177 (H) 05/16/2022 12:00 AM     Lab Results   Component Value Date/Time    Hemoglobin A1c 6.1 (H) 05/20/2022 04:45 PM     Recent Labs     05/20/22  1645   ALB 3.3*   TP 6.5   TBILI 0.3     ABG:  No results found for: PH, PHI, PCO2, PCO2I, PO2, PO2I, HCO3, HCO3I, FIO2, FIO2I  No results for input(s): TROIQ, CPK, CKMB in the last 72 hours. Lab Results   Component Value Date/Time    TSH 0.79 10/13/2021 11:04 AM       EKG: (independently reviewed)   EKG Results       None          ECHO:   05/10/22    ECHO ADULT COMPLETE 05/10/2022 5/10/2022    Interpretation Summary    Left Ventricle: Normal left ventricular systolic function with a visually estimated EF of 55 - 60%. Left ventricle size is normal. Mild septal thickening. See diagram for wall motion findings. Diastolic dysfunction present with normal LV EF.     Aortic Valve: Tricuspid valve.    Mitral Valve: Mildly thickened leaflet.   Left Atrium: Left atrium is mildly dilated. Left atrial volume index is mildly increased (35-41 mL/m2).   Aorta: Normal sized aortic arch. Mildly dilated aortic root. Ao Root diameter is 3.7 cm. Signed by: Herrera Hernandez MD on 5/10/2022 10:11 AM          CATHETERIZATION:   05/20/22    CARDIAC PROCEDURE 05/20/2022 5/20/2022    Conclusion  LM: Normal  LAD: Mid LAD diffuse calcified 85-90% stenosis. Diagonal branch: Medium, ostial 60 to 70% stenosis, proximal 70%  LCx/OM: Proximal and mid serial up to 60% followed by proximal OM branch has eccentric 75% stenosis  RCA: Ostial/proximal and mid RCA stent patent with evidence of 50% narrowing in between the stent.   80-90% ostial/proximal RPDA stenosis, proximal/mid RPDA stenosis  Normal LVEF and LVEDP    We will have CT surgery evaluation for possible surgical revascularization    Signed by: Herrera Hernandez MD on 5/20/2022 12:11 PM      RADIOLOGY DATA: (images independently reviewed)   XR Results (most recent):  Results from [place] encounter on 05/20/22    XR CHEST PA LAT    Narrative      Impression        PFT: [facility]     PF Readings from Last 2 Encounters:   No data found for PF       STS RISK:     Age 46 y.o.   Gender F   Race WHITE/NON-   Primary Payor [unfilled]  Weight Kg   Wt Readings from Last 2 Encounters:   05/20/22 86.9 kg (191 lb 9.3 oz)   05/10/22 109.3 kg (241 lb)      Height Cm   Ht Readings from Last 1 Encounters:   05/23/22 5' 6\" (1.676 m)     Hematocrit   Lab Results   Component Value Date/Time    HCT 40.3 05/16/2022 12:00 AM      WBC Count   Lab Results   Component Value Date/Time    WBC 9.5 05/16/2022 12:00 AM     Platelet Count   Lab Results   Component Value Date/Time    PLATELET 312 92/99/2442 12:00 AM     Last Creatinine Level   Lab Results   Component Value Date/Time    Creatinine 0.66 05/16/2022 12:00 AM        MELD SCORE: [email or web address]://[email or web address] [email or web address].gov/resources/allocation-calculators/meld-calculator/  Lab Results   Component Value Date/Time    Creatinine 0.66 05/16/2022 12:00 AM     Lab Results   Component Value Date/Time    Bilirubin, total 0.3 05/20/2022 04:45 PM    Bilirubin, direct 0.1 05/20/2022 04:45 PM    Bilirubin Negative 02/04/2021 12:05 PM     Lab Results   Component Value Date/Time    INR 0.9 05/16/2022 12:00 AM     Lab Results   Component Value Date/Time    Sodium 140 05/16/2022 12:00 AM        Sanjay Jasmine MD    PLEASE NOTE:  This document has been produced using voice recognition software. Unrecognized errors in transcription may be present. NOTE TO PATIENT:  The purpose of this note is to communicate optimally with the other providers involved in your care. It is written using standard medical terminology. If you have questions regarding details of the note please call my office at [phone] and make an appointment to discuss your concerns.

## 2022-05-23 NOTE — PATIENT INSTRUCTIONS
Surgical Instructions:     · Start taking the new medication - Amiodarone- as prescribed. (Twice a day, take with food- instruction on bottle). Medication was e-scribed to [employer]. · Complete your three showers using the Hibiclens surgical soap on Tuesday night, Wednesday night and Thursday night. Use soap on chest/ sternal area only. **DO NOT wash face or privates with the surgical soap. **     · Use the Bactroban (mupirocin nasal ointment in both nostrilsafter each shower. · Stop taking the Aspirin today. Continue to stop taking Plavix (clopidogrel)     · Nothing to eat or drink after midnight on Thursday night. · The morning of surgery take Metoprolol (Toprol XL) medication with a very small sip of water (ONLY). · Check in at 6:30 am Friday, 5/27 [facility] · Please bring all medications to the hospital the morning of surgery. Call [phone] if any questions or concerns. Dispensed Hibiclens and Bactroban ointment to the patient.

## 2022-05-24 NOTE — TELEPHONE ENCOUNTER
Per Jarrell Coreas's note from office visit yesterday. Noted that samples were given for surgery. New OR form has been rewritten and waiting for signature with the new procedure information, was CABG x's 3 now is CABG x's 4 with LIMA and Left Radial Artery harvest.    Patient has been called and informed that her orders have been placed and that she needs to have them done today or no later than Wednesday for OR. I have made her a PATS appointment with Preadmission Dept for Wednesday     Patient has no started the Amiodarone because she states that the medication needs a prior authorization for the medication. I have submitted an prior auth for amiodarone but it has to be moved up to review and it will take 24 hours or so before we hear anything back. I spoke with David Harden and case # is [id number].

## 2022-05-24 NOTE — TELEPHONE ENCOUNTER
Called and spoke with Carmen Shay RN at pre admissions office and she stated that if patient has been fully vaccinated then patient does not require a COVID testing. We do not see anything in chart weather patient is fully vaccinated or not. I have called and asked patient she is fully vaccinated.

## 2022-05-24 NOTE — TELEPHONE ENCOUNTER
Patients Amiodarone has been pre authed and is ready for  at the pharmacy. Amiodarone  mg has been approved from 5/24/22 through 5/29/22. This authorization has been faxed over to patients pharmacy.

## 2022-05-24 NOTE — TELEPHONE ENCOUNTER
Patient would like to know if she can go to [facility] to have this labs and all done since she lives in [place]? Also she would like to know if there is a way that the video's can be sent to her since its a 40 minute drive here. Please Advise.

## 2022-05-25 NOTE — TELEPHONE ENCOUNTER
Joy Mansfield MD  You; Chato Johnson PA-C; Juan Diego Mensah PA-C 17 minutes ago (2:43 PM)     BR    Can you help her arrange care for  so she can get labs? The daughter came to clinic is she able to help    Message text        Called and spoke with patient. Patient states that daughter is at work and has no one to help.

## 2022-05-25 NOTE — TELEPHONE ENCOUNTER
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

## 2022-05-27 ENCOUNTER — HOSPITAL ENCOUNTER (INPATIENT)
Age: 52
LOS: 7 days | Discharge: HOME HEALTH | DRG: 166 | End: 2022-06-03
Attending: THORACIC SURGERY (CARDIOTHORACIC VASCULAR SURGERY) | Admitting: THORACIC SURGERY (CARDIOTHORACIC VASCULAR SURGERY)
Payer: MEDICAID

## 2022-05-27 ENCOUNTER — ANESTHESIA EVENT (OUTPATIENT)
Dept: CARDIOTHORACIC SURGERY | Age: 52
DRG: 166 | End: 2022-05-27
Payer: MEDICAID

## 2022-05-27 ENCOUNTER — ANESTHESIA (OUTPATIENT)
Dept: CARDIOTHORACIC SURGERY | Age: 52
DRG: 166 | End: 2022-05-27
Payer: MEDICAID

## 2022-05-27 ENCOUNTER — APPOINTMENT (OUTPATIENT)
Dept: GENERAL RADIOLOGY | Age: 52
DRG: 166 | End: 2022-05-27
Attending: PHYSICIAN ASSISTANT
Payer: MEDICAID

## 2022-05-27 DIAGNOSIS — Z95.1 S/P CABG X 4: Primary | ICD-10-CM

## 2022-05-27 DIAGNOSIS — I25.118 CORONARY ARTERY DISEASE OF NATIVE ARTERY OF NATIVE HEART WITH STABLE ANGINA PECTORIS (HCC): ICD-10-CM

## 2022-05-27 PROBLEM — I25.10 CAD (CORONARY ARTERY DISEASE): Status: ACTIVE | Noted: 2022-05-27

## 2022-05-27 LAB
ADMINISTERED INITIALS, ADMINIT: NORMAL
ALBUMIN SERPL-MCNC: 2.1 G/DL (ref 3.4–5)
ALBUMIN/GLOB SERPL: 1.1 {RATIO} (ref 0.8–1.7)
ALP SERPL-CCNC: 55 U/L (ref 45–117)
ALT SERPL-CCNC: 34 U/L (ref 13–56)
ANION GAP BLD CALC-SCNC: 12 MMOL/L (ref 10–20)
ANION GAP BLD CALC-SCNC: 12 MMOL/L (ref 10–20)
ANION GAP BLD CALC-SCNC: 13 MMOL/L (ref 10–20)
ANION GAP BLD CALC-SCNC: 14 MMOL/L (ref 10–20)
ANION GAP BLD CALC-SCNC: 22 MMOL/L (ref 10–20)
ANION GAP SERPL CALC-SCNC: 10 MMOL/L (ref 3–18)
APPEARANCE UR: CLEAR
APTT PPP: 27.8 SEC (ref 23–36.4)
ARTERIAL PATENCY WRIST A: ABNORMAL
ARTERIAL PATENCY WRIST A: ABNORMAL
AST SERPL-CCNC: 78 U/L (ref 10–38)
ATRIAL RATE: 53 BPM
BASE DEFICIT BLD-SCNC: 0.5 MMOL/L
BASE DEFICIT BLD-SCNC: 1.6 MMOL/L
BASE DEFICIT BLD-SCNC: 1.6 MMOL/L
BASE DEFICIT BLD-SCNC: 1.7 MMOL/L
BASE DEFICIT BLD-SCNC: 14.2 MMOL/L
BASE DEFICIT BLD-SCNC: 2.2 MMOL/L
BASE DEFICIT BLD-SCNC: 2.4 MMOL/L
BASE DEFICIT BLD-SCNC: 2.5 MMOL/L
BASE DEFICIT BLD-SCNC: 2.6 MMOL/L
BASE DEFICIT BLD-SCNC: 2.8 MMOL/L
BASE DEFICIT BLD-SCNC: 3 MMOL/L
BASE DEFICIT BLD-SCNC: 3.5 MMOL/L
BASE DEFICIT BLD-SCNC: 3.7 MMOL/L
BASE DEFICIT BLD-SCNC: 3.8 MMOL/L
BASE DEFICIT BLD-SCNC: 3.9 MMOL/L
BASE DEFICIT BLD-SCNC: 4.9 MMOL/L
BASE DEFICIT BLD-SCNC: 5.5 MMOL/L
BASOPHILS # BLD: 0 K/UL (ref 0–0.1)
BASOPHILS NFR BLD: 0 % (ref 0–2)
BDY SITE: ABNORMAL
BILIRUB DIRECT SERPL-MCNC: <0.1 MG/DL (ref 0–0.2)
BILIRUB SERPL-MCNC: 0.6 MG/DL (ref 0.2–1)
BILIRUB UR QL: NEGATIVE
BODY TEMPERATURE: 97.8
BUN SERPL-MCNC: 12 MG/DL (ref 7–18)
BUN/CREAT SERPL: 12 (ref 12–20)
CA-I BLD-MCNC: 0.65 MMOL/L (ref 1.12–1.32)
CA-I BLD-MCNC: 0.91 MMOL/L (ref 1.12–1.32)
CA-I BLD-MCNC: 1 MMOL/L (ref 1.12–1.32)
CA-I BLD-MCNC: 1.02 MMOL/L (ref 1.12–1.32)
CA-I BLD-MCNC: 1.03 MMOL/L (ref 1.12–1.32)
CA-I BLD-MCNC: 1.03 MMOL/L (ref 1.12–1.32)
CA-I BLD-MCNC: 1.06 MMOL/L (ref 1.12–1.32)
CA-I BLD-MCNC: 1.06 MMOL/L (ref 1.12–1.32)
CA-I BLD-MCNC: 1.1 MMOL/L (ref 1.12–1.32)
CA-I BLD-MCNC: 1.13 MMOL/L (ref 1.12–1.32)
CA-I BLD-MCNC: 1.2 MMOL/L (ref 1.12–1.32)
CA-I BLD-MCNC: 1.21 MMOL/L (ref 1.12–1.32)
CA-I BLD-MCNC: 1.22 MMOL/L (ref 1.12–1.32)
CALCIUM SERPL-MCNC: 8.3 MG/DL (ref 8.5–10.1)
CALCULATED P AXIS, ECG09: 24 DEGREES
CALCULATED R AXIS, ECG10: 25 DEGREES
CALCULATED T AXIS, ECG11: 16 DEGREES
CHLORIDE BLD-SCNC: 101 MMOL/L (ref 98–107)
CHLORIDE BLD-SCNC: 101 MMOL/L (ref 98–107)
CHLORIDE BLD-SCNC: 102 MMOL/L (ref 98–107)
CHLORIDE BLD-SCNC: 103 MMOL/L (ref 98–107)
CHLORIDE BLD-SCNC: 103 MMOL/L (ref 98–107)
CHLORIDE BLD-SCNC: 104 MMOL/L (ref 98–107)
CHLORIDE BLD-SCNC: 106 MMOL/L (ref 98–107)
CHLORIDE BLD-SCNC: 107 MMOL/L (ref 98–107)
CHLORIDE BLD-SCNC: 107 MMOL/L (ref 98–107)
CHLORIDE BLD-SCNC: 99 MMOL/L (ref 98–107)
CHLORIDE SERPL-SCNC: 111 MMOL/L (ref 100–111)
CO2 BLD-SCNC: 21 MMOL/L (ref 19–24)
CO2 BLD-SCNC: 22 MMOL/L (ref 19–24)
CO2 BLD-SCNC: 22 MMOL/L (ref 19–24)
CO2 BLD-SCNC: 23 MMOL/L (ref 19–24)
CO2 BLD-SCNC: 24 MMOL/L (ref 19–24)
CO2 BLD-SCNC: 25 MMOL/L (ref 19–24)
CO2 BLD-SCNC: 25 MMOL/L (ref 19–24)
CO2 SERPL-SCNC: 22 MMOL/L (ref 21–32)
COLOR UR: YELLOW
CREAT BLD-MCNC: 0.72 MG/DL (ref 0.6–1.3)
CREAT BLD-MCNC: 0.82 MG/DL (ref 0.6–1.3)
CREAT BLD-MCNC: 0.83 MG/DL (ref 0.6–1.3)
CREAT BLD-MCNC: 0.86 MG/DL (ref 0.6–1.3)
CREAT BLD-MCNC: 0.88 MG/DL (ref 0.6–1.3)
CREAT BLD-MCNC: 0.9 MG/DL (ref 0.6–1.3)
CREAT BLD-MCNC: 0.92 MG/DL (ref 0.6–1.3)
CREAT BLD-MCNC: 0.92 MG/DL (ref 0.6–1.3)
CREAT BLD-MCNC: 0.93 MG/DL (ref 0.6–1.3)
CREAT BLD-MCNC: 0.93 MG/DL (ref 0.6–1.3)
CREAT BLD-MCNC: 1.01 MG/DL (ref 0.6–1.3)
CREAT BLD-MCNC: 1.03 MG/DL (ref 0.6–1.3)
CREAT BLD-MCNC: 1.06 MG/DL (ref 0.6–1.3)
CREAT SERPL-MCNC: 1.02 MG/DL (ref 0.6–1.3)
D50 ADMINISTERED, D50ADM: 0 ML
D50 ORDER, D50ORD: 0 ML
DIAGNOSIS, 93000: NORMAL
DIFFERENTIAL METHOD BLD: ABNORMAL
EOSINOPHIL # BLD: 0 K/UL (ref 0–0.4)
EOSINOPHIL NFR BLD: 0 % (ref 0–5)
ERYTHROCYTE [DISTWIDTH] IN BLOOD BY AUTOMATED COUNT: 14.4 % (ref 11.6–14.5)
FIO2 ON VENT: 100 %
GAS FLOW.O2 O2 DELIVERY SYS: ABNORMAL L/MIN
GAS FLOW.O2 SETTING OXYMISER: 20 BPM
GAS FLOW.O2 SETTING OXYMISER: 20 BPM
GLOBULIN SER CALC-MCNC: 1.9 G/DL (ref 2–4)
GLUCOSE BLD STRIP.AUTO-MCNC: 132 MG/DL (ref 74–106)
GLUCOSE BLD STRIP.AUTO-MCNC: 144 MG/DL (ref 70–110)
GLUCOSE BLD STRIP.AUTO-MCNC: 147 MG/DL (ref 70–110)
GLUCOSE BLD STRIP.AUTO-MCNC: 158 MG/DL (ref 70–110)
GLUCOSE BLD STRIP.AUTO-MCNC: 161 MG/DL (ref 70–110)
GLUCOSE BLD STRIP.AUTO-MCNC: 172 MG/DL (ref 74–106)
GLUCOSE BLD STRIP.AUTO-MCNC: 183 MG/DL (ref 74–106)
GLUCOSE BLD STRIP.AUTO-MCNC: 201 MG/DL (ref 74–106)
GLUCOSE BLD STRIP.AUTO-MCNC: 207 MG/DL (ref 74–106)
GLUCOSE BLD STRIP.AUTO-MCNC: 210 MG/DL (ref 74–106)
GLUCOSE BLD STRIP.AUTO-MCNC: 213 MG/DL (ref 74–106)
GLUCOSE BLD STRIP.AUTO-MCNC: 99 MG/DL (ref 74–106)
GLUCOSE BLD-MCNC: 139 MG/DL (ref 65–100)
GLUCOSE BLD-MCNC: 151 MG/DL (ref 65–100)
GLUCOSE BLD-MCNC: 167 MG/DL (ref 65–100)
GLUCOSE BLD-MCNC: 222 MG/DL (ref 65–100)
GLUCOSE BLD-MCNC: 334 MG/DL (ref 65–100)
GLUCOSE SERPL-MCNC: 164 MG/DL (ref 74–99)
GLUCOSE UR STRIP.AUTO-MCNC: NEGATIVE MG/DL
GLUCOSE, GLC: 144 MG/DL
GLUCOSE, GLC: 147 MG/DL
GLUCOSE, GLC: 158 MG/DL
GLUCOSE, GLC: 161 MG/DL
GLUCOSE, GLC: 167 MG/DL
HCO3 BLD-SCNC: 10.4 MMOL/L (ref 22–26)
HCO3 BLD-SCNC: 19.6 MMOL/L (ref 22–26)
HCO3 BLD-SCNC: 20.6 MMOL/L (ref 22–26)
HCO3 BLD-SCNC: 21.3 MMOL/L (ref 22–26)
HCO3 BLD-SCNC: 21.4 MMOL/L (ref 22–26)
HCO3 BLD-SCNC: 22.1 MMOL/L (ref 22–26)
HCO3 BLD-SCNC: 22.2 MMOL/L (ref 22–26)
HCO3 BLD-SCNC: 22.4 MMOL/L (ref 22–26)
HCO3 BLD-SCNC: 23 MMOL/L (ref 22–26)
HCO3 BLD-SCNC: 23.1 MMOL/L (ref 22–26)
HCO3 BLD-SCNC: 23.3 MMOL/L (ref 22–26)
HCO3 BLD-SCNC: 23.5 MMOL/L (ref 22–26)
HCO3 BLD-SCNC: 23.7 MMOL/L (ref 22–26)
HCO3 BLD-SCNC: 24 MMOL/L (ref 22–26)
HCO3 BLD-SCNC: 24.1 MMOL/L (ref 22–26)
HCO3 BLD-SCNC: 24.2 MMOL/L (ref 22–26)
HCO3 BLD-SCNC: 24.7 MMOL/L (ref 22–26)
HCT VFR BLD AUTO: 31.2 % (ref 35–45)
HCT VFR BLD CALC: 23 % (ref 36–49)
HCT VFR BLD CALC: 25 % (ref 36–49)
HCT VFR BLD CALC: 26 % (ref 36–49)
HCT VFR BLD CALC: 26 % (ref 36–49)
HCT VFR BLD CALC: 27 % (ref 36–49)
HCT VFR BLD CALC: 27 % (ref 36–49)
HCT VFR BLD CALC: 29 % (ref 36–49)
HCT VFR BLD CALC: 39 % (ref 36–49)
HGB BLD-MCNC: 10.3 G/DL (ref 12–16)
HGB BLD-MCNC: 13.1 G/DL (ref 12–16)
HGB BLD-MCNC: 7.8 G/DL (ref 12–16)
HGB BLD-MCNC: 8.4 G/DL (ref 12–16)
HGB BLD-MCNC: 8.7 G/DL (ref 12–16)
HGB BLD-MCNC: 8.9 G/DL (ref 12–16)
HGB BLD-MCNC: 9.1 G/DL (ref 12–16)
HGB BLD-MCNC: 9.3 G/DL (ref 12–16)
HGB BLD-MCNC: 9.8 G/DL (ref 12–16)
HGB UR QL STRIP: NEGATIVE
HIGH TARGET, HITG: 150 MG/DL
HISTORY CHECKED?,CKHIST: NORMAL
IMM GRANULOCYTES # BLD AUTO: 0 K/UL (ref 0–0.04)
IMM GRANULOCYTES NFR BLD AUTO: 0 % (ref 0–0.5)
INR PPP: 1.3 (ref 0.8–1.2)
INSPIRATION.DURATION SETTING TIME VENT: 0.9 SEC
INSULIN ADMINSTERED, INSADM: 2.1 UNITS/HOUR
INSULIN ADMINSTERED, INSADM: 2.5 UNITS/HOUR
INSULIN ADMINSTERED, INSADM: 2.6 UNITS/HOUR
INSULIN ADMINSTERED, INSADM: 2.9 UNITS/HOUR
INSULIN ADMINSTERED, INSADM: 4 UNITS/HOUR
INSULIN ORDER, INSORD: 2.1 UNITS/HOUR
INSULIN ORDER, INSORD: 2.5 UNITS/HOUR
INSULIN ORDER, INSORD: 2.6 UNITS/HOUR
INSULIN ORDER, INSORD: 2.9 UNITS/HOUR
INSULIN ORDER, INSORD: 4 UNITS/HOUR
IPAP/PIP, IPAPIP: 16
KETONES UR QL STRIP.AUTO: ABNORMAL MG/DL
LACTATE BLD-SCNC: 1.77 MMOL/L (ref 0.4–2)
LACTATE BLD-SCNC: 10.27 MMOL/L (ref 0.4–2)
LACTATE BLD-SCNC: 2 MMOL/L (ref 0.4–2)
LACTATE BLD-SCNC: 4.69 MMOL/L (ref 0.4–2)
LACTATE BLD-SCNC: 7.19 MMOL/L (ref 0.4–2)
LACTATE BLD-SCNC: 8.28 MMOL/L (ref 0.4–2)
LACTATE BLD-SCNC: 9.22 MMOL/L (ref 0.4–2)
LACTATE SERPL-SCNC: 6.7 MMOL/L (ref 0.4–2)
LEUKOCYTE ESTERASE UR QL STRIP.AUTO: NEGATIVE
LOW TARGET, LOT: 80 MG/DL
LYMPHOCYTES # BLD: 3 K/UL (ref 0.9–3.6)
LYMPHOCYTES NFR BLD: 12 % (ref 21–52)
MAGNESIUM SERPL-MCNC: 2.3 MG/DL (ref 1.6–2.6)
MCH RBC QN AUTO: 28.2 PG (ref 24–34)
MCHC RBC AUTO-ENTMCNC: 33 G/DL (ref 31–37)
MCV RBC AUTO: 85.5 FL (ref 78–100)
MINUTES UNTIL NEXT BG, NBG: 60 MIN
MONOCYTES # BLD: 1 K/UL (ref 0.05–1.2)
MONOCYTES NFR BLD: 4 % (ref 3–10)
MULTIPLIER, MUL: 0.02
MULTIPLIER, MUL: 0.03
MULTIPLIER, MUL: 0.04
NEUTS BAND NFR BLD MANUAL: 1 %
NEUTS SEG # BLD: 21.3 K/UL (ref 1.8–8)
NEUTS SEG NFR BLD: 83 % (ref 40–73)
NITRITE UR QL STRIP.AUTO: NEGATIVE
NRBC # BLD: 0 K/UL (ref 0–0.01)
NRBC BLD-RTO: 0 PER 100 WBC
O2/TOTAL GAS SETTING VFR VENT: 60 %
O2/TOTAL GAS SETTING VFR VENT: 60 %
ORDER INITIALS, ORDINIT: NORMAL
P-R INTERVAL, ECG05: 154 MS
PAW @ MEAN EXP FLOW ON VENT: 10 CMH2O
PAW @ MEAN EXP FLOW ON VENT: 8 CMH2O
PAW @ MEAN EXP FLOW ON VENT: 8.4 CMH2O
PCO2 BLD: 19.2 MMHG (ref 35–45)
PCO2 BLD: 32.5 MMHG (ref 35–45)
PCO2 BLD: 33.6 MMHG (ref 35–45)
PCO2 BLD: 38.8 MMHG (ref 35–45)
PCO2 BLD: 41.8 MMHG (ref 35–45)
PCO2 BLD: 42.1 MMHG (ref 35–45)
PCO2 BLD: 43.2 MMHG (ref 35–45)
PCO2 BLD: 43.2 MMHG (ref 35–45)
PCO2 BLD: 44.2 MMHG (ref 35–45)
PCO2 BLD: 44.3 MMHG (ref 35–45)
PCO2 BLD: 44.6 MMHG (ref 35–45)
PCO2 BLD: 44.7 MMHG (ref 35–45)
PCO2 BLD: 45.3 MMHG (ref 35–45)
PCO2 BLD: 45.3 MMHG (ref 35–45)
PCO2 BLD: 49.2 MMHG (ref 35–45)
PCO2 BLDV: 33.3 MMHG (ref 41–51)
PCO2 BLDV: 46.3 MMHG (ref 41–51)
PEEP RESPIRATORY: 5 CMH2O
PEEP RESPIRATORY: 5 CMH2O
PEEP RESPIRATORY: 5 CM[H2O]
PH BLD: 7.28 [PH] (ref 7.35–7.45)
PH BLD: 7.3 [PH] (ref 7.35–7.45)
PH BLD: 7.31 [PH] (ref 7.35–7.45)
PH BLD: 7.32 [PH] (ref 7.35–7.45)
PH BLD: 7.32 [PH] (ref 7.35–7.45)
PH BLD: 7.33 [PH] (ref 7.35–7.45)
PH BLD: 7.34 [PH] (ref 7.35–7.45)
PH BLD: 7.35 [PH] (ref 7.35–7.45)
PH BLD: 7.35 [PH] (ref 7.35–7.45)
PH BLD: 7.37 [PH] (ref 7.35–7.45)
PH BLD: 7.38 [PH] (ref 7.35–7.45)
PH BLD: 7.44 [PH] (ref 7.35–7.45)
PH BLDV: 7.31 [PH] (ref 7.32–7.42)
PH BLDV: 7.4 [PH] (ref 7.32–7.42)
PH UR STRIP: 5.5 [PH] (ref 5–8)
PIP ISTAT,IPIP: 11
PIP ISTAT,IPIP: 13
PLATELET # BLD AUTO: 224 K/UL (ref 135–420)
PLATELET COMMENTS,PCOM: ABNORMAL
PMV BLD AUTO: 10.6 FL (ref 9.2–11.8)
PO2 BLD: 106 MMHG (ref 80–100)
PO2 BLD: 109 MMHG (ref 80–100)
PO2 BLD: 115 MMHG (ref 80–100)
PO2 BLD: 252 MMHG (ref 80–100)
PO2 BLD: 254 MMHG (ref 80–100)
PO2 BLD: 269 MMHG (ref 80–100)
PO2 BLD: 286 MMHG (ref 80–100)
PO2 BLD: 297 MMHG (ref 80–100)
PO2 BLD: 331 MMHG (ref 80–100)
PO2 BLD: 36 MMHG (ref 80–100)
PO2 BLD: 390 MMHG (ref 80–100)
PO2 BLD: 415 MMHG (ref 80–100)
PO2 BLD: 428 MMHG (ref 80–100)
PO2 BLD: 495 MMHG (ref 80–100)
PO2 BLD: 86 MMHG (ref 80–100)
PO2 BLDV: 37 MMHG (ref 25–40)
PO2 BLDV: 38 MMHG (ref 25–40)
POTASSIUM BLD-SCNC: 2.9 MMOL/L (ref 3.5–5.5)
POTASSIUM BLD-SCNC: 3.8 MMOL/L (ref 3.5–5.5)
POTASSIUM BLD-SCNC: 4 MMOL/L (ref 3.5–5.1)
POTASSIUM BLD-SCNC: 4 MMOL/L (ref 3.5–5.5)
POTASSIUM BLD-SCNC: 4.2 MMOL/L (ref 3.5–5.5)
POTASSIUM BLD-SCNC: 4.3 MMOL/L (ref 3.5–5.1)
POTASSIUM BLD-SCNC: 4.3 MMOL/L (ref 3.5–5.5)
POTASSIUM BLD-SCNC: 4.4 MMOL/L (ref 3.5–5.1)
POTASSIUM BLD-SCNC: 4.4 MMOL/L (ref 3.5–5.1)
POTASSIUM BLD-SCNC: 4.6 MMOL/L (ref 3.5–5.5)
POTASSIUM BLD-SCNC: 4.7 MMOL/L (ref 3.5–5.1)
POTASSIUM BLD-SCNC: 5.3 MMOL/L (ref 3.5–5.5)
POTASSIUM BLD-SCNC: 5.4 MMOL/L (ref 3.5–5.5)
POTASSIUM SERPL-SCNC: 4 MMOL/L (ref 3.5–5.5)
PROT SERPL-MCNC: 4 G/DL (ref 6.4–8.2)
PROT UR STRIP-MCNC: NEGATIVE MG/DL
PROTHROMBIN TIME: 16.4 SEC (ref 11.5–15.2)
Q-T INTERVAL, ECG07: 484 MS
QRS DURATION, ECG06: 94 MS
QTC CALCULATION (BEZET), ECG08: 454 MS
RBC # BLD AUTO: 3.65 M/UL (ref 4.2–5.3)
RBC MORPH BLD: ABNORMAL
SAO2 % BLD: 100 %
SAO2 % BLD: 100 %
SAO2 % BLD: 100 % (ref 92–97)
SAO2 % BLD: 100 % (ref 92–97)
SAO2 % BLD: 63.4 % (ref 92–97)
SAO2 % BLD: 67 %
SAO2 % BLD: 71 %
SAO2 % BLD: 96.3 % (ref 92–97)
SAO2 % BLD: 97.8 % (ref 92–97)
SAO2 % BLD: 98 %
SAO2 % BLD: 98.1 % (ref 92–97)
SAO2 % BLD: 99.8 % (ref 92–97)
SAO2 % BLD: 99.8 % (ref 92–97)
SAO2 % BLD: 99.9 % (ref 92–97)
SERVICE CMNT-IMP: ABNORMAL
SODIUM BLD-SCNC: 137 MMOL/L (ref 136–145)
SODIUM BLD-SCNC: 137 MMOL/L (ref 136–145)
SODIUM BLD-SCNC: 138 MMOL/L (ref 136–145)
SODIUM BLD-SCNC: 140 MMOL/L (ref 136–145)
SODIUM BLD-SCNC: 141 MMOL/L (ref 136–145)
SODIUM BLD-SCNC: 141 MMOL/L (ref 136–145)
SODIUM BLD-SCNC: 142 MMOL/L (ref 136–145)
SODIUM BLD-SCNC: 142 MMOL/L (ref 136–145)
SODIUM BLD-SCNC: 145 MMOL/L (ref 136–145)
SODIUM SERPL-SCNC: 143 MMOL/L (ref 136–145)
SP GR UR REFRACTOMETRY: 1.02 (ref 1–1.03)
SPECIMEN SITE: ABNORMAL
SPECIMEN TYPE: ABNORMAL
TOTAL RESP. RATE, ITRR: 25
TOTAL RESP. RATE, ITRR: 28
TOTAL RESP. RATE, ITRR: 30
TROPONIN-HIGH SENSITIVITY: 4101 NG/L (ref 0–54)
TROPONIN-HIGH SENSITIVITY: 753 NG/L (ref 0–54)
UROBILINOGEN UR QL STRIP.AUTO: 0.2 EU/DL (ref 0.2–1)
VENTILATION MODE VENT: ABNORMAL
VENTRICULAR RATE, ECG03: 53 BPM
VT SETTING VENT: 380 ML
VT SETTING VENT: 383 ML
VT SETTING VENT: 400 ML
WBC # BLD AUTO: 25.3 K/UL (ref 4.6–13.2)

## 2022-05-27 PROCEDURE — 80048 BASIC METABOLIC PNL TOTAL CA: CPT

## 2022-05-27 PROCEDURE — 74011636637 HC RX REV CODE- 636/637: Performed by: PHYSICIAN ASSISTANT

## 2022-05-27 PROCEDURE — 77030002987 HC SUT PROL J&J -B: Performed by: THORACIC SURGERY (CARDIOTHORACIC VASCULAR SURGERY)

## 2022-05-27 PROCEDURE — 86900 BLOOD TYPING SEROLOGIC ABO: CPT

## 2022-05-27 PROCEDURE — 06BP4ZZ EXCISION OF RIGHT SAPHENOUS VEIN, PERCUTANEOUS ENDOSCOPIC APPROACH: ICD-10-PCS | Performed by: THORACIC SURGERY (CARDIOTHORACIC VASCULAR SURGERY)

## 2022-05-27 PROCEDURE — 76937 US GUIDE VASCULAR ACCESS: CPT | Performed by: ANESTHESIOLOGY

## 2022-05-27 PROCEDURE — C1713 ANCHOR/SCREW BN/BN,TIS/BN: HCPCS | Performed by: THORACIC SURGERY (CARDIOTHORACIC VASCULAR SURGERY)

## 2022-05-27 PROCEDURE — 74011636637 HC RX REV CODE- 636/637: Performed by: THORACIC SURGERY (CARDIOTHORACIC VASCULAR SURGERY)

## 2022-05-27 PROCEDURE — 36556 INSERT NON-TUNNEL CV CATH: CPT | Performed by: ANESTHESIOLOGY

## 2022-05-27 PROCEDURE — 77030003010 HC SUT SURG STL J&J -B: Performed by: THORACIC SURGERY (CARDIOTHORACIC VASCULAR SURGERY)

## 2022-05-27 PROCEDURE — 74011000250 HC RX REV CODE- 250: Performed by: PHYSICIAN ASSISTANT

## 2022-05-27 PROCEDURE — 77030020061 HC IV BLD WRMR ADMIN SET 3M -B: Performed by: ANESTHESIOLOGY

## 2022-05-27 PROCEDURE — 86923 COMPATIBILITY TEST ELECTRIC: CPT

## 2022-05-27 PROCEDURE — 74011000250 HC RX REV CODE- 250: Performed by: ANESTHESIOLOGY

## 2022-05-27 PROCEDURE — 83605 ASSAY OF LACTIC ACID: CPT

## 2022-05-27 PROCEDURE — 80076 HEPATIC FUNCTION PANEL: CPT

## 2022-05-27 PROCEDURE — 77030005530 HC CATH URETH FOL40 BARD -B: Performed by: THORACIC SURGERY (CARDIOTHORACIC VASCULAR SURGERY)

## 2022-05-27 PROCEDURE — 33534 CABG ARTERIAL TWO: CPT | Performed by: THORACIC SURGERY (CARDIOTHORACIC VASCULAR SURGERY)

## 2022-05-27 PROCEDURE — 77030002916 HC SUT ETHLN J&J -A: Performed by: THORACIC SURGERY (CARDIOTHORACIC VASCULAR SURGERY)

## 2022-05-27 PROCEDURE — 36620 INSERTION CATHETER ARTERY: CPT | Performed by: ANESTHESIOLOGY

## 2022-05-27 PROCEDURE — 00567 ANES DIRECT CABG W/PUMP: CPT | Performed by: ANESTHESIOLOGY

## 2022-05-27 PROCEDURE — 77030008500 HC TBNG CONN PRSS BD -A: Performed by: ANESTHESIOLOGY

## 2022-05-27 PROCEDURE — 33534 CABG ARTERIAL TWO: CPT | Performed by: PHYSICIAN ASSISTANT

## 2022-05-27 PROCEDURE — 77030005401 HC CATH RAD ARRO -A: Performed by: ANESTHESIOLOGY

## 2022-05-27 PROCEDURE — 93005 ELECTROCARDIOGRAM TRACING: CPT

## 2022-05-27 PROCEDURE — 74011250636 HC RX REV CODE- 250/636: Performed by: ANESTHESIOLOGY

## 2022-05-27 PROCEDURE — 65620000000 HC RM CCU GENERAL

## 2022-05-27 PROCEDURE — 74011250636 HC RX REV CODE- 250/636: Performed by: PHYSICIAN ASSISTANT

## 2022-05-27 PROCEDURE — 2709999900 HC NON-CHARGEABLE SUPPLY: Performed by: THORACIC SURGERY (CARDIOTHORACIC VASCULAR SURGERY)

## 2022-05-27 PROCEDURE — 82947 ASSAY GLUCOSE BLOOD QUANT: CPT

## 2022-05-27 PROCEDURE — 33518 CABG ARTERY-VEIN TWO: CPT | Performed by: PHYSICIAN ASSISTANT

## 2022-05-27 PROCEDURE — 85025 COMPLETE CBC W/AUTO DIFF WBC: CPT

## 2022-05-27 PROCEDURE — 74011000258 HC RX REV CODE- 258: Performed by: PHYSICIAN ASSISTANT

## 2022-05-27 PROCEDURE — 85730 THROMBOPLASTIN TIME PARTIAL: CPT

## 2022-05-27 PROCEDURE — 77030012390 HC DRN CHST BTL GTNG -B: Performed by: THORACIC SURGERY (CARDIOTHORACIC VASCULAR SURGERY)

## 2022-05-27 PROCEDURE — 82803 BLOOD GASES ANY COMBINATION: CPT

## 2022-05-27 PROCEDURE — 77030018719 HC DRSG PTCH ANTIMIC J&J -A: Performed by: ANESTHESIOLOGY

## 2022-05-27 PROCEDURE — 02HV33Z INSERTION OF INFUSION DEVICE INTO SUPERIOR VENA CAVA, PERCUTANEOUS APPROACH: ICD-10-PCS | Performed by: ANESTHESIOLOGY

## 2022-05-27 PROCEDURE — 77030013313: Performed by: THORACIC SURGERY (CARDIOTHORACIC VASCULAR SURGERY)

## 2022-05-27 PROCEDURE — 77030015758: Performed by: THORACIC SURGERY (CARDIOTHORACIC VASCULAR SURGERY)

## 2022-05-27 PROCEDURE — 74011000258 HC RX REV CODE- 258: Performed by: ANESTHESIOLOGY

## 2022-05-27 PROCEDURE — 76010000220: Performed by: THORACIC SURGERY (CARDIOTHORACIC VASCULAR SURGERY)

## 2022-05-27 PROCEDURE — 77030010823: Performed by: THORACIC SURGERY (CARDIOTHORACIC VASCULAR SURGERY)

## 2022-05-27 PROCEDURE — 021209W BYPASS CORONARY ARTERY, THREE ARTERIES FROM AORTA WITH AUTOLOGOUS VENOUS TISSUE, OPEN APPROACH: ICD-10-PCS | Performed by: THORACIC SURGERY (CARDIOTHORACIC VASCULAR SURGERY)

## 2022-05-27 PROCEDURE — 77030008683 HC TU ET CUF COVD -A: Performed by: ANESTHESIOLOGY

## 2022-05-27 PROCEDURE — 77030025415: Performed by: THORACIC SURGERY (CARDIOTHORACIC VASCULAR SURGERY)

## 2022-05-27 PROCEDURE — 74011000250 HC RX REV CODE- 250: Performed by: THORACIC SURGERY (CARDIOTHORACIC VASCULAR SURGERY)

## 2022-05-27 PROCEDURE — 77030038692 HC WND DEB SYS IRMX -B: Performed by: THORACIC SURGERY (CARDIOTHORACIC VASCULAR SURGERY)

## 2022-05-27 PROCEDURE — 84484 ASSAY OF TROPONIN QUANT: CPT

## 2022-05-27 PROCEDURE — P9045 ALBUMIN (HUMAN), 5%, 250 ML: HCPCS | Performed by: PHYSICIAN ASSISTANT

## 2022-05-27 PROCEDURE — C1769 GUIDE WIRE: HCPCS | Performed by: ANESTHESIOLOGY

## 2022-05-27 PROCEDURE — 74011250636 HC RX REV CODE- 250/636

## 2022-05-27 PROCEDURE — 77030022199 HC SYS HARV VESL GTNG -G1: Performed by: THORACIC SURGERY (CARDIOTHORACIC VASCULAR SURGERY)

## 2022-05-27 PROCEDURE — 77030018836 HC SOL IRR NACL ICUM -A: Performed by: THORACIC SURGERY (CARDIOTHORACIC VASCULAR SURGERY)

## 2022-05-27 PROCEDURE — 77030026855 HC PNCH AORT MYO AEMC -B: Performed by: THORACIC SURGERY (CARDIOTHORACIC VASCULAR SURGERY)

## 2022-05-27 PROCEDURE — 85610 PROTHROMBIN TIME: CPT

## 2022-05-27 PROCEDURE — 76998 US GUIDE INTRAOP: CPT | Performed by: THORACIC SURGERY (CARDIOTHORACIC VASCULAR SURGERY)

## 2022-05-27 PROCEDURE — 74011000272 HC RX REV CODE- 272: Performed by: THORACIC SURGERY (CARDIOTHORACIC VASCULAR SURGERY)

## 2022-05-27 PROCEDURE — 81003 URINALYSIS AUTO W/O SCOPE: CPT

## 2022-05-27 PROCEDURE — P9045 ALBUMIN (HUMAN), 5%, 250 ML: HCPCS

## 2022-05-27 PROCEDURE — 5A1221Z PERFORMANCE OF CARDIAC OUTPUT, CONTINUOUS: ICD-10-PCS | Performed by: THORACIC SURGERY (CARDIOTHORACIC VASCULAR SURGERY)

## 2022-05-27 PROCEDURE — 77030019896 HC KT ARTERIAL LN TELE -B: Performed by: THORACIC SURGERY (CARDIOTHORACIC VASCULAR SURGERY)

## 2022-05-27 PROCEDURE — 74011250637 HC RX REV CODE- 250/637: Performed by: THORACIC SURGERY (CARDIOTHORACIC VASCULAR SURGERY)

## 2022-05-27 PROCEDURE — 82962 GLUCOSE BLOOD TEST: CPT

## 2022-05-27 PROCEDURE — 74011250636 HC RX REV CODE- 250/636: Performed by: THORACIC SURGERY (CARDIOTHORACIC VASCULAR SURGERY)

## 2022-05-27 PROCEDURE — 77030013079 HC BLNKT BAIR HGGR 3M -A: Performed by: ANESTHESIOLOGY

## 2022-05-27 PROCEDURE — 33518 CABG ARTERY-VEIN TWO: CPT | Performed by: THORACIC SURGERY (CARDIOTHORACIC VASCULAR SURGERY)

## 2022-05-27 PROCEDURE — 77030010938 HC CLP LIG TELE -A: Performed by: THORACIC SURGERY (CARDIOTHORACIC VASCULAR SURGERY)

## 2022-05-27 PROCEDURE — 77030002933 HC SUT MCRYL J&J -A: Performed by: THORACIC SURGERY (CARDIOTHORACIC VASCULAR SURGERY)

## 2022-05-27 PROCEDURE — 77030002912 HC SUT ETHBND J&J -A: Performed by: THORACIC SURGERY (CARDIOTHORACIC VASCULAR SURGERY)

## 2022-05-27 PROCEDURE — 77030026918 HC ADMN ST IV BLD BD -A: Performed by: ANESTHESIOLOGY

## 2022-05-27 PROCEDURE — 76060000051 HC ANESTHESIA 10 TO 10.5 HR: Performed by: THORACIC SURGERY (CARDIOTHORACIC VASCULAR SURGERY)

## 2022-05-27 PROCEDURE — 77030011244 HC DRN WND HUBLS J&J -B: Performed by: THORACIC SURGERY (CARDIOTHORACIC VASCULAR SURGERY)

## 2022-05-27 PROCEDURE — P9073 PLATELETS PHERESIS PATH REDU: HCPCS

## 2022-05-27 PROCEDURE — 74011636637 HC RX REV CODE- 636/637: Performed by: ANESTHESIOLOGY

## 2022-05-27 PROCEDURE — 77030008771 HC TU NG SALEM SUMP -A: Performed by: ANESTHESIOLOGY

## 2022-05-27 PROCEDURE — 77030028271 HC SRGFL HEMSTAT MTRX KT J&J -C: Performed by: THORACIC SURGERY (CARDIOTHORACIC VASCULAR SURGERY)

## 2022-05-27 PROCEDURE — 02100A9 BYPASS CORONARY ARTERY, ONE ARTERY FROM LEFT INTERNAL MAMMARY WITH AUTOLOGOUS ARTERIAL TISSUE, OPEN APPROACH: ICD-10-PCS | Performed by: THORACIC SURGERY (CARDIOTHORACIC VASCULAR SURGERY)

## 2022-05-27 PROCEDURE — 77030015683 HC ADPT CRDPLG MEDT -B: Performed by: THORACIC SURGERY (CARDIOTHORACIC VASCULAR SURGERY)

## 2022-05-27 PROCEDURE — 94002 VENT MGMT INPAT INIT DAY: CPT

## 2022-05-27 PROCEDURE — 71045 X-RAY EXAM CHEST 1 VIEW: CPT

## 2022-05-27 PROCEDURE — P9045 ALBUMIN (HUMAN), 5%, 250 ML: HCPCS | Performed by: ANESTHESIOLOGY

## 2022-05-27 PROCEDURE — 77030018390 HC SPNG HEMSTAT2 J&J -B: Performed by: THORACIC SURGERY (CARDIOTHORACIC VASCULAR SURGERY)

## 2022-05-27 PROCEDURE — 77030002986 HC SUT PROL J&J -A: Performed by: THORACIC SURGERY (CARDIOTHORACIC VASCULAR SURGERY)

## 2022-05-27 PROCEDURE — 83735 ASSAY OF MAGNESIUM: CPT

## 2022-05-27 PROCEDURE — C1751 CATH, INF, PER/CENT/MIDLINE: HCPCS | Performed by: ANESTHESIOLOGY

## 2022-05-27 PROCEDURE — 77030002982 HC SUT POLYSRB J&J -A: Performed by: THORACIC SURGERY (CARDIOTHORACIC VASCULAR SURGERY)

## 2022-05-27 PROCEDURE — 77030012407 HC DRN WND BARD -B: Performed by: THORACIC SURGERY (CARDIOTHORACIC VASCULAR SURGERY)

## 2022-05-27 PROCEDURE — 77030002996 HC SUT SLK J&J -A: Performed by: THORACIC SURGERY (CARDIOTHORACIC VASCULAR SURGERY)

## 2022-05-27 PROCEDURE — 36430 TRANSFUSION BLD/BLD COMPNT: CPT

## 2022-05-27 PROCEDURE — 74011250637 HC RX REV CODE- 250/637: Performed by: PHYSICIAN ASSISTANT

## 2022-05-27 PROCEDURE — 77030016037 HC MANFLD MULTI QUES -B: Performed by: ANESTHESIOLOGY

## 2022-05-27 PROCEDURE — 77030010929 HC CLMP VASC FGRTY EDWD -B: Performed by: THORACIC SURGERY (CARDIOTHORACIC VASCULAR SURGERY)

## 2022-05-27 PROCEDURE — 77030033036 HC BLWR MISTR ACUMIST MEDT -C: Performed by: THORACIC SURGERY (CARDIOTHORACIC VASCULAR SURGERY)

## 2022-05-27 PROCEDURE — 77030003390 HC NDL ANGI MRTM -A: Performed by: THORACIC SURGERY (CARDIOTHORACIC VASCULAR SURGERY)

## 2022-05-27 PROCEDURE — 77030014491 HC PLEDG PTFE BARD -A: Performed by: THORACIC SURGERY (CARDIOTHORACIC VASCULAR SURGERY)

## 2022-05-27 DEVICE — IMPLANTABLE DEVICE
Type: IMPLANTABLE DEVICE | Site: STERNUM | Status: FUNCTIONAL
Brand: STERNALOCK® BLU SYSTEM

## 2022-05-27 DEVICE — PLATE BNE 8 H X SHP STERNALOCK BLU PRI CLSR SYS: Type: IMPLANTABLE DEVICE | Site: STERNUM | Status: FUNCTIONAL

## 2022-05-27 DEVICE — K-WIRE BLUNT PERCUTANEOUS MIS
Type: IMPLANTABLE DEVICE | Site: STERNUM | Status: FUNCTIONAL
Brand: PROVIEW

## 2022-05-27 DEVICE — IMPLANTABLE DEVICE: Type: IMPLANTABLE DEVICE | Site: STERNUM | Status: FUNCTIONAL

## 2022-05-27 RX ORDER — ESMOLOL HYDROCHLORIDE 10 MG/ML
INJECTION INTRAVENOUS AS NEEDED
Status: DISCONTINUED | OUTPATIENT
Start: 2022-05-27 | End: 2022-05-27 | Stop reason: HOSPADM

## 2022-05-27 RX ORDER — SODIUM CHLORIDE 9 MG/ML
250 INJECTION, SOLUTION INTRAVENOUS AS NEEDED
Status: DISCONTINUED | OUTPATIENT
Start: 2022-05-27 | End: 2022-05-28

## 2022-05-27 RX ORDER — ALBUMIN HUMAN 50 G/1000ML
12.5 SOLUTION INTRAVENOUS
Status: DISCONTINUED | OUTPATIENT
Start: 2022-05-27 | End: 2022-05-28 | Stop reason: SDUPTHER

## 2022-05-27 RX ORDER — ALBUMIN HUMAN 50 G/1000ML
SOLUTION INTRAVENOUS
Status: COMPLETED
Start: 2022-05-27 | End: 2022-05-27

## 2022-05-27 RX ORDER — DEXTROSE MONOHYDRATE 100 MG/ML
0-250 INJECTION, SOLUTION INTRAVENOUS AS NEEDED
Status: DISCONTINUED | OUTPATIENT
Start: 2022-05-27 | End: 2022-05-28

## 2022-05-27 RX ORDER — ONDANSETRON 2 MG/ML
4 INJECTION INTRAMUSCULAR; INTRAVENOUS
Status: DISCONTINUED | OUTPATIENT
Start: 2022-05-27 | End: 2022-06-03 | Stop reason: HOSPADM

## 2022-05-27 RX ORDER — SODIUM CHLORIDE 0.9 % (FLUSH) 0.9 %
5-40 SYRINGE (ML) INJECTION AS NEEDED
Status: DISCONTINUED | OUTPATIENT
Start: 2022-05-27 | End: 2022-05-27

## 2022-05-27 RX ORDER — DOCUSATE SODIUM 100 MG/1
100 CAPSULE, LIQUID FILLED ORAL 2 TIMES DAILY
Status: DISCONTINUED | OUTPATIENT
Start: 2022-05-28 | End: 2022-06-03 | Stop reason: HOSPADM

## 2022-05-27 RX ORDER — PROPOFOL 10 MG/ML
INJECTION, EMULSION INTRAVENOUS
Status: COMPLETED
Start: 2022-05-27 | End: 2022-05-27

## 2022-05-27 RX ORDER — CALCIUM CHLORIDE INJECTION 100 MG/ML
INJECTION, SOLUTION INTRAVENOUS AS NEEDED
Status: DISCONTINUED | OUTPATIENT
Start: 2022-05-27 | End: 2022-05-27 | Stop reason: HOSPADM

## 2022-05-27 RX ORDER — PROPOFOL 10 MG/ML
0-50 VIAL (ML) INTRAVENOUS
Status: DISCONTINUED | OUTPATIENT
Start: 2022-05-27 | End: 2022-05-28

## 2022-05-27 RX ORDER — ACETAMINOPHEN 325 MG/1
650 TABLET ORAL
Status: DISCONTINUED | OUTPATIENT
Start: 2022-05-27 | End: 2022-05-29

## 2022-05-27 RX ORDER — VANCOMYCIN HYDROCHLORIDE 1 G/20ML
INJECTION, POWDER, LYOPHILIZED, FOR SOLUTION INTRAVENOUS AS NEEDED
Status: DISCONTINUED | OUTPATIENT
Start: 2022-05-27 | End: 2022-05-27 | Stop reason: HOSPADM

## 2022-05-27 RX ORDER — PROPOFOL 10 MG/ML
INJECTION, EMULSION INTRAVENOUS AS NEEDED
Status: DISCONTINUED | OUTPATIENT
Start: 2022-05-27 | End: 2022-05-27 | Stop reason: HOSPADM

## 2022-05-27 RX ORDER — NOREPINEPHRINE BITARTRATE/D5W 8 MG/250ML
2-16 PLASTIC BAG, INJECTION (ML) INTRAVENOUS
Status: DISCONTINUED | OUTPATIENT
Start: 2022-05-27 | End: 2022-05-28

## 2022-05-27 RX ORDER — MAGNESIUM SULFATE 100 %
4 CRYSTALS MISCELLANEOUS AS NEEDED
Status: DISCONTINUED | OUTPATIENT
Start: 2022-05-27 | End: 2022-05-28

## 2022-05-27 RX ORDER — SODIUM CHLORIDE 0.9 % (FLUSH) 0.9 %
5-40 SYRINGE (ML) INJECTION EVERY 8 HOURS
Status: DISCONTINUED | OUTPATIENT
Start: 2022-05-27 | End: 2022-05-27

## 2022-05-27 RX ORDER — MORPHINE SULFATE 2 MG/ML
2-4 INJECTION, SOLUTION INTRAMUSCULAR; INTRAVENOUS
Status: DISPENSED | OUTPATIENT
Start: 2022-05-27 | End: 2022-05-28

## 2022-05-27 RX ORDER — NITROGLYCERIN 40 MG/100ML
0-200 INJECTION INTRAVENOUS
Status: DISCONTINUED | OUTPATIENT
Start: 2022-05-27 | End: 2022-05-28

## 2022-05-27 RX ORDER — HEPARIN SODIUM 1000 [USP'U]/ML
INJECTION, SOLUTION INTRAVENOUS; SUBCUTANEOUS AS NEEDED
Status: DISCONTINUED | OUTPATIENT
Start: 2022-05-27 | End: 2022-05-27 | Stop reason: HOSPADM

## 2022-05-27 RX ORDER — BUPIVACAINE HYDROCHLORIDE 5 MG/ML
INJECTION, SOLUTION EPIDURAL; INTRACAUDAL AS NEEDED
Status: DISCONTINUED | OUTPATIENT
Start: 2022-05-27 | End: 2022-05-27 | Stop reason: HOSPADM

## 2022-05-27 RX ORDER — ENOXAPARIN SODIUM 100 MG/ML
40 INJECTION SUBCUTANEOUS EVERY 24 HOURS
Status: DISCONTINUED | OUTPATIENT
Start: 2022-05-29 | End: 2022-05-28

## 2022-05-27 RX ORDER — ATORVASTATIN CALCIUM 40 MG/1
80 TABLET, FILM COATED ORAL
Status: DISCONTINUED | OUTPATIENT
Start: 2022-05-27 | End: 2022-06-03 | Stop reason: HOSPADM

## 2022-05-27 RX ORDER — ALBUTEROL SULFATE 0.83 MG/ML
2.5 SOLUTION RESPIRATORY (INHALATION)
Status: DISCONTINUED | OUTPATIENT
Start: 2022-05-27 | End: 2022-06-03 | Stop reason: HOSPADM

## 2022-05-27 RX ORDER — METOPROLOL TARTRATE 25 MG/1
12.5 TABLET, FILM COATED ORAL EVERY 12 HOURS
Status: DISCONTINUED | OUTPATIENT
Start: 2022-05-28 | End: 2022-05-28

## 2022-05-27 RX ORDER — AMINOCAPROIC ACID 250 MG/ML
INJECTION, SOLUTION INTRAVENOUS AS NEEDED
Status: DISCONTINUED | OUTPATIENT
Start: 2022-05-27 | End: 2022-05-27 | Stop reason: HOSPADM

## 2022-05-27 RX ORDER — SODIUM CHLORIDE 9 MG/ML
INJECTION INTRAMUSCULAR; INTRAVENOUS; SUBCUTANEOUS
Status: DISCONTINUED
Start: 2022-05-27 | End: 2022-05-27

## 2022-05-27 RX ORDER — SODIUM CHLORIDE, SODIUM LACTATE, POTASSIUM CHLORIDE, CALCIUM CHLORIDE 600; 310; 30; 20 MG/100ML; MG/100ML; MG/100ML; MG/100ML
75 INJECTION, SOLUTION INTRAVENOUS CONTINUOUS
Status: DISCONTINUED | OUTPATIENT
Start: 2022-05-27 | End: 2022-05-27

## 2022-05-27 RX ORDER — ROCURONIUM BROMIDE 10 MG/ML
INJECTION, SOLUTION INTRAVENOUS AS NEEDED
Status: DISCONTINUED | OUTPATIENT
Start: 2022-05-27 | End: 2022-05-27 | Stop reason: HOSPADM

## 2022-05-27 RX ORDER — ACETAMINOPHEN 650 MG/1
SUPPOSITORY RECTAL
Status: DISCONTINUED
Start: 2022-05-27 | End: 2022-05-27

## 2022-05-27 RX ORDER — SUCCINYLCHOLINE CHLORIDE 20 MG/ML
INJECTION INTRAMUSCULAR; INTRAVENOUS AS NEEDED
Status: DISCONTINUED | OUTPATIENT
Start: 2022-05-27 | End: 2022-05-27 | Stop reason: HOSPADM

## 2022-05-27 RX ORDER — PAPAVERINE HYDROCHLORIDE 30 MG/ML
INJECTION INTRAMUSCULAR; INTRAVENOUS
Status: DISCONTINUED
Start: 2022-05-27 | End: 2022-05-27

## 2022-05-27 RX ORDER — PROTAMINE SULFATE 10 MG/ML
INJECTION, SOLUTION INTRAVENOUS AS NEEDED
Status: DISCONTINUED | OUTPATIENT
Start: 2022-05-27 | End: 2022-05-27 | Stop reason: HOSPADM

## 2022-05-27 RX ORDER — VANCOMYCIN HYDROCHLORIDE 1 G/20ML
INJECTION, POWDER, LYOPHILIZED, FOR SOLUTION INTRAVENOUS
Status: DISCONTINUED
Start: 2022-05-27 | End: 2022-05-27

## 2022-05-27 RX ORDER — CHLORHEXIDINE GLUCONATE 1.2 MG/ML
10 RINSE ORAL 2 TIMES DAILY
Status: DISCONTINUED | OUTPATIENT
Start: 2022-05-27 | End: 2022-06-03 | Stop reason: HOSPADM

## 2022-05-27 RX ORDER — SODIUM CHLORIDE 0.9 % (FLUSH) 0.9 %
5-40 SYRINGE (ML) INJECTION AS NEEDED
Status: DISCONTINUED | OUTPATIENT
Start: 2022-05-27 | End: 2022-06-03 | Stop reason: HOSPADM

## 2022-05-27 RX ORDER — SODIUM CHLORIDE 0.9 % (FLUSH) 0.9 %
5-40 SYRINGE (ML) INJECTION EVERY 8 HOURS
Status: DISCONTINUED | OUTPATIENT
Start: 2022-05-27 | End: 2022-06-03 | Stop reason: HOSPADM

## 2022-05-27 RX ORDER — ISOSORBIDE MONONITRATE 30 MG/1
30 TABLET, EXTENDED RELEASE ORAL DAILY
Status: DISCONTINUED | OUTPATIENT
Start: 2022-05-28 | End: 2022-05-30

## 2022-05-27 RX ORDER — AMIODARONE HYDROCHLORIDE 200 MG/1
200 TABLET ORAL 3 TIMES DAILY
Status: DISCONTINUED | OUTPATIENT
Start: 2022-05-28 | End: 2022-06-03 | Stop reason: HOSPADM

## 2022-05-27 RX ORDER — ALBUMIN HUMAN 50 G/1000ML
SOLUTION INTRAVENOUS AS NEEDED
Status: DISCONTINUED | OUTPATIENT
Start: 2022-05-27 | End: 2022-05-27 | Stop reason: HOSPADM

## 2022-05-27 RX ORDER — MUPIROCIN 20 MG/G
OINTMENT TOPICAL 2 TIMES DAILY
Status: COMPLETED | OUTPATIENT
Start: 2022-05-27 | End: 2022-06-01

## 2022-05-27 RX ORDER — DEXMEDETOMIDINE HYDROCHLORIDE 4 UG/ML
.2-.7 INJECTION, SOLUTION INTRAVENOUS
Status: DISCONTINUED | OUTPATIENT
Start: 2022-05-27 | End: 2022-05-28

## 2022-05-27 RX ORDER — VANCOMYCIN HYDROCHLORIDE 1 G/20ML
INJECTION, POWDER, LYOPHILIZED, FOR SOLUTION INTRAVENOUS
Status: DISPENSED
Start: 2022-05-27 | End: 2022-05-28

## 2022-05-27 RX ORDER — DEXAMETHASONE SODIUM PHOSPHATE 4 MG/ML
INJECTION, SOLUTION INTRA-ARTICULAR; INTRALESIONAL; INTRAMUSCULAR; INTRAVENOUS; SOFT TISSUE AS NEEDED
Status: DISCONTINUED | OUTPATIENT
Start: 2022-05-27 | End: 2022-05-27 | Stop reason: HOSPADM

## 2022-05-27 RX ORDER — SODIUM CHLORIDE 9 MG/ML
INJECTION, SOLUTION INTRAVENOUS
Status: DISCONTINUED | OUTPATIENT
Start: 2022-05-27 | End: 2022-05-27 | Stop reason: HOSPADM

## 2022-05-27 RX ORDER — POTASSIUM CHLORIDE 29.8 MG/ML
INJECTION INTRAVENOUS AS NEEDED
Status: DISCONTINUED | OUTPATIENT
Start: 2022-05-27 | End: 2022-05-27 | Stop reason: HOSPADM

## 2022-05-27 RX ORDER — VANCOMYCIN HYDROCHLORIDE 1 G/20ML
INJECTION, POWDER, LYOPHILIZED, FOR SOLUTION INTRAVENOUS
Status: DISPENSED
Start: 2022-05-27 | End: 2022-05-27

## 2022-05-27 RX ORDER — NITROGLYCERIN 40 MG/100ML
INJECTION INTRAVENOUS
Status: DISCONTINUED | OUTPATIENT
Start: 2022-05-27 | End: 2022-05-27 | Stop reason: HOSPADM

## 2022-05-27 RX ORDER — MIDAZOLAM HYDROCHLORIDE 1 MG/ML
INJECTION, SOLUTION INTRAMUSCULAR; INTRAVENOUS AS NEEDED
Status: DISCONTINUED | OUTPATIENT
Start: 2022-05-27 | End: 2022-05-27 | Stop reason: HOSPADM

## 2022-05-27 RX ORDER — VECURONIUM BROMIDE FOR INJECTION 1 MG/ML
INJECTION, POWDER, LYOPHILIZED, FOR SOLUTION INTRAVENOUS AS NEEDED
Status: DISCONTINUED | OUTPATIENT
Start: 2022-05-27 | End: 2022-05-27 | Stop reason: HOSPADM

## 2022-05-27 RX ORDER — HYDROCODONE BITARTRATE AND ACETAMINOPHEN 5; 325 MG/1; MG/1
1-2 TABLET ORAL
Status: DISCONTINUED | OUTPATIENT
Start: 2022-05-27 | End: 2022-05-29

## 2022-05-27 RX ORDER — PROPOFOL 10 MG/ML
VIAL (ML) INTRAVENOUS
Status: DISCONTINUED | OUTPATIENT
Start: 2022-05-27 | End: 2022-05-27 | Stop reason: HOSPADM

## 2022-05-27 RX ORDER — ACETAMINOPHEN 650 MG/1
SUPPOSITORY RECTAL AS NEEDED
Status: DISCONTINUED | OUTPATIENT
Start: 2022-05-27 | End: 2022-05-27 | Stop reason: HOSPADM

## 2022-05-27 RX ORDER — NALOXONE HYDROCHLORIDE 0.4 MG/ML
0.4 INJECTION, SOLUTION INTRAMUSCULAR; INTRAVENOUS; SUBCUTANEOUS AS NEEDED
Status: DISCONTINUED | OUTPATIENT
Start: 2022-05-27 | End: 2022-06-03 | Stop reason: HOSPADM

## 2022-05-27 RX ORDER — HEPARIN SODIUM 1000 [USP'U]/ML
INJECTION, SOLUTION INTRAVENOUS; SUBCUTANEOUS
Status: DISCONTINUED
Start: 2022-05-27 | End: 2022-05-27

## 2022-05-27 RX ORDER — BUPIVACAINE HYDROCHLORIDE 5 MG/ML
INJECTION, SOLUTION EPIDURAL; INTRACAUDAL
Status: DISCONTINUED
Start: 2022-05-27 | End: 2022-05-27

## 2022-05-27 RX ORDER — ASPIRIN 81 MG/1
81 TABLET ORAL DAILY
Status: DISCONTINUED | OUTPATIENT
Start: 2022-05-28 | End: 2022-06-03 | Stop reason: HOSPADM

## 2022-05-27 RX ORDER — SODIUM CHLORIDE 9 MG/ML
10 INJECTION, SOLUTION INTRAVENOUS CONTINUOUS
Status: DISCONTINUED | OUTPATIENT
Start: 2022-05-27 | End: 2022-05-28

## 2022-05-27 RX ORDER — FENTANYL CITRATE 50 UG/ML
INJECTION, SOLUTION INTRAMUSCULAR; INTRAVENOUS AS NEEDED
Status: DISCONTINUED | OUTPATIENT
Start: 2022-05-27 | End: 2022-05-27 | Stop reason: HOSPADM

## 2022-05-27 RX ADMIN — VECURONIUM BROMIDE 4 MG: 1 INJECTION, POWDER, LYOPHILIZED, FOR SOLUTION INTRAVENOUS at 08:32

## 2022-05-27 RX ADMIN — Medication 40 MCG/KG/MIN: at 20:03

## 2022-05-27 RX ADMIN — MUPIROCIN: 20 OINTMENT TOPICAL at 20:49

## 2022-05-27 RX ADMIN — SODIUM CHLORIDE: 9 INJECTION, SOLUTION INTRAVENOUS at 08:16

## 2022-05-27 RX ADMIN — VECURONIUM BROMIDE 2 MG: 1 INJECTION, POWDER, LYOPHILIZED, FOR SOLUTION INTRAVENOUS at 09:01

## 2022-05-27 RX ADMIN — CHLORHEXIDINE GLUCONATE 0.12% ORAL RINSE 10 ML: 1.2 LIQUID ORAL at 20:49

## 2022-05-27 RX ADMIN — SUCCINYLCHOLINE CHLORIDE 100 MG: 20 INJECTION, SOLUTION INTRAMUSCULAR; INTRAVENOUS at 08:28

## 2022-05-27 RX ADMIN — EPINEPHRINE 2 MCG/MIN: 1 INJECTION INTRAMUSCULAR; INTRAVENOUS; SUBCUTANEOUS at 15:31

## 2022-05-27 RX ADMIN — DESMOPRESSIN ACETATE 40 MCG: 4 SOLUTION INTRAVENOUS at 20:49

## 2022-05-27 RX ADMIN — VECURONIUM BROMIDE 1 MG: 1 INJECTION, POWDER, LYOPHILIZED, FOR SOLUTION INTRAVENOUS at 08:28

## 2022-05-27 RX ADMIN — Medication 5 UNITS/HR: at 12:09

## 2022-05-27 RX ADMIN — AMINOCAPROIC ACID 1 G/HR: 250 INJECTION, SOLUTION INTRAVENOUS at 12:00

## 2022-05-27 RX ADMIN — PROPOFOL 25 MCG/KG/MIN: 10 INJECTION, EMULSION INTRAVENOUS at 17:55

## 2022-05-27 RX ADMIN — ROCURONIUM BROMIDE 20 MG: 50 INJECTION INTRAVENOUS at 15:30

## 2022-05-27 RX ADMIN — ALBUMIN (HUMAN) 12.5 G: 12.5 INJECTION, SOLUTION INTRAVENOUS at 19:00

## 2022-05-27 RX ADMIN — SODIUM CHLORIDE 10 ML/HR: 9 INJECTION, SOLUTION INTRAVENOUS at 20:51

## 2022-05-27 RX ADMIN — FENTANYL CITRATE 100 MCG: 50 INJECTION, SOLUTION INTRAMUSCULAR; INTRAVENOUS at 08:56

## 2022-05-27 RX ADMIN — FAMOTIDINE 20 MG: 10 INJECTION, SOLUTION INTRAVENOUS at 20:49

## 2022-05-27 RX ADMIN — ESMOLOL HYDROCHLORIDE 10 MG: 10 INJECTION, SOLUTION INTRAVENOUS at 08:28

## 2022-05-27 RX ADMIN — ESMOLOL HYDROCHLORIDE 10 MG: 10 INJECTION, SOLUTION INTRAVENOUS at 08:24

## 2022-05-27 RX ADMIN — FENTANYL CITRATE 50 MCG: 50 INJECTION, SOLUTION INTRAMUSCULAR; INTRAVENOUS at 17:23

## 2022-05-27 RX ADMIN — FENTANYL CITRATE 50 MCG: 50 INJECTION, SOLUTION INTRAMUSCULAR; INTRAVENOUS at 09:50

## 2022-05-27 RX ADMIN — VECURONIUM BROMIDE 3 MG: 1 INJECTION, POWDER, LYOPHILIZED, FOR SOLUTION INTRAVENOUS at 13:45

## 2022-05-27 RX ADMIN — PROPOFOL 40 MCG/KG/MIN: 10 INJECTION, EMULSION INTRAVENOUS at 20:03

## 2022-05-27 RX ADMIN — POTASSIUM CHLORIDE 20 MEQ: 29.8 INJECTION, SOLUTION INTRAVENOUS at 17:03

## 2022-05-27 RX ADMIN — FENTANYL CITRATE 50 MCG: 50 INJECTION, SOLUTION INTRAMUSCULAR; INTRAVENOUS at 11:31

## 2022-05-27 RX ADMIN — ALBUMIN (HUMAN) 250 ML: 12.5 SOLUTION INTRAVENOUS at 15:38

## 2022-05-27 RX ADMIN — VANCOMYCIN HYDROCHLORIDE 1 G: 1 INJECTION, POWDER, LYOPHILIZED, FOR SOLUTION INTRAVENOUS at 08:16

## 2022-05-27 RX ADMIN — VECURONIUM BROMIDE 2 MG: 1 INJECTION, POWDER, LYOPHILIZED, FOR SOLUTION INTRAVENOUS at 10:51

## 2022-05-27 RX ADMIN — HYDROCODONE BITARTRATE AND ACETAMINOPHEN 2 TABLET: 5; 325 TABLET ORAL at 23:48

## 2022-05-27 RX ADMIN — PROPOFOL 160 MG: 10 INJECTION, EMULSION INTRAVENOUS at 08:28

## 2022-05-27 RX ADMIN — NITROGLYCERIN 40 MCG/MIN: 40 INJECTION INTRAVENOUS at 20:01

## 2022-05-27 RX ADMIN — AMINOCAPROIC ACID 5 G: 250 INJECTION, SOLUTION INTRAVENOUS at 12:00

## 2022-05-27 RX ADMIN — CALCIUM CHLORIDE 0.5 G: 100 INJECTION INTRAVENOUS; INTRAVENTRICULAR at 16:40

## 2022-05-27 RX ADMIN — MIDAZOLAM HYDROCHLORIDE 1 MG: 2 INJECTION, SOLUTION INTRAMUSCULAR; INTRAVENOUS at 12:47

## 2022-05-27 RX ADMIN — VECURONIUM BROMIDE 1 MG: 1 INJECTION, POWDER, LYOPHILIZED, FOR SOLUTION INTRAVENOUS at 10:43

## 2022-05-27 RX ADMIN — VECURONIUM BROMIDE 1 MG: 1 INJECTION, POWDER, LYOPHILIZED, FOR SOLUTION INTRAVENOUS at 14:41

## 2022-05-27 RX ADMIN — FENTANYL CITRATE 100 MCG: 50 INJECTION, SOLUTION INTRAMUSCULAR; INTRAVENOUS at 08:28

## 2022-05-27 RX ADMIN — SODIUM CHLORIDE, PRESERVATIVE FREE 10 ML: 5 INJECTION INTRAVENOUS at 22:00

## 2022-05-27 RX ADMIN — HEPARIN SODIUM 31000 UNITS: 1000 INJECTION, SOLUTION INTRAVENOUS; SUBCUTANEOUS at 11:43

## 2022-05-27 RX ADMIN — FENTANYL CITRATE 50 MCG: 50 INJECTION, SOLUTION INTRAMUSCULAR; INTRAVENOUS at 09:36

## 2022-05-27 RX ADMIN — ALBUMIN (HUMAN) 12.5 G: 12.5 INJECTION, SOLUTION INTRAVENOUS at 22:30

## 2022-05-27 RX ADMIN — DEXAMETHASONE SODIUM PHOSPHATE 4 MG: 4 INJECTION, SOLUTION INTRAMUSCULAR; INTRAVENOUS at 10:39

## 2022-05-27 RX ADMIN — NITROGLYCERIN 10 MCG/MIN: 40 INJECTION INTRAVENOUS at 08:16

## 2022-05-27 RX ADMIN — ROCURONIUM BROMIDE 20 MG: 50 INJECTION INTRAVENOUS at 16:18

## 2022-05-27 RX ADMIN — FENTANYL CITRATE 100 MCG: 50 INJECTION, SOLUTION INTRAMUSCULAR; INTRAVENOUS at 10:00

## 2022-05-27 RX ADMIN — VECURONIUM BROMIDE 2 MG: 1 INJECTION, POWDER, LYOPHILIZED, FOR SOLUTION INTRAVENOUS at 11:31

## 2022-05-27 RX ADMIN — ROCURONIUM BROMIDE 10 MG: 50 INJECTION INTRAVENOUS at 17:10

## 2022-05-27 RX ADMIN — CALCIUM CHLORIDE 0.5 G: 100 INJECTION INTRAVENOUS; INTRAVENTRICULAR at 17:32

## 2022-05-27 RX ADMIN — MIDAZOLAM HYDROCHLORIDE 1 MG: 2 INJECTION, SOLUTION INTRAMUSCULAR; INTRAVENOUS at 18:32

## 2022-05-27 RX ADMIN — FENTANYL CITRATE 50 MCG: 50 INJECTION, SOLUTION INTRAMUSCULAR; INTRAVENOUS at 09:43

## 2022-05-27 RX ADMIN — PROTAMINE SULFATE 250 MG: 10 INJECTION, SOLUTION INTRAVENOUS at 16:39

## 2022-05-27 RX ADMIN — HEPARIN SODIUM 2000 UNITS: 1000 INJECTION, SOLUTION INTRAVENOUS; SUBCUTANEOUS at 10:48

## 2022-05-27 RX ADMIN — MIDAZOLAM HYDROCHLORIDE 2 MG: 2 INJECTION, SOLUTION INTRAMUSCULAR; INTRAVENOUS at 08:16

## 2022-05-27 RX ADMIN — VECURONIUM BROMIDE 2 MG: 1 INJECTION, POWDER, LYOPHILIZED, FOR SOLUTION INTRAVENOUS at 09:39

## 2022-05-27 RX ADMIN — ATORVASTATIN CALCIUM 80 MG: 40 TABLET, FILM COATED ORAL at 22:31

## 2022-05-27 RX ADMIN — PHENYLEPHRINE HYDROCHLORIDE 10 MCG/MIN: 10 INJECTION INTRAVENOUS at 10:16

## 2022-05-27 RX ADMIN — VECURONIUM BROMIDE 2 MG: 1 INJECTION, POWDER, LYOPHILIZED, FOR SOLUTION INTRAVENOUS at 12:47

## 2022-05-27 RX ADMIN — FENTANYL CITRATE 100 MCG: 50 INJECTION, SOLUTION INTRAMUSCULAR; INTRAVENOUS at 09:40

## 2022-05-27 RX ADMIN — VANCOMYCIN HYDROCHLORIDE 1000 MG: 1 INJECTION, POWDER, LYOPHILIZED, FOR SOLUTION INTRAVENOUS at 22:31

## 2022-05-27 RX ADMIN — HEPARIN SODIUM 5000 UNITS: 1000 INJECTION, SOLUTION INTRAVENOUS; SUBCUTANEOUS at 12:33

## 2022-05-27 RX ADMIN — EPINEPHRINE 1 MCG/MIN: 1 INJECTION INTRAMUSCULAR; INTRAVENOUS; SUBCUTANEOUS at 20:11

## 2022-05-27 RX ADMIN — HEPARIN SODIUM 5000 UNITS: 1000 INJECTION, SOLUTION INTRAVENOUS; SUBCUTANEOUS at 12:00

## 2022-05-27 RX ADMIN — CALCIUM CHLORIDE 0.5 G: 100 INJECTION INTRAVENOUS; INTRAVENTRICULAR at 16:24

## 2022-05-27 RX ADMIN — FENTANYL CITRATE 100 MCG: 50 INJECTION, SOLUTION INTRAMUSCULAR; INTRAVENOUS at 09:45

## 2022-05-27 NOTE — ANESTHESIA PROCEDURE NOTES
Central Line Placement    Start time: 5/27/2022 8:35 AM  End time: 5/27/2022 8:45 AM  Performed by: Nomi Waller MD  Authorized by: Nomi Waller MD     Indications: vascular access, central pressure monitoring and need for vasopressors  Preanesthetic Checklist: patient identified, risks and benefits discussed, anesthesia consent, site marked, patient being monitored and timeout performed    Timeout Time: 08:35 EDT       Pre-procedure: All elements of maximal sterile barrier technique followed? Yes    2% Chlorhexidine for cutaneous antisepsis, Hand hygiene performed prior to catheter insertion and Ultrasound guidance    Sterile Ultrasound Technique followed?: Yes            Procedure:   Prep:  ChloraPrep  Location: internal jugular  Orientation:  Right  Patient position:  Trendelenburg  Number of lumens: Cordis.       Number of attempts:  1  Successful placement: Yes      Assessment:   Post-procedure:  Catheter secured, sterile dressing applied and sterile dressing with CHG applied  Assessment:  Free fluid flow, blood return through all ports and guidewire removal verified  Insertion:  Uncomplicated  Patient tolerance:  Patient tolerated the procedure well with no immediate complications

## 2022-05-27 NOTE — ROUTINE PROCESS
TRANSFER - OUT REPORT:    Verbal report given to Magnus(name) sharla Burnette  being transferred to CVT(unit) for ordered procedure       Report consisted of patients Situation, Background, Assessment and   Recommendations(SBAR). Information from the following report(s) SBAR was reviewed with the receiving nurse. Lines:   Peripheral IV 05/27/22 Anterior;Right Forearm (Active)   Site Assessment Clean, dry, & intact 05/27/22 0710   Phlebitis Assessment 0 05/27/22 0710   Infiltration Assessment 0 05/27/22 0710   Dressing Status Clean, dry, & intact 05/27/22 0710   Dressing Type Transparent;Tape 05/27/22 0710   Hub Color/Line Status Pink 05/27/22 0710   Alcohol Cap Used No 05/27/22 0710        Opportunity for questions and clarification was provided.       Patient transported with:   [employer]

## 2022-05-27 NOTE — ANESTHESIA PREPROCEDURE EVALUATION
Anesthetic History   No history of anesthetic complications            Review of Systems / Medical History  Patient summary reviewed, nursing notes reviewed and pertinent labs reviewed    Pulmonary          Shortness of breath         Neuro/Psych             Comments: 05/2022 b/l internal carotid less than 50% stenosis Cardiovascular    Hypertension    Angina      CAD and hyperlipidemia      Comments: 05/2022 ECHO  estimated EF of 55 - 60%.    GI/Hepatic/Renal  Within defined limits              Endo/Other        Obesity and arthritis     Other Findings              Physical Exam    Airway  Mallampati: II  TM Distance: 4 - 6 cm  Neck ROM: normal range of motion   Mouth opening: Normal     Cardiovascular    Rhythm: regular           Dental    Dentition: Poor dentition     Pulmonary  Breath sounds clear to auscultation               Abdominal  GI exam deferred       Other Findings            Anesthetic Plan    ASA: 4  Anesthesia type: general    Monitoring Plan: Arterial line, BIS, Continuous noninvasive hemodynamic monitoring, CVP, [place] and MACIE    Post procedure ventilation     Anesthetic plan and risks discussed with: Patient

## 2022-05-27 NOTE — OP NOTES
Date of Procedure: 5/27/2022      Pre-Op Diagnosis: Coronary artery disease involving native coronary artery of native heart with unstable angina pectoris .     Post-Op Diagnosis: Same as preoperative diagnosis.        Indication for procedure:  Ms. Raz Price is a pleasant 46year old woman with exertional and rest chest discomfort in class # NYHA class 3, with   Cardiac diastolic dysfunction,  Hypertension  increased BMI  Hyperlipidemia  Hypercholesterolemia  Prior NSTEMI 2015  Prior RCA stent by Dr. Pippa Hernandez in 2015  She was to have returned for subsequent lad stent. STS score [facility]    Procedure(s):  CORONARY ARTERY BYPASS GRAFT (CABG) TIMES 4  Epiaortic Ultrasound  RGSVG-pda  LRA-OM2  RGSVG-D2  CABG x 4  Lima-lad  Sternal plating x 3    Drains:   Left pleural #28 Bennie  Left mediastinal #28 Bennie along diaphragmatic surface  Right mediastinal #28 [place]      Bipolar V wires, work right before leaving the OR.     CPBT= 209  CCT= 139     Findings:  Ozing from skin incision with properative platelet use. No TEG available. She stopped plavix 5 days ago. Epiaortic US demonstrated a minimally calcified ascending aorta with adequate sites for cannulation. Pda was 1 mm in size. The PDA target and diagonal targets appeared free of calcium. The mammary artery was 1 mm and adequate length without fasciotomies. Diagonal was small more calcified at 0.75 mm in size. Left anterior descending artery was calcified  midway not requiring an endarterectomy. It was 1 mm in size. SVG was 6-7 mm in size , nonaneurysmal.              Left radial artery was 3 mm in size     Surgeon(s):  Yessy Taylor MD     Surgical Assistant: Physician Assistant: Mr. Rakel [facility].     The PA was critical for harvest of graft conduit including SVG as well as left radial artery, assistance with construction of each anastomosis, and closure of the wound and sternal plates.     Surg Asst-1: Bharati Staples     Anesthesia: Mara Song.     Perfusion: Bhaskar Escobar     Circulator:    Magnus Rose     The patient was brought to the operating room. All pressure points were padded. Right radial arterial line, right internal jugular Charlotte-Cassie catheter and Trujillo catheter were all placed prior to starting. General anesthesia was induced. Echo was reviewed with the anesthesiologist.  Ntg drip was running prior to the harvest of the left radial artery to prevent vasospasm and throughout the entire case into the ICU.     WHO timeout was performed at which time we identified the patient as Ms. Nelida Yates who was in the proper position with proper imaging available and displayed. Intravenous antibiotics were administered prior to skin incision.     She was initially very hypertensive almost 200/110 and came down easily with medications for sternotomy as well as cannulation. Simultaneous median sternotomy and left radial as well as left leg endoscopic vein harvesting was performed   A plane was able to be formed into the left pleural space. Left internal mammary artery was 1 mm in size in the proximal half with good flow  No fasciotomies were performed in the [facility] to make adequate length. Prior to division of the left internal mammary artery, only 3000 units of heparin was administered rather than full dose as patient was oozy and leg vein was in process of being harvested. The left internal mammary artery was divided proximal to its bifurcation in order to maintain chest wall perfusion through the musculophrenic and superior epigastric arteries and minimize chance of sternal wound infection. The distal end of the left internal mammary artery was only 1 mm in size and was trimmed and prepared. A single left sided #28 [place] chest tube was placed in the left costophrenic gutter.     Epiaortic ultrasound demonstrated minimal calcification in the ascending aorta.     The patient was then fully heparinized and once adequate ACT was achieved at 450, the patient was cannulated in the ascending aorta and the right atrium. The ascending aorta was very short and posterior. Due to her short stature, 3 stage as well as 2 stage would entirely fit into her right atrium. Bicaval cannulation was performed with 2 single stages. Retrograde and antegrade and vent cannulas were also placed. Patient was not able to undergo arterial and venous rapping prior to commencing cardiopulmonary bypass. Vacuum assist was used during the entire procedure.     CPB was required to place the retrograde cannulae. Inspecy\tion of targets demonstrated, the pda appeared to be 1 mm. The diagonal was smaller than 1 mm. The obtuse marginal artery was 1 mm  Calcification was palpable in the mid lad region and free of calcium after that and 1 mm in size.     To arrest the heart, 1 liter of antegrade cardioplegia and 500 cc of retrograde cold Buckberg 1:4 cardioplegia were administered. arrest occurred at 150 cc. With administration of cardioplegia there was no left ventricular distention and manual palpation prevented this. There was no aortic regurgitation preoperatively. The vein graft was inspected and required 1 repair stitch midway; this was ultimately excluded. The left radial artery was inspected and did not need repair; multiple side branches were clipped. The left internal mammary artery distal tip was prepared and a vein midway was later clipped.      Initial bypass was performed with 7-0 Prolene end-to-side from RSVG to the PDA followed by proximal anastomosis with 6-0 Prolene to the ascending aorta. Proximal anastomosis to the right hand side of the ascending aorta was performed with 6-0 prolene.   Antegrade cardioplegia was administered after each distal and retrograde after each proximal anastomosis and/or every 20 minutes.     This was followed by distal anastomosis to the second obtuse marginal artery with the radial artery with 7-0 prolene and 6-0 proximal anastomosis to the left lateral aspect of the ascending leigh. There was not sufficient room for on the ascending aorta for the diagonal graft, thus the proximal site was used from the aortic vent site. The diagonal artery was anastomosed with 7-0 Prolene followed by proximal anastomosis on the aortic vent site of the ascending aorta with 6-0 Prolene. This was very close to the aortic crossclamp due to her very short and posterior ascending aorta. Due to insufficient room to visualize the ascending aorta, stays were placed on the rvot and pulmonary artery for retraction.     Once hemostasis was secured, the patient was placed in the headdown position, with the aortic vent was on full, and a 250 cc of hotshot dose was administered retrograde in order to clear the ascending aorta of any air and minimize risk of stroke. After this, pump flows were lowered and the aortic cross-clamp was removed and bypass flows reestablished with no dropped in mean arterial pressures. CCT was 139 min. Patient resumed NSR spontaneously. The patient was weaned from CPB without difficulty. There was no air on MACIE. Standard V wires were placed on the diaphragmatic surface of the right ventricle. W wires work. No atrial wires were placed. The mediastinum was policed for bleeding. I repair stitch was placed in the distal pda anastomosis and proximal radial anastomosis which required the partial aortic crossclamp.   Repair stitch was placed into of the proximals RCA and diagonal anastomoses.     Mediastinal chest tubes were placed.     Reinspection for hemostasis from the LIMA harvest bed the periosteum was reinspected and cauterized.  Thymus was reinspected for bleeding hemostasis was controlled there was no bleeding from the chest tube sites nor the pacing wire sites.  Portions of Surgicel were placed around the distals as well as the proximals as well as floseal.    Stasis on the chest wall was obtained.     The sternum was closed in the usual manner with the assistants.  Sternum was also very oozy. 1 Pack platelets was administered. Vancopaste was applied. Due to the DM, BMI and use of arms taking care of her , we decided to plate her sternum with 3 plates. A mixture of Exparel and bupivacaine was administered into the chest tube sites and sternum.    The patient remained in normal sinus rhythm during the case there was no ventricular fibrillation or ventricular tachycardia.     Patient was transferred to the ICU in normal sinus rhythm not paced. She was on 0.009 mcg/kg/min of epinephrine for known preoperative diastolic dysfunction and stunning, mtg drip 30 for left radial.     Shee tolerated test dose of protamine and administration of protamine without any adverse sequelae. Aortic cannula was removed at half heparin. Due to preoperative plavix use and not TEG and visible oozing from beginning platelet transfusion was administered. Due to increeased BMI, DM and concern for arm use sternal plates were used, 1 in the manubrium, 1 body of the sternum, and 1 xyphoid.   I was present for the entire case.     Estimated Blood Loss (mL):  cell saver     Complications: none     Specimens:  n/a    Implants: none

## 2022-05-27 NOTE — PROGRESS NOTES
conducted a pre-surgery visit with Echo Finch, who is a 46 y.o.,female. The  provided the following Interventions:  Initiated a relationship of care and support. Offered prayer and assurance of continued prayers on patient's behalf. There is no advance directive present. Plan:  Chaplains will continue to follow and will provide pastoral care on an as needed/requested basis.  recommends bedside caregivers page  on duty if patient shows signs of acute spiritual or emotional distress.   Reiseñor 3   Board Certified [address] [place] [address]   [phone]

## 2022-05-27 NOTE — ROUTINE PROCESS
TRANSFER - OUT REPORT:    Verbal report given to Lori Davis RN on Echo Finch  being transferred to CVT ICU for routine post - op       Report consisted of patients Situation, Background, Assessment and   Recommendations(SBAR). Information from the following report(s) SBAR, Kardex, OR Summary, Procedure Summary, MAR and Recent Results was reviewed with the receiving nurse. Lines:   Peripheral IV 05/27/22 Anterior;Right Forearm (Active)   Site Assessment Clean, dry, & intact 05/27/22 1221   Phlebitis Assessment 0 05/27/22 1221   Infiltration Assessment 0 05/27/22 0710   Dressing Status Clean, dry, & intact 05/27/22 1221   Dressing Type Transparent 05/27/22 1221   Hub Color/Line Status Pink 05/27/22 1221   Alcohol Cap Used No 05/27/22 0710       Arterial Line 05/27/22 (Active)   Site Assessment Clean, dry, & intact 05/27/22 1221   Dressing Status Clean, dry, & intact 05/27/22 1221   Dressing Type Transparent;Disk with Chlorhexadine gluconate (CHG) 05/27/22 1221   Treatment Arm board on 05/27/22 1221        Opportunity for questions and clarification was provided.       Patient transported with:   Monitor  Registered Nurse

## 2022-05-27 NOTE — PROGRESS NOTES
05/27/22 1857   Patient Observations   Resp Rate (!) 32   Airway - Endotracheal Tube 05/27/22 Oral   Placement Date/Time: 05/27/22 0830   Number of Attempts: 1  Inserted By: Jaz Zavala  Location: Oral  Placement Verified: BBS;EtCO2; Auscultation  Airway Types: Endotracheal, cuffed  Airway Tube Size: 7.5 mm  DL Glottic View: 2  Technique: Direct Laryngoscop. ..    Insertion Depth (cm) 26 cm   Line Dennis Lips   Side Secured Left   Cuff Pressure   (n/a)   Site Assessment Clean, dry, & intact   Ventilator Initiate/Discontinue   Ventilator Initiate Yes   Bio-Med ID # [id number]   Vent Settings   FIO2 (%) 60 %  (Per ABG)   CMV Rate Set 20  (Per ABG)   Back-Up Rate 20   Vt Set (ml) 400 ml  (Per ABG)   PEEP/VENT (cm H2O) 5 cm H20   Insp Time (sec) 0.9 sec   Insp Rise Time (sec) 50   Flow Trigger 3   Ventilator Measurements   Resp Rate Observed 32   Vt Exhaled (Machine Breath) (ml) 452 ml   Ve Observed (l/min) 11 l/min   PIP Observed (cm H2O) 17 cm H2O   Plateau Pressure (cm H2O) 10 cm H2O   Driving Pressure 5 PAC6L   MAP (cm H2O) 11   I:E Ratio Actual 1:1.1   Static Compliance (ml/cm H20) 0 ml/cm H20   Safety & Alarms   Circuit Temperature   (HME)   Backup Mode Checked/Apnea Yes   Pressure Max 40 cm H2O   Pressure Min 11 cm H2O   Ve Min 4   Ve Max 20   Vt Min 200 ml   Vt Max 1000 ml   RR Max 45   Ambu Bag Yes   Ambu Mask Yes   Weaning Parameters   Spontaneous Breathing Trial Complete No (Comments)   Age Specific Ventilator Associated Pneumonia Bundle   Patient Age Group Adult   Adult Ventilator Associated Pneumonia Bundle   Elevation of Head to 30-45 Degrees (Unless Contraindicated) Yes   Vent Method/Mode   Ventilation Method Conventional   Ventilator Mode VC+   Ventilator Mode ID [id number]   $$ Ventilator Initial Initial Vent Day

## 2022-05-27 NOTE — ANESTHESIA PROCEDURE NOTES
Arterial Line Placement    Start time: 5/27/2022 8:34 AM  End time: 5/27/2022 8:44 AM  Performed by: Mila Villanueva  Authorized by: Ashley Self MD     Pre-Procedure  Indications:  Arterial pressure monitoring and blood sampling  Preanesthetic Checklist: patient identified, risks and benefits discussed, anesthesia consent, site marked, patient being monitored, timeout performed and patient being monitored    Timeout Time: 08:34 EDT        Procedure:   Prep:  ChloraPrep  Seldinger Technique?: Yes    Orientation:  Right  Location:  Radial artery  Catheter size:  20 G  Number of attempts:  1  Cont Cardiac Output Sensor: No      Assessment:   Post-procedure:  Line secured and sterile dressing applied  Patient Tolerance:  Patient tolerated the procedure well with no immediate complications

## 2022-05-27 NOTE — PROGRESS NOTES
5910- Patient arrived from OR. Epi @1, Du @ 15, Nitro @ 20, propofol @ 25. SR intrinsic rate in the 70's. A-line in place, 's. CT x 3 draining. Dressings clean, dry and intact. [place] sent. 1850- Blood gas drawn. Vent adjustments per RT.   1900- Nohemi, RN and Asaf Keen RN (oncoming RN) at bedside. Bedside shift report given along with oncoming RN's assisting with patient care. 1927- Platelets started. 1936- X-ray at bedside. [facility] to [address], RN and Asaf Keen RN.

## 2022-05-28 ENCOUNTER — APPOINTMENT (OUTPATIENT)
Dept: GENERAL RADIOLOGY | Age: 52
DRG: 166 | End: 2022-05-28
Attending: PHYSICIAN ASSISTANT
Payer: MEDICAID

## 2022-05-28 LAB
ADMINISTERED INITIALS, ADMINIT: NORMAL
ALBUMIN SERPL-MCNC: 2.9 G/DL (ref 3.4–5)
ALBUMIN/GLOB SERPL: 1.2 {RATIO} (ref 0.8–1.7)
ALP SERPL-CCNC: 51 U/L (ref 45–117)
ALT SERPL-CCNC: 43 U/L (ref 13–56)
ANION GAP SERPL CALC-SCNC: 9 MMOL/L (ref 3–18)
AST SERPL-CCNC: 129 U/L (ref 10–38)
ATRIAL RATE: 88 BPM
BASE DEFICIT BLDV-SCNC: 1.2 MMOL/L
BASE EXCESS BLD CALC-SCNC: 0 MMOL/L
BILIRUB DIRECT SERPL-MCNC: <0.1 MG/DL (ref 0–0.2)
BILIRUB SERPL-MCNC: 0.2 MG/DL (ref 0.2–1)
BLD PROD TYP BPU: NORMAL
BPU ID: NORMAL
BUN SERPL-MCNC: 10 MG/DL (ref 7–18)
BUN/CREAT SERPL: 12 (ref 12–20)
CALCIUM SERPL-MCNC: 8.3 MG/DL (ref 8.5–10.1)
CALCULATED P AXIS, ECG09: 33 DEGREES
CALCULATED R AXIS, ECG10: 9 DEGREES
CALCULATED T AXIS, ECG11: -2 DEGREES
CALLED TO:,BCALL1: NORMAL
CALLED TO:,BCALL1: NORMAL
CHLORIDE SERPL-SCNC: 111 MMOL/L (ref 100–111)
CO2 SERPL-SCNC: 24 MMOL/L (ref 21–32)
CREAT SERPL-MCNC: 0.85 MG/DL (ref 0.6–1.3)
D50 ADMINISTERED, D50ADM: 0 ML
D50 ORDER, D50ORD: 0 ML
DIAGNOSIS, 93000: NORMAL
ERYTHROCYTE [DISTWIDTH] IN BLOOD BY AUTOMATED COUNT: 14.6 % (ref 11.6–14.5)
EST. AVERAGE GLUCOSE BLD GHB EST-MCNC: 123 MG/DL
GLOBULIN SER CALC-MCNC: 2.4 G/DL (ref 2–4)
GLUCOSE BLD STRIP.AUTO-MCNC: 100 MG/DL (ref 70–110)
GLUCOSE BLD STRIP.AUTO-MCNC: 101 MG/DL (ref 70–110)
GLUCOSE BLD STRIP.AUTO-MCNC: 101 MG/DL (ref 70–110)
GLUCOSE BLD STRIP.AUTO-MCNC: 102 MG/DL (ref 70–110)
GLUCOSE BLD STRIP.AUTO-MCNC: 105 MG/DL (ref 70–110)
GLUCOSE BLD STRIP.AUTO-MCNC: 106 MG/DL (ref 70–110)
GLUCOSE BLD STRIP.AUTO-MCNC: 109 MG/DL (ref 70–110)
GLUCOSE BLD STRIP.AUTO-MCNC: 110 MG/DL (ref 70–110)
GLUCOSE BLD STRIP.AUTO-MCNC: 111 MG/DL (ref 70–110)
GLUCOSE BLD STRIP.AUTO-MCNC: 116 MG/DL (ref 70–110)
GLUCOSE BLD STRIP.AUTO-MCNC: 123 MG/DL (ref 70–110)
GLUCOSE BLD STRIP.AUTO-MCNC: 129 MG/DL (ref 70–110)
GLUCOSE BLD STRIP.AUTO-MCNC: 153 MG/DL (ref 70–110)
GLUCOSE BLD STRIP.AUTO-MCNC: 156 MG/DL (ref 70–110)
GLUCOSE BLD STRIP.AUTO-MCNC: 161 MG/DL (ref 70–110)
GLUCOSE BLD STRIP.AUTO-MCNC: 88 MG/DL (ref 70–110)
GLUCOSE BLD STRIP.AUTO-MCNC: 98 MG/DL (ref 70–110)
GLUCOSE SERPL-MCNC: 139 MG/DL (ref 74–99)
GLUCOSE, GLC: 100 MG/DL
GLUCOSE, GLC: 101 MG/DL
GLUCOSE, GLC: 101 MG/DL
GLUCOSE, GLC: 102 MG/DL
GLUCOSE, GLC: 105 MG/DL
GLUCOSE, GLC: 106 MG/DL
GLUCOSE, GLC: 109 MG/DL
GLUCOSE, GLC: 111 MG/DL
GLUCOSE, GLC: 116 MG/DL
GLUCOSE, GLC: 123 MG/DL
GLUCOSE, GLC: 129 MG/DL
GLUCOSE, GLC: 135 MG/DL
GLUCOSE, GLC: 153 MG/DL
GLUCOSE, GLC: 156 MG/DL
GLUCOSE, GLC: 161 MG/DL
GLUCOSE, GLC: 88 MG/DL
GLUCOSE, GLC: 98 MG/DL
HBA1C MFR BLD: 5.9 % (ref 4.2–5.6)
HCO3 BLD-SCNC: 24.5 MMOL/L (ref 22–26)
HCO3 BLDV-SCNC: 23.9 MMOL/L (ref 23–28)
HCT VFR BLD AUTO: 28.4 % (ref 35–45)
HGB BLD-MCNC: 9.4 G/DL (ref 12–16)
HIGH TARGET, HITG: 150 MG/DL
INSULIN ADMINSTERED, INSADM: 2 UNITS/HOUR
INSULIN ADMINSTERED, INSADM: 2.7 UNITS/HOUR
INSULIN ADMINSTERED, INSADM: 2.8 UNITS/HOUR
INSULIN ADMINSTERED, INSADM: 2.9 UNITS/HOUR
INSULIN ADMINSTERED, INSADM: 3.2 UNITS/HOUR
INSULIN ADMINSTERED, INSADM: 3.2 UNITS/HOUR
INSULIN ADMINSTERED, INSADM: 3.4 UNITS/HOUR
INSULIN ADMINSTERED, INSADM: 3.6 UNITS/HOUR
INSULIN ADMINSTERED, INSADM: 3.9 UNITS/HOUR
INSULIN ADMINSTERED, INSADM: 4.4 UNITS/HOUR
INSULIN ADMINSTERED, INSADM: 4.8 UNITS/HOUR
INSULIN ADMINSTERED, INSADM: 5.1 UNITS/HOUR
INSULIN ADMINSTERED, INSADM: 5.3 UNITS/HOUR
INSULIN ADMINSTERED, INSADM: 5.6 UNITS/HOUR
INSULIN ADMINSTERED, INSADM: 6.7 UNITS/HOUR
INSULIN ORDER, INSORD: 2 UNITS/HOUR
INSULIN ORDER, INSORD: 2.7 UNITS/HOUR
INSULIN ORDER, INSORD: 2.8 UNITS/HOUR
INSULIN ORDER, INSORD: 2.9 UNITS/HOUR
INSULIN ORDER, INSORD: 3.2 UNITS/HOUR
INSULIN ORDER, INSORD: 3.2 UNITS/HOUR
INSULIN ORDER, INSORD: 3.4 UNITS/HOUR
INSULIN ORDER, INSORD: 3.6 UNITS/HOUR
INSULIN ORDER, INSORD: 3.9 UNITS/HOUR
INSULIN ORDER, INSORD: 4.4 UNITS/HOUR
INSULIN ORDER, INSORD: 4.8 UNITS/HOUR
INSULIN ORDER, INSORD: 5.1 UNITS/HOUR
INSULIN ORDER, INSORD: 5.3 UNITS/HOUR
INSULIN ORDER, INSORD: 5.6 UNITS/HOUR
INSULIN ORDER, INSORD: 6.7 UNITS/HOUR
LACTATE BLD-SCNC: 4.17 MMOL/L (ref 0.4–2)
LACTATE SERPL-SCNC: 2.2 MMOL/L (ref 0.4–2)
LOW TARGET, LOT: 80 MG/DL
MAGNESIUM SERPL-MCNC: 1.9 MG/DL (ref 1.6–2.6)
MCH RBC QN AUTO: 28 PG (ref 24–34)
MCHC RBC AUTO-ENTMCNC: 33.1 G/DL (ref 31–37)
MCV RBC AUTO: 84.5 FL (ref 78–100)
MINUTES UNTIL NEXT BG, NBG: 60 MIN
MULTIPLIER, MUL: 0.05
MULTIPLIER, MUL: 0.06
MULTIPLIER, MUL: 0.07
NRBC # BLD: 0 K/UL (ref 0–0.01)
NRBC BLD-RTO: 0 PER 100 WBC
ORDER INITIALS, ORDINIT: NORMAL
P-R INTERVAL, ECG05: 138 MS
PCO2 BLD: 38.2 MMHG (ref 35–45)
PCO2 BLDV: 40.8 MMHG (ref 41–51)
PH BLD: 7.42 [PH] (ref 7.35–7.45)
PH BLDV: 7.38 [PH] (ref 7.32–7.42)
PLATELET # BLD AUTO: 248 K/UL (ref 135–420)
PMV BLD AUTO: 10.1 FL (ref 9.2–11.8)
PO2 BLD: 74 MMHG (ref 80–100)
PO2 BLDV: 33 MMHG (ref 25–40)
POTASSIUM SERPL-SCNC: 4 MMOL/L (ref 3.5–5.5)
PROT SERPL-MCNC: 5.3 G/DL (ref 6.4–8.2)
Q-T INTERVAL, ECG07: 390 MS
QRS DURATION, ECG06: 80 MS
QTC CALCULATION (BEZET), ECG08: 471 MS
RBC # BLD AUTO: 3.36 M/UL (ref 4.2–5.3)
SAO2 % BLD: 95 % (ref 92–97)
SAO2 % BLDV: 62.9 % (ref 65–88)
SERVICE CMNT-IMP: ABNORMAL
SERVICE CMNT-IMP: ABNORMAL
SODIUM SERPL-SCNC: 144 MMOL/L (ref 136–145)
SPECIMEN TYPE: ABNORMAL
SPECIMEN TYPE: ABNORMAL
STATUS OF UNIT,%ST: NORMAL
UNIT DIVISION, %UDIV: 0
VENTRICULAR RATE, ECG03: 88 BPM
WBC # BLD AUTO: 16 K/UL (ref 4.6–13.2)

## 2022-05-28 PROCEDURE — 74011000250 HC RX REV CODE- 250: Performed by: PHYSICIAN ASSISTANT

## 2022-05-28 PROCEDURE — 74011000258 HC RX REV CODE- 258: Performed by: PHYSICIAN ASSISTANT

## 2022-05-28 PROCEDURE — 97166 OT EVAL MOD COMPLEX 45 MIN: CPT

## 2022-05-28 PROCEDURE — 83605 ASSAY OF LACTIC ACID: CPT

## 2022-05-28 PROCEDURE — 74011250637 HC RX REV CODE- 250/637: Performed by: PHYSICIAN ASSISTANT

## 2022-05-28 PROCEDURE — 71045 X-RAY EXAM CHEST 1 VIEW: CPT

## 2022-05-28 PROCEDURE — 65620000000 HC RM CCU GENERAL

## 2022-05-28 PROCEDURE — 80048 BASIC METABOLIC PNL TOTAL CA: CPT

## 2022-05-28 PROCEDURE — 93005 ELECTROCARDIOGRAM TRACING: CPT

## 2022-05-28 PROCEDURE — 77010033678 HC OXYGEN DAILY

## 2022-05-28 PROCEDURE — 82803 BLOOD GASES ANY COMBINATION: CPT

## 2022-05-28 PROCEDURE — 97162 PT EVAL MOD COMPLEX 30 MIN: CPT

## 2022-05-28 PROCEDURE — 99024 POSTOP FOLLOW-UP VISIT: CPT | Performed by: PHYSICIAN ASSISTANT

## 2022-05-28 PROCEDURE — 85027 COMPLETE CBC AUTOMATED: CPT

## 2022-05-28 PROCEDURE — 83735 ASSAY OF MAGNESIUM: CPT

## 2022-05-28 PROCEDURE — 97530 THERAPEUTIC ACTIVITIES: CPT

## 2022-05-28 PROCEDURE — 74011000258 HC RX REV CODE- 258: Performed by: THORACIC SURGERY (CARDIOTHORACIC VASCULAR SURGERY)

## 2022-05-28 PROCEDURE — 83036 HEMOGLOBIN GLYCOSYLATED A1C: CPT

## 2022-05-28 PROCEDURE — APPNB180 APP NON BILLABLE TIME > 60 MINS: Performed by: PHYSICIAN ASSISTANT

## 2022-05-28 PROCEDURE — 97535 SELF CARE MNGMENT TRAINING: CPT

## 2022-05-28 PROCEDURE — 74011636637 HC RX REV CODE- 636/637: Performed by: PHYSICIAN ASSISTANT

## 2022-05-28 PROCEDURE — 82962 GLUCOSE BLOOD TEST: CPT

## 2022-05-28 PROCEDURE — 94762 N-INVAS EAR/PLS OXIMTRY CONT: CPT

## 2022-05-28 PROCEDURE — 74011250636 HC RX REV CODE- 250/636: Performed by: PHYSICIAN ASSISTANT

## 2022-05-28 PROCEDURE — 80076 HEPATIC FUNCTION PANEL: CPT

## 2022-05-28 PROCEDURE — 74011250636 HC RX REV CODE- 250/636: Performed by: THORACIC SURGERY (CARDIOTHORACIC VASCULAR SURGERY)

## 2022-05-28 RX ORDER — ALBUMIN HUMAN 250 G/1000ML
12.5 SOLUTION INTRAVENOUS
Status: DISCONTINUED | OUTPATIENT
Start: 2022-05-28 | End: 2022-06-02

## 2022-05-28 RX ORDER — INSULIN GLARGINE 100 [IU]/ML
40 INJECTION, SOLUTION SUBCUTANEOUS DAILY
Status: DISCONTINUED | OUTPATIENT
Start: 2022-05-28 | End: 2022-06-03 | Stop reason: HOSPADM

## 2022-05-28 RX ORDER — LIDOCAINE 4 G/100G
1 PATCH TOPICAL EVERY 24 HOURS
Status: DISCONTINUED | OUTPATIENT
Start: 2022-05-28 | End: 2022-06-03 | Stop reason: HOSPADM

## 2022-05-28 RX ORDER — MAGNESIUM SULFATE 100 %
16 CRYSTALS MISCELLANEOUS AS NEEDED
Status: DISCONTINUED | OUTPATIENT
Start: 2022-05-28 | End: 2022-06-03 | Stop reason: HOSPADM

## 2022-05-28 RX ORDER — INSULIN LISPRO 100 [IU]/ML
INJECTION, SOLUTION INTRAVENOUS; SUBCUTANEOUS
Status: DISCONTINUED | OUTPATIENT
Start: 2022-05-28 | End: 2022-06-03 | Stop reason: HOSPADM

## 2022-05-28 RX ORDER — DEXTROSE MONOHYDRATE 100 MG/ML
0-250 INJECTION, SOLUTION INTRAVENOUS AS NEEDED
Status: DISCONTINUED | OUTPATIENT
Start: 2022-05-28 | End: 2022-06-03 | Stop reason: HOSPADM

## 2022-05-28 RX ORDER — METOPROLOL TARTRATE 25 MG/1
6.25 TABLET, FILM COATED ORAL EVERY 12 HOURS
Status: DISCONTINUED | OUTPATIENT
Start: 2022-05-28 | End: 2022-05-31

## 2022-05-28 RX ORDER — ENOXAPARIN SODIUM 100 MG/ML
40 INJECTION SUBCUTANEOUS EVERY 24 HOURS
Status: DISCONTINUED | OUTPATIENT
Start: 2022-05-28 | End: 2022-06-03 | Stop reason: HOSPADM

## 2022-05-28 RX ADMIN — SODIUM CHLORIDE 5.1 UNITS/HR: 9 INJECTION, SOLUTION INTRAVENOUS at 00:36

## 2022-05-28 RX ADMIN — ASPIRIN 81 MG: 81 TABLET, COATED ORAL at 09:14

## 2022-05-28 RX ADMIN — SODIUM CHLORIDE, PRESERVATIVE FREE 5 ML: 5 INJECTION INTRAVENOUS at 21:36

## 2022-05-28 RX ADMIN — VANCOMYCIN HYDROCHLORIDE 1000 MG: 1 INJECTION, POWDER, LYOPHILIZED, FOR SOLUTION INTRAVENOUS at 21:23

## 2022-05-28 RX ADMIN — HYDROCODONE BITARTRATE AND ACETAMINOPHEN 2 TABLET: 5; 325 TABLET ORAL at 07:49

## 2022-05-28 RX ADMIN — MORPHINE SULFATE 2 MG: 2 INJECTION, SOLUTION INTRAMUSCULAR; INTRAVENOUS at 09:29

## 2022-05-28 RX ADMIN — HYDROCODONE BITARTRATE AND ACETAMINOPHEN 2 TABLET: 5; 325 TABLET ORAL at 14:10

## 2022-05-28 RX ADMIN — ACETAMINOPHEN 650 MG: 325 TABLET ORAL at 11:36

## 2022-05-28 RX ADMIN — SODIUM CHLORIDE, PRESERVATIVE FREE 5 ML: 5 INJECTION INTRAVENOUS at 06:31

## 2022-05-28 RX ADMIN — CHLORHEXIDINE GLUCONATE 0.12% ORAL RINSE 10 ML: 1.2 LIQUID ORAL at 09:14

## 2022-05-28 RX ADMIN — ATORVASTATIN CALCIUM 80 MG: 40 TABLET, FILM COATED ORAL at 21:23

## 2022-05-28 RX ADMIN — HYDROCODONE BITARTRATE AND ACETAMINOPHEN 2 TABLET: 5; 325 TABLET ORAL at 20:23

## 2022-05-28 RX ADMIN — DOCUSATE SODIUM 100 MG: 100 CAPSULE, LIQUID FILLED ORAL at 09:14

## 2022-05-28 RX ADMIN — ISOSORBIDE MONONITRATE 30 MG: 30 TABLET, EXTENDED RELEASE ORAL at 09:14

## 2022-05-28 RX ADMIN — VANCOMYCIN HYDROCHLORIDE 1000 MG: 1 INJECTION, POWDER, LYOPHILIZED, FOR SOLUTION INTRAVENOUS at 09:01

## 2022-05-28 RX ADMIN — DOCUSATE SODIUM 100 MG: 100 CAPSULE, LIQUID FILLED ORAL at 18:07

## 2022-05-28 RX ADMIN — CHLORHEXIDINE GLUCONATE 0.12% ORAL RINSE 10 ML: 1.2 LIQUID ORAL at 18:07

## 2022-05-28 RX ADMIN — MUPIROCIN: 20 OINTMENT TOPICAL at 09:15

## 2022-05-28 RX ADMIN — AMIODARONE HYDROCHLORIDE 200 MG: 200 TABLET ORAL at 21:23

## 2022-05-28 RX ADMIN — MORPHINE SULFATE 4 MG: 2 INJECTION, SOLUTION INTRAMUSCULAR; INTRAVENOUS at 15:19

## 2022-05-28 RX ADMIN — AMIODARONE HYDROCHLORIDE 200 MG: 200 TABLET ORAL at 09:14

## 2022-05-28 RX ADMIN — AMIODARONE HYDROCHLORIDE 200 MG: 200 TABLET ORAL at 16:19

## 2022-05-28 RX ADMIN — MUPIROCIN: 20 OINTMENT TOPICAL at 18:07

## 2022-05-28 RX ADMIN — ENOXAPARIN SODIUM 40 MG: 100 INJECTION SUBCUTANEOUS at 18:07

## 2022-05-28 RX ADMIN — SODIUM CHLORIDE, PRESERVATIVE FREE 10 ML: 5 INJECTION INTRAVENOUS at 14:31

## 2022-05-28 RX ADMIN — DESMOPRESSIN ACETATE 40 MCG: 4 SOLUTION INTRAVENOUS at 00:00

## 2022-05-28 RX ADMIN — CALCIUM GLUCONATE 2 G: 98 INJECTION, SOLUTION INTRAVENOUS at 12:26

## 2022-05-28 RX ADMIN — Medication 40 UNITS: at 11:15

## 2022-05-28 NOTE — PROGRESS NOTES
1900- Change of shift. Report given by Sergio, RN. Will assume care with Nayan Johansen, [address]- Pt in bed, resting quietly. Pt vitals; oral temperature 98.5 F, pulse 78, RR 26, O2 sat 94% on 2L of O2, BP 94/62. Pt alert and oriented x4 and complaining of pain in the upper left shoulder. 0500- Pt bathed with CHG. Ace bandages removed from pt and incisions cleansed and redressed on left arm, left leg, and chest. Dressings clean, dry, and intact. 5533- Pt higginbotham removed. 3003- Pt up in chair. 0700- Change of shift. Report given to CARL Hill.

## 2022-05-28 NOTE — PROGRESS NOTES
Problem: Self Care Deficits Care Plan (Adult)  Goal: *Acute Goals and Plan of Care (Insert Text)  Description: Occupational Therapy Goals  Initiated 5/28/2022 within 7 day(s). 1.  Patient will perform self-feeding with supervision/set-up. 2.  Patient will perform grooming with supervision/set-up standing at the sink with Good balance for >4 min. 3.  Patient will perform upper body and lower body dressing with supervision/set-up, using AE prn.  4.  Patient will perform toilet transfers with supervision/set-up. 5.  Patient will perform all aspects of toileting with supervision/set-up. 6.  Patient will verbalize sternal precautions and will follow them during functional activities without cues. 7.  Patient will utilize energy conservation techniques during functional activities without verbal cues. Prior Level of Function: Pt reports being independent with ADLs and functional mobility, pt lives with her spouse who she is primary caregiver for, pt's daughter lives nearby and checks on them. Outcome: Progressing Towards Goal  OCCUPATIONAL THERAPY EVALUATION    Patient: Faith Phillips (49 y.o. female)  Date: 5/28/2022  Primary Diagnosis: CAD (coronary artery disease) [I25.10]  Procedure(s) (LRB):  CORONARY ARTERY BYPASS GRAFT (CABG) TIMES FOUR/LEFT RADIAL ARTERY HARVEST / Cade Zhang (N/A)  TRANSESOPHAGEAL ECHOCARDIOGRAM (N/A) 1 Day Post-Op   Precautions: sternal       ASSESSMENT :  Pt cleared to participate in OT evaluation by RN. Upon entering room, pt received in chair, alert, and agreeable to OT eval/treatment. Pt seen in conjunction with PT to maximize safety of patient and staff members.   Based on the objective data described below, the patient presents with decreased endurance and functional activity tolerance, generalized weakness, sternal precautions, edema in BUE, pain in L shoulder and decreased sensation in L hand, decreased functional mobility, limiting pt's participation and independence with ADLs. Pt was educated on sternal precautions. Pt was able to recall 50% of the sternal precautions, educated on the remaining sternal precautions and how they relate to ADLs and functional mobility. Pt verbalized understanding, required Min VCs to recall and to follow throughout the session. Pt cont to c/o pain in L shoulder limiting her tolerance of activity. RN notified and present. Pt's BP assessed:  100/63 seated reclined  103/77 seated with BLE on floor  101/66 immediately after standing  80/58 after standing for ~3 min  95/63 after return to sit    Patient will benefit from skilled intervention to address the above impairments. Patient's rehabilitation potential is considered to be Good  Factors which may influence rehabilitation potential include:   []             None noted  []             Mental ability/status  [x]             Medical condition  [x]             Home/family situation and support systems  []             Safety awareness  [x]             Pain tolerance/management  []             Other:      PLAN :  Recommendations and Planned Interventions:   [x]               Self Care Training                  [x]      Therapeutic Activities  [x]               Functional Mobility Training   [x]      Cognitive Retraining  [x]               Therapeutic Exercises           [x]      Endurance Activities  [x]               Balance Training                    []      Neuromuscular Re-Education  []               Visual/Perceptual Training     [x]      Home Safety Training  [x]               Patient Education                   [x]      Family Training/Education  []               Other (comment):    Frequency/Duration: Patient will be followed by occupational therapy 1-2 times/day, 3-5 days/week to address goals. Discharge Recommendations:  Home Health with assistance from capable adult.  If no consistent assistance available, recommend Rehab  Further Equipment Recommendations for Discharge: bedside commode and shower chair     SUBJECTIVE:   Patient stated I am hurting.     OBJECTIVE DATA SUMMARY:     Past Medical History:   Diagnosis Date    CAD (coronary artery disease)     Displacement of lumbar intervertebral disc     HLD (hyperlipidemia)     Hypertension     Lumbar radiculitis     Lumbar sprain     Numbness     legs and arms    S/P CABG x 4 5/27/2022    LIMA-LAD, Left radial to OM, rSVG to PDA, rSVG to diag by Dr. Mirlande Scales    STEMI (ST elevation myocardial infarction) ([facility] [address] 03/2020     Past Surgical History:   Procedure Laterality Date    HX APPENDECTOMY  2014    HX CHOLECYSTECTOMY  2013    HX GI      multiple esophageal surgeries in childhood    HX HEENT      \"surgery on esophagus\"    HX HYSTERECTOMY  2003    HX LUMBAR DISKECTOMY       Barriers to Learning/Limitations: None  Compensate with: visual, verbal, tactile, kinesthetic cues/model    Home Situation:   Home Situation  Home Environment: Private residence  # Steps to Enter: 0  Wheelchair Ramp: Yes  One/Two Story Residence: One story  Living Alone: No  Support Systems: Spouse/Significant Other,Child(mahogany) (pt normally takes care of , daughter can assist)  Patient Expects to be Discharged to[de-identified] Home  Current DME Used/Available at Home: None  Tub or Shower Type: Tub/Shower combination  [x]  Right hand dominant   []  Left hand dominant    Cognitive/Behavioral Status:  Neurologic State: Alert  Orientation Level: Oriented X4  Cognition: Follows commands  Safety/Judgement: Awareness of environment    Skin: mult IV, chest tubes, ace wrap on LUE, splint on RUE  Edema: mod in UEs    Vision/Perceptual:       WFL  Coordination: BUE  Coordination: Generally decreased, functional (assessed within sternal precautions)  Fine Motor Skills-Upper: Left Impaired;Right Impaired    Gross Motor Skills-Upper: Left Impaired;Right Impaired    Balance:  Sitting: Impaired  Sitting - Static: Good (unsupported)  Sitting - Dynamic: Good (unsupported)  Standing: Impaired; Without support  Standing - Static: Good  Standing - Dynamic : Fair    Strength: BUE  Strength: Generally decreased, functional (assessed within sternal precautions)   Tone & Sensation: BUE  Tone: Normal  Sensation: Impaired (LUE numbness distally)     Range of Motion: BUE  AROM: Generally decreased, functional (assessed within sternal precautions)  PROM: Generally decreased, functional (assessed within sternal precautions)   Functional Mobility and Transfers for ADLs:     Transfers:  Sit to Stand: Minimum assistance  Stand to Sit: Minimum assistance     ADL Assessment:   Feeding: Minimum assistance  Oral Facial Hygiene/Grooming: Minimum assistance  Bathing: Moderate assistance  Upper Body Dressing: Moderate assistance  Lower Body Dressing: Maximum assistance  Toileting: Moderate assistance   ADL Intervention:     Cognitive Retraining  Safety/Judgement: Awareness of environment    Pain:  Pain level pre-treatment: not rated  Pain level post-treatment: not rated    Activity Tolerance:   Fair  Please refer to the flowsheet for vital signs taken during this treatment. After treatment:   [x] Patient left in no apparent distress sitting up in chair  [] Patient left in no apparent distress in bed  [x] Call bell left within reach  [x] Nursing notified  [] Caregiver present  [] Bed alarm activated    COMMUNICATION/EDUCATION:   [x] Role of Occupational Therapy in the acute care setting  [x] Home safety education was provided and the patient/caregiver indicated understanding. [x] Patient/family have participated as able in goal setting and plan of care. [] Patient/family agree to work toward stated goals and plan of care. [] Patient understands intent and goals of therapy, but is neutral about his/her participation. [] Patient is unable to participate in goal setting and plan of care.     Thank you for this referral.  Vladimir Redman OTR/L  Time Calculation: 24 mins    Eval Complexity: History: LOW Complexity : Brief history review ; Examination: MEDIUM Complexity : 3-5 performance deficits relating to physical, cognitive , or psychosocial skils that result in activity limitations and / or participation restrictions; Decision Making:MEDIUM Complexity : Patient may present with comorbidities that affect occupational performnce.  Miniml to moderate modification of tasks or assistance (eg, physical or verbal ) with assesment(s) is necessary to enable patient to complete evaluation

## 2022-05-28 NOTE — PROGRESS NOTES
1951- Change of shift report. Report given by CARL Hill. Will assume care of patient with Charlie Vides RN.   2010- Pt on Epi at 1, Nitro at 40, Propofol at 40, Insulin Drip at 2.9. Pt intubated and responding to pain. Pt vitals; Temperature 98.4F via esophageal, pulse 83, RR 29, and /84. A- line in place. Bennie Drain and Ctx 2 draining. Dressings clean, dry, and intact. Daughter in the waiting room. 2030- DaughterChandan at bedside. 2040- Dr. Jaimee Machado in pt room with daughter. 2059- Platelets stopped. 2204- Second bag of platelets started. 2320- Critical Lactic Acid Result on E-poc. Mixed Venous result 10.27, Arterial result 9.22. Chandrakant Boateng, [address] informed. 2329- Pt extubated. Patient able to state her name and date of birth. Pt alert and oriented x4. Pt on 6L of nasal cannula O2.   2351- Second Unit of Platelets stopped. 0000- Patient bathed in CHG. Gown, linen, electrodes, and pulse oximeter changed. 0400- Electrodes changed. 6521- Critical Lactic Acid Result Result on E-poc. Arterial result 4.17.  0543- Spoke with GLORY Phillips in regards to pts BP dropping. Will give pt 500 bolus and dc swan. 9474Buford Paul out.  0700- Patient weighed, heart hugger applied, and pt ambulated to chair. 0740- Change of shift report. Report given to CARL Hill.

## 2022-05-28 NOTE — PROGRESS NOTES
05/27/22 2334   Patient Observations   Pulse (Heart Rate) 92   Resp Rate 27   O2 Sat (%) 95 %   Vent Settings   FIO2 (%) 40 %   SpO2/FIO2 Ratio 237.5   Pressure Support (cm H2O) 6 cm H2O  (Started at PS:10, 8. extubated at PS: 6)   PEEP/VENT (cm H2O) 5 cm H20   Insp Rise Time (sec) 50   Flow Trigger 3   Expiratory Sensitivity 25 %   Ventilator Measurements   Resp Rate Observed 27   Vt Exhaled (Machine Breath) (ml) 550 ml   Ve Observed (l/min) 14.6 l/min   PIP Observed (cm H2O) 13 cm H2O   MAP (cm H2O) 8.4   I:E Ratio Actual 1:1.0   Safety & Alarms   Circuit Temperature   (HME)   Backup Mode Checked/Apnea Yes   Pressure Max 40 cm H2O   Pressure Min 11 cm H2O   Ve Min 4   Ve Max 20   Vt Min 200 ml   Vt Max 1000 ml   RR Max 45   Ambu Bag Yes   Ambu Mask Yes   Weaning Parameters   Spontaneous Breathing Trial Complete Yes   Resp Rate Observed 29   Ve 14.6      RSBI 66   Age Specific Ventilator Associated Pneumonia Bundle   Patient Age Group Adult   Adult Ventilator Associated Pneumonia Bundle   Elevation of Head to 30-45 Degrees (Unless Contraindicated) Yes   Vent Method/Mode   Ventilation Method Conventional   Ventilator Mode Spontaneous   Ventilator Mode ID [id number]     Patient Extubated at 11:29 PM, to 6L NC  HR: 92  RR: 27  SPO2: 95  No Audible Stridor.

## 2022-05-28 NOTE — PROGRESS NOTES
Problem: Mobility Impaired (Adult and Pediatric)  Goal: *Acute Goals and Plan of Care (Insert Text)  Description: Physical Therapy Goals  Initiated 5/28/2022 and to be accomplished within 7 day(s)  1. Patient will move from supine to sit and sit to supine  in bed with modified independence. 2.  Patient will transfer from bed to chair and chair to bed with modified independence using the least restrictive device. 3.  Patient will perform sit to stand with modified independence. 4.  Patient will ambulate with modified independence for 250 feet with the least restrictive device. PLOF: Pt reports she is normally (I), takes care of her  who is wheelchair bound. Pt's daughter is nearby and can help at times. Outcome: Progressing Towards Goal     PHYSICAL THERAPY EVALUATION    Patient: Holland Pfeiffer (59 y.o. female)  Date: 5/28/2022  Primary Diagnosis: CAD (coronary artery disease) [I25.10]  Procedure(s) (LRB):  CORONARY ARTERY BYPASS GRAFT (CABG) TIMES FOUR/LEFT RADIAL ARTERY HARVEST / [place] (N/A)  TRANSESOPHAGEAL ECHOCARDIOGRAM (N/A) 1 Day Post-Op   Precautions: Cardiac/Sternal     PLOF: Independent    ASSESSMENT :  Based on the objective data described below, the patient presents with impaired functional mobility, gait dysfunction, and decreased activity tolerance 2/2 CABG x4. Pt required min A to complete sit to/from stand, limited this session due to complaints of pain in (L) shoulder. Pt also with notable numbness in (L) hand which is not a result of ACE bandage (removed during session). Notified RN. Per RN, pt was in surgery x 10 hours on (L) shoulder, question compression/injury to brachial plexus resulting in numbness in (L) hand. Pt encouraged to do light shoulder rolls without horizontal abduction, encouraged to gently stretch upper trapezius due to noted shoulder hiking and muscle guarding of (L) shoulder.  Pt took sidesteps in chair, BP noted to drop and returned to chair, no complaints of dizziness. BP seated: 103/77  BP seated with legs dependent: 101/66  BP after standing 3 minutes: 80/58  BP after sitting x 2 minutes: 95/63    Patient will benefit from skilled intervention to address the above impairments. Patient's rehabilitation potential is considered to be Good  Factors which may influence rehabilitation potential include:   []         None noted  []         Mental ability/status  [x]         Medical condition  [x]         Home/family situation and support systems  [x]         Safety awareness  [x]         Pain tolerance/management  []         Other:      PLAN :  Recommendations and Planned Interventions:   [x]           Bed Mobility Training             [x]    Neuromuscular Re-Education  [x]           Transfer Training                   []    Orthotic/Prosthetic Training  [x]           Gait Training                          []    Modalities  [x]           Therapeutic Exercises           []    Edema Management/Control  [x]           Therapeutic Activities            [x]    Family Training/Education  [x]           Patient Education  []           Other (comment):    Frequency/Duration: Patient will be followed by physical therapy 1-2 times per day/4-7 days per week to address goals. Discharge Recommendations: Home Health  Further Equipment Recommendations for Discharge: bedside commode     SUBJECTIVE:   Patient stated my shoulder hurts.     OBJECTIVE DATA SUMMARY:     Past Medical History:   Diagnosis Date    CAD (coronary artery disease)     Displacement of lumbar intervertebral disc     HLD (hyperlipidemia)     Hypertension     Lumbar radiculitis     Lumbar sprain     Numbness     legs and arms    S/P CABG x 4 5/27/2022    LIMA-LAD, Left radial to OM, rSVG to PDA, rSVG to diag by Dr. Bela Mar    STEMI (ST elevation myocardial infarction) [facility]) 03/2020     Past Surgical History:   Procedure Laterality Date    HX APPENDECTOMY  2014    HX CHOLECYSTECTOMY  2013    HX GI multiple esophageal surgeries in childhood    HX HEENT      \"surgery on esophagus\"    HX HYSTERECTOMY  2003    HX LUMBAR DISKECTOMY       Barriers to Learning/Limitations: None  Compensate with: N/A  Home Situation:  Home Situation  Home Environment: Private residence  # Steps to Enter: 0  Wheelchair Ramp: Yes  One/Two Story Residence: One story  Living Alone: No  Support Systems: Spouse/Significant Other,Child(mahogany) (pt normally takes care of , daughter can assist)  Patient Expects to be Discharged to[de-identified] Home  Current DME Used/Available at Home: None  Tub or Shower Type: Tub/Shower combination  Critical Behavior:  Neurologic State: Alert  Orientation Level: Oriented X4  Cognition: Appropriate decision making; Appropriate for age attention/concentration; Appropriate safety awareness; Follows commands  Psychosocial  Patient Behaviors: Calm; Cooperative  Purposeful Interaction: Yes  Pt Identified Daily Priority: Clinical issues (comment)  Caritas Process: Nurture loving kindness;Enable afia/hope;Establish trust;Teaching/learning; Attend basic human needs;Create healing environment;Supportive expression;Creative use of self  Caring Interventions: Reassure; Therapeutic modalities; Other caring modalities  Reassure: Therapeutic listening; Informing; Acceptance;Quiet presence; Sit with patient;Caring rounds  Therapeutic Modalities: Humor; Intentional therapeutic touch  Other Caring Modalities: 1:1 nursing care  Skin Condition/Temp: Dry  Skin Integrity: Incision (comment)  Skin Integumentary  Skin Color: Appropriate for ethnicity  Skin Condition/Temp: Dry  Skin Integrity: Incision (comment)  Strength:    Strength: Generally decreased, functional  Tone & Sensation:   Sensation: Impaired (pt reporting numbness (L) hand)  Range Of Motion:  AROM: Generally decreased, functional  PROM: Generally decreased, functional  Posture:  Posture (WDL): Exceptions to WDL  Posture Assessment:  (hiking (L) shoulder 2/2 pain)  Functional Mobility:  Transfers:  Sit to Stand: Minimum assistance  Stand to Sit: Minimum assistance  Balance:   Sitting: Impaired  Sitting - Static: Good (unsupported)  Sitting - Dynamic: Good (unsupported)  Standing: Impaired; Without support  Standing - Static: Good  Standing - Dynamic : Fair  Ambulation/Gait Training:  Distance (ft): 1 Feet (ft) (sidesteps only)  Assistive Device:  (none)  Gait Description (WDL): Exceptions to WDL  Pain:  Pain level pre-treatment: 9/10   Pain level post-treatment: 9/10   Pain Intervention(s) : Medication (see MAR); Rest, Ice, Repositioning  Response to intervention: Nurse notified, See doc flow  Activity Tolerance:   Poor 2/2 BP and pain  Please refer to the flowsheet for vital signs taken during this treatment. After treatment:   [x]         Patient left in no apparent distress sitting up in chair  []         Patient left in no apparent distress in bed  [x]         Call bell left within reach  [x]         Nursing notified  []         Caregiver present  []         Bed alarm activated  []         SCDs applied    COMMUNICATION/EDUCATION:   [x]         Role of Physical Therapy in the acute care setting. [x]         Fall prevention education was provided and the patient/caregiver indicated understanding. [x]         Patient/family have participated as able in goal setting and plan of care. []         Patient/family agree to work toward stated goals and plan of care. []         Patient understands intent and goals of therapy, but is neutral about his/her participation. []         Patient is unable to participate in goal setting/plan of care: ongoing with therapy staff.  []         Other:     Thank you for this referral.  Cala Shone PT, DPT   Time Calculation: 28 mins      Eval Complexity: History: LOW Complexity : Zero comorbidities / personal factors that will impact the outcome / POCExam:LOW Complexity : 1-2 Standardized tests and measures addressing body structure, function, activity limitation and / or participation in recreation  Presentation: LOW Complexity : Stable, uncomplicated  Clinical Decision Making:Low Complexity    Overall Complexity:LOW

## 2022-05-28 NOTE — PROGRESS NOTES
Per physician referral,  provided a follow up visit for [facility], a 46 y.o.,female. The  provided the following Interventions:  Initiated relationship of care and support as I checked in with her concerning her current coping. Listened empathically as she shared about her pain and spending her birthday (today) in the hospital.  Offered her birthday greetings and explored for sources of support. Offered prayer and assurance of continued prayer on patients behalf. Chart reviewed. The following outcomes were achieved:  Patient expressed gratitude for 's visit. Assessment:  Along with Ms. Mart's afia, her daughter appears to be source of support. She will be visiting her today and bringing her something special to eat for her birthday. There are no further spiritual or Latter day issues which require Spiritual Care Services interventions at this time. Plan:  Chaplains will continue to follow and will provide pastoral care on an as needed/requested basis.  recommends bedside caregivers page  on duty if patient shows signs of acute spiritual or emotional distress.        [address] [place]   [phone]

## 2022-05-28 NOTE — PROGRESS NOTES
Problem: Falls - Risk of  Goal: *Absence of Falls  Description: Document Bhavna Locket Fall Risk and appropriate interventions in the flowsheet.   Outcome: Progressing Towards Goal  Note: Fall Risk Interventions:  Mentation Interventions: Bed/chair exit alarm,Adequate sleep, hydration, pain control,Evaluate medications/consider consulting pharmacy,More frequent rounding,Reorient patient,Room close to nurse's station,Update white board  Medication Interventions: Bed/chair exit alarm,Evaluate medications/consider consulting pharmacy  Elimination Interventions: Bed/chair exit alarm,Call light in reach    Problem: Patient Education: Go to Patient Education Activity  Goal: Patient/Family Education  Outcome: Progressing Towards Goal

## 2022-05-28 NOTE — PROGRESS NOTES
0700- Change of shift. Bedside report received from CARL Lucas and Isreal Quiroz RN. Will assume care of the patient. 0830- Physical therapy/occupational therapists at bedside. 4559- Imdur given, Nitro gtt turned off. 1030- CVP transduced per MD. CVP between 8-10.   1345- Patient ambulated with RN. Patient walked roughly 50 feet with minimal assistance. Patient taken back to her room and is resting comfortably in her recliner. Call bell within reach. 1410- Right radial arterial line removed per Marcelo Lopez.   1630- Patient ambulated again with RN. Patient walked about 150 feet again. Patient taken back to her room and given dinner tray. No patient needs at this time. Call bell within reach. 1800- Insulin gtt discontinued. 1830- Patient back to bed. 1850- Change of shift. Report given to Alaina Schafer RN.

## 2022-05-28 NOTE — PROGRESS NOTES
CARDIAC SURGERY PROGRESS NOTE    2022     Post Operative Day # 1     Chart reviewed. Interval History/Events of Past 24 hours:   Extubated last pm, now on 3LNC. Epi, Levo weaned off. Tolerated OOB to chair. Lactic acid decreasing, SGOT 128. UOP tapering this am.   Assessment:  CAD S/P  CABG x 4, Left radial  BB, ASA, statin  Respiratory parameters stable  Cardiac status stable     Plans:  1. resume BB, ASA, Statin   2. Lantus   3. d/c lakhwinder   4. Gentle diuresis   5. wean oxygen   6. PT/OT    Heart Hugger use encouraged to mitigate pain and will also assist with deep breathing and incentive spirometry goals. Possible discharge 4 days. Alfredo Lemos PA-C  [facility]  [phone]  _____________________________________________________________________________________________________________________________________________  Subjective:  Patient seen and examined on rounds today. Objective:  Vital signs:   Visit Vitals  /75   Pulse 83   Temp 99.2 °F (37.3 °C)   Resp 27   Ht 5' 6\" (1.676 m)   Wt 123.7 kg (272 lb 11.2 oz)   SpO2 96%   BMI 44.01 kg/m²     Temp (24hrs), Av.7 °F (37.1 °C), Min:97.9 °F (36.6 °C), Max:99.7 °F (37.6 °C)    Admission Weight: Last Weight   Weight: 111.1 kg (245 lb) Weight: 123.7 kg (272 lb 11.2 oz)     Last 3 Recorded Weights in this Encounter    22 0719 22 0700   Weight: 111.1 kg (245 lb) 123.7 kg (272 lb 11.2 oz)       Telemetry: nsr      Physical Examination:     General:  Alert, oriented  Lungs: Clear to ascultation without rales, wheezes or rhonchi. Chest: Dressings clean and dry. Sternum stable. Heart: regular rate and rhythm, No murmur. Abdomen: Soft and non-tender without masses. Bowel sounds present. Extremities: Warm and well perfused. Edema moderate. Incisions: clean and dry. + numbness LEFT hand  Neuro: No deficit.         SUP Prophylaxis: Pepcid  DVT Prophylaxis:   pneumatic compression boots: Yes  Compression stockings:  Yes  Enoxaparin:  Yes    Chest tubes:  present    Pacing wires:  present    DME needs:  tbd            Labs:  Lab Results   Component Value Date/Time    WBC 16.0 (H) 05/28/2022 04:00 AM    HCT 28.4 (L) 05/28/2022 04:00 AM     05/28/2022 04:00 AM      Lab Results   Component Value Date/Time     05/28/2022 04:00 AM    K 4.0 05/28/2022 04:00 AM     05/28/2022 04:00 AM    CO2 24 05/28/2022 04:00 AM     (H) 05/28/2022 04:00 AM    BUN 10 05/28/2022 04:00 AM    CREA 0.85 05/28/2022 04:00 AM    CREA 1.02 05/27/2022 06:37 PM    CREA 0.66 05/16/2022 12:00 AM     Lab Results   Component Value Date/Time    HBA1C 6.1 (H) 05/20/2022 04:45 PM     pH (POC)   Date Value Ref Range Status   05/28/2022 7.42 7.35 - 7.45   Final     pCO2 (POC)   Date Value Ref Range Status   05/28/2022 38.2 35.0 - 45.0 MMHG Final     pO2 (POC)   Date Value Ref Range Status   05/28/2022 74 (L) 80 - 100 MMHG Final     HCO3 (POC)   Date Value Ref Range Status   05/28/2022 24.5 22 - 26 MMOL/L Final     sO2 (POC)   Date Value Ref Range Status   05/28/2022 95.0 92 - 97 % Final     Base excess (POC)   Date Value Ref Range Status   05/28/2022 0.0 mmol/L Final          EKG:NSR    CXR: t/l ok, no sig effusion or PTX         PLEASE NOTE:  This document may have been produced using voice recognition software. Unrecognized errors in transcription may be present. Please call with any questions. NOTE TO PATIENT:  The purpose of this note is to communicate optimally with the other providers involved in your care. It is written using standard medical terminology. If you have questions regarding details of the note please call my office at [phone] and make an appointment to discuss your concerns.

## 2022-05-28 NOTE — PROGRESS NOTES
Problem: Falls - Risk of  Goal: *Absence of Falls  Description: Document Morene Hole Fall Risk and appropriate interventions in the flowsheet.   5/28/2022 1911 by Jacquie Inman RN  Outcome: Progressing Towards Goal  Note: Fall Risk Interventions:  Mobility Interventions: Assess mobility with egress test,Communicate number of staff needed for ambulation/transfer,OT consult for ADLs,PT Consult for mobility concerns,PT Consult for assist device competence,Strengthening exercises (ROM-active/passive),Utilize walker, cane, or other assistive device  Mentation Interventions: Adequate sleep, hydration, pain control,Evaluate medications/consider consulting pharmacy,Increase mobility,More frequent rounding,Reorient patient,Room close to nurse's station,Toileting rounds,Update white board  Medication Interventions: Assess postural VS orthostatic hypotension,Evaluate medications/consider consulting pharmacy,Patient to call before getting OOB,Teach patient to arise slowly  Elimination Interventions: Call light in reach,Patient to call for help with toileting needs,Stay With Me (per policy),Toilet paper/wipes in reach,Toileting schedule/hourly rounds  5/28/2022 0748 by Jacquie Inman RN  Outcome: Progressing Towards Goal  Note: Fall Risk Interventions:  Mentation Interventions: Bed/chair exit alarm,Adequate sleep, hydration, pain control,Evaluate medications/consider consulting pharmacy,More frequent rounding,Reorient patient,Room close to nurse's station,Update white board  Medication Interventions: Bed/chair exit alarm,Evaluate medications/consider consulting pharmacy  Elimination Interventions: Bed/chair exit alarm,Call light in reach    Problem: Patient Education: Go to Patient Education Activity  Goal: Patient/Family Education  5/28/2022 1911 by Jacquie Inman RN  Outcome: Progressing Towards Goal  5/28/2022 0748 by Jacquie Inman RN  Outcome: Progressing Towards Goal     Problem: Patient Education: Go to Patient Education Activity  Goal: Patient/Family Education  Outcome: Progressing Towards Goal     Problem: Patient Education: Go to Patient Education Activity  Goal: Patient/Family Education  Outcome: Progressing Towards Goal

## 2022-05-29 ENCOUNTER — APPOINTMENT (OUTPATIENT)
Dept: GENERAL RADIOLOGY | Age: 52
DRG: 166 | End: 2022-05-29
Attending: THORACIC SURGERY (CARDIOTHORACIC VASCULAR SURGERY)
Payer: MEDICAID

## 2022-05-29 LAB
ANION GAP SERPL CALC-SCNC: 4 MMOL/L (ref 3–18)
ARTERIAL PATENCY WRIST A: ABNORMAL
BASE DEFICIT BLD-SCNC: 0.2 MMOL/L
BDY SITE: ABNORMAL
BODY TEMPERATURE: 99.2
BUN SERPL-MCNC: 9 MG/DL (ref 7–18)
BUN/CREAT SERPL: 17 (ref 12–20)
CALCIUM SERPL-MCNC: 8.1 MG/DL (ref 8.5–10.1)
CHLORIDE SERPL-SCNC: 108 MMOL/L (ref 100–111)
CO2 SERPL-SCNC: 29 MMOL/L (ref 21–32)
CREAT SERPL-MCNC: 0.53 MG/DL (ref 0.6–1.3)
ERYTHROCYTE [DISTWIDTH] IN BLOOD BY AUTOMATED COUNT: 14.7 % (ref 11.6–14.5)
GAS FLOW.O2 O2 DELIVERY SYS: ABNORMAL L/MIN
GLUCOSE BLD STRIP.AUTO-MCNC: 108 MG/DL (ref 70–110)
GLUCOSE BLD STRIP.AUTO-MCNC: 111 MG/DL (ref 70–110)
GLUCOSE BLD STRIP.AUTO-MCNC: 114 MG/DL (ref 70–110)
GLUCOSE BLD STRIP.AUTO-MCNC: 91 MG/DL (ref 70–110)
GLUCOSE SERPL-MCNC: 100 MG/DL (ref 74–99)
HCO3 BLD-SCNC: 24.8 MMOL/L (ref 22–26)
HCT VFR BLD AUTO: 23.7 % (ref 35–45)
HGB BLD-MCNC: 7.5 G/DL (ref 12–16)
LACTATE BLD-SCNC: 0.69 MMOL/L (ref 0.4–2)
LACTATE SERPL-SCNC: 1 MMOL/L (ref 0.4–2)
MAGNESIUM SERPL-MCNC: 2.1 MG/DL (ref 1.6–2.6)
MCH RBC QN AUTO: 27.9 PG (ref 24–34)
MCHC RBC AUTO-ENTMCNC: 31.6 G/DL (ref 31–37)
MCV RBC AUTO: 88.1 FL (ref 78–100)
NRBC # BLD: 0 K/UL (ref 0–0.01)
NRBC BLD-RTO: 0 PER 100 WBC
PCO2 BLD: 41.6 MMHG (ref 35–45)
PH BLD: 7.38 [PH] (ref 7.35–7.45)
PLATELET # BLD AUTO: 172 K/UL (ref 135–420)
PMV BLD AUTO: 11.1 FL (ref 9.2–11.8)
PO2 BLD: 34 MMHG (ref 80–100)
POTASSIUM SERPL-SCNC: 3.7 MMOL/L (ref 3.5–5.5)
POTASSIUM SERPL-SCNC: 4.1 MMOL/L (ref 3.5–5.5)
RBC # BLD AUTO: 2.69 M/UL (ref 4.2–5.3)
SAO2 % BLD: 63.5 % (ref 92–97)
SERVICE CMNT-IMP: ABNORMAL
SODIUM SERPL-SCNC: 141 MMOL/L (ref 136–145)
SPECIMEN TYPE: ABNORMAL
WBC # BLD AUTO: 14.8 K/UL (ref 4.6–13.2)

## 2022-05-29 PROCEDURE — 84132 ASSAY OF SERUM POTASSIUM: CPT

## 2022-05-29 PROCEDURE — 74011636637 HC RX REV CODE- 636/637: Performed by: PHYSICIAN ASSISTANT

## 2022-05-29 PROCEDURE — 99024 POSTOP FOLLOW-UP VISIT: CPT | Performed by: PHYSICIAN ASSISTANT

## 2022-05-29 PROCEDURE — P9047 ALBUMIN (HUMAN), 25%, 50ML: HCPCS | Performed by: THORACIC SURGERY (CARDIOTHORACIC VASCULAR SURGERY)

## 2022-05-29 PROCEDURE — 77010033678 HC OXYGEN DAILY

## 2022-05-29 PROCEDURE — 74011000250 HC RX REV CODE- 250

## 2022-05-29 PROCEDURE — 85027 COMPLETE CBC AUTOMATED: CPT

## 2022-05-29 PROCEDURE — 74011250636 HC RX REV CODE- 250/636: Performed by: PHYSICIAN ASSISTANT

## 2022-05-29 PROCEDURE — 83605 ASSAY OF LACTIC ACID: CPT

## 2022-05-29 PROCEDURE — APPNB180 APP NON BILLABLE TIME > 60 MINS: Performed by: PHYSICIAN ASSISTANT

## 2022-05-29 PROCEDURE — 71045 X-RAY EXAM CHEST 1 VIEW: CPT

## 2022-05-29 PROCEDURE — 82962 GLUCOSE BLOOD TEST: CPT

## 2022-05-29 PROCEDURE — 74011000250 HC RX REV CODE- 250: Performed by: PHYSICIAN ASSISTANT

## 2022-05-29 PROCEDURE — 74011250636 HC RX REV CODE- 250/636

## 2022-05-29 PROCEDURE — 65620000000 HC RM CCU GENERAL

## 2022-05-29 PROCEDURE — 74011250636 HC RX REV CODE- 250/636: Performed by: THORACIC SURGERY (CARDIOTHORACIC VASCULAR SURGERY)

## 2022-05-29 PROCEDURE — 74011250637 HC RX REV CODE- 250/637: Performed by: PHYSICIAN ASSISTANT

## 2022-05-29 PROCEDURE — 80048 BASIC METABOLIC PNL TOTAL CA: CPT

## 2022-05-29 PROCEDURE — 83735 ASSAY OF MAGNESIUM: CPT

## 2022-05-29 PROCEDURE — 94762 N-INVAS EAR/PLS OXIMTRY CONT: CPT

## 2022-05-29 PROCEDURE — P9045 ALBUMIN (HUMAN), 5%, 250 ML: HCPCS

## 2022-05-29 RX ORDER — NOREPINEPHRINE BITARTRATE/D5W 8 MG/250ML
.5-3 PLASTIC BAG, INJECTION (ML) INTRAVENOUS
Status: DISCONTINUED | OUTPATIENT
Start: 2022-05-29 | End: 2022-05-31

## 2022-05-29 RX ORDER — FUROSEMIDE 10 MG/ML
10 INJECTION INTRAMUSCULAR; INTRAVENOUS ONCE
Status: COMPLETED | OUTPATIENT
Start: 2022-05-29 | End: 2022-05-29

## 2022-05-29 RX ORDER — FAMOTIDINE 20 MG/1
20 TABLET, FILM COATED ORAL 2 TIMES DAILY
Status: DISCONTINUED | OUTPATIENT
Start: 2022-05-29 | End: 2022-06-03 | Stop reason: HOSPADM

## 2022-05-29 RX ORDER — POLYETHYLENE GLYCOL 3350 17 G/17G
17 POWDER, FOR SOLUTION ORAL DAILY
Status: DISCONTINUED | OUTPATIENT
Start: 2022-05-29 | End: 2022-06-03 | Stop reason: HOSPADM

## 2022-05-29 RX ORDER — ACETAMINOPHEN 500 MG
1000 TABLET ORAL EVERY 6 HOURS
Status: DISCONTINUED | OUTPATIENT
Start: 2022-05-29 | End: 2022-06-02

## 2022-05-29 RX ORDER — NOREPINEPHRINE BITARTRATE/D5W 8 MG/250ML
PLASTIC BAG, INJECTION (ML) INTRAVENOUS
Status: COMPLETED
Start: 2022-05-29 | End: 2022-05-29

## 2022-05-29 RX ORDER — ALBUMIN HUMAN 50 G/1000ML
SOLUTION INTRAVENOUS
Status: COMPLETED
Start: 2022-05-29 | End: 2022-05-29

## 2022-05-29 RX ORDER — FENTANYL CITRATE 50 UG/ML
12.5-25 INJECTION, SOLUTION INTRAMUSCULAR; INTRAVENOUS
Status: DISCONTINUED | OUTPATIENT
Start: 2022-05-29 | End: 2022-05-31

## 2022-05-29 RX ORDER — ALBUMIN HUMAN 50 G/1000ML
12.5 SOLUTION INTRAVENOUS ONCE
Status: COMPLETED | OUTPATIENT
Start: 2022-05-29 | End: 2022-05-29

## 2022-05-29 RX ORDER — CALCIUM GLUCONATE 20 MG/ML
1 INJECTION, SOLUTION INTRAVENOUS
Status: COMPLETED | OUTPATIENT
Start: 2022-05-29 | End: 2022-05-29

## 2022-05-29 RX ORDER — POTASSIUM CHLORIDE 14.9 MG/ML
20 INJECTION INTRAVENOUS ONCE
Status: COMPLETED | OUTPATIENT
Start: 2022-05-29 | End: 2022-05-29

## 2022-05-29 RX ORDER — OXYCODONE HYDROCHLORIDE 5 MG/1
5-10 TABLET ORAL
Status: DISPENSED | OUTPATIENT
Start: 2022-05-29 | End: 2022-05-30

## 2022-05-29 RX ORDER — BISACODYL 5 MG
10 TABLET, DELAYED RELEASE (ENTERIC COATED) ORAL DAILY
Status: DISCONTINUED | OUTPATIENT
Start: 2022-05-29 | End: 2022-06-03 | Stop reason: HOSPADM

## 2022-05-29 RX ADMIN — ALBUMIN (HUMAN) 12.5 G: 0.25 INJECTION, SOLUTION INTRAVENOUS at 09:10

## 2022-05-29 RX ADMIN — BISACODYL 10 MG: 5 TABLET, COATED ORAL at 12:25

## 2022-05-29 RX ADMIN — ASPIRIN 81 MG: 81 TABLET, COATED ORAL at 08:30

## 2022-05-29 RX ADMIN — Medication 2 MCG/MIN: at 10:40

## 2022-05-29 RX ADMIN — DOCUSATE SODIUM 100 MG: 100 CAPSULE, LIQUID FILLED ORAL at 08:29

## 2022-05-29 RX ADMIN — CALCIUM GLUCONATE 1000 MG: 20 INJECTION, SOLUTION INTRAVENOUS at 11:07

## 2022-05-29 RX ADMIN — SODIUM CHLORIDE, PRESERVATIVE FREE 5 ML: 5 INJECTION INTRAVENOUS at 21:09

## 2022-05-29 RX ADMIN — MUPIROCIN: 20 OINTMENT TOPICAL at 08:29

## 2022-05-29 RX ADMIN — POTASSIUM CHLORIDE 20 MEQ: 14.9 INJECTION, SOLUTION INTRAVENOUS at 07:27

## 2022-05-29 RX ADMIN — ACETAMINOPHEN 1000 MG: 500 TABLET ORAL at 12:26

## 2022-05-29 RX ADMIN — OXYCODONE HYDROCHLORIDE 10 MG: 5 TABLET ORAL at 22:06

## 2022-05-29 RX ADMIN — MUPIROCIN: 20 OINTMENT TOPICAL at 17:19

## 2022-05-29 RX ADMIN — ENOXAPARIN SODIUM 40 MG: 100 INJECTION SUBCUTANEOUS at 17:24

## 2022-05-29 RX ADMIN — CHLORHEXIDINE GLUCONATE 0.12% ORAL RINSE 10 ML: 1.2 LIQUID ORAL at 08:29

## 2022-05-29 RX ADMIN — POLYETHYLENE GLYCOL 3350 17 G: 17 POWDER, FOR SOLUTION ORAL at 12:25

## 2022-05-29 RX ADMIN — ATORVASTATIN CALCIUM 80 MG: 40 TABLET, FILM COATED ORAL at 21:08

## 2022-05-29 RX ADMIN — OXYCODONE HYDROCHLORIDE 5 MG: 5 TABLET ORAL at 12:26

## 2022-05-29 RX ADMIN — ACETAMINOPHEN 1000 MG: 500 TABLET ORAL at 17:18

## 2022-05-29 RX ADMIN — Medication 40 UNITS: at 08:32

## 2022-05-29 RX ADMIN — CHLORHEXIDINE GLUCONATE 0.12% ORAL RINSE 10 ML: 1.2 LIQUID ORAL at 17:24

## 2022-05-29 RX ADMIN — METOPROLOL TARTRATE 6.25 MG: 25 TABLET, FILM COATED ORAL at 08:29

## 2022-05-29 RX ADMIN — AMIODARONE HYDROCHLORIDE 200 MG: 200 TABLET ORAL at 17:17

## 2022-05-29 RX ADMIN — ALBUMIN HUMAN 12.5 G: 50 SOLUTION INTRAVENOUS at 09:40

## 2022-05-29 RX ADMIN — ISOSORBIDE MONONITRATE 30 MG: 30 TABLET, EXTENDED RELEASE ORAL at 08:29

## 2022-05-29 RX ADMIN — FENTANYL CITRATE 25 MCG: 50 INJECTION INTRAMUSCULAR; INTRAVENOUS at 14:48

## 2022-05-29 RX ADMIN — ALBUMIN (HUMAN) 12.5 G: 12.5 INJECTION, SOLUTION INTRAVENOUS at 09:40

## 2022-05-29 RX ADMIN — CALCIUM GLUCONATE 1000 MG: 20 INJECTION, SOLUTION INTRAVENOUS at 10:00

## 2022-05-29 RX ADMIN — DOCUSATE SODIUM 100 MG: 100 CAPSULE, LIQUID FILLED ORAL at 17:18

## 2022-05-29 RX ADMIN — FAMOTIDINE 20 MG: 20 TABLET ORAL at 17:18

## 2022-05-29 RX ADMIN — SODIUM CHLORIDE, PRESERVATIVE FREE 10 ML: 5 INJECTION INTRAVENOUS at 14:54

## 2022-05-29 RX ADMIN — FAMOTIDINE 20 MG: 20 TABLET ORAL at 12:26

## 2022-05-29 RX ADMIN — SODIUM CHLORIDE, PRESERVATIVE FREE 5 ML: 5 INJECTION INTRAVENOUS at 06:47

## 2022-05-29 RX ADMIN — AMIODARONE HYDROCHLORIDE 200 MG: 200 TABLET ORAL at 08:29

## 2022-05-29 RX ADMIN — FUROSEMIDE 10 MG: 10 INJECTION, SOLUTION INTRAMUSCULAR; INTRAVENOUS at 14:54

## 2022-05-29 RX ADMIN — HYDROCODONE BITARTRATE AND ACETAMINOPHEN 2 TABLET: 5; 325 TABLET ORAL at 08:28

## 2022-05-29 RX ADMIN — AMIODARONE HYDROCHLORIDE 200 MG: 200 TABLET ORAL at 21:08

## 2022-05-29 RX ADMIN — HYDROCODONE BITARTRATE AND ACETAMINOPHEN 2 TABLET: 5; 325 TABLET ORAL at 02:33

## 2022-05-29 NOTE — PROGRESS NOTES
Problem: Falls - Risk of  Goal: *Absence of Falls  Description: Document Karla Alegre Fall Risk and appropriate interventions in the flowsheet.   Outcome: Progressing Towards Goal  Note: Fall Risk Interventions:  Mobility Interventions: Assess mobility with egress test,Communicate number of staff needed for ambulation/transfer,Patient to call before getting OOB,PT Consult for mobility concerns,PT Consult for assist device competence,Strengthening exercises (ROM-active/passive),Utilize walker, cane, or other assistive device    Mentation Interventions: Adequate sleep, hydration, pain control,Evaluate medications/consider consulting pharmacy,Increase mobility,More frequent rounding,Reorient patient,Room close to nurse's station,Update white board    Medication Interventions: Assess postural VS orthostatic hypotension,Evaluate medications/consider consulting pharmacy,Patient to call before getting OOB,Teach patient to arise slowly    Elimination Interventions: Call light in reach,Stay With Me (per policy),Toilet paper/wipes in reach,Toileting schedule/hourly rounds              Problem: Pain  Goal: *Control of Pain  Outcome: Progressing Towards Goal  Goal: *PALLIATIVE CARE:  Alleviation of Pain  Outcome: Progressing Towards Goal      Problem: Patient Education: Go to Patient Education Activity  Goal: Patient/Family Education  Outcome: Progressing Towards Goal

## 2022-05-29 NOTE — PROGRESS NOTES
CARDIAC SURGERY PROGRESS NOTE    2022     Post Operative Day # 1     Chart reviewed. Interval History/Events of Past 24 hours:   Low BP after BB and Imdur. S/p ALbumin and now on levophed. Chest tubes still draining. [place] out this am.     Assessment:  CAD S/P  CABG x 4, Left radial  BB, ASA, statin  Respiratory parameters stable  Cardiac status stable     Plans:  1. Keep drains today  2. Ambulate  3. Hold BB, Wean Levo  4. Needs better pain control  5. Push IS add flutter valve  6. ambulat    Heart Hugger use encouraged to mitigate pain and will also assist with deep breathing and incentive spirometry goals. Possible discharge 3 days. Sierra Sebastian PA-C  [facility]  [phone]  _____________________________________________________________________________________________________________________________________________  Subjective:  Patient seen and examined on rounds today. Objective:  Vital signs:   Visit Vitals  BP 91/60   Pulse 68   Temp 99 °F (37.2 °C)   Resp 22   Ht 5' 6\" (1.676 m)   Wt 118.3 kg (260 lb 11.8 oz)   SpO2 95%   BMI 42.08 kg/m²     Temp (24hrs), Av.6 °F (37 °C), Min:97.8 °F (36.6 °C), Max:99 °F (37.2 °C)    Admission Weight: Last Weight   Weight: 111.1 kg (245 lb) Weight: 118.3 kg (260 lb 11.8 oz)     Last 3 Recorded Weights in this Encounter    22 0719 22 0700 22 0400   Weight: 111.1 kg (245 lb) 123.7 kg (272 lb 11.2 oz) 118.3 kg (260 lb 11.8 oz)       Telemetry: nsr      Physical Examination:     General:  Alert, oriented  Lungs: Clear to ascultation without rales, wheezes or rhonchi. Chest: Dressings clean and dry. Sternum stable. Heart: regular rate and rhythm, No murmur. Abdomen: Soft and non-tender without masses. Bowel sounds present. Extremities: Warm and well perfused. Edema moderate. Incisions: clean and dry. + numbness LEFT hand  Neuro: No deficit.         SUP Prophylaxis: Pepcid  DVT Prophylaxis: pneumatic compression boots: Yes  Compression stockings:  Yes  Enoxaparin:  Yes    Chest tubes:  present    Pacing wires:  present    DME needs:  tbd            Labs:  Lab Results   Component Value Date/Time    WBC 16.0 (H) 05/28/2022 04:00 AM    HCT 28.4 (L) 05/28/2022 04:00 AM     05/28/2022 04:00 AM      Lab Results   Component Value Date/Time     05/29/2022 05:20 AM    K 3.7 05/29/2022 05:20 AM     05/29/2022 05:20 AM    CO2 29 05/29/2022 05:20 AM     (H) 05/29/2022 05:20 AM    BUN 9 05/29/2022 05:20 AM    CREA 0.53 (L) 05/29/2022 05:20 AM    CREA 0.85 05/28/2022 04:00 AM    CREA 1.02 05/27/2022 06:37 PM     Lab Results   Component Value Date/Time    HBA1C 5.9 (H) 05/28/2022 04:00 AM     pH (POC)   Date Value Ref Range Status   05/28/2022 7.42 7.35 - 7.45   Final     pCO2 (POC)   Date Value Ref Range Status   05/28/2022 38.2 35.0 - 45.0 MMHG Final     pO2 (POC)   Date Value Ref Range Status   05/28/2022 74 (L) 80 - 100 MMHG Final     HCO3 (POC)   Date Value Ref Range Status   05/28/2022 24.5 22 - 26 MMOL/L Final     sO2 (POC)   Date Value Ref Range Status   05/28/2022 95.0 92 - 97 % Final     Base excess (POC)   Date Value Ref Range Status   05/28/2022 0.0 mmol/L Final          EKG:NSR    CXR: t/l ok, hazy bases b/l       PLEASE NOTE:  This document may have been produced using voice recognition software. Unrecognized errors in transcription may be present. Please call with any questions. NOTE TO PATIENT:  The purpose of this note is to communicate optimally with the other providers involved in your care. It is written using standard medical terminology. If you have questions regarding details of the note please call my office at [phone] and make an appointment to discuss your concerns. Pt seen and examined. On low dose epi. Mixed venous 63. Bilateral hand discomfort better--likely edema and given BM@ 42 ---her position on OR tabel and tucking---should resolve.

## 2022-05-29 NOTE — PROGRESS NOTES
0700- Change of shift. Report received from [address], [address]. Will assume care of the patient. 0900- Following administration of imdur and metoprolol blood pressure 88/50 (63). PRN 25% albumin given. 0930- Blood pressure now 80/40 (56) and patient complaining of new headache and mild dizziness. Marcelo Montano notified and orders received for albumin 250ml and 2gm calcium gluconate. 1000- Blood pressure starting to improve. Patient resting in recliner. 1040- Blood pressure down-trending again. Marcelo Montano aware. Levophed started. 1145- Patient reports headache and dizziness are now gone. /67. Will continue to monitor. 1345- Patient ambulated with RN about 150 feet. Patient tolerated walk well. VSS. 1430- Dr Arabella Verdin at bedside. Verbal order received to obtain venous gas + POC lactic acid. 1630- Patient ambulated with RN about 200 feet. Patient states she feels stronger. 1800- Patient had 2 liquid bowel movements. 1815- Levophed titrated off.   1900- Change of shift. Report given to Felipe Frankel.

## 2022-05-30 ENCOUNTER — APPOINTMENT (OUTPATIENT)
Dept: GENERAL RADIOLOGY | Age: 52
DRG: 166 | End: 2022-05-30
Attending: THORACIC SURGERY (CARDIOTHORACIC VASCULAR SURGERY)
Payer: MEDICAID

## 2022-05-30 PROBLEM — D64.9 POSTOPERATIVE ANEMIA: Status: ACTIVE | Noted: 2022-05-30

## 2022-05-30 LAB
ABO + RH BLD: NORMAL
ANION GAP SERPL CALC-SCNC: 1 MMOL/L (ref 3–18)
ANION GAP SERPL CALC-SCNC: 4 MMOL/L (ref 3–18)
BASOPHILS # BLD: 0.1 K/UL (ref 0–0.1)
BASOPHILS NFR BLD: 1 % (ref 0–2)
BLD PROD TYP BPU: NORMAL
BLD PROD TYP BPU: NORMAL
BLOOD GROUP ANTIBODIES SERPL: NORMAL
BPU ID: NORMAL
BPU ID: NORMAL
BUN SERPL-MCNC: 10 MG/DL (ref 7–18)
BUN SERPL-MCNC: 7 MG/DL (ref 7–18)
BUN/CREAT SERPL: 17 (ref 12–20)
BUN/CREAT SERPL: 27 (ref 12–20)
CALCIUM SERPL-MCNC: 6.2 MG/DL (ref 8.5–10.1)
CALCIUM SERPL-MCNC: 8.3 MG/DL (ref 8.5–10.1)
CALLED TO:,BCALL1: NORMAL
CHLORIDE SERPL-SCNC: 105 MMOL/L (ref 100–111)
CHLORIDE SERPL-SCNC: 120 MMOL/L (ref 100–111)
CO2 SERPL-SCNC: 20 MMOL/L (ref 21–32)
CO2 SERPL-SCNC: 29 MMOL/L (ref 21–32)
CREAT SERPL-MCNC: 0.26 MG/DL (ref 0.6–1.3)
CREAT SERPL-MCNC: 0.58 MG/DL (ref 0.6–1.3)
CROSSMATCH RESULT,%XM: NORMAL
CROSSMATCH RESULT,%XM: NORMAL
DIFFERENTIAL METHOD BLD: ABNORMAL
EOSINOPHIL # BLD: 0.3 K/UL (ref 0–0.4)
EOSINOPHIL NFR BLD: 3 % (ref 0–5)
ERYTHROCYTE [DISTWIDTH] IN BLOOD BY AUTOMATED COUNT: 14.6 % (ref 11.6–14.5)
GLUCOSE BLD STRIP.AUTO-MCNC: 103 MG/DL (ref 70–110)
GLUCOSE BLD STRIP.AUTO-MCNC: 97 MG/DL (ref 70–110)
GLUCOSE BLD STRIP.AUTO-MCNC: 97 MG/DL (ref 70–110)
GLUCOSE BLD STRIP.AUTO-MCNC: 99 MG/DL (ref 70–110)
GLUCOSE SERPL-MCNC: 102 MG/DL (ref 74–99)
GLUCOSE SERPL-MCNC: 74 MG/DL (ref 74–99)
HCT VFR BLD AUTO: 23.7 % (ref 35–45)
HGB BLD-MCNC: 7.6 G/DL (ref 12–16)
IMM GRANULOCYTES # BLD AUTO: 0.1 K/UL (ref 0–0.04)
IMM GRANULOCYTES NFR BLD AUTO: 0 % (ref 0–0.5)
LYMPHOCYTES # BLD: 3 K/UL (ref 0.9–3.6)
LYMPHOCYTES NFR BLD: 26 % (ref 21–52)
MAGNESIUM SERPL-MCNC: 1.4 MG/DL (ref 1.6–2.6)
MAGNESIUM SERPL-MCNC: 1.9 MG/DL (ref 1.6–2.6)
MCH RBC QN AUTO: 28.1 PG (ref 24–34)
MCHC RBC AUTO-ENTMCNC: 32.1 G/DL (ref 31–37)
MCV RBC AUTO: 87.8 FL (ref 78–100)
MONOCYTES # BLD: 1 K/UL (ref 0.05–1.2)
MONOCYTES NFR BLD: 9 % (ref 3–10)
NEUTS SEG # BLD: 6.9 K/UL (ref 1.8–8)
NEUTS SEG NFR BLD: 61 % (ref 40–73)
NRBC # BLD: 0.02 K/UL (ref 0–0.01)
NRBC BLD-RTO: 0.2 PER 100 WBC
PLATELET # BLD AUTO: 151 K/UL (ref 135–420)
PMV BLD AUTO: 11.2 FL (ref 9.2–11.8)
POTASSIUM SERPL-SCNC: 2.4 MMOL/L (ref 3.5–5.5)
POTASSIUM SERPL-SCNC: 3.6 MMOL/L (ref 3.5–5.5)
RBC # BLD AUTO: 2.7 M/UL (ref 4.2–5.3)
SODIUM SERPL-SCNC: 138 MMOL/L (ref 136–145)
SODIUM SERPL-SCNC: 141 MMOL/L (ref 136–145)
SPECIMEN EXP DATE BLD: NORMAL
STATUS OF UNIT,%ST: NORMAL
STATUS OF UNIT,%ST: NORMAL
UNIT DIVISION, %UDIV: 0
UNIT DIVISION, %UDIV: 0
WBC # BLD AUTO: 11.2 K/UL (ref 4.6–13.2)

## 2022-05-30 PROCEDURE — 74011000258 HC RX REV CODE- 258: Performed by: PHYSICIAN ASSISTANT

## 2022-05-30 PROCEDURE — 77010033678 HC OXYGEN DAILY

## 2022-05-30 PROCEDURE — 2709999900 HC NON-CHARGEABLE SUPPLY

## 2022-05-30 PROCEDURE — 97535 SELF CARE MNGMENT TRAINING: CPT

## 2022-05-30 PROCEDURE — 74011250636 HC RX REV CODE- 250/636: Performed by: PHYSICIAN ASSISTANT

## 2022-05-30 PROCEDURE — 65620000000 HC RM CCU GENERAL

## 2022-05-30 PROCEDURE — 80048 BASIC METABOLIC PNL TOTAL CA: CPT

## 2022-05-30 PROCEDURE — 94762 N-INVAS EAR/PLS OXIMTRY CONT: CPT

## 2022-05-30 PROCEDURE — 82962 GLUCOSE BLOOD TEST: CPT

## 2022-05-30 PROCEDURE — 83735 ASSAY OF MAGNESIUM: CPT

## 2022-05-30 PROCEDURE — 99024 POSTOP FOLLOW-UP VISIT: CPT | Performed by: THORACIC SURGERY (CARDIOTHORACIC VASCULAR SURGERY)

## 2022-05-30 PROCEDURE — 36415 COLL VENOUS BLD VENIPUNCTURE: CPT

## 2022-05-30 PROCEDURE — 74011636637 HC RX REV CODE- 636/637: Performed by: PHYSICIAN ASSISTANT

## 2022-05-30 PROCEDURE — 71045 X-RAY EXAM CHEST 1 VIEW: CPT

## 2022-05-30 PROCEDURE — 74011250637 HC RX REV CODE- 250/637: Performed by: PHYSICIAN ASSISTANT

## 2022-05-30 PROCEDURE — APPNB45 APP NON BILLABLE 31-45 MINUTES: Performed by: PHYSICIAN ASSISTANT

## 2022-05-30 PROCEDURE — 97530 THERAPEUTIC ACTIVITIES: CPT

## 2022-05-30 PROCEDURE — 74011000250 HC RX REV CODE- 250: Performed by: PHYSICIAN ASSISTANT

## 2022-05-30 PROCEDURE — 85025 COMPLETE CBC W/AUTO DIFF WBC: CPT

## 2022-05-30 RX ORDER — THERA TABS 400 MCG
1 TAB ORAL DAILY
Status: DISCONTINUED | OUTPATIENT
Start: 2022-05-30 | End: 2022-06-03 | Stop reason: HOSPADM

## 2022-05-30 RX ORDER — POTASSIUM CHLORIDE 20 MEQ/1
40 TABLET, EXTENDED RELEASE ORAL ONCE
Status: COMPLETED | OUTPATIENT
Start: 2022-05-30 | End: 2022-05-30

## 2022-05-30 RX ORDER — GABAPENTIN 100 MG/1
100 CAPSULE ORAL
Status: DISCONTINUED | OUTPATIENT
Start: 2022-05-30 | End: 2022-05-31

## 2022-05-30 RX ORDER — CYCLOBENZAPRINE HCL 10 MG
5 TABLET ORAL
Status: DISCONTINUED | OUTPATIENT
Start: 2022-05-30 | End: 2022-06-03 | Stop reason: HOSPADM

## 2022-05-30 RX ORDER — ISOSORBIDE DINITRATE 5 MG/1
10 TABLET ORAL 2 TIMES DAILY
Status: DISCONTINUED | OUTPATIENT
Start: 2022-05-31 | End: 2022-06-03 | Stop reason: HOSPADM

## 2022-05-30 RX ADMIN — FAMOTIDINE 20 MG: 20 TABLET ORAL at 17:16

## 2022-05-30 RX ADMIN — ENOXAPARIN SODIUM 40 MG: 100 INJECTION SUBCUTANEOUS at 17:16

## 2022-05-30 RX ADMIN — THERA TABS 1 TABLET: TAB at 10:08

## 2022-05-30 RX ADMIN — CYCLOBENZAPRINE 5 MG: 10 TABLET, FILM COATED ORAL at 23:40

## 2022-05-30 RX ADMIN — CHLORHEXIDINE GLUCONATE 0.12% ORAL RINSE 10 ML: 1.2 LIQUID ORAL at 08:01

## 2022-05-30 RX ADMIN — IRON SUCROSE 200 MG: 20 INJECTION, SOLUTION INTRAVENOUS at 10:08

## 2022-05-30 RX ADMIN — OXYCODONE HYDROCHLORIDE 10 MG: 5 TABLET ORAL at 07:34

## 2022-05-30 RX ADMIN — OXYCODONE HYDROCHLORIDE 10 MG: 5 TABLET ORAL at 02:50

## 2022-05-30 RX ADMIN — ISOSORBIDE MONONITRATE 30 MG: 30 TABLET, EXTENDED RELEASE ORAL at 08:00

## 2022-05-30 RX ADMIN — ASPIRIN 81 MG: 81 TABLET, COATED ORAL at 08:00

## 2022-05-30 RX ADMIN — POTASSIUM CHLORIDE 40 MEQ: 1500 TABLET, EXTENDED RELEASE ORAL at 15:20

## 2022-05-30 RX ADMIN — CYCLOBENZAPRINE 5 MG: 10 TABLET, FILM COATED ORAL at 11:57

## 2022-05-30 RX ADMIN — GABAPENTIN 100 MG: 100 CAPSULE ORAL at 21:29

## 2022-05-30 RX ADMIN — ACETAMINOPHEN 1000 MG: 500 TABLET ORAL at 23:40

## 2022-05-30 RX ADMIN — ACETAMINOPHEN 1000 MG: 500 TABLET ORAL at 06:15

## 2022-05-30 RX ADMIN — SODIUM CHLORIDE, PRESERVATIVE FREE 10 ML: 5 INJECTION INTRAVENOUS at 22:22

## 2022-05-30 RX ADMIN — Medication 1 TABLET: at 11:57

## 2022-05-30 RX ADMIN — MUPIROCIN: 20 OINTMENT TOPICAL at 17:16

## 2022-05-30 RX ADMIN — CHLORHEXIDINE GLUCONATE 0.12% ORAL RINSE 10 ML: 1.2 LIQUID ORAL at 17:16

## 2022-05-30 RX ADMIN — MUPIROCIN: 20 OINTMENT TOPICAL at 08:02

## 2022-05-30 RX ADMIN — SODIUM CHLORIDE, PRESERVATIVE FREE 5 ML: 5 INJECTION INTRAVENOUS at 06:16

## 2022-05-30 RX ADMIN — AMIODARONE HYDROCHLORIDE 200 MG: 200 TABLET ORAL at 21:29

## 2022-05-30 RX ADMIN — AMIODARONE HYDROCHLORIDE 200 MG: 200 TABLET ORAL at 08:00

## 2022-05-30 RX ADMIN — GABAPENTIN 100 MG: 100 CAPSULE ORAL at 15:20

## 2022-05-30 RX ADMIN — CYCLOBENZAPRINE 5 MG: 10 TABLET, FILM COATED ORAL at 16:29

## 2022-05-30 RX ADMIN — ACETAMINOPHEN 1000 MG: 500 TABLET ORAL at 11:57

## 2022-05-30 RX ADMIN — FENTANYL CITRATE 25 MCG: 50 INJECTION INTRAMUSCULAR; INTRAVENOUS at 17:36

## 2022-05-30 RX ADMIN — Medication 40 UNITS: at 08:00

## 2022-05-30 RX ADMIN — ACETAMINOPHEN 1000 MG: 500 TABLET ORAL at 17:16

## 2022-05-30 RX ADMIN — SODIUM CHLORIDE, PRESERVATIVE FREE 10 ML: 5 INJECTION INTRAVENOUS at 15:21

## 2022-05-30 RX ADMIN — FENTANYL CITRATE 25 MCG: 50 INJECTION INTRAMUSCULAR; INTRAVENOUS at 22:22

## 2022-05-30 RX ADMIN — FAMOTIDINE 20 MG: 20 TABLET ORAL at 08:00

## 2022-05-30 RX ADMIN — AMIODARONE HYDROCHLORIDE 200 MG: 200 TABLET ORAL at 15:20

## 2022-05-30 RX ADMIN — FENTANYL CITRATE 25 MCG: 50 INJECTION INTRAMUSCULAR; INTRAVENOUS at 07:34

## 2022-05-30 RX ADMIN — ATORVASTATIN CALCIUM 80 MG: 40 TABLET, FILM COATED ORAL at 21:29

## 2022-05-30 NOTE — PROGRESS NOTES
CARDIAC SURGERY PROGRESS NOTE    2022        At  some point once V wire removed, start DOAC back unless cardiology ok not to as bypassed the right system. Since she has not been able to tolerate BB yet, have left wire in. Post Operative Day # 3     Chart reviewed. Interval History/Events of Past 24 hours:   Ambulates in schwarz with RN on oxygen. Still with LEFT shoulder pain. K 2.4?? Rechecking. Hgb 7.6  Assessment:  CAD S/P  CABG x 4 -  BB, ASA, statin  Left radial artery harvest on Imdur  Respiratory parameters stable  Cardiac status stable   Anemia post op expecte    Plans:  Recheck K  Add IV iron, MVI, anemia vitamins  Change Imdur to short acting lower dose to start tomorrow  Add flexeril  wean oxygen   D/c med ct and v-wire done without difficulty    Heart Hugger use encouraged to mitigate pain and will also assist with deep breathing and incentive spirometry goals. Possible discharge 3 days. Sierra Sebastian PA-C  [facility]  [phone]  _____________________________________________________________________________________________________________________________________________  Subjective:  Patient seen and examined on rounds today.     Pain level: controlled   Ambulating: well  Sleeping: well  Eating:  Well   Sternal pain: NO     BM: Yes    Objective:  Vital signs:   Visit Vitals  /79 (BP 1 Location: Right upper arm, BP Patient Position: At rest)   Pulse 71   Temp 98.6 °F (37 °C)   Resp 23   Ht 5' 6\" (1.676 m)   Wt 117.1 kg (258 lb 3.2 oz)   SpO2 97%   BMI 41.67 kg/m²     Temp (24hrs), Av.7 °F (37.1 °C), Min:98.4 °F (36.9 °C), Max:99.3 °F (37.4 °C)    Admission Weight: Last Weight   Weight: 111.1 kg (245 lb) Weight: 117.1 kg (258 lb 3.2 oz)     Last 3 Recorded Weights in this Encounter    22 0700 22 0400 22 0400   Weight: 123.7 kg (272 lb 11.2 oz) 118.3 kg (260 lb 11.8 oz) 117.1 kg (258 lb 3.2 oz)       Telemetry: nsr    Physical Examination:     General:  Alert, oriented  Lungs: Clear to ascultation without rales, wheezes or rhonchi. Chest: Dressings clean and dry. Sternum stable. Heart: regular rate and rhythm, No murmur. Abdomen: Soft and non-tender without masses. Bowel sounds present. Extremities: Warm and well perfused. Edema moderate. Incisions: clean and dry  Neuro: No deficit. Beta-blocker: Yes  ASA: Yes   Statin: Yes  ACE-I: No  Diuretic: No     SUP Prophylaxis: Pepcid  DVT Prophylaxis:   pneumatic compression boots: Yes  Compression stockings:  Yes  Enoxaparin:  Yes    Chest tubes:  present    Pacing wires:  present    DME needs:  tbd            Labs:  Lab Results   Component Value Date/Time    WBC 11.2 05/30/2022 05:27 AM    HCT 23.7 (L) 05/30/2022 05:27 AM     05/30/2022 05:27 AM      Lab Results   Component Value Date/Time     05/30/2022 10:47 AM    K 2.4 (LL) 05/30/2022 10:47 AM     (H) 05/30/2022 10:47 AM    CO2 20 (L) 05/30/2022 10:47 AM    GLU 74 05/30/2022 10:47 AM    BUN 7 05/30/2022 10:47 AM    CREA 0.26 (L) 05/30/2022 10:47 AM    CREA 0.53 (L) 05/29/2022 05:20 AM    CREA 0.85 05/28/2022 04:00 AM     Lab Results   Component Value Date/Time    HBA1C 5.9 (H) 05/28/2022 04:00 AM       PLEASE NOTE:  This document may have been produced using voice recognition software. Unrecognized errors in transcription may be present. Please call with any questions. NOTE TO PATIENT:  The purpose of this note is to communicate optimally with the other providers involved in your care. It is written using standard medical terminology. If you have questions regarding details of the note please call my office at [phone] and make an appointment to discuss your concerns.

## 2022-05-30 NOTE — PROGRESS NOTES
0700 - Assumed patient care. Handoff received from Rob vidal RN. Patient resting in recliner, NAD, VSS.  0920 - Short run of AF with RVR, HR: 120s, approximately 1 min duration. Spontaneously converted back to SR.  1000 - Ambulated in hallways. RA SpO2: 86%. Returned to recliner and placed back on 1.5L NC  [address], PA at bedside. Epicardial wires and mediastinal chest tube removed without difficulty,  1530 - Ambulated in hallways. RA SpO2: 87%, returned to 1.5L NC. Returned to RICARDO Maher. [phone] - Patient requesting to stand up for reposition. Chest tube connection detached from atrium tubing site. Re-connected and tape reinforced, no air leak noted. No complaints of shortness of breath, pleuritic pain or desaturation. Will continue to monitor. 1730 - Ambulated in hallways. Unable to complete usual path due to pain. Returned to bed and medicated appropriately. 1900 - End of shift.  Handoff given to Bryan Espinosa

## 2022-05-30 NOTE — PROGRESS NOTES
Problem: Self Care Deficits Care Plan (Adult)  Goal: *Acute Goals and Plan of Care (Insert Text)  Description: Occupational Therapy Goals  Initiated 5/28/2022 within 7 day(s). 1.  Patient will perform self-feeding with supervision/set-up. 2.  Patient will perform grooming with supervision/set-up standing at the sink with Good balance for >4 min. 3.  Patient will perform upper body and lower body dressing with supervision/set-up, using AE prn.  4.  Patient will perform toilet transfers with supervision/set-up. 5.  Patient will perform all aspects of toileting with supervision/set-up. 6.  Patient will verbalize sternal precautions and will follow them during functional activities without cues. 7.  Patient will utilize energy conservation techniques during functional activities without verbal cues. Prior Level of Function: Pt reports being independent with ADLs and functional mobility, pt lives with her spouse who she is primary caregiver for, pt's daughter lives nearby and checks on them. Outcome: Progressing Towards Goal   OCCUPATIONAL THERAPY TREATMENT    Patient: Vicki Aponte (77 y.o. female)  Date: 5/30/2022  Diagnosis: CAD (coronary artery disease) [I25.10] S/P CABG x 4  Procedure(s) (LRB):  CORONARY ARTERY BYPASS GRAFT (CABG) TIMES FOUR/LEFT RADIAL ARTERY HARVEST / Isabelle Care (N/A)  TRANSESOPHAGEAL ECHOCARDIOGRAM (N/A) 3 Days Post-Op  Precautions:    PLOF: Pt reports being independent with ADLs and functional mobility, pt lives with her spouse who she is primary caregiver for, pt's daughter lives nearby and checks on them. Chart, occupational therapy assessment, plan of care, and goals were reviewed. ASSESSMENT:  Pt cleared by RN for OT tx at this time. Pt presented sitting upright in reclining chair upon therapist arrival, 2L O2NC intact, all lines intact, and agreeable work with OT. Reviewed sternal precautions with pt as pt able to recall 1/3 benefiting from min vc's to utilize heart hugger. Pt able to scoot towards edge of chair and performed STS transfer with CGA. Once in stance, she performed oral hygiene w/ SBA tolerating ~ 2 mins in stance before seated rest break. Pt with notable B UE edema and educated on mult edema mgmt techniques including AROM and elevation, pt verbalized understanding. She positioned herself for comfort in chair and left with B LE's/ UE's elevated and all needs left within reach. Vitals assessed throughout tx session and WNL. RN made aware of pt's participation and position. Progression toward goals:  []          Improving appropriately and progressing toward goals  [x]          Improving slowly and progressing toward goals  []          Not making progress toward goals and plan of care will be adjusted     PLAN:  Patient continues to benefit from skilled intervention to address the above impairments. Continue treatment per established plan of care. Discharge Recommendations: Home Health with assistance from capable adult. If no consistent assistance available, recommend Rehab  Further Equipment Recommendations for Discharge: [facility] and Shower chair     SUBJECTIVE:   Patient stated  I am doing okay. \"     OBJECTIVE DATA SUMMARY:   Cognitive/Behavioral Status:  Neurologic State: Alert  Orientation Level: Oriented X4  Cognition: Follows commands  Safety/Judgement: Awareness of environment    Functional Mobility and Transfers for ADLs:      Transfers:  Sit to Stand: Contact guard assistance  Stand to Sit: Contact guard assistance          Balance:  Sitting: Impaired  Sitting - Static: Good (unsupported)  Sitting - Dynamic: Good (unsupported)  Standing: Impaired; With support  Standing - Static: Good  Standing - Dynamic : Fair    ADL Intervention:       Grooming  Position Performed: Standing  Brushing Teeth: Stand-by assistance    Pain:  Pain level pre-treatment: pt did not rate pain  Pain level post-treatment: pt did not rate pain    Activity Tolerance:    Fair   Please refer to the flowsheet for vital signs taken during this treatment. After treatment:   [x]  Patient left in no apparent distress sitting up in chair  []  Patient left in no apparent distress in bed  [x]  Call bell left within reach  [x]  Nursing notified  []  Caregiver present  []  Bed alarm activated    COMMUNICATION/EDUCATION:   [x] Role of Occupational Therapy in the acute care setting  [] Home safety education was provided and the patient/caregiver indicated understanding. [x] Patient/family have participated as able in working towards goals and plan of care. [x] Patient/family agree to work toward stated goals and plan of care. [] Patient understands intent and goals of therapy, but is neutral about his/her participation. [] Patient is unable to participate in goal setting and plan of care.       Thank you for this referral.  CLAIR Weir  Time Calculation: 23 mins

## 2022-05-30 NOTE — PROGRESS NOTES
Problem: Falls - Risk of  Goal: *Absence of Falls  Description: Document Lizzy Zhao Fall Risk and appropriate interventions in the flowsheet.   Outcome: Progressing Towards Goal  Note: Fall Risk Interventions:  Mobility Interventions: Assess mobility with egress test,Bed/chair exit alarm,Communicate number of staff needed for ambulation/transfer,Patient to call before getting OOB,Strengthening exercises (ROM-active/passive),Utilize walker, cane, or other assistive device  Mentation Interventions: Adequate sleep, hydration, pain control,Bed/chair exit alarm,Evaluate medications/consider consulting pharmacy,Increase mobility,Room close to nurse's station,Update white board,Toileting rounds  Medication Interventions: Assess postural VS orthostatic hypotension,Bed/chair exit alarm,Patient to call before getting OOB,Teach patient to arise slowly  Elimination Interventions: Bed/chair exit alarm,Call light in reach,Patient to call for help with toileting needs,Stay With Me (per policy),Toileting schedule/hourly rounds,Toilet paper/wipes in reach    Problem: Patient Education: Go to Patient Education Activity  Goal: Patient/Family Education  Outcome: Progressing Towards Goal     Problem: CABG: Post-Op Day 2/Transfer Day  Goal: Activity/Safety  Outcome: Progressing Towards Goal  Goal: Diagnostic Test/Procedures  Outcome: Progressing Towards Goal  Goal: Nutrition/Diet  Outcome: Progressing Towards Goal  Goal: Medications  Outcome: Progressing Towards Goal

## 2022-05-30 NOTE — PROGRESS NOTES
1900- Change of shift. Report given by CARL Hill. Will assume care of pt with Chely Solo, [address]- Pt in bed, resting quietly. Pt vitals; oral temperature is 98.6 F, pulse 73, RR 19, and O2 saturation is 94% on 2L of O2. Pt alert and oriented x4 and complaining of pain in the upper left shoulder. 0000- Pt bathed with CHG. Dressings clean, dry, and intact. 6831- Labs drawn. HGB result 7.6.  0600- Pt up in chair. 0700- Change of shift. Report given to CARL Henry.

## 2022-05-31 ENCOUNTER — APPOINTMENT (OUTPATIENT)
Dept: GENERAL RADIOLOGY | Age: 52
DRG: 166 | End: 2022-05-31
Attending: PHYSICIAN ASSISTANT
Payer: MEDICAID

## 2022-05-31 LAB
GLUCOSE BLD STRIP.AUTO-MCNC: 106 MG/DL (ref 70–110)
GLUCOSE BLD STRIP.AUTO-MCNC: 85 MG/DL (ref 70–110)
GLUCOSE BLD STRIP.AUTO-MCNC: 88 MG/DL (ref 70–110)

## 2022-05-31 PROCEDURE — 99024 POSTOP FOLLOW-UP VISIT: CPT | Performed by: PHYSICIAN ASSISTANT

## 2022-05-31 PROCEDURE — APPNB180 APP NON BILLABLE TIME > 60 MINS: Performed by: PHYSICIAN ASSISTANT

## 2022-05-31 PROCEDURE — 74011000258 HC RX REV CODE- 258: Performed by: PHYSICIAN ASSISTANT

## 2022-05-31 PROCEDURE — 71045 X-RAY EXAM CHEST 1 VIEW: CPT

## 2022-05-31 PROCEDURE — 94762 N-INVAS EAR/PLS OXIMTRY CONT: CPT

## 2022-05-31 PROCEDURE — 74011250637 HC RX REV CODE- 250/637: Performed by: PHYSICIAN ASSISTANT

## 2022-05-31 PROCEDURE — 65660000004 HC RM CVT STEPDOWN

## 2022-05-31 PROCEDURE — 82962 GLUCOSE BLOOD TEST: CPT

## 2022-05-31 PROCEDURE — 74011250637 HC RX REV CODE- 250/637: Performed by: THORACIC SURGERY (CARDIOTHORACIC VASCULAR SURGERY)

## 2022-05-31 PROCEDURE — 74011250636 HC RX REV CODE- 250/636: Performed by: PHYSICIAN ASSISTANT

## 2022-05-31 PROCEDURE — 99222 1ST HOSP IP/OBS MODERATE 55: CPT | Performed by: INTERNAL MEDICINE

## 2022-05-31 PROCEDURE — 74011636637 HC RX REV CODE- 636/637: Performed by: PHYSICIAN ASSISTANT

## 2022-05-31 PROCEDURE — 93005 ELECTROCARDIOGRAM TRACING: CPT

## 2022-05-31 PROCEDURE — 77010033678 HC OXYGEN DAILY

## 2022-05-31 PROCEDURE — 97535 SELF CARE MNGMENT TRAINING: CPT

## 2022-05-31 PROCEDURE — 74011000250 HC RX REV CODE- 250: Performed by: PHYSICIAN ASSISTANT

## 2022-05-31 RX ORDER — GUAIFENESIN 600 MG/1
600 TABLET, EXTENDED RELEASE ORAL 2 TIMES DAILY
Status: DISCONTINUED | OUTPATIENT
Start: 2022-05-31 | End: 2022-06-03 | Stop reason: HOSPADM

## 2022-05-31 RX ORDER — GABAPENTIN 100 MG/1
100 CAPSULE ORAL 3 TIMES DAILY
Status: DISCONTINUED | OUTPATIENT
Start: 2022-05-31 | End: 2022-06-02

## 2022-05-31 RX ORDER — MORPHINE SULFATE 2 MG/ML
1 INJECTION, SOLUTION INTRAMUSCULAR; INTRAVENOUS
Status: DISCONTINUED | OUTPATIENT
Start: 2022-05-31 | End: 2022-06-03 | Stop reason: HOSPADM

## 2022-05-31 RX ORDER — FUROSEMIDE 10 MG/ML
10 INJECTION INTRAMUSCULAR; INTRAVENOUS 2 TIMES DAILY
Status: DISCONTINUED | OUTPATIENT
Start: 2022-05-31 | End: 2022-05-31

## 2022-05-31 RX ORDER — FUROSEMIDE 10 MG/ML
20 INJECTION INTRAMUSCULAR; INTRAVENOUS 2 TIMES DAILY
Status: DISCONTINUED | OUTPATIENT
Start: 2022-06-01 | End: 2022-06-02

## 2022-05-31 RX ORDER — BACLOFEN 10 MG/1
10 TABLET ORAL 3 TIMES DAILY
Status: DISCONTINUED | OUTPATIENT
Start: 2022-05-31 | End: 2022-05-31

## 2022-05-31 RX ORDER — BACLOFEN 10 MG/1
10 TABLET ORAL 3 TIMES DAILY
Status: DISCONTINUED | OUTPATIENT
Start: 2022-05-31 | End: 2022-06-02

## 2022-05-31 RX ORDER — OXYCODONE HYDROCHLORIDE 5 MG/1
5-10 TABLET ORAL
Status: DISCONTINUED | OUTPATIENT
Start: 2022-05-31 | End: 2022-06-02

## 2022-05-31 RX ORDER — FENTANYL CITRATE 50 UG/ML
25 INJECTION, SOLUTION INTRAMUSCULAR; INTRAVENOUS
Status: DISCONTINUED | OUTPATIENT
Start: 2022-05-31 | End: 2022-05-31

## 2022-05-31 RX ORDER — MAGNESIUM CITRATE
296 SOLUTION, ORAL ORAL
Status: COMPLETED | OUTPATIENT
Start: 2022-05-31 | End: 2022-05-31

## 2022-05-31 RX ADMIN — Medication 1 TABLET: at 08:26

## 2022-05-31 RX ADMIN — CHLORHEXIDINE GLUCONATE 0.12% ORAL RINSE 10 ML: 1.2 LIQUID ORAL at 17:21

## 2022-05-31 RX ADMIN — CYCLOBENZAPRINE 5 MG: 10 TABLET, FILM COATED ORAL at 17:21

## 2022-05-31 RX ADMIN — GUAIFENESIN 600 MG: 600 TABLET, EXTENDED RELEASE ORAL at 12:03

## 2022-05-31 RX ADMIN — FAMOTIDINE 20 MG: 20 TABLET ORAL at 17:21

## 2022-05-31 RX ADMIN — ASPIRIN 81 MG: 81 TABLET, COATED ORAL at 08:26

## 2022-05-31 RX ADMIN — ACETAMINOPHEN 1000 MG: 500 TABLET ORAL at 06:46

## 2022-05-31 RX ADMIN — THERA TABS 1 TABLET: TAB at 08:26

## 2022-05-31 RX ADMIN — FUROSEMIDE 10 MG: 10 INJECTION, SOLUTION INTRAMUSCULAR; INTRAVENOUS at 17:21

## 2022-05-31 RX ADMIN — ACETAMINOPHEN 1000 MG: 500 TABLET ORAL at 12:03

## 2022-05-31 RX ADMIN — AMIODARONE HYDROCHLORIDE 200 MG: 200 TABLET ORAL at 15:47

## 2022-05-31 RX ADMIN — ISOSORBIDE DINITRATE 10 MG: 5 TABLET ORAL at 17:21

## 2022-05-31 RX ADMIN — MUPIROCIN: 20 OINTMENT TOPICAL at 17:22

## 2022-05-31 RX ADMIN — FAMOTIDINE 20 MG: 20 TABLET ORAL at 08:26

## 2022-05-31 RX ADMIN — SODIUM CHLORIDE, PRESERVATIVE FREE 10 ML: 5 INJECTION INTRAVENOUS at 07:40

## 2022-05-31 RX ADMIN — Medication 40 UNITS: at 08:26

## 2022-05-31 RX ADMIN — FENTANYL CITRATE 25 MCG: 50 INJECTION, SOLUTION INTRAMUSCULAR; INTRAVENOUS at 21:25

## 2022-05-31 RX ADMIN — ISOSORBIDE DINITRATE 10 MG: 5 TABLET ORAL at 08:26

## 2022-05-31 RX ADMIN — SODIUM CHLORIDE, PRESERVATIVE FREE 10 ML: 5 INJECTION INTRAVENOUS at 21:27

## 2022-05-31 RX ADMIN — AMIODARONE HYDROCHLORIDE 200 MG: 200 TABLET ORAL at 08:26

## 2022-05-31 RX ADMIN — CYCLOBENZAPRINE 5 MG: 10 TABLET, FILM COATED ORAL at 07:40

## 2022-05-31 RX ADMIN — MAGNESIUM CITRATE 296 ML: 1.75 LIQUID ORAL at 15:48

## 2022-05-31 RX ADMIN — ATORVASTATIN CALCIUM 80 MG: 40 TABLET, FILM COATED ORAL at 21:27

## 2022-05-31 RX ADMIN — FENTANYL CITRATE 25 MCG: 50 INJECTION INTRAMUSCULAR; INTRAVENOUS at 09:11

## 2022-05-31 RX ADMIN — AMIODARONE HYDROCHLORIDE 200 MG: 200 TABLET ORAL at 21:27

## 2022-05-31 RX ADMIN — BACLOFEN 10 MG: 10 TABLET ORAL at 21:27

## 2022-05-31 RX ADMIN — CHLORHEXIDINE GLUCONATE 0.12% ORAL RINSE 10 ML: 1.2 LIQUID ORAL at 08:27

## 2022-05-31 RX ADMIN — FENTANYL CITRATE 25 MCG: 50 INJECTION INTRAMUSCULAR; INTRAVENOUS at 15:43

## 2022-05-31 RX ADMIN — FUROSEMIDE 10 MG: 10 INJECTION, SOLUTION INTRAMUSCULAR; INTRAVENOUS at 08:26

## 2022-05-31 RX ADMIN — BACLOFEN 10 MG: 10 TABLET ORAL at 14:09

## 2022-05-31 RX ADMIN — ACETAMINOPHEN 1000 MG: 500 TABLET ORAL at 23:17

## 2022-05-31 RX ADMIN — GUAIFENESIN 600 MG: 600 TABLET, EXTENDED RELEASE ORAL at 17:21

## 2022-05-31 RX ADMIN — GABAPENTIN 100 MG: 100 CAPSULE ORAL at 23:18

## 2022-05-31 RX ADMIN — SODIUM CHLORIDE, PRESERVATIVE FREE 10 ML: 5 INJECTION INTRAVENOUS at 14:09

## 2022-05-31 RX ADMIN — BACLOFEN 10 MG: 10 TABLET ORAL at 15:47

## 2022-05-31 RX ADMIN — ENOXAPARIN SODIUM 40 MG: 100 INJECTION SUBCUTANEOUS at 17:21

## 2022-05-31 RX ADMIN — ACETAMINOPHEN 1000 MG: 500 TABLET ORAL at 17:21

## 2022-05-31 RX ADMIN — MUPIROCIN: 20 OINTMENT TOPICAL at 08:28

## 2022-05-31 RX ADMIN — IRON SUCROSE 200 MG: 20 INJECTION, SOLUTION INTRAVENOUS at 09:05

## 2022-05-31 RX ADMIN — GABAPENTIN 100 MG: 100 CAPSULE ORAL at 06:46

## 2022-05-31 NOTE — PROGRESS NOTES
Bedside and Verbal shift change report given to Debbie Hemphill RN (oncoming nurse) by Arlice Koyanagi, RN (offgoing nurse). Report included the following information SBAR, Kardex, ED Summary, Procedure Summary, Intake/Output, MAR, Recent Results, Med Rec Status and Cardiac Rhythm NSR.     1941: AOX4, c/o post op pain scale 8/10, ice pack applied @ L shoulder area, on room air, V/S and shift assessment completed, Assisted pt to bedside commode and back to bed, chest incision open to air, clean, dry and intact, Chest tube in place    2033: Spoke to Dr. Dimitris Martin about pt pain, order obtained and acknowledged    2125: Fentanyl 25 mcg IV administered for pain, Due medication given,  - No insulin coverage per protocol    2319: Assisted pt to recliner, c/o sob, Assisted pt with IS, V/S checked /79, HR 87, O2 SAT 93, resting comfortably, Due Medication given, call bell within reach    2345: Dr. Debroah Balloon called - updated with pt    0023: Pt c/o post op pain, Morphine 1 mg IV administered for pain    0158: Sleeping  In recliner, call bell within reach    0500: CHG bath provided    0535: Ambulated in hallways, voiced no c/o    0618: Tylenol 1000 mg PO given as scheduled    Bedside and Verbal shift change report given to Ginger Marte RN (oncoming nurse) by Brenda Hickman RN (offgoing nurse). Report included the following information SBAR, Kardex, ED Summary, Procedure Summary, Intake/Output, MAR, Recent Results, Med Rec Status and Cardiac Rhythm NSR, Sinus Tach.

## 2022-05-31 NOTE — CONSULTS
Comprehensive Nutrition Assessment    Type and Reason for Visit: Initial,Consult    Nutrition Recommendations/Plan:   1. Update flavor preference of nutrition supplement in order; continue supplements  2. Continue all other nutrition interventions. Encourage/ monitor po intake of meals and supplements. Malnutrition Assessment:  Malnutrition Status: At risk for malnutrition (specify) (poor meal intake since admission) (05/31/22 1636)      Nutrition History and Allergies:   Past medical hx: CAD, HLD, HTN. Pt with wt gain PTA; question if related to fluid given pt with edema. Good appetite/ meal intake at home PTA; usually eats 2-3 meals daily. Nutrition Assessment:    Pt reported usually intake was good at home; decreased after CABG x4, on 5/27/2022. Has nutrition supplements; consuming with fair intake. Tolerating diet. Does have some throat soreness due to the tube placed during surgery. Discussed providing nutrition education on cardiac diet; pt declined, stated she is already familiar with dietary considerations. Nutrition Related Findings:    BM 5/29. +edema. Pertinent meds: venofer, MVI, folbic rf foltx tablet Wound Type: Surgical incision    Current Nutrition Intake & Therapies:  Average Meal Intake: 1-25%  Average Supplement Intake: 51-75%  ADULT DIET Regular; 4 carb choices (60 gm/meal); Low Fat/Low Chol/High Fiber/2 gm Na; no concentrated sweets. no red meat. ADULT ORAL NUTRITION SUPPLEMENT Breakfast, Lunch, Dinner; Standard High Calorie/High Protein    Anthropometric Measures:  Height: 5' 6\" (167.6 cm)  Ideal Body Weight (IBW): 130 lbs (59 kg)  Admission Body Weight: 244 lb 14.9 oz  Current Body Wt:  116.6 kg (257 lb 0.9 oz), 197.7 % IBW.  Standing scale  Current BMI (kg/m2): 41.5  Usual Body Weight: 111.1 kg (245 lb)  % Weight Change (Calculated): 4.9  Weight Adjustment: No adjustment  BMI Category: Obese class 3 (BMI 40.0 or greater)    Estimated Daily Nutrient Needs:  Energy Requirements Based On: Formula  Weight Used for Energy Requirements: Usual  Energy (kcal/day): 0251-9085  Weight Used for Protein Requirements: Usual (x0.8-1.2)  Protein (g/day):   Method Used for Fluid Requirements: 1 ml/kcal  Fluid (ml/day): 1401-2625    Nutrition Diagnosis:   · Inadequate oral intake related to early satiety as evidenced by intake 0-25%,intake 26-50%,intake 51-75% (overall intake with meals and supplements)      Nutrition Interventions:   Food and/or Nutrient Delivery: Continue current diet,Mineral supplement,Vitamin supplement,Continue oral nutrition supplement  Nutrition Education/Counseling: No recommendations at this time,Education not indicated  Coordination of Nutrition Care: Continue to monitor while inpatient  Plan of Care discussed with: pt    Goals:     Goals: Meet at least 75% of estimated needs,PO intake 75% or greater,by next RD assessment       Nutrition Monitoring and Evaluation:   Behavioral-Environmental Outcomes: None identified  Food/Nutrient Intake Outcomes: Diet advancement/tolerance,Food and nutrient intake,Supplement intake,Vitamin/mineral intake  Physical Signs/Symptoms Outcomes: Biochemical data,Meal time behavior    Discharge Planning:     Too soon to determine    Yohannes Nails, [address]  Contact: [phone]

## 2022-05-31 NOTE — PROGRESS NOTES
CARDIAC SURGERY PROGRESS NOTE    2022     Post Operative Day # 4     Chart reviewed. Interval History/Events of Past 24 hours:   Weaned off oxygen. Walks in [place]. Pain issues    Assessment:  CAD S/P  CABG x 4 - on  BB, ASA, statin  Respiratory parameters stable  Cardiac status stable   Anemia post op expected on IV iron, MVI, anemia vitamins  Pain issues on multimodal pain control medications    Plans:  1. Add baclofen  2. Cbc, BMP in am  3. Cont cathartics  4. diurese  5. D/c cordis in am    Heart Hugger use encouraged to mitigate pain and will also assist with deep breathing and incentive spirometry goals. Possible discharge 2 days. Tee Membreno PA-C  [facility]  [phone]  _____________________________________________________________________________________________________________________________________________  Subjective:  Patient seen and examined on rounds today. Pain level: not controlled   Ambulating: well  Sleeping: well  Eating:  Well   Sternal pain: NO     BM: No    Objective:  Vital signs:   Visit Vitals  /72 (BP 1 Location: Right upper arm, BP Patient Position: At rest)   Pulse 79   Temp 97.5 °F (36.4 °C)   Resp 30   Ht 5' 6\" (1.676 m)   Wt 116.6 kg (257 lb 0.9 oz)   SpO2 91%   BMI 41.49 kg/m²     Temp (24hrs), Av.3 °F (36.8 °C), Min:97.5 °F (36.4 °C), Max:98.6 °F (37 °C)    Admission Weight: Last Weight   Weight: 111.1 kg (245 lb) Weight: 116.6 kg (257 lb 0.9 oz)     Last 3 Recorded Weights in this Encounter    22 0400 22 0400 22 0600   Weight: 118.3 kg (260 lb 11.8 oz) 117.1 kg (258 lb 3.2 oz) 116.6 kg (257 lb 0.9 oz)       Telemetry: nsr    Physical Examination:     General:  Alert, oriented  Lungs: Clear to ascultation without rales, wheezes or rhonchi. Chest: Dressings clean and dry. Midline incision healing well. Sternum stable. Heart: regular rate and rhythm, No murmur.   Abdomen: Soft and non-tender without masses. Bowel sounds present. Extremities: Warm and well perfused. Edema moderate. Incisions: clean and dry  Neuro: No deficit. Beta-blocker: Yes  ASA: Yes   Statin: Yes  ACE-I: Yes  Diuretic: No     SUP Prophylaxis: Pepcid  DVT Prophylaxis:   pneumatic compression boots: Yes  Compression stockings:  Yes  Enoxaparin:  Yes    Chest tubes:  present    Pacing wires:  not present    DME needs:  tbd          Labs:  Lab Results   Component Value Date/Time    WBC 11.2 05/30/2022 05:27 AM    HCT 23.7 (L) 05/30/2022 05:27 AM     05/30/2022 05:27 AM      Lab Results   Component Value Date/Time     05/30/2022 01:48 PM    K 3.6 05/30/2022 01:48 PM     05/30/2022 01:48 PM    CO2 29 05/30/2022 01:48 PM     (H) 05/30/2022 01:48 PM    BUN 10 05/30/2022 01:48 PM    CREA 0.58 (L) 05/30/2022 01:48 PM    CREA 0.26 (L) 05/30/2022 10:47 AM    CREA 0.53 (L) 05/29/2022 05:20 AM     Lab Results   Component Value Date/Time    HBA1C 5.9 (H) 05/28/2022 04:00 AM            PLEASE NOTE:  This document may have been produced using voice recognition software. Unrecognized errors in transcription may be present. Please call with any questions. NOTE TO PATIENT:  The purpose of this note is to communicate optimally with the other providers involved in your care. It is written using standard medical terminology. If you have questions regarding details of the note please call my office at [phone] and make an appointment to discuss your concerns.

## 2022-05-31 NOTE — DIABETES MGMT
Diabetes/ Glycemic Control Plan of Care    Assessment:   CAD S/P  CABG x 4 on 5/27. Has transitioned off insulin drip -   Receiving basal daily and has corrective ordered as needed 4 x daily  BG well controlled   No prior h/o DM  Will follow     DX:   1. Coronary artery disease of native artery of native heart with stable angina pectoris ([facility] [address]        Blood glucose values: Within target range (70-180mg/dL): yes    Current insulin orders:   lantus 40 units daily  Corrective humalog, normal insulin sensitivity, 4 times daily  Total Daily Dose previous 24 hours = 40 units lantus    Current A1c:   Lab Results   Component Value Date/Time    Hemoglobin A1c 5.9 (H) 05/28/2022 04:00 AM      equivalent  to ave Blood Glucose of  123 mg/dl for 2-3 months prior to admission    Nutrition/Diet:   Active Orders   Diet    ADULT DIET Regular; 4 carb choices (60 gm/meal); Low Fat/Low Chol/High Fiber/2 gm Na; no concentrated sweets. no red meat.       Meal Intake:  Patient Vitals for the past 168 hrs:   % Diet Eaten   05/31/22 0855 76 - 100%   05/30/22 1800 76 - 100%   05/30/22 1300 76 - 100%   05/30/22 0830 76 - 100%   05/29/22 1200 1 - 25%   05/29/22 0800 1 - 25%   05/28/22 1700 26 - 50%   05/28/22 1200 1 - 25%   05/28/22 0800 0%       Camille Suarez MPH RN Christiano Jack  Pager [phone]  Office [phone]

## 2022-05-31 NOTE — ANESTHESIA POSTPROCEDURE EVALUATION
Procedure(s):  CORONARY ARTERY BYPASS GRAFT (CABG) TIMES FOUR/LEFT RADIAL ARTERY HARVEST / Tabitha Jose Angel  TRANSESOPHAGEAL ECHOCARDIOGRAM.    general    Anesthesia Post Evaluation      Multimodal analgesia: multimodal analgesia used between 6 hours prior to anesthesia start to PACU discharge  Patient location during evaluation: ICU  Patient participation: complete - patient participated  Level of consciousness: awake and alert  Pain management: adequate  Airway patency: patent  Anesthetic complications: no  Cardiovascular status: acceptable and hemodynamically stable  Respiratory status: acceptable  Hydration status: acceptable  Post anesthesia nausea and vomiting:  controlled      INITIAL Post-op Vital signs:   Vitals Value Taken Time   /62 05/31/22 0800   Temp 37 °C (98.6 °F) 05/31/22 0400   Pulse 71 05/31/22 0807   Resp 18 05/31/22 0807   SpO2 92 % 05/31/22 0807   Vitals shown include unvalidated device data.

## 2022-05-31 NOTE — CONSULTS
Cardiology Initial Patient Referral Note    Cardiology referral request from Dr. Delphine Melendrez for evaluation and management/treatment of CAD    Date of  Admission: 5/27/2022  6:13 AM   Primary Care Physician:  Qi Herrera NP    Attending Cardiologist: Dr. Meño Quiles       Assessment:     -CAD, s/p CABG x 4 (RGSVG-PDA, LRA-OM2, RGSVG-D2, LIMA-LAD) 5/27/2022. Historically on ASA/Brilinta, plan was to switch Brilinta to Plavix (when Brilinta supply ran out) at time of last office visit 5/5/2022. S/p cardiac cath 5/20/2022 with findings as follows:  · LM: Normal  · LAD: Mid LAD diffuse calcified 85-90% stenosis. · Diagonal branch: Medium, ostial 60-70% stenosis, proximal 70%. · LCx/OM: Proximal and mid serial up to 60% followed by proximal OM branch eccentric 75% stenosis. · RCA: Ostial/proximal and mid RCA stent patent with evidence of 50% narrowing in between the stent. 80-90% ostial/proximal RPDA stenosis, proximal/mid RPDA stenosis. -Hx inferior STEMI 03/2020, s/p RCA TEJAL with 0% residual stenosis with diffuse 3 vessel CAD. -Echo 5/10/2022: Normal LVEF 55-60% with hypokinetic basal inferoseptal and basal inferior walls, diastolic dysfunction present, mildly dilated LA  -HTN  -Dyslipidemia  -Hx tobacco use    Primary cardiologist is Dr. Pierre Parent:     -Continue ASA 81 mg, Lipitor. Continue to monitor off DAPT, PCI/TEJAL was > 2 years ago. -Pt started on low-dose IV Lasix, continue close monitoring.  -Continued on low-dose Isordil.  -Pt has been off Levo since 5/29 PM, would resume low-dose Metoprolol as allowed by BP.  -Continue routine post-operative care per cardiothoracic surgery team, appreciate assistance.  -Continued on shayla-operative Amiodarone per cardiothoracic surgery team.    59-year-old  female who just underwent a CABG successfully without any complications. Cardiology opinion comment on the need for dual antiplatelet therapy.   Patient with a history of previous PCI the last 1 in March 2020. Had anginal symptoms for which cardiac catheterization done by Dr. Kuldeep Stringer recently after which elective CABG was done during this admission. Physical exam is significant for severe obesity, clear lung fields, chest wall tenderness as expected postoperatively, normal S1-S2 without any murmur rub or gallop, no significant edema seen. I think, since there is no recent unstable coronary events, a single antiplatelet post CABG should suffice. Discussed this with patient and surgical team.  Her cardiologist, Dr. Kuldeep Stringer will be here tomorrow and make a final decision. Thank you for asking us to be involved in the care of Ms. Pedro Galaviz. History of Present Illness: This is a 46 y.o. female admitted for CAD (coronary artery disease) [I25.10]. Patient complains of: exertional dyspnea/fatigue      Marj Vargas is a 46 y.o. female who presented to the hospital for CABG, given recent symptoms of exertional dyspnea/fatigue with cardiac cath findings as noted above. Pt underwent CABG x 4 by Dr. Jaimee Machado on 5/27/2022. Cardiology has been consulted for assistance with medical management.     Cardiac risk factors: smoking/ tobacco exposure, dyslipidemia, hypertension      Review of Symptoms:  Except as stated above include:  Constitutional:  negative  Respiratory:  negative  Cardiovascular:  As per HPI  Gastrointestinal: negative  Genitourinary:  negative  Musculoskeletal:  Negative  Neurological:  Negative  Dermatological:  Negative  Endocrinological: Negative  Psychological:  Negative       Past Medical History:     Past Medical History:   Diagnosis Date    CAD (coronary artery disease)     Displacement of lumbar intervertebral disc     HLD (hyperlipidemia)     Hypertension     Lumbar radiculitis     Lumbar sprain     Numbness     legs and arms    S/P CABG x 4 5/27/2022    LIMA-LAD, Left radial to OM, rSVG to PDA, rSVG to diag by Dr. Alyssa Parra STEMI (ST elevation myocardial infarction) ([address] 2020         Social History:     Social History     Socioeconomic History    Marital status:    Tobacco Use    Smoking status: Former Smoker     Packs/day: 1.00     Years: 35.00     Pack years: 35.00     Quit date: 2020     Years since quittin.2    Smokeless tobacco: Former User     Quit date: 2017    Tobacco comment: pt states she smoked about 1.5 packs a day for 30-35 years and quit on 17   Substance and Sexual Activity    Alcohol use: Yes     Alcohol/week: 4.0 standard drinks     Types: 4 Shots of liquor per week     Comment: occ    Drug use: No        Family History:     Family History   Problem Relation Age of Onset    Hypertension Mother     Hypertension Father     Heart Disease Father     Hypertension Maternal Grandmother     Heart Disease Maternal Grandmother     Hypertension Paternal Grandmother     Heart Disease Paternal Grandmother         Medications:      Allergies   Allergen Reactions    Codeine Hives     Tolerates hydrocodone and morphine    Pcn [Penicillins] Shortness of Breath     Tongue swelling          Current Facility-Administered Medications   Medication Dose Route Frequency    furosemide (LASIX) injection 10 mg  10 mg IntraVENous BID    guaiFENesin ER (MUCINEX) tablet 600 mg  600 mg Oral BID    iron sucrose (VENOFER) 200 mg in 0.9% sodium chloride 100 mL IVPB  200 mg IntraVENous Q24H    vit B12-levomefolate calcium-vit B6 (FOLBIC RF, FOLTX) 2-1.13-25 mg tablet 1 Tablet  1 Tablet Oral DAILY    therapeutic multivitamin (THERAGRAN) tablet 1 Tablet  1 Tablet Oral DAILY    isosorbide dinitrate (ISORDIL) tablet 10 mg  10 mg Oral BID    cyclobenzaprine (FLEXERIL) tablet 5 mg  5 mg Oral TID PRN    gabapentin (NEURONTIN) capsule 100 mg  100 mg Oral TID PRN    bisacodyL (DULCOLAX) tablet 10 mg  10 mg Oral DAILY    famotidine (PEPCID) tablet 20 mg  20 mg Oral BID    fentaNYL citrate (PF) injection 12.5-25 mcg  12.5-25 mcg IntraVENous Q15MIN PRN  polyethylene glycol (MIRALAX) packet 17 g  17 g Oral DAILY    acetaminophen (TYLENOL) tablet 1,000 mg  1,000 mg Oral Q6H    insulin glargine (LANTUS) injection 40 Units  40 Units SubCUTAneous DAILY    enoxaparin (LOVENOX) injection 40 mg  40 mg SubCUTAneous Q24H    [Held by provider] metoprolol tartrate (LOPRESSOR) tablet 6.25 mg  6.25 mg Oral Q12H    albumin human 25% (BUMINATE) solution 12.5 g  12.5 g IntraVENous Q4H PRN    lidocaine 4 % patch 1 Patch  1 Patch TransDERmal Q24H    insulin lispro (HUMALOG) injection   SubCUTAneous AC&HS    glucose chewable tablet 16 g  16 g Oral PRN    glucagon (GLUCAGEN) injection 1 mg  1 mg IntraMUSCular PRN    dextrose 10% infusion 0-250 mL  0-250 mL IntraVENous PRN    sodium chloride (NS) flush 5-40 mL  5-40 mL IntraVENous Q8H    sodium chloride (NS) flush 5-40 mL  5-40 mL IntraVENous PRN    naloxone (NARCAN) injection 0.4 mg  0.4 mg IntraVENous PRN    mupirocin (BACTROBAN) 2 % ointment   Both Nostrils BID    amiodarone (CORDARONE) tablet 200 mg  200 mg Oral TID    albuterol (PROVENTIL VENTOLIN) nebulizer solution 2.5 mg  2.5 mg Nebulization Q4H PRN    atorvastatin (LIPITOR) tablet 80 mg  80 mg Oral QHS    ondansetron (ZOFRAN) injection 4 mg  4 mg IntraVENous Q4H PRN    aspirin delayed-release tablet 81 mg  81 mg Oral DAILY    chlorhexidine (PERIDEX) 0.12 % mouthwash 10 mL  10 mL Oral BID    docusate sodium (COLACE) capsule 100 mg  100 mg Oral BID    ELECTROLYTE REPLACEMENT PROTOCOL - Potassium Standard Dosing  1 Each Other PRN    ELECTROLYTE REPLACEMENT PROTOCOL - Magnesium  1 Each Other PRN         Physical Exam:     Visit Vitals  BP 97/70   Pulse 79   Temp 98 °F (36.7 °C)   Resp 30   Ht 5' 6\" (1.676 m)   Wt 116.6 kg (257 lb 0.9 oz)   SpO2 95%   BMI 41.49 kg/m²         BP Readings from Last 3 Encounters:   05/31/22 97/70   05/23/22 122/88   05/20/22 (!) 157/68     Pulse Readings from Last 3 Encounters:   05/31/22 79   05/23/22 (!) 59   05/20/22 60 Wt Readings from Last 3 Encounters:   05/31/22 116.6 kg (257 lb 0.9 oz)   05/23/22 110.7 kg (244 lb)   05/20/22 86.9 kg (191 lb 9.3 oz)       General:  alert, cooperative, no distress, appears stated age  Neck:  supple  Lungs:  clear to auscultation bilaterally. Heart:  regular rate and rhythm  Abdomen:  abdomen is soft without significant tenderness, masses, organomegaly or guarding  Extremities:  atraumatic, no appreciable pitting edema, compression stockings in place  Skin: Warm and dry. Neuro: alert, oriented x3, affect appropriate, no focal neurological deficits, moves all extremities well, no involuntary movements  Psych: non focal     Data Review:     Recent Labs     05/30/22  0527 05/29/22  1355   WBC 11.2 14.8*   HGB 7.6* 7.5*   HCT 23.7* 23.7*    172     Recent Labs     05/30/22  1348 05/30/22  1047 05/29/22  1355 05/29/22  0520 05/29/22  0520    141  --   --  141   K 3.6 2.4* 4.1   < > 3.7    120*  --   --  108   CO2 29 20*  --   --  29   * 74  --   --  100*   BUN 10 7  --   --  9   CREA 0.58* 0.26*  --   --  0.53*   CA 8.3* 6.2*  --   --  8.1*   MG 1.9 1.4*  --   --  2.1    < > = values in this interval not displayed. Results for orders placed or performed during the hospital encounter of 05/27/22   EKG, 12 LEAD, INITIAL   Result Value Ref Range    Ventricular Rate 88 BPM    Atrial Rate 88 BPM    P-R Interval 138 ms    QRS Duration 80 ms    Q-T Interval 390 ms    QTC Calculation (Bezet) 471 ms    Calculated P Axis 33 degrees    Calculated R Axis 9 degrees    Calculated T Axis -2 degrees    Diagnosis       Normal sinus rhythm  Possible Inferior infarct , age undetermined  Abnormal ECG  When compared with ECG of 27-MAY-2022 07:28,  Vent.  rate has increased BY  35 BPM  Nonspecific T wave abnormality now evident in Lateral leads  Confirmed by [facility] ([id number]) on 5/28/2022 10:41:49 AM         Last Lipid:    Lab Results   Component Value Date/Time    Cholesterol, total 223 (H) 05/16/2022 12:00 AM    HDL Cholesterol 50 05/16/2022 12:00 AM    LDL, calculated 141 (H) 05/16/2022 12:00 AM    LDL, calculated 150.2 (H) 10/13/2021 11:04 AM    Triglyceride 177 (H) 05/16/2022 12:00 AM    CHOL/HDL Ratio 4.6 10/13/2021 11:04 AM       Cardiographics:     EKG Results     Procedure 720 Value Units Date/Time    EKG, 12 LEAD, INITIAL [[id number]] Collected: 05/28/22 0525    Order Status: Completed Updated: 05/28/22 1041     Ventricular Rate 88 BPM      Atrial Rate 88 BPM      P-R Interval 138 ms      QRS Duration 80 ms      Q-T Interval 390 ms      QTC Calculation (Bezet) 471 ms      Calculated P Axis 33 degrees      Calculated R Axis 9 degrees      Calculated T Axis -2 degrees      Diagnosis --     Normal sinus rhythm  Possible Inferior infarct , age undetermined  Abnormal ECG  When compared with ECG of 27-MAY-2022 07:28,  Vent. rate has increased BY  35 BPM  Nonspecific T wave abnormality now evident in Lateral leads  Confirmed by Jason Liang ([id number]) on 5/28/2022 10:41:49 AM      EKG, 12 LEAD, INITIAL [[id number]]     Order Status: Canceled     EKG, 12 LEAD, INITIAL [[id number]] Collected: 05/27/22 0728    Order Status: Completed Updated: 05/27/22 0804     Ventricular Rate 53 BPM      Atrial Rate 53 BPM      P-R Interval 154 ms      QRS Duration 94 ms      Q-T Interval 484 ms      QTC Calculation (Bezet) 454 ms      Calculated P Axis 24 degrees      Calculated R Axis 25 degrees      Calculated T Axis 16 degrees      Diagnosis --     Sinus bradycardia  Otherwise normal ECG  When compared with ECG of 04-FEB-2021 11:52,  No significant change was found  Confirmed by Jason Liang ([id number]) on 5/27/2022 8:04:11 AM              Signed By: Christopher King PA-C     May 31, 2022      I have personally and independently evaluated and examined the patient. All relevant labs and testing data are reviewed.  I have personally reviewed all the diagnostic labs, available EKG imaging x-rays and other diagnostic data and incorporated them into my care. I am referencing APC's note. I participated in medical decision making. Care plan discussed and updated after review. More than 50% time spent reviewing data, examining patient and recommending assessment and plan.   Padmini Hill MD

## 2022-05-31 NOTE — TELEPHONE ENCOUNTER
Kaleb Romero PA-C  Matteo,  Seat.; Vipul De Luna  Cc: Ayaz Verdin MD; Matt Moss; Gwendolyn Manzo PA-C  Caller: Unspecified (1 week ago)  Unfortunately, the labs will need to be drawn at [facility]. Barrera Rahman needs to be screened for blood which can only be done at those two locations. At that time she will receive a blood band. Currently, we do not have electronic copies of the videos to send to patients. When patients return to clinic to review studies and have an upcoming or planned OR date, this is an ideal time to get preop labs and to complete the preop education to reduce travel time.    (Including the others on this thread so that we can avoid this in the future. )   Thanks,   Jeffrey Sierra

## 2022-05-31 NOTE — PROGRESS NOTES
Problem: Self Care Deficits Care Plan (Adult)  Goal: *Acute Goals and Plan of Care (Insert Text)  Description: Occupational Therapy Goals  Initiated 5/28/2022 within 7 day(s). 1.  Patient will perform self-feeding with supervision/set-up. 2.  Patient will perform grooming with supervision/set-up standing at the sink with Good balance for >4 min. 3.  Patient will perform upper body and lower body dressing with supervision/set-up, using AE prn.  4.  Patient will perform toilet transfers with supervision/set-up. 5.  Patient will perform all aspects of toileting with supervision/set-up. 6.  Patient will verbalize sternal precautions and will follow them during functional activities without cues. 7.  Patient will utilize energy conservation techniques during functional activities without verbal cues. Prior Level of Function: Pt reports being independent with ADLs and functional mobility, pt lives with her spouse who she is primary caregiver for, pt's daughter lives nearby and checks on them. Outcome: Progressing Towards Goal       OCCUPATIONAL THERAPY TREATMENT    Patient: Toya Burnette (83 y.o. female)  Date: 5/31/2022  Diagnosis: CAD (coronary artery disease) [I25.10] S/P CABG x 4  Procedure(s) (LRB):  CORONARY ARTERY BYPASS GRAFT (CABG) TIMES FOUR/LEFT RADIAL ARTERY HARVEST / [facility] (N/A)  TRANSESOPHAGEAL ECHOCARDIOGRAM (N/A) 4 Days Post-Op  Precautions: Fall,Sternal          Chart, occupational therapy assessment, plan of care, and goals were reviewed. ASSESSMENT:  Patient cleared to participate in OT treatment by RN. Upon entering the room, the patient was seated in chair on room air, alert, and agreeable to participate in OT session. Patient re-educated on heart hugger, importance of getting up and moving at least 3x/day per protocol, and safety during this hospital admission. Patient only able to recall 2 sternal precautions this session; issued handout to ensure all precautions are followed. Patient demos good awareness of precautions and holding heart hugger with no verbal cues needed during transitions. Patient stand by assist for adjusting straps of heart hugger, stand by assist for scooting and stand by assist - contact guard assist for functional transfers. Patient able to stand this session for approx. 3 minutes, educated on energy conservation techniques, pursed lip breathing, and self pacing during daily activities. Patient O2 desaturated to 83% this session following standing functional activities from 89% initially at rest. Patient requires approx. 4 minutes for O2 increase to 91% with spirometer use. BP 97/70, HR 80 bpm following session. Patient with reports of increased pain in back; upper trapezius noted to be tight. Patient given HEP including shoulder rolls in all directions, shrugs and shoulder flex/ext within sternal precautions. Patient also issued heated wipes to muscles for comfort. Patient left seated in chair, all needs met and call bell in reach. Progression toward goals:  [x]          Improving appropriately and progressing toward goals  []          Improving slowly and progressing toward goals  []          Not making progress toward goals and plan of care will be adjusted     PLAN:  Patient continues to benefit from skilled intervention to address the above impairments. Continue treatment per established plan of care.   Discharge Recommendations:  Home Health with increased family support   Further Equipment Recommendations for Discharge:  shower chair and rolling walker     SUBJECTIVE:   Patient stated im sore all over but my back really hurts    OBJECTIVE DATA SUMMARY:   Cognitive/Behavioral Status:  Neurologic State: Alert  Orientation Level: Oriented X4  Cognition: Follows commands  Safety/Judgement: Fall prevention,Awareness of environment,Good awareness of safety precautions    Functional Mobility and Transfers for ADLs:   Bed Mobility:  Scooting: Stand-by assistance     Transfers:  Sit to Stand: Contact guard assistance  Stand to Sit: Stand-by assistance   Toilet Transfer : Contact guard assistance (simulation)       Balance:  Sitting: Intact  Sitting - Static: Good (unsupported)  Sitting - Dynamic: Good (unsupported)  Standing: Impaired; With support  Standing - Static: Good  Standing - Dynamic : Fair (F+)    ADL Intervention:  Upper Body Dressing Assistance  Dressing Assistance: Stand-by assistance  Orthotics(Brace): Stand-by assistance (heart hugger; straps only)      Cognitive Retraining  Safety/Judgement: Fall prevention; Awareness of environment;Good awareness of safety precautions    Pain:  Pain level pre-treatment: 8/10 , back  Pain level post-treatment: 8/10  Pain Intervention(s): Medication (see MAR); Rest, Ice, Repositioning  Response to intervention: Nurse notified, See doc flow    Activity Tolerance:    Fair+      Please refer to the flowsheet for vital signs taken during this treatment. After treatment:   [x]  Patient left in no apparent distress sitting up in chair  []  Patient left in no apparent distress in bed  [x]  Call bell left within reach  [x]  Nursing notified  []  Caregiver present  []  Bed alarm activated    COMMUNICATION/EDUCATION:   [x] Role of Occupational Therapy in the acute care setting  [x] Home safety education was provided and the patient/caregiver indicated understanding. [x] Patient/family have participated as able in working towards goals and plan of care. [x] Patient/family agree to work toward stated goals and plan of care. [] Patient understands intent and goals of therapy, but is neutral about his/her participation. [] Patient is unable to participate in goal setting and plan of care.       Thank you for this referral.  Shara Lorenzo, OTR/L  Time Calculation: 23 mins

## 2022-06-01 PROBLEM — F41.8 MIXED ANXIETY AND DEPRESSIVE DISORDER: Status: ACTIVE | Noted: 2022-06-01

## 2022-06-01 PROBLEM — J45.909 ASTHMA WITHOUT STATUS ASTHMATICUS: Status: ACTIVE | Noted: 2022-06-01

## 2022-06-01 PROBLEM — I50.9 HEART FAILURE (HCC): Status: ACTIVE | Noted: 2022-06-01

## 2022-06-01 PROBLEM — I16.0 HYPERTENSIVE URGENCY: Status: ACTIVE | Noted: 2022-06-01

## 2022-06-01 PROBLEM — F43.23 ADJUSTMENT DISORDER WITH MIXED ANXIETY AND DEPRESSED MOOD: Status: ACTIVE | Noted: 2022-06-01

## 2022-06-01 LAB
ANION GAP SERPL CALC-SCNC: 7 MMOL/L (ref 3–18)
ATRIAL RATE: 80 BPM
BUN SERPL-MCNC: 9 MG/DL (ref 7–18)
BUN/CREAT SERPL: 14 (ref 12–20)
CALCIUM SERPL-MCNC: 8.9 MG/DL (ref 8.5–10.1)
CALCULATED P AXIS, ECG09: 24 DEGREES
CALCULATED R AXIS, ECG10: 6 DEGREES
CALCULATED T AXIS, ECG11: -10 DEGREES
CHLORIDE SERPL-SCNC: 107 MMOL/L (ref 100–111)
CO2 SERPL-SCNC: 26 MMOL/L (ref 21–32)
CREAT SERPL-MCNC: 0.66 MG/DL (ref 0.6–1.3)
DIAGNOSIS, 93000: NORMAL
ERYTHROCYTE [DISTWIDTH] IN BLOOD BY AUTOMATED COUNT: 14.9 % (ref 11.6–14.5)
GLUCOSE BLD STRIP.AUTO-MCNC: 168 MG/DL (ref 70–110)
GLUCOSE BLD STRIP.AUTO-MCNC: 76 MG/DL (ref 70–110)
GLUCOSE BLD STRIP.AUTO-MCNC: 93 MG/DL (ref 70–110)
GLUCOSE BLD STRIP.AUTO-MCNC: 94 MG/DL (ref 70–110)
GLUCOSE SERPL-MCNC: 84 MG/DL (ref 74–99)
HCT VFR BLD AUTO: 27.9 % (ref 35–45)
HGB BLD-MCNC: 8.9 G/DL (ref 12–16)
MCH RBC QN AUTO: 27.9 PG (ref 24–34)
MCHC RBC AUTO-ENTMCNC: 31.9 G/DL (ref 31–37)
MCV RBC AUTO: 87.5 FL (ref 78–100)
NRBC # BLD: 0.12 K/UL (ref 0–0.01)
NRBC BLD-RTO: 1.1 PER 100 WBC
P-R INTERVAL, ECG05: 140 MS
PLATELET # BLD AUTO: 262 K/UL (ref 135–420)
PMV BLD AUTO: 10.5 FL (ref 9.2–11.8)
POTASSIUM SERPL-SCNC: 3.6 MMOL/L (ref 3.5–5.5)
Q-T INTERVAL, ECG07: 400 MS
QRS DURATION, ECG06: 98 MS
QTC CALCULATION (BEZET), ECG08: 461 MS
RBC # BLD AUTO: 3.19 M/UL (ref 4.2–5.3)
SODIUM SERPL-SCNC: 140 MMOL/L (ref 136–145)
VENTRICULAR RATE, ECG03: 80 BPM
WBC # BLD AUTO: 11.2 K/UL (ref 4.6–13.2)

## 2022-06-01 PROCEDURE — 97116 GAIT TRAINING THERAPY: CPT

## 2022-06-01 PROCEDURE — 36415 COLL VENOUS BLD VENIPUNCTURE: CPT

## 2022-06-01 PROCEDURE — 74011636637 HC RX REV CODE- 636/637: Performed by: THORACIC SURGERY (CARDIOTHORACIC VASCULAR SURGERY)

## 2022-06-01 PROCEDURE — 97535 SELF CARE MNGMENT TRAINING: CPT

## 2022-06-01 PROCEDURE — 74011000258 HC RX REV CODE- 258: Performed by: PHYSICIAN ASSISTANT

## 2022-06-01 PROCEDURE — 80048 BASIC METABOLIC PNL TOTAL CA: CPT

## 2022-06-01 PROCEDURE — 82962 GLUCOSE BLOOD TEST: CPT

## 2022-06-01 PROCEDURE — 94760 N-INVAS EAR/PLS OXIMETRY 1: CPT

## 2022-06-01 PROCEDURE — 74011636637 HC RX REV CODE- 636/637: Performed by: PHYSICIAN ASSISTANT

## 2022-06-01 PROCEDURE — 85027 COMPLETE CBC AUTOMATED: CPT

## 2022-06-01 PROCEDURE — 99024 POSTOP FOLLOW-UP VISIT: CPT | Performed by: PHYSICIAN ASSISTANT

## 2022-06-01 PROCEDURE — 74011250636 HC RX REV CODE- 250/636: Performed by: PHYSICIAN ASSISTANT

## 2022-06-01 PROCEDURE — 74011250637 HC RX REV CODE- 250/637: Performed by: THORACIC SURGERY (CARDIOTHORACIC VASCULAR SURGERY)

## 2022-06-01 PROCEDURE — 65660000004 HC RM CVT STEPDOWN

## 2022-06-01 PROCEDURE — APPNB60 APP NON BILLABLE TIME 46-60 MINS: Performed by: PHYSICIAN ASSISTANT

## 2022-06-01 PROCEDURE — 74011250637 HC RX REV CODE- 250/637: Performed by: PHYSICIAN ASSISTANT

## 2022-06-01 PROCEDURE — 99232 SBSQ HOSP IP/OBS MODERATE 35: CPT | Performed by: INTERNAL MEDICINE

## 2022-06-01 PROCEDURE — 74011000250 HC RX REV CODE- 250: Performed by: PHYSICIAN ASSISTANT

## 2022-06-01 PROCEDURE — 2709999900 HC NON-CHARGEABLE SUPPLY

## 2022-06-01 RX ORDER — POTASSIUM CHLORIDE 20 MEQ/1
40 TABLET, EXTENDED RELEASE ORAL
Status: COMPLETED | OUTPATIENT
Start: 2022-06-01 | End: 2022-06-01

## 2022-06-01 RX ADMIN — ISOSORBIDE DINITRATE 10 MG: 5 TABLET ORAL at 17:30

## 2022-06-01 RX ADMIN — GABAPENTIN 100 MG: 100 CAPSULE ORAL at 22:03

## 2022-06-01 RX ADMIN — AMIODARONE HYDROCHLORIDE 200 MG: 200 TABLET ORAL at 22:02

## 2022-06-01 RX ADMIN — POTASSIUM CHLORIDE 40 MEQ: 1500 TABLET, EXTENDED RELEASE ORAL at 10:59

## 2022-06-01 RX ADMIN — GUAIFENESIN 600 MG: 600 TABLET, EXTENDED RELEASE ORAL at 08:43

## 2022-06-01 RX ADMIN — MORPHINE SULFATE 1 MG: 2 INJECTION, SOLUTION INTRAMUSCULAR; INTRAVENOUS at 00:23

## 2022-06-01 RX ADMIN — CHLORHEXIDINE GLUCONATE 0.12% ORAL RINSE 10 ML: 1.2 LIQUID ORAL at 08:48

## 2022-06-01 RX ADMIN — AMIODARONE HYDROCHLORIDE 200 MG: 200 TABLET ORAL at 08:43

## 2022-06-01 RX ADMIN — Medication 2 UNITS: at 17:28

## 2022-06-01 RX ADMIN — FAMOTIDINE 20 MG: 20 TABLET ORAL at 08:43

## 2022-06-01 RX ADMIN — ATORVASTATIN CALCIUM 80 MG: 40 TABLET, FILM COATED ORAL at 22:02

## 2022-06-01 RX ADMIN — OXYCODONE HYDROCHLORIDE 10 MG: 5 TABLET ORAL at 08:42

## 2022-06-01 RX ADMIN — BACLOFEN 10 MG: 10 TABLET ORAL at 08:43

## 2022-06-01 RX ADMIN — THERA TABS 1 TABLET: TAB at 08:43

## 2022-06-01 RX ADMIN — BACLOFEN 10 MG: 10 TABLET ORAL at 15:29

## 2022-06-01 RX ADMIN — ASPIRIN 81 MG: 81 TABLET, COATED ORAL at 08:43

## 2022-06-01 RX ADMIN — SODIUM CHLORIDE, PRESERVATIVE FREE 10 ML: 5 INJECTION INTRAVENOUS at 17:31

## 2022-06-01 RX ADMIN — GABAPENTIN 100 MG: 100 CAPSULE ORAL at 15:29

## 2022-06-01 RX ADMIN — ACETAMINOPHEN 1000 MG: 500 TABLET ORAL at 06:18

## 2022-06-01 RX ADMIN — OXYCODONE HYDROCHLORIDE 5 MG: 5 TABLET ORAL at 04:35

## 2022-06-01 RX ADMIN — BISACODYL 10 MG: 5 TABLET, COATED ORAL at 08:43

## 2022-06-01 RX ADMIN — FUROSEMIDE 20 MG: 10 INJECTION, SOLUTION INTRAMUSCULAR; INTRAVENOUS at 17:31

## 2022-06-01 RX ADMIN — BACLOFEN 10 MG: 10 TABLET ORAL at 22:03

## 2022-06-01 RX ADMIN — ENOXAPARIN SODIUM 40 MG: 100 INJECTION SUBCUTANEOUS at 17:28

## 2022-06-01 RX ADMIN — ACETAMINOPHEN 1000 MG: 500 TABLET ORAL at 17:30

## 2022-06-01 RX ADMIN — ISOSORBIDE DINITRATE 10 MG: 5 TABLET ORAL at 08:43

## 2022-06-01 RX ADMIN — FUROSEMIDE 20 MG: 10 INJECTION, SOLUTION INTRAMUSCULAR; INTRAVENOUS at 08:46

## 2022-06-01 RX ADMIN — OXYCODONE HYDROCHLORIDE 10 MG: 5 TABLET ORAL at 20:42

## 2022-06-01 RX ADMIN — Medication 40 UNITS: at 08:45

## 2022-06-01 RX ADMIN — ACETAMINOPHEN 1000 MG: 500 TABLET ORAL at 12:11

## 2022-06-01 RX ADMIN — FAMOTIDINE 20 MG: 20 TABLET ORAL at 17:30

## 2022-06-01 RX ADMIN — MORPHINE SULFATE 1 MG: 2 INJECTION, SOLUTION INTRAMUSCULAR; INTRAVENOUS at 17:37

## 2022-06-01 RX ADMIN — OXYCODONE HYDROCHLORIDE 10 MG: 5 TABLET ORAL at 15:29

## 2022-06-01 RX ADMIN — IRON SUCROSE 200 MG: 20 INJECTION, SOLUTION INTRAVENOUS at 08:49

## 2022-06-01 RX ADMIN — GUAIFENESIN 600 MG: 600 TABLET, EXTENDED RELEASE ORAL at 17:30

## 2022-06-01 RX ADMIN — CYCLOBENZAPRINE 5 MG: 10 TABLET, FILM COATED ORAL at 04:35

## 2022-06-01 RX ADMIN — AMIODARONE HYDROCHLORIDE 200 MG: 200 TABLET ORAL at 15:29

## 2022-06-01 RX ADMIN — MUPIROCIN: 20 OINTMENT TOPICAL at 08:50

## 2022-06-01 RX ADMIN — DOCUSATE SODIUM 100 MG: 100 CAPSULE, LIQUID FILLED ORAL at 08:43

## 2022-06-01 RX ADMIN — SODIUM CHLORIDE, PRESERVATIVE FREE 10 ML: 5 INJECTION INTRAVENOUS at 06:18

## 2022-06-01 RX ADMIN — CHLORHEXIDINE GLUCONATE 0.12% ORAL RINSE 10 ML: 1.2 LIQUID ORAL at 17:31

## 2022-06-01 RX ADMIN — SODIUM CHLORIDE, PRESERVATIVE FREE 10 ML: 5 INJECTION INTRAVENOUS at 22:04

## 2022-06-01 RX ADMIN — DOCUSATE SODIUM 100 MG: 100 CAPSULE, LIQUID FILLED ORAL at 17:30

## 2022-06-01 RX ADMIN — GABAPENTIN 100 MG: 100 CAPSULE ORAL at 08:43

## 2022-06-01 RX ADMIN — Medication 1 TABLET: at 08:43

## 2022-06-01 NOTE — PROGRESS NOTES
discussed with the patient therapy recommendations.  informed patient that therapy is recommending home health with increase family support. The patient indicated marlene there daughter in law will be moving in with her and her daughter will be stopping by in the evenings.  discussed home health. The patient indicated that she would like to use [facility] home health.     RAZIA Burgos

## 2022-06-01 NOTE — PROGRESS NOTES
Problem: Mobility Impaired (Adult and Pediatric)  Goal: *Acute Goals and Plan of Care (Insert Text)  Description: Physical Therapy Goals  Initiated 5/28/2022 and to be accomplished within 7 day(s)  1. Patient will move from supine to sit and sit to supine  in bed with modified independence. 2.  Patient will transfer from bed to chair and chair to bed with modified independence using the least restrictive device. 3.  Patient will perform sit to stand with modified independence. 4.  Patient will ambulate with modified independence for 250 feet with the least restrictive device. PLOF: Pt reports she is normally (I), takes care of her  who is wheelchair bound. Pt's daughter is nearby and can help at times. Outcome: Progressing Towards Goal     PHYSICAL THERAPY TREATMENT    Patient: Rafaela Canales (3 y.o. female)  Date: 6/1/2022  Diagnosis: CAD (coronary artery disease) [I25.10] S/P CABG x 4  Procedure(s) (LRB):  CORONARY ARTERY BYPASS GRAFT (CABG) TIMES FOUR/LEFT RADIAL ARTERY HARVEST / [facility] (N/A)  TRANSESOPHAGEAL ECHOCARDIOGRAM (N/A) 5 Days Post-Op  Precautions: Fall,Sternal    ASSESSMENT:  Pt cleared to participate in PT session, pt received semi-reclined in bed and agreeable to therapy session. Pt performing  supine to sit but using RUE on bed to push off. Pt educated on sternal precautions and supine to long sit to sitting EOB. Pt verbalized understanding. Pt demonstrating good sitting balance. Standing with supervision to RW, chest tube intact. Pt ambulating x100 feet with good balance with RW. Pt returned to sitting EOB, reporting 9/10 pain requesting pt medicine. Pt positioned for comfort and educated to call for assist before getting up, pt verbalized understanding. Pt left with all needs met and call bell in reach. RN notified of position and participation.      Progression toward goals:   []      Improving appropriately and progressing toward goals  [x]      Improving slowly and progressing toward goals  []      Not making progress toward goals and plan of care will be adjusted     PLAN:  Patient continues to benefit from skilled intervention to address the above impairments. Continue treatment per established plan of care. Discharge Recommendations:  Home Health with increased family support from capable adult  Further Equipment Recommendations for Discharge:  rolling walker     SUBJECTIVE:   Patient stated My leg is really hurting.     OBJECTIVE DATA SUMMARY:   Critical Behavior:  Neurologic State: Alert  Orientation Level: Oriented X4  Cognition: Follows commands  Safety/Judgement: Awareness of environment,Fall prevention  Functional Mobility Training:  Bed Mobility:     Supine to Sit: Supervision  Sit to Supine: Supervision  Scooting: Supervision    Transfers:  Sit to Stand: Supervision  Stand to Sit: Supervision    Balance:  Sitting: Intact  Sitting - Static: Good (unsupported)  Sitting - Dynamic: Good (unsupported)  Standing: Intact; With support  Standing - Static: Good  Standing - Dynamic : Good     Ambulation/Gait Training:  Distance (ft): 100 Feet (ft)  Assistive Device: Walker, rolling  Ambulation - Level of Assistance: Supervision    Gait Abnormalities: Decreased step clearance    Speed/Katia: Shuffled      Pain:  Pain level pre-treatment: 9/10  Pain level post-treatment: 9/10   Pain Intervention(s): Rest, Repositioning  Response to intervention: Nurse notified    Activity Tolerance:   Improving     Please refer to the flowsheet for vital signs taken during this treatment. After treatment:   [] Patient left in no apparent distress sitting up in chair  [x] Patient left in no apparent distress in bed  [x] Call bell left within reach  [x] Nursing notified  [] Caregiver present  [] Bed alarm activated  [] SCDs applied      COMMUNICATION/EDUCATION:   [x]         Role of Physical Therapy in the acute care setting.   [x]         Fall prevention education was provided and the patient/caregiver indicated understanding. [x]         Patient/family have participated as able in working toward goals and plan of care. [x]         Patient/family agree to work toward stated goals and plan of care. []         Patient understands intent and goals of therapy, but is neutral about his/her participation.   []         Patient is unable to participate in stated goals/plan of care: ongoing with therapy staff.  []         Other:        Garrison Welsh, PT   Time Calculation: 8 mins

## 2022-06-01 NOTE — PROGRESS NOTES
Physician Progress Note      Nabil Wright  CSN #:                  [id number]  :                       1970  ADMIT DATE:       2022 6:13 AM  DISCH DATE:  RESPONDING  PROVIDER #:        BELL LEBRON PA-C          QUERY TEXT:    Pt admitted for CABG x4  . Pt noted to have Low BP and started on levophed with Albumin . If possible, please document in the progress notes and discharge summary if you are evaluating and/or treating any of the following: The medical record reflects the following:    Risk Factors:46year old woman with exertional  and rest chest discomfort in class # NYHA class 3, with Cardiac diastolic dysfunction    Clinical Indicators: S/P  CABG x 4 ,-  BPs 89/58 ,89/54, 88/50,80/44 , 90/52 ,87/51 ,85/52 and MAPs 50-60's    Treatment: IV albumin ,IV levophed ,Labs and CXR monitored carefully    Thank you  Rock Bethany [facility] [email or web address]  Options provided:  -- Cardiogenic Shock  -- Hemorrhagic Shock  -- Hypovolemic Shock  -- Hypovolemia without Shock  -- Hypotension without Shock  -- Other - I will add my own diagnosis  -- Disagree - Not applicable / Not valid  -- Disagree - Clinically unable to determine / Unknown  -- Refer to Clinical Documentation Reviewer    PROVIDER RESPONSE TEXT:    This patient has hypovolemia without shock. Query created by:  Kimberly Quiroga on 2022 1:36 PM      Electronically signed by:  Patience Reyes PA-C 2022 6:49 PM

## 2022-06-01 NOTE — PROGRESS NOTES
Problem: Falls - Risk of  Goal: *Absence of Falls  Description: Document Lizzy Zhao Fall Risk and appropriate interventions in the flowsheet. Outcome: Progressing Towards Goal  Note: Fall Risk Interventions:  Mobility Interventions: Bed/chair exit alarm,Patient to call before getting OOB    Mentation Interventions: Adequate sleep, hydration, pain control,Door open when patient unattended    Medication Interventions: Patient to call before getting OOB,Teach patient to arise slowly    Elimination Interventions:  Toileting schedule/hourly rounds,Patient to call for help with toileting needs,Call light in reach              Problem: Patient Education: Go to Patient Education Activity  Goal: Patient/Family Education  Outcome: Progressing Towards Goal     Problem: Pain  Goal: *Control of Pain  Outcome: Progressing Towards Goal     Problem: Patient Education: Go to Patient Education Activity  Goal: Patient/Family Education  Outcome: Progressing Towards Goal

## 2022-06-01 NOTE — PROGRESS NOTES
Reason for Admission:  CAD (coronary artery disease) [I25.10]                 RUR Score:    8           Plan for utilizing home health: Yes                      Likelihood of Readmission:   LOW                         Transition of Care Plan:              Initial assessment completed with patient. Cognitive status of patient: oriented to time, place, person and situation at time of assessment. Face sheet information confirmed:  yes. The patient designates daughter, Sis Morton, and spouse, Radha Logan,  to participate in his discharge plan and to receive any needed information. This patient lives in a apartment with spouse. The patient lives in an apartment on the 1st floor. There is 1 step to enter from the front and no steps to enter from the back. Patient is able to navigate steps as needed. Prior to hospitalization, patient was considered to be independent with ADLs/IADLS : yes . The patient currently provides care for her  at home. The patient reported that she gets paid by a  company to take care of her . The patient's reported that her daughter in law will be moving in to help her at home and her daughter will be coming past in the evenings to assist her. Patient has a current ACP document on file: yes      Healthcare Decision Maker:     Click here to complete [address] including selection of the Healthcare Decision Maker Relationship (ie \"Primary\")    The daughter and daughter in law will be available to transport patient home upon discharge. The patient already has [employer] medical equipment available in the home. Patient is not currently active with home health. The patient would like to use [facility] for home health. Patient has not stayed in a skilled nursing facility or rehab. This patient is on dialysis :no       Currently, the discharge plan is Home with Home Health.     The patient states that he can obtain his medications from the pharmacy, and take his medications as directed. Patient's current insurance is Medicaid. Care Management Interventions  PCP Verified by CM: Yes  Mode of Transport at Discharge:  Other (see comment) (daughter or daughter in law)  Transition of Care Consult (CM Consult): Discharge Planning  Discharge Durable Medical Equipment:  (patient has rolling walker and wheelchair)  Support Systems: Child(mahogany)  Confirm Follow Up Transport: Self  Discharge Location  Patient Expects to be Discharged to[de-identified] Home with home health    RAZIA Gaxiola

## 2022-06-01 NOTE — PROGRESS NOTES
left a message for [facility] regarding home health referral.      RAZIA Huerta

## 2022-06-01 NOTE — PROGRESS NOTES
Cardiology   Cardiology referral request from Dr. Gricel Abreu for evaluation and management/treatment of CAD    Date of  Admission: 5/27/2022  6:13 AM   Primary Care Physician:  Waleska Hernandes NP    Subjective:  Left shoulder pain with movement and also pain at surgical site. No symptoms concerning for angina. Assessment:     -CAD, s/p CABG x 4 (RGSVG-PDA, LRA-OM2, RGSVG-D2, LIMA-LAD) 5/27/2022. Historically on ASA/Brilinta, plan was to switch Brilinta to Plavix (when Brilinta supply ran out) at time of last office visit 5/5/2022. S/p cardiac cath 5/20/2022 with findings as follows:  · LM: Normal  · LAD: Mid LAD diffuse calcified 85-90% stenosis. · Diagonal branch: Medium, ostial 60-70% stenosis, proximal 70%. · LCx/OM: Proximal and mid serial up to 60% followed by proximal OM branch eccentric 75% stenosis. · RCA: Ostial/proximal and mid RCA stent patent with evidence of 50% narrowing in between the stent. 80-90% ostial/proximal RPDA stenosis, proximal/mid RPDA stenosis. -Hx inferior STEMI 03/2020, s/p RCA TEJAL with 0% residual stenosis with diffuse 3 vessel CAD. -Echo 5/10/2022: Normal LVEF 55-60% with hypokinetic basal inferoseptal and basal inferior walls, diastolic dysfunction present, mildly dilated LA  -HTN  -Dyslipidemia  -Hx tobacco use    Primary cardiologist is Dr. Summer Corbin:     -Continue ASA 81 mg, Lipitor. No need for dual antiplatelet agent  -Continued on low-dose Isordil.  -Continue periprocedural amiodarone for A. fib protocol prevention. Continue atorvastatin. Maintenance Lasix  - Start Low dose BB if BP stable tomorrow  - No further cardiac work up planned. Plan for possible discharge in am noted  - WIll be available as needed. Pls call with questions. History of Present Illness: This is a 46 y.o. female admitted for CAD (coronary artery disease) [I25.10].     Patient complains of: exertional dyspnea/fatigue      Corinne Amass is a 46 y.o. female who presented to the hospital for CABG, given recent symptoms of exertional dyspnea/fatigue with cardiac cath findings as noted above. Pt underwent CABG x 4 by Dr. Chavo Mayer on 2022. Cardiology has been consulted for assistance with medical management. Cardiac risk factors: smoking/ tobacco exposure, dyslipidemia, hypertension      Review of Symptoms:  Except as stated above include:  Constitutional:  negative  Respiratory:  negative  Cardiovascular:  As per HPI  Gastrointestinal: negative  Genitourinary:  negative  Musculoskeletal:  Negative  Neurological:  Negative  Dermatological:  Negative  Endocrinological: Negative  Psychological:  Negative       Past Medical History:     Past Medical History:   Diagnosis Date    CAD (coronary artery disease)     Displacement of lumbar intervertebral disc     HLD (hyperlipidemia)     Hypertension     Lumbar radiculitis     Lumbar sprain     Numbness     legs and arms    S/P CABG x 4 2022    LIMA-LAD, Left radial to OM, rSVG to PDA, rSVG to diag by Dr. Mendel Falter STEMI (ST elevation myocardial infarction) ([facility] [address] 2020         Social History:     Social History     Socioeconomic History    Marital status:    Tobacco Use    Smoking status: Former Smoker     Packs/day: 1.00     Years: 35.00     Pack years: 35.00     Quit date: 2020     Years since quittin.3    Smokeless tobacco: Former User     Quit date: 2017    Tobacco comment: pt states she smoked about 1.5 packs a day for 30-35 years and quit on 17   Substance and Sexual Activity    Alcohol use:  Yes     Alcohol/week: 4.0 standard drinks     Types: 4 Shots of liquor per week     Comment: occ    Drug use: No        Family History:     Family History   Problem Relation Age of Onset    Hypertension Mother     Hypertension Father     Heart Disease Father     Hypertension Maternal Grandmother     Heart Disease Maternal Grandmother     Hypertension Paternal Grandmother     Heart Disease Paternal Grandmother         Medications:      Allergies   Allergen Reactions    Codeine Hives     Tolerates hydrocodone and morphine    Pcn [Penicillins] Shortness of Breath     Tongue swelling          Current Facility-Administered Medications   Medication Dose Route Frequency    guaiFENesin ER (MUCINEX) tablet 600 mg  600 mg Oral BID    baclofen (LIORESAL) tablet 10 mg  10 mg Oral TID    furosemide (LASIX) injection 20 mg  20 mg IntraVENous BID    gabapentin (NEURONTIN) capsule 100 mg  100 mg Oral TID    oxyCODONE IR (ROXICODONE) tablet 5-10 mg  5-10 mg Oral Q4H PRN    morphine injection 1 mg  1 mg IntraVENous Q4H PRN    vit B12-levomefolate calcium-vit B6 (FOLBIC RF, FOLTX) 2-1.13-25 mg tablet 1 Tablet  1 Tablet Oral DAILY    therapeutic multivitamin (THERAGRAN) tablet 1 Tablet  1 Tablet Oral DAILY    isosorbide dinitrate (ISORDIL) tablet 10 mg  10 mg Oral BID    cyclobenzaprine (FLEXERIL) tablet 5 mg  5 mg Oral TID PRN    bisacodyL (DULCOLAX) tablet 10 mg  10 mg Oral DAILY    famotidine (PEPCID) tablet 20 mg  20 mg Oral BID    polyethylene glycol (MIRALAX) packet 17 g  17 g Oral DAILY    acetaminophen (TYLENOL) tablet 1,000 mg  1,000 mg Oral Q6H    insulin glargine (LANTUS) injection 40 Units  40 Units SubCUTAneous DAILY    enoxaparin (LOVENOX) injection 40 mg  40 mg SubCUTAneous Q24H    albumin human 25% (BUMINATE) solution 12.5 g  12.5 g IntraVENous Q4H PRN    lidocaine 4 % patch 1 Patch  1 Patch TransDERmal Q24H    insulin lispro (HUMALOG) injection   SubCUTAneous AC&HS    glucose chewable tablet 16 g  16 g Oral PRN    glucagon (GLUCAGEN) injection 1 mg  1 mg IntraMUSCular PRN    dextrose 10% infusion 0-250 mL  0-250 mL IntraVENous PRN    sodium chloride (NS) flush 5-40 mL  5-40 mL IntraVENous Q8H    sodium chloride (NS) flush 5-40 mL  5-40 mL IntraVENous PRN    naloxone (NARCAN) injection 0.4 mg  0.4 mg IntraVENous PRN    amiodarone (CORDARONE) tablet 200 mg  200 mg Oral TID    albuterol (PROVENTIL VENTOLIN) nebulizer solution 2.5 mg  2.5 mg Nebulization Q4H PRN    atorvastatin (LIPITOR) tablet 80 mg  80 mg Oral QHS    ondansetron (ZOFRAN) injection 4 mg  4 mg IntraVENous Q4H PRN    aspirin delayed-release tablet 81 mg  81 mg Oral DAILY    chlorhexidine (PERIDEX) 0.12 % mouthwash 10 mL  10 mL Oral BID    docusate sodium (COLACE) capsule 100 mg  100 mg Oral BID    ELECTROLYTE REPLACEMENT PROTOCOL - Potassium Standard Dosing  1 Each Other PRN    ELECTROLYTE REPLACEMENT PROTOCOL - Magnesium  1 Each Other PRN         Physical Exam:     Visit Vitals  /72   Pulse 77   Temp 98.2 °F (36.8 °C)   Resp 19   Ht 5' 6\" (1.676 m)   Wt 114.3 kg (251 lb 15.8 oz)   SpO2 90%   BMI 40.67 kg/m²         BP Readings from Last 3 Encounters:   06/01/22 114/72   05/23/22 122/88   05/20/22 (!) 157/68     Pulse Readings from Last 3 Encounters:   06/01/22 77   05/23/22 (!) 59   05/20/22 60     Wt Readings from Last 3 Encounters:   06/01/22 114.3 kg (251 lb 15.8 oz)   05/23/22 110.7 kg (244 lb)   05/20/22 86.9 kg (191 lb 9.3 oz)       General:  alert, cooperative, no distress, appears stated age  Neck:  supple  Lungs:  clear to auscultation bilaterally. Heart:  regular rate and rhythm  Abdomen:  abdomen is soft   Extremities:  atraumatic,  Skin: Warm and dry.    Neuro: alert, oriented x3, affect appropriate,      Data Review:     Recent Labs     06/01/22  0525 05/30/22  0527 05/29/22  1355   WBC 11.2 11.2 14.8*   HGB 8.9* 7.6* 7.5*   HCT 27.9* 23.7* 23.7*    151 172     Recent Labs     06/01/22  0525 05/30/22  1348 05/30/22  1047    138 141   K 3.6 3.6 2.4*    105 120*   CO2 26 29 20*   GLU 84 102* 74   BUN 9 10 7   CREA 0.66 0.58* 0.26*   CA 8.9 8.3* 6.2*   MG  --  1.9 1.4*       Results for orders placed or performed during the hospital encounter of 05/27/22   EKG, 12 LEAD, INITIAL   Result Value Ref Range    Ventricular Rate 88 BPM    Atrial Rate 88 BPM    P-R Interval 138 ms    QRS Duration 80 ms    Q-T Interval 390 ms    QTC Calculation (Bezet) 471 ms    Calculated P Axis 33 degrees    Calculated R Axis 9 degrees    Calculated T Axis -2 degrees    Diagnosis       Normal sinus rhythm  Possible Inferior infarct , age undetermined  Abnormal ECG  When compared with ECG of 27-MAY-2022 07:28,  Vent.  rate has increased BY  35 BPM  Nonspecific T wave abnormality now evident in Lateral leads  Confirmed by Ellaree Favre ([id number]) on 5/28/2022 10:41:49 AM         Last Lipid:    Lab Results   Component Value Date/Time    Cholesterol, total 223 (H) 05/16/2022 12:00 AM    HDL Cholesterol 50 05/16/2022 12:00 AM    LDL, calculated 141 (H) 05/16/2022 12:00 AM    LDL, calculated 150.2 (H) 10/13/2021 11:04 AM    Triglyceride 177 (H) 05/16/2022 12:00 AM    CHOL/HDL Ratio 4.6 10/13/2021 11:04 AM       Cardiographics:     EKG Results     Procedure 720 Value Units Date/Time    EKG, 12 LEAD, SUBSEQUENT [[id number]] Collected: 05/31/22 1208    Order Status: Completed Updated: 06/01/22 0820     Ventricular Rate 80 BPM      Atrial Rate 80 BPM      P-R Interval 140 ms      QRS Duration 98 ms      Q-T Interval 400 ms      QTC Calculation (Bezet) 461 ms      Calculated P Axis 24 degrees      Calculated R Axis 6 degrees      Calculated T Axis -10 degrees      Diagnosis --     Normal sinus rhythm  Inferior infarct (cited on or before 28-MAY-2022)  Abnormal ECG  When compared with ECG of 28-MAY-2022 05:25,  No significant change was found  Confirmed by Isabela Burroughs MD, ----- ([id number]) on 6/1/2022 8:20:16 AM      EKG, 12 LEAD, INITIAL [[id number]] Collected: 05/28/22 0525    Order Status: Completed Updated: 05/28/22 1041     Ventricular Rate 88 BPM      Atrial Rate 88 BPM      P-R Interval 138 ms      QRS Duration 80 ms      Q-T Interval 390 ms      QTC Calculation (Bezet) 471 ms      Calculated P Axis 33 degrees      Calculated R Axis 9 degrees      Calculated T Axis -2 degrees      Diagnosis --     Normal sinus rhythm  Possible Inferior infarct , age undetermined  Abnormal ECG  When compared with ECG of 27-MAY-2022 07:28,  Vent.  rate has increased BY  35 BPM  Nonspecific T wave abnormality now evident in Lateral leads  Confirmed by Len Weston ([id number]) on 5/28/2022 10:41:49 AM      EKG, 12 LEAD, INITIAL [[id number]]     Order Status: Canceled     EKG, 12 LEAD, INITIAL [[id number]] Collected: 05/27/22 0728    Order Status: Completed Updated: 05/27/22 0804     Ventricular Rate 53 BPM      Atrial Rate 53 BPM      P-R Interval 154 ms      QRS Duration 94 ms      Q-T Interval 484 ms      QTC Calculation (Bezet) 454 ms      Calculated P Axis 24 degrees      Calculated R Axis 25 degrees      Calculated T Axis 16 degrees      Diagnosis --     Sinus bradycardia  Otherwise normal ECG  When compared with ECG of 04-FEB-2021 11:52,  No significant change was found  Confirmed by Len Weston ([id number]) on 5/27/2022 8:04:11 AM              Signed By: Albina Robbins MD     June 1, 2022      Albina Robbins MD

## 2022-06-01 NOTE — ROUTINE PROCESS
2970 Received report from [facility]. Patient alert and oriented x 4.    0830 Patient up in the chair for breakfast. Medications given. 0930 Patient ambulated to the bathroom and back in the bed.     1230 Patient ambulated in the hallway from room to room 15 and back. 1500 Patient ambulated in he hallway with PT.    1920 Bedside shift change report given to [facility] (oncoming nurse). Report included the following information SBAR, Kardex, Intake/Output, MAR and Recent Results.

## 2022-06-01 NOTE — PROGRESS NOTES
Problem: Self Care Deficits Care Plan (Adult)  Goal: *Acute Goals and Plan of Care (Insert Text)  Description: Occupational Therapy Goals  Initiated 5/28/2022 within 7 day(s). 1.  Patient will perform self-feeding with supervision/set-up. 2.  Patient will perform grooming with supervision/set-up standing at the sink with Good balance for >4 min. 3.  Patient will perform upper body and lower body dressing with supervision/set-up, using AE prn.  4.  Patient will perform toilet transfers with supervision/set-up. 5.  Patient will perform all aspects of toileting with supervision/set-up. 6.  Patient will verbalize sternal precautions and will follow them during functional activities without cues. 7.  Patient will utilize energy conservation techniques during functional activities without verbal cues. Prior Level of Function: Pt reports being independent with ADLs and functional mobility, pt lives with her spouse who she is primary caregiver for, pt's daughter lives nearby and checks on them. Outcome: Progressing Towards Goal   OCCUPATIONAL THERAPY TREATMENT    Patient: David Franklin (84 y.o. female)  Date: 6/1/2022  Diagnosis: CAD (coronary artery disease) [I25.10] S/P CABG x 4  Procedure(s) (LRB):  CORONARY ARTERY BYPASS GRAFT (CABG) TIMES FOUR/LEFT RADIAL ARTERY HARVEST / CharlyLivingston Nevin (N/A)  TRANSESOPHAGEAL ECHOCARDIOGRAM (N/A) 5 Days Post-Op  Precautions: Fall,Sternal    Chart, occupational therapy assessment, plan of care, and goals were reviewed. ASSESSMENT:  Pt is supine in bed, reports just returning from sitting in the chair. Pt reports gradual progress with functional mobility with and without FWW. Pt reports difficulty with reaching behind her back for toileting and difficulty bending fwd for LB ADLs. Pt educated on and issued reacher and long handled sponge to maximize independence with LB ADLs (pt does not usually wear socks). Pt demos understanding of all education.  Pt verbalized 100% of her sternal precautions and noted independently utilizing heart hugger when coughing and repositioning. Progression toward goals:  []          Improving appropriately and progressing toward goals  [x]          Improving slowly and progressing toward goals  []          Not making progress toward goals and plan of care will be adjusted     PLAN:  Patient continues to benefit from skilled intervention to address the above impairments. Continue treatment per established plan of care. Discharge Recommendations:  Home Health with increased family support  Further Equipment Recommendations for Discharge:  shower chair     SUBJECTIVE:   Patient stated I think once they remove my tubes I will be okay.     OBJECTIVE DATA SUMMARY:   Cognitive/Behavioral Status:  Neurologic State: Alert  Orientation Level: Oriented X4  Cognition: Follows commands  Safety/Judgement: Awareness of environment,Fall prevention    Functional Mobility and Transfers for ADLs:   Pt presents supine in bed, politely declining mobility at this time  ADL Intervention:     Upper Body [address]: Stand-by assistance (simulated in bed)  Cognitive Retraining  Safety/Judgement: Awareness of environment; Fall prevention  Pain:  Pain level pre-treatment: not rated   Pain level post-treatment: not rated    Activity Tolerance:    Fair  Please refer to the flowsheet for vital signs taken during this treatment. After treatment:   []  Patient left in no apparent distress sitting up in chair  [x]  Patient left in no apparent distress in bed  [x]  Call bell left within reach  [x]  Nursing notified  []  Caregiver present  []  Bed alarm activated    COMMUNICATION/EDUCATION:   [x] Role of Occupational Therapy in the acute care setting  [x] Home safety education was provided and the patient/caregiver indicated understanding. [x] Patient/family have participated as able in working towards goals and plan of care.   [] Patient/family agree to work toward stated goals and plan of care. [] Patient understands intent and goals of therapy, but is neutral about his/her participation. [] Patient is unable to participate in goal setting and plan of care.       Thank you for this referral.  Viet Hernandez OTR/L  Time Calculation: 11 mins

## 2022-06-01 NOTE — PROGRESS NOTES
CARDIAC SURGERY PROGRESS NOTE    2022     Post Operative Day # 5     Chart reviewed. Interval History/Events of Past 24 hours:   Ambulates in schwarz on room air. Pain better controlled on current regimen. K+ 3.6    Assessment:  CAD S/P  CABG x 4 - on  BB, ASA, statin  Respiratory parameters stable  Cardiac status stable   Anemia post op expected on IV iron, MVI, anemia vitamins  Pain issues on multimodal pain control medications    Plans:  1. Replace K+  2. Cont to diurese  3. Chest tubes to water seal  4. Likely home in am    Heart Hugger use encouraged to mitigate pain and will also assist with deep breathing and incentive spirometry goals. Possible discharge 1 days. Tyesha Miranda PA-C  [facility]  [phone]  _____________________________________________________________________________________________________________________________________________  Subjective:  Patient seen and examined on rounds today. Pain level: not controlled   Ambulating: well  Sleeping: well  Eating:  Well   Sternal pain: NO     BM: No    Objective:  Vital signs:   Visit Vitals  /73 (BP 1 Location: Left upper arm, BP Patient Position: At rest)   Pulse 76   Temp 97.9 °F (36.6 °C)   Resp 19   Ht 5' 6\" (1.676 m)   Wt 114.3 kg (251 lb 15.8 oz)   SpO2 92%   BMI 40.67 kg/m²     Temp (24hrs), Av.2 °F (36.8 °C), Min:97.5 °F (36.4 °C), Max:98.8 °F (37.1 °C)    Admission Weight: Last Weight   Weight: 111.1 kg (245 lb) Weight: 114.3 kg (251 lb 15.8 oz)     Last 3 Recorded Weights in this Encounter    22 0400 22 0600 22 0448   Weight: 117.1 kg (258 lb 3.2 oz) 116.6 kg (257 lb 0.9 oz) 114.3 kg (251 lb 15.8 oz)       Telemetry: nsr    Physical Examination:     General:  Alert, oriented  Lungs: Clear to ascultation without rales, wheezes or rhonchi. Chest: Dressings clean and dry. Midline incision healing well. Sternum stable.   Heart: regular rate and rhythm, No murmur. Abdomen: Soft and non-tender without masses. Bowel sounds present. Extremities: Warm and well perfused. Edema moderate. Incisions: clean and dry  Neuro: No deficit. Beta-blocker: Yes  ASA: Yes   Statin: Yes  ACE-I: Yes  Diuretic: yes     SUP Prophylaxis: Pepcid  DVT Prophylaxis:   pneumatic compression boots: Yes  Compression stockings:  Yes  Enoxaparin:  Yes    Chest tubes:  present    Pacing wires:  not present    DME needs:  tbd          Labs:  Lab Results   Component Value Date/Time    WBC 11.2 06/01/2022 05:25 AM    HCT 27.9 (L) 06/01/2022 05:25 AM     06/01/2022 05:25 AM      Lab Results   Component Value Date/Time     06/01/2022 05:25 AM    K 3.6 06/01/2022 05:25 AM     06/01/2022 05:25 AM    CO2 26 06/01/2022 05:25 AM    GLU 84 06/01/2022 05:25 AM    BUN 9 06/01/2022 05:25 AM    CREA 0.66 06/01/2022 05:25 AM    CREA 0.58 (L) 05/30/2022 01:48 PM    CREA 0.26 (L) 05/30/2022 10:47 AM     Lab Results   Component Value Date/Time    HBA1C 5.9 (H) 05/28/2022 04:00 AM            PLEASE NOTE:  This document may have been produced using voice recognition software. Unrecognized errors in transcription may be present. Please call with any questions. NOTE TO PATIENT:  The purpose of this note is to communicate optimally with the other providers involved in your care. It is written using standard medical terminology. If you have questions regarding details of the note please call my office at [phone] and make an appointment to discuss your concerns.

## 2022-06-02 ENCOUNTER — HOME HEALTH ADMISSION (OUTPATIENT)
Dept: HOME HEALTH SERVICES | Facility: HOME HEALTH | Age: 52
End: 2022-06-02
Payer: MEDICAID

## 2022-06-02 LAB
ANION GAP SERPL CALC-SCNC: 4 MMOL/L (ref 3–18)
ANION GAP SERPL CALC-SCNC: 7 MMOL/L (ref 3–18)
BUN SERPL-MCNC: 8 MG/DL (ref 7–18)
BUN SERPL-MCNC: 8 MG/DL (ref 7–18)
BUN/CREAT SERPL: 11 (ref 12–20)
BUN/CREAT SERPL: 11 (ref 12–20)
CALCIUM SERPL-MCNC: 8.7 MG/DL (ref 8.5–10.1)
CALCIUM SERPL-MCNC: 8.7 MG/DL (ref 8.5–10.1)
CHLORIDE SERPL-SCNC: 106 MMOL/L (ref 100–111)
CHLORIDE SERPL-SCNC: 107 MMOL/L (ref 100–111)
CO2 SERPL-SCNC: 27 MMOL/L (ref 21–32)
CO2 SERPL-SCNC: 30 MMOL/L (ref 21–32)
CREAT SERPL-MCNC: 0.7 MG/DL (ref 0.6–1.3)
CREAT SERPL-MCNC: 0.73 MG/DL (ref 0.6–1.3)
ERYTHROCYTE [DISTWIDTH] IN BLOOD BY AUTOMATED COUNT: 15.8 % (ref 11.6–14.5)
GLUCOSE BLD STRIP.AUTO-MCNC: 60 MG/DL (ref 70–110)
GLUCOSE BLD STRIP.AUTO-MCNC: 86 MG/DL (ref 70–110)
GLUCOSE BLD STRIP.AUTO-MCNC: 86 MG/DL (ref 70–110)
GLUCOSE BLD STRIP.AUTO-MCNC: 93 MG/DL (ref 70–110)
GLUCOSE SERPL-MCNC: 129 MG/DL (ref 74–99)
GLUCOSE SERPL-MCNC: 75 MG/DL (ref 74–99)
HCT VFR BLD AUTO: 28.4 % (ref 35–45)
HGB BLD-MCNC: 8.9 G/DL (ref 12–16)
MAGNESIUM SERPL-MCNC: 2 MG/DL (ref 1.6–2.6)
MCH RBC QN AUTO: 27.8 PG (ref 24–34)
MCHC RBC AUTO-ENTMCNC: 31.3 G/DL (ref 31–37)
MCV RBC AUTO: 88.8 FL (ref 78–100)
NRBC # BLD: 0.13 K/UL (ref 0–0.01)
NRBC BLD-RTO: 1.2 PER 100 WBC
PLATELET # BLD AUTO: 289 K/UL (ref 135–420)
PMV BLD AUTO: 10.3 FL (ref 9.2–11.8)
POTASSIUM SERPL-SCNC: 3.6 MMOL/L (ref 3.5–5.5)
POTASSIUM SERPL-SCNC: 3.6 MMOL/L (ref 3.5–5.5)
RBC # BLD AUTO: 3.2 M/UL (ref 4.2–5.3)
SODIUM SERPL-SCNC: 140 MMOL/L (ref 136–145)
SODIUM SERPL-SCNC: 141 MMOL/L (ref 136–145)
WBC # BLD AUTO: 10.6 K/UL (ref 4.6–13.2)

## 2022-06-02 PROCEDURE — 36415 COLL VENOUS BLD VENIPUNCTURE: CPT

## 2022-06-02 PROCEDURE — 83735 ASSAY OF MAGNESIUM: CPT

## 2022-06-02 PROCEDURE — 74011250636 HC RX REV CODE- 250/636

## 2022-06-02 PROCEDURE — 74011000250 HC RX REV CODE- 250: Performed by: PHYSICIAN ASSISTANT

## 2022-06-02 PROCEDURE — 99024 POSTOP FOLLOW-UP VISIT: CPT | Performed by: PHYSICIAN ASSISTANT

## 2022-06-02 PROCEDURE — 74011250637 HC RX REV CODE- 250/637: Performed by: PHYSICIAN ASSISTANT

## 2022-06-02 PROCEDURE — 85027 COMPLETE CBC AUTOMATED: CPT

## 2022-06-02 PROCEDURE — APPNB60 APP NON BILLABLE TIME 46-60 MINS: Performed by: PHYSICIAN ASSISTANT

## 2022-06-02 PROCEDURE — 80048 BASIC METABOLIC PNL TOTAL CA: CPT

## 2022-06-02 PROCEDURE — 82962 GLUCOSE BLOOD TEST: CPT

## 2022-06-02 PROCEDURE — 97116 GAIT TRAINING THERAPY: CPT

## 2022-06-02 PROCEDURE — 97530 THERAPEUTIC ACTIVITIES: CPT

## 2022-06-02 PROCEDURE — APPNB15 APP NON BILLABLE TIME 0-15 MINS: Performed by: PHYSICIAN ASSISTANT

## 2022-06-02 PROCEDURE — 2709999900 HC NON-CHARGEABLE SUPPLY

## 2022-06-02 PROCEDURE — 74011250637 HC RX REV CODE- 250/637: Performed by: THORACIC SURGERY (CARDIOTHORACIC VASCULAR SURGERY)

## 2022-06-02 PROCEDURE — 97535 SELF CARE MNGMENT TRAINING: CPT

## 2022-06-02 PROCEDURE — 94667 MNPJ CHEST WALL 1ST: CPT

## 2022-06-02 PROCEDURE — 74011636637 HC RX REV CODE- 636/637: Performed by: PHYSICIAN ASSISTANT

## 2022-06-02 PROCEDURE — 74011250636 HC RX REV CODE- 250/636: Performed by: PHYSICIAN ASSISTANT

## 2022-06-02 PROCEDURE — 93005 ELECTROCARDIOGRAM TRACING: CPT

## 2022-06-02 PROCEDURE — 65660000004 HC RM CVT STEPDOWN

## 2022-06-02 RX ORDER — AMIODARONE HYDROCHLORIDE 150 MG/3ML
150 INJECTION, SOLUTION INTRAVENOUS ONCE
Status: DISCONTINUED | OUTPATIENT
Start: 2022-06-02 | End: 2022-06-02

## 2022-06-02 RX ORDER — ISOSORBIDE DINITRATE 10 MG/1
10 TABLET ORAL 2 TIMES DAILY
Qty: 60 TABLET | Refills: 2 | Status: SHIPPED | OUTPATIENT
Start: 2022-06-02 | End: 2022-06-02 | Stop reason: SDUPTHER

## 2022-06-02 RX ORDER — OXYCODONE AND ACETAMINOPHEN 5; 325 MG/1; MG/1
1-2 TABLET ORAL
Status: DISCONTINUED | OUTPATIENT
Start: 2022-06-02 | End: 2022-06-03 | Stop reason: HOSPADM

## 2022-06-02 RX ORDER — GUAIFENESIN 600 MG/1
1200 TABLET, EXTENDED RELEASE ORAL 2 TIMES DAILY
Qty: 20 TABLET | Refills: 0 | Status: SHIPPED | OUTPATIENT
Start: 2022-06-02 | End: 2022-06-07 | Stop reason: ALTCHOICE

## 2022-06-02 RX ORDER — GABAPENTIN 100 MG/1
100 CAPSULE ORAL
Status: DISCONTINUED | OUTPATIENT
Start: 2022-06-02 | End: 2022-06-03 | Stop reason: HOSPADM

## 2022-06-02 RX ORDER — BACLOFEN 10 MG/1
10 TABLET ORAL
Status: DISCONTINUED | OUTPATIENT
Start: 2022-06-02 | End: 2022-06-02

## 2022-06-02 RX ORDER — POTASSIUM CHLORIDE 20 MEQ/1
40 TABLET, EXTENDED RELEASE ORAL
Status: COMPLETED | OUTPATIENT
Start: 2022-06-02 | End: 2022-06-02

## 2022-06-02 RX ORDER — POTASSIUM CHLORIDE 20 MEQ/1
20 TABLET, EXTENDED RELEASE ORAL DAILY
Qty: 30 TABLET | Refills: 0 | Status: SHIPPED | OUTPATIENT
Start: 2022-06-02 | End: 2022-06-09

## 2022-06-02 RX ORDER — POTASSIUM CHLORIDE 20 MEQ/1
20 TABLET, EXTENDED RELEASE ORAL DAILY
Status: DISCONTINUED | OUTPATIENT
Start: 2022-06-03 | End: 2022-06-03 | Stop reason: HOSPADM

## 2022-06-02 RX ORDER — METOPROLOL TARTRATE 5 MG/5ML
INJECTION INTRAVENOUS
Status: DISPENSED
Start: 2022-06-02 | End: 2022-06-03

## 2022-06-02 RX ORDER — METOPROLOL TARTRATE 25 MG/1
12.5 TABLET, FILM COATED ORAL EVERY 12 HOURS
Status: DISCONTINUED | OUTPATIENT
Start: 2022-06-02 | End: 2022-06-03 | Stop reason: HOSPADM

## 2022-06-02 RX ORDER — ACETAMINOPHEN 325 MG/1
650 TABLET ORAL
Status: DISCONTINUED | OUTPATIENT
Start: 2022-06-02 | End: 2022-06-03 | Stop reason: HOSPADM

## 2022-06-02 RX ORDER — THERA TABS 400 MCG
1 TAB ORAL DAILY
Qty: 30 TABLET | Refills: 2 | Status: SHIPPED | OUTPATIENT
Start: 2022-06-03

## 2022-06-02 RX ORDER — ISOSORBIDE DINITRATE 10 MG/1
10 TABLET ORAL 2 TIMES DAILY
Qty: 60 TABLET | Refills: 2 | Status: SHIPPED | OUTPATIENT
Start: 2022-06-02 | End: 2022-08-04 | Stop reason: SDUPTHER

## 2022-06-02 RX ORDER — GUAIFENESIN 100 MG/5ML
81 LIQUID (ML) ORAL DAILY
Qty: 30 TABLET | Refills: 2 | Status: SHIPPED | OUTPATIENT
Start: 2022-06-02

## 2022-06-02 RX ORDER — OXYCODONE AND ACETAMINOPHEN 5; 325 MG/1; MG/1
1 TABLET ORAL
Qty: 28 TABLET | Refills: 0 | Status: SHIPPED | OUTPATIENT
Start: 2022-06-02 | End: 2022-06-09

## 2022-06-02 RX ORDER — CYCLOBENZAPRINE HCL 10 MG
5 TABLET ORAL
Qty: 15 TABLET | Refills: 0 | Status: SHIPPED | OUTPATIENT
Start: 2022-06-02 | End: 2022-07-18 | Stop reason: SDUPTHER

## 2022-06-02 RX ORDER — FUROSEMIDE 40 MG/1
40 TABLET ORAL DAILY
Status: DISCONTINUED | OUTPATIENT
Start: 2022-06-03 | End: 2022-06-03 | Stop reason: HOSPADM

## 2022-06-02 RX ORDER — ATORVASTATIN CALCIUM 80 MG/1
80 TABLET, FILM COATED ORAL
Qty: 30 TABLET | Refills: 2 | Status: SHIPPED | OUTPATIENT
Start: 2022-06-02

## 2022-06-02 RX ORDER — FUROSEMIDE 40 MG/1
40 TABLET ORAL DAILY
Qty: 30 TABLET | Refills: 0 | Status: SHIPPED | OUTPATIENT
Start: 2022-06-02 | End: 2022-06-09 | Stop reason: SDUPTHER

## 2022-06-02 RX ADMIN — CHLORHEXIDINE GLUCONATE 0.12% ORAL RINSE 10 ML: 1.2 LIQUID ORAL at 17:08

## 2022-06-02 RX ADMIN — ACETAMINOPHEN 1000 MG: 500 TABLET ORAL at 00:33

## 2022-06-02 RX ADMIN — AMIODARONE HYDROCHLORIDE 200 MG: 200 TABLET ORAL at 21:26

## 2022-06-02 RX ADMIN — MORPHINE SULFATE 1 MG: 2 INJECTION, SOLUTION INTRAMUSCULAR; INTRAVENOUS at 09:29

## 2022-06-02 RX ADMIN — METOPROLOL TARTRATE 12.5 MG: 25 TABLET, FILM COATED ORAL at 21:26

## 2022-06-02 RX ADMIN — BACLOFEN 10 MG: 10 TABLET ORAL at 08:30

## 2022-06-02 RX ADMIN — ACETAMINOPHEN 1000 MG: 500 TABLET ORAL at 12:12

## 2022-06-02 RX ADMIN — FUROSEMIDE 20 MG: 10 INJECTION, SOLUTION INTRAMUSCULAR; INTRAVENOUS at 08:30

## 2022-06-02 RX ADMIN — OXYCODONE AND ACETAMINOPHEN 1 TABLET: 325; 5 TABLET ORAL at 18:15

## 2022-06-02 RX ADMIN — FUROSEMIDE 20 MG: 10 INJECTION, SOLUTION INTRAMUSCULAR; INTRAVENOUS at 17:08

## 2022-06-02 RX ADMIN — ISOSORBIDE DINITRATE 10 MG: 5 TABLET ORAL at 17:08

## 2022-06-02 RX ADMIN — THERA TABS 1 TABLET: TAB at 08:30

## 2022-06-02 RX ADMIN — FAMOTIDINE 20 MG: 20 TABLET ORAL at 08:29

## 2022-06-02 RX ADMIN — POTASSIUM CHLORIDE 40 MEQ: 1500 TABLET, EXTENDED RELEASE ORAL at 23:51

## 2022-06-02 RX ADMIN — POTASSIUM CHLORIDE 40 MEQ: 1500 TABLET, EXTENDED RELEASE ORAL at 18:13

## 2022-06-02 RX ADMIN — GUAIFENESIN 600 MG: 600 TABLET, EXTENDED RELEASE ORAL at 21:26

## 2022-06-02 RX ADMIN — AMIODARONE HYDROCHLORIDE 150 MG: 1.5 INJECTION, SOLUTION INTRAVENOUS at 14:44

## 2022-06-02 RX ADMIN — SODIUM CHLORIDE, PRESERVATIVE FREE 10 ML: 5 INJECTION INTRAVENOUS at 17:18

## 2022-06-02 RX ADMIN — OXYCODONE HYDROCHLORIDE 10 MG: 5 TABLET ORAL at 08:29

## 2022-06-02 RX ADMIN — AMIODARONE HYDROCHLORIDE 200 MG: 200 TABLET ORAL at 08:29

## 2022-06-02 RX ADMIN — SODIUM CHLORIDE, PRESERVATIVE FREE 10 ML: 5 INJECTION INTRAVENOUS at 21:29

## 2022-06-02 RX ADMIN — SODIUM CHLORIDE, PRESERVATIVE FREE 10 ML: 5 INJECTION INTRAVENOUS at 05:45

## 2022-06-02 RX ADMIN — CHLORHEXIDINE GLUCONATE 0.12% ORAL RINSE 10 ML: 1.2 LIQUID ORAL at 08:30

## 2022-06-02 RX ADMIN — METOPROLOL TARTRATE 12.5 MG: 25 TABLET, FILM COATED ORAL at 13:32

## 2022-06-02 RX ADMIN — Medication 1 TABLET: at 08:29

## 2022-06-02 RX ADMIN — ATORVASTATIN CALCIUM 80 MG: 40 TABLET, FILM COATED ORAL at 21:25

## 2022-06-02 RX ADMIN — ASPIRIN 81 MG: 81 TABLET, COATED ORAL at 08:29

## 2022-06-02 RX ADMIN — OXYCODONE HYDROCHLORIDE 10 MG: 5 TABLET ORAL at 13:37

## 2022-06-02 RX ADMIN — ISOSORBIDE DINITRATE 10 MG: 5 TABLET ORAL at 08:30

## 2022-06-02 RX ADMIN — FAMOTIDINE 20 MG: 20 TABLET ORAL at 17:08

## 2022-06-02 RX ADMIN — MORPHINE SULFATE 1 MG: 2 INJECTION, SOLUTION INTRAMUSCULAR; INTRAVENOUS at 02:35

## 2022-06-02 RX ADMIN — GUAIFENESIN 600 MG: 600 TABLET, EXTENDED RELEASE ORAL at 08:30

## 2022-06-02 RX ADMIN — ACETAMINOPHEN 1000 MG: 500 TABLET ORAL at 05:43

## 2022-06-02 RX ADMIN — GABAPENTIN 100 MG: 100 CAPSULE ORAL at 08:29

## 2022-06-02 RX ADMIN — ENOXAPARIN SODIUM 40 MG: 100 INJECTION SUBCUTANEOUS at 17:08

## 2022-06-02 RX ADMIN — Medication 40 UNITS: at 08:30

## 2022-06-02 RX ADMIN — AMIODARONE HYDROCHLORIDE 200 MG: 200 TABLET ORAL at 17:08

## 2022-06-02 NOTE — PROGRESS NOTES
Bedside and Verbal shift change report given to Ino Powell RN (oncoming nurse) by Edson Schultz RN (offgoing nurse). Report included the following information SBAR, Kardex, Intake/Output, MAR, Recent Results, Med Rec Status and Cardiac Rhythm NSR.     2042: AOX4, c/o incision chest pain, scale 8/10, Medicated for pain, Assisted pt for use of IS, call bell within reach    2202: Due Medication given, BG 76 - No coverage per protocol    0033: Pt in bed resting quietly, due medication given, no changes from previous assessment    0235: morphine 1 mg IV administered for pain    0441: pt sleeping, no distress noted, call bell within reach    0600: CHG bath provided, Pt ambulated in hallways    Bedside and Verbal shift change report given to Edson Schultz [facility] (oncoming nurse) by Ino Powell RN (offgoing nurse). Report included the following information SBAR, Kardex, Intake/Output, MAR, Recent Results, Med Rec Status and Cardiac Rhythm NSR.

## 2022-06-02 NOTE — PROGRESS NOTES
Problem: Self Care Deficits Care Plan (Adult)  Goal: *Acute Goals and Plan of Care (Insert Text)  Description: Occupational Therapy Goals  Initiated 5/28/2022 within 7 day(s). 1.  Patient will perform self-feeding with supervision/set-up. 2.  Patient will perform grooming with supervision/set-up standing at the sink with Good balance for >4 min. 3.  Patient will perform upper body and lower body dressing with supervision/set-up, using AE prn.  4.  Patient will perform toilet transfers with supervision/set-up. 5.  Patient will perform all aspects of toileting with supervision/set-up. 6.  Patient will verbalize sternal precautions and will follow them during functional activities without cues. 7.  Patient will utilize energy conservation techniques during functional activities without verbal cues. Prior Level of Function: Pt reports being independent with ADLs and functional mobility, pt lives with her spouse who she is primary caregiver for, pt's daughter lives nearby and checks on them. Outcome: Progressing Towards Goal   OCCUPATIONAL THERAPY TREATMENT    Patient: Andres Rai (53 y.o. female)  Date: 6/2/2022  Diagnosis: CAD (coronary artery disease) [I25.10] S/P CABG x 4  Procedure(s) (LRB):  CORONARY ARTERY BYPASS GRAFT (CABG) TIMES FOUR/LEFT RADIAL ARTERY HARVEST / Vonore Mccoy (N/A)  TRANSESOPHAGEAL ECHOCARDIOGRAM (N/A) 6 Days Post-Op  Precautions: Fall,Sternal  PLOF: Pt reports being independent with ADLs and functional mobility, pt lives with her spouse who she is primary caregiver for, pt's daughter lives nearby and checks on them. Chart, occupational therapy assessment, plan of care, and goals were reviewed. ASSESSMENT:  Pt cleared by RN for OT tx at this time. Pt presented sitting upright in reclining chair upon entry and agreeable to work with OT. Pt able to recall sternal precautions with min cueing throughout tx session.  Pt required MIN A donning socks @ chair level utilizing leg cross technique w/  increased time 2/2 difficulty keeping legs crossed. Pt educated on mult energy conservation techniques to utilize in home environment, including pacing and deep breathing to prevent SOB and fatigue, and increase activity tolerance and safety w/ADLs and functional mobility, pt verbalized understanding. STS transfer Supervision w/ RW and maneuvered to sink side ~ 20 ft. She stood ~ 4 mins and engaged in facial and hand hygiene Supervision w/ no UE support, no LOB noted. Pt returned back to reclining chair at end of session w/ Supervision and positioned self for comfort. She was left with B LE's elevated and all needs left within reach. RN made aware of pt's participation and position. Progression toward goals:  []          Improving appropriately and progressing toward goals  [x]          Improving slowly and progressing toward goals  []          Not making progress toward goals and plan of care will be adjusted     PLAN:  Patient continues to benefit from skilled intervention to address the above impairments. Continue treatment per established plan of care. Discharge Recommendations:  [facility] with increased family support  Further Equipment Recommendations for Discharge:  RW and shower chair     SUBJECTIVE:   Patient stated  I am ready to go home. \"     OBJECTIVE DATA SUMMARY:   Cognitive/Behavioral Status:  Neurologic State: Alert,Appropriate for age,Eyes do not open to any stimulus  Orientation Level: Oriented X4  Cognition: Follows commands,Appropriate safety awareness,Appropriate decision making,Appropriate for age attention/concentration  Safety/Judgement: Awareness of environment,Fall prevention    Functional Mobility and Transfers for ADLs:     Transfers:  Sit to Stand: Supervision  Stand to Sit: Supervision      Balance:  Sitting: Intact  Standing: Intact; With support  Standing - Static: Good  Standing - Dynamic : Good    ADL Intervention:       Grooming  Position Performed: Standing  Washing Face: Supervision  Washing Hands: Supervision    Lower Body Dressing Assistance  Socks: Minimum assistance  Leg Crossed Method Used: Yes  Position Performed: Seated in chair      Pain:  Pain level pre-treatment: 8/10   Pain level post-treatment: 8/10  Pain Intervention(s): Medication (see MAR); Rest,  Repositioning  Response to intervention: Nurse notified, See doc flow    Activity Tolerance:    Fair   Please refer to the flowsheet for vital signs taken during this treatment. After treatment:   [x]  Patient left in no apparent distress sitting up in chair  []  Patient left in no apparent distress in bed  [x]  Call bell left within reach  [x]  Nursing notified  []  Caregiver present  []  Bed alarm activated    COMMUNICATION/EDUCATION:   [x] Role of Occupational Therapy in the acute care setting  [x] Home safety education was provided and the patient/caregiver indicated understanding. [x] Patient/family have participated as able in working towards goals and plan of care. [x] Patient/family agree to work toward stated goals and plan of care. [] Patient understands intent and goals of therapy, but is neutral about his/her participation. [] Patient is unable to participate in goal setting and plan of care.       Thank you for this referral.  CLAIR Florentino  Time Calculation: 23 mins

## 2022-06-02 NOTE — DISCHARGE SUMMARY
CARDIAC SURGERY DISCHARGE SUMMARY    6/3/2022  12:59 PM    CHIEF COMPLAINT: exertional dyspnea/fatigue    HISTORY OF PRESENT ILLNESS:  Adrián Goldman is a 46 y.o. female patient of Dr. Mert Hung  who presented with exertional fatique and dyspnea and was found to have 3vCAD. Echocardiography revealed an ejection fraction of 55-60%. . She was admitted for surgical coronary revascularization. PAST MEDICAL HISTORY:   Past Medical History:   Diagnosis Date    CAD (coronary artery disease)     Displacement of lumbar intervertebral disc     HLD (hyperlipidemia)     Hypertension     Lumbar radiculitis     Lumbar sprain     Numbness     legs and arms    S/P CABG x 4 5/27/2022    LIMA-LAD, Left radial to OM, rSVG to PDA, rSVG to diag by Dr. Valarie Leger [facility]ST [facility]2020   . PAST SURGICAL HISTORY:   Past Surgical History:   Procedure Laterality Date    HX APPENDECTOMY  2014    HX CHOLECYSTECTOMY  2013    HX GI      multiple esophageal surgeries in childhood    HX HEENT      \"surgery on esophagus\"    HX HYSTERECTOMY  2003    HX LUMBAR DISKECTOMY     . HOSPITAL COURSE:  She was admitted to the hospital and underwent CABG x 4 with left radial artery harvest on 5/27  She was extubated on the first night following surgery and was eventually up and ambulating well and taking a diet well. Wires and tubes were removed. Notable postoperative events included did not tolerate low dose beta blocker earlier in admission. Atrial fibrillation occurred just prior to planned discharge and she remained in the hospital one extra day for IV amio treatment and observation. She converted to NSR after amiodarone bolus. Procedures:   Procedure(s):  CORONARY ARTERY BYPASS GRAFT (CABG) TIMES FOUR/LEFT RADIAL ARTERY HARVEST / Damion Saleh  TRANSESOPHAGEAL ECHOCARDIOGRAM        She is to be discharged to Home today.   At the time of discharge, her lungs were course at bases bilaterally and the heart had a regular rate and rhythm. The sternum was stable and all wounds were well healed with no evidence of infection. There was mild pedal edema. Room air oximetry was 98%. LABS:  Lab Results   Component Value Date/Time    WBC 11.8 06/03/2022 04:24 AM    HCT 27.4 (L) 06/03/2022 04:24 AM     06/03/2022 04:24 AM      Lab Results   Component Value Date/Time     06/03/2022 04:24 AM    K 4.1 06/03/2022 04:24 AM     06/03/2022 04:24 AM    CO2 27 06/03/2022 04:24 AM    GLU 89 06/03/2022 04:24 AM    BUN 7 06/03/2022 04:24 AM    CREA 0.65 06/03/2022 04:24 AM    CREA 0.70 06/02/2022 09:40 PM    CREA 0.73 06/02/2022 09:35 AM       Imaging:  CT HEAD WO CONT    Result Date: 5/21/2022  CT of the head without contrast HISTORY: Hypertension, leg and arm numbness. Preop cabg COMPARISON: None. TECHNIQUE: Helical axial scan to the head was performed from the skull base to the vertex without IV contrast administration. All CT scans at this facility performed using dose optimization techniques as appreciated to a performed exam, to include automated exposure control, adjustment of the mA and or KU according to patient size (including appropriate matching for site specific examination), or use of iterative reconstruction technique. FINDINGS: Brain volume appears unremarkable for age. No ventricular dilatation. The gray-white matter differentiation is preserved. There is no evidence of acute intracranial hemorrhage,  mass effect or midline shift identified. No skull fracture or extra axial fluid collections identified. Visualized sinuses and mastoid air cells appear unremarkable. No acute intracranial abnormality. Note: If clinical concern of acute stroke, CTA or MRI with diffusion weighted images is recommended for better evaluation.  Thank you for your referral.    CT CHEST WO CONT    Result Date: 5/24/2022  EXAM: CT CHEST, CT ABDOMEN AND PELVIS WITHOUT ENHANCEMENT INDICATION: Calcified aorta assessment prior to CABG. . TECHNIQUE: Axial images obtained of the chest, abdomen and pelvis without intravenous contrast. Coronal and sagittal reformatted images were obtained. All CT scans are performed using dose optimization techniques as appropriate to the performed exam including the following: Automated exposure control, adjustment of mA and/or kV according to patient size, and use of iterative reconstructive technique. COMPARISON: 8/2/2020 CT abdomen and pelvis, 7/3/2020 CTA chest. CHEST FINDINGS: Lung: Minimal dependent atelectasis. Pleura: No pleural effusion. Stable small fatty [place] diaphragmatic hernia on the right. Cardiovascular: No pericardial effusion. Coronary artery calcifications present. Right coronary artery stents. No aortic aneurysm. Mild calcifications along the thoracic aorta, minimal at the ascending aorta. Lymph Nodes: Unremarkable. Thyroid: Unremarkable. ABDOMEN FINDINGS: Liver: Hepatic steatosis. Hepatomegaly is mildly improved. Biliary: Cholecystectomy clips. Spleen: Unremarkable. Pancreas: Unremarkable. Adrenal Glands: Unremarkable. Kidneys: There is excreted contrast in the bilateral intrarenal collecting system and ureters without filling defect or hydronephrosis. Stable chronic scarring along the atrophic left renal lower pole in the setting of collecting system duplication. Peritoneum/ Retroperitoneum: Unremarkable. Lymph Nodes: Unremarkable. Vessels: Scattered moderate abdominal aortic wall calcifications. Infrarenal aorta is minimally ectatic measuring 2.4 cm without change. PELVIS FINDINGS: Bowel: Unremarkable. Appendectomy. Bladder: Filled with excreted contrast. No filling defect. Pelvic Organs: Hysterectomy. Right ovarian 2.4 cm hypodensity. Bones/Soft tissues: Vertebral osteophytes. Tiny fatty right inguinal hernia and fatty umbilical hernia. Nonenhancing small lipoma within the left iliopsoas muscle at the anterior left hip level. Lumbar L3 laminectomy.      1. Atherosclerotic calcific disease as above. No aortic aneurysm. 2. Hepatic steatosis with hepatomegaly. 3. Chronic atrophy of the lower pole moiety of left kidney with duplicated collecting system. 4. Hypodense small right ovarian lesion, not fully characterized. Ultrasound can be utilized to confirm cyst. 5. Tiny fatty right inguinal and umbilical hernias. 6. 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 CT ABD PELV WO CONT    Result Date: 5/24/2022  EXAM: CT CHEST, CT ABDOMEN AND PELVIS WITHOUT ENHANCEMENT INDICATION: Calcified aorta assessment prior to CABG. . TECHNIQUE: Axial images obtained of the chest, abdomen and pelvis without intravenous contrast. Coronal and sagittal reformatted images were obtained. All CT scans are performed using dose optimization techniques as appropriate to the performed exam including the following: Automated exposure control, adjustment of mA and/or kV according to patient size, and use of iterative reconstructive technique. COMPARISON: 8/2/2020 CT abdomen and pelvis, 7/3/2020 CTA chest. CHEST FINDINGS: Lung: Minimal dependent atelectasis. Pleura: No pleural effusion. Stable small fatty [place] diaphragmatic hernia on the right. Cardiovascular: No pericardial effusion. Coronary artery calcifications present. Right coronary artery stents. No aortic aneurysm. Mild calcifications along the thoracic aorta, minimal at the ascending aorta. Lymph Nodes: Unremarkable. Thyroid: Unremarkable. ABDOMEN FINDINGS: Liver: Hepatic steatosis. Hepatomegaly is mildly improved. Biliary: Cholecystectomy clips. Spleen: Unremarkable. Pancreas: Unremarkable. Adrenal Glands: Unremarkable. Kidneys: There is excreted contrast in the bilateral intrarenal collecting system and ureters without filling defect or hydronephrosis. Stable chronic scarring along the atrophic left renal lower pole in the setting of collecting system duplication. Peritoneum/ Retroperitoneum: Unremarkable.  Lymph Nodes: Unremarkable. Vessels: Scattered moderate abdominal aortic wall calcifications. Infrarenal aorta is minimally ectatic measuring 2.4 cm without change. PELVIS FINDINGS: Bowel: Unremarkable. Appendectomy. Bladder: Filled with excreted contrast. No filling defect. Pelvic Organs: Hysterectomy. Right ovarian 2.4 cm hypodensity. Bones/Soft tissues: Vertebral osteophytes. Tiny fatty right inguinal hernia and fatty umbilical hernia. Nonenhancing small lipoma within the left iliopsoas muscle at the anterior left hip level. Lumbar L3 laminectomy. 1.  Atherosclerotic calcific disease as above. No aortic aneurysm. 2. Hepatic steatosis with hepatomegaly. 3. Chronic atrophy of the lower pole moiety of left kidney with duplicated collecting system. 4. Hypodense small right ovarian lesion, not fully characterized. Ultrasound can be utilized to confirm cyst. 5. Tiny fatty right inguinal and umbilical hernias. 6. 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 ECHO ADULT COMPLETE    Result Date: 5/10/2022    Left Ventricle: Normal left ventricular systolic function with a visually estimated EF of 55 - 60%. Left ventricle size is normal. Mild septal thickening. See diagram for wall motion findings. Diastolic dysfunction present with normal LV EF.   Aortic Valve: Tricuspid valve.   Mitral Valve: Mildly thickened leaflet.   Left Atrium: Left atrium is mildly dilated. Left atrial volume index is mildly increased (35-41 mL/m2).   Aorta: Normal sized aortic arch. Mildly dilated aortic root. Ao Root diameter is 3.7 cm. CARDIAC PROCEDURE    Result Date: 5/20/2022  LM: Normal LAD: Mid LAD diffuse calcified 85-90% stenosis. Diagonal branch: Medium, ostial 60 to 70% stenosis, proximal 70% LCx/OM: Proximal and mid serial up to 60% followed by proximal OM branch has eccentric 75% stenosis RCA: Ostial/proximal and mid RCA stent patent with evidence of 50% narrowing in between the stent.   80-90% ostial/proximal RPDA stenosis, proximal/mid RPDA stenosis Normal LVEF and LVEDP We will have CT surgery evaluation for possible surgical revascularization    DUPLEX CAROTID BILATERAL    Result Date: 5/21/2022  · Mild heterogenous plaque at the bifurcation and internal carotid artery on the right, with less than 50% stenosis. · Mild irregular heterogenous plaque at the bifurcation and internal carotid artery on the left, with less than 50% stenosis. · No evidence of significant stenosis in the external carotid arteries. · Antegrade vertebral arteries bilaterally. · Normal velocities in the the subclavian arteries bilaterally. DUPLEX LOWER EXT VEIN MAP BILAT    Result Date: 5/21/2022  · Patent right great saphenous vein and small saphenous veins. Varicose veins noted in the right calf. Please see measurements below. · Patent left great saphenous vein and small saphenous veins. Branch off the left great saphenous vein distal thigh. Please see measurements below. DISCHARGE DIAGNOSES:    1. CAD, s/p CABG x 4 (RGSVG-PDA, LRA-OM2, RGSVG-D2, LIMA-LAD) 5/27/202 on ASA, statin  2. Note that Metoprolol was never a current medication for several years per pt despite EMR. Did not tolerate Beta blocker due to hypotension this admission. Will need to address as outpatient. 3. hypertension controlled  4. hypercholesterolemia on lipitor  5. overweight   6. Post op A fib    DISCHARGE DISPOSITION:  1. Activities: limited, with strict sternal precautions including no heavy lifting and with constant wearing of the sternal harness. 2. Diet: Cardiac Diet  3. Condition: good  4. Prognosis:  good    DISCHARGE INSTRUCTIONS:  She is to follow up with the cardiac surgery PA in 7 days and 3 weeks, and will see the primary care physician and cardiologist preferably within one month. The visiting nurses will see her once home. Follow-up appointments:      Follow-up Information     Follow up With Specialties Details [address], Clark Fonseca NP Family Nurse Practitioner, Nurse Practitioner Go on 6/10/2022 @8:30 [id number] [facility]      Snehal Washburn MD Cardiovascular Disease Physician, Internal Medicine Physician Go on 6/28/2022 @9:30 AM [place]  Jackie Rodrigues [phone] [facility]      Ector Kussmaul, MD Cardiothoracic Surgery Schedule an appointment as soon as possible for a visit in 1 week For follow up with PA [address] [facility] [address]  [phone]            DISCHARGE MEDICATIONS:    Current Discharge Medication List      START taking these medications    Details   acetaminophen (TYLENOL) 325 mg tablet Take 2 Tablets by mouth every six (6) hours as needed for Pain or Fever. Qty: 90 Tablet, Refills: 1  Start date: 6/3/2022      metoprolol tartrate (LOPRESSOR) 25 mg tablet Take 0.5 Tablets by mouth every twelve (12) hours. Qty: 60 Tablet, Refills: 2  Start date: 6/3/2022      cyclobenzaprine (FLEXERIL) 10 mg tablet Take 0.5 Tablets by mouth three (3) times daily as needed for Muscle Spasm(s). Qty: 15 Tablet, Refills: 0  Start date: 6/2/2022      guaiFENesin ER (MUCINEX) 600 mg ER tablet Take 2 Tablets by mouth two (2) times a day for 5 days. Qty: 20 Tablet, Refills: 0  Start date: 6/2/2022, End date: 6/7/2022      therapeutic multivitamin [facility]) tablet Take 1 Tablet by mouth daily. Qty: 30 Tablet, Refills: 2  Start date: 6/3/2022      oxyCODONE-acetaminophen (PERCOCET) 5-325 mg per tablet Take 1 Tablet by mouth every six (6) hours as needed for Pain for up to 7 days. Max Daily Amount: 4 Tablets. Qty: 28 Tablet, Refills: 0  Start date: 6/2/2022, End date: 6/9/2022    Associated Diagnoses: S/P CABG x 4      furosemide (LASIX) 40 mg tablet Take 1 Tablet by mouth daily for 7 days. Then as directed for swelling (edema).   Qty: 30 Tablet, Refills: 0  Start date: 6/2/2022, End date: 6/9/2022      potassium chloride (K-DUR, KLOR-CON M20) 20 mEq tablet Take 1 Tablet by mouth daily for 7 days. Then as directed with Lasix (furosemide)  Qty: 30 Tablet, Refills: 0  Start date: 6/2/2022, End date: 6/9/2022      isosorbide dinitrate (ISORDIL) 10 mg tablet Take 1 Tablet by mouth two (2) times a day. Qty: 60 Tablet, Refills: 2  Start date: 6/2/2022         CONTINUE these medications which have CHANGED    Details   amiodarone (CORDARONE) 200 mg tablet Take 2 Tablets by mouth daily. Take 2 tabs twice per day for 7 days, then one tab twice per day for 7 days then one tab daily for 7 days, then discontinue. Qty: 49 Tablet, Refills: 0  Start date: 6/3/2022      atorvastatin (LIPITOR) 80 mg tablet Take 1 Tablet by mouth nightly. Qty: 30 Tablet, Refills: 2  Start date: 6/2/2022      aspirin 81 mg chewable tablet Take 1 Tablet by mouth daily. Qty: 30 Tablet, Refills: 2  Start date: 6/2/2022         CONTINUE these medications which have NOT CHANGED    Details   nitroglycerin (NITROSTAT) 0.4 mg SL tablet 1 Tablet by SubLINGual route every five (5) minutes as needed for Chest Pain. Up to 3 doses. Qty: 25 Tablet, Refills: 5      albuterol (Proventil HFA) 90 mcg/actuation inhaler Take 2 Puffs by inhalation every four (4) hours as needed for Wheezing, Shortness of Breath or Respiratory Distress.   Qty: 1 Inhaler, Refills: 0         STOP taking these medications       mupirocin (BACTROBAN) 2 % ointment Comments:   Reason for Stopping:         chlorhexidine (Scrub Chlorhexidine Gluconate) 4 % liquid Comments:   Reason for Stopping:         isosorbide mononitrate ER (IMDUR) 30 mg tablet Comments:   Reason for Stopping:         clopidogreL (Plavix) 75 mg tab Comments:   Reason for Stopping:               Stan Loving PA-C  [facility]  [phone]    Addendum by Kathy Mensah PA-C, day of discharge

## 2022-06-02 NOTE — PROGRESS NOTES
1400 Patient's HR went up to 150's with activity. Informed Marie Cabezas. 12 L EKG done and metoprolol given. 1444 Patient still in A fib. Amiodarone 150 mg given over 15 minutes. BP stable. 1540 Patient converted to Normal sinus rhythm 60-70's. Latest /83.

## 2022-06-02 NOTE — PROGRESS NOTES
06/01/22 2058   Intial Assessment   Rhoncourus Breath Sounds No   Therapy Type Incentive Spirometry   Oxygen Therapy   O2 Sat (%) 98 %   Pulse via Oximetry 80 beats per minute   O2 Device None (Room air)   Skin Assessment Clean, dry, & intact   Skin Protection for O2 Device N/A   Incentive Respiratory Assessment   Breathing Pattern Regular   Breath Sounds Bilateral Lower;Diminished; Upper;Clear   Incentive Spirometry Treatment   Actual Volume (ml) 750 ml   Treatment Tolerance Other (comment)  (Patient doing on own)

## 2022-06-02 NOTE — PROGRESS NOTES
Problem: Mobility Impaired (Adult and Pediatric)  Goal: *Acute Goals and Plan of Care (Insert Text)  Description: Physical Therapy Goals  Initiated 5/28/2022 and to be accomplished within 7 day(s)  1. Patient will move from supine to sit and sit to supine  in bed with modified independence. 2.  Patient will transfer from bed to chair and chair to bed with modified independence using the least restrictive device. 3.  Patient will perform sit to stand with modified independence. 4.  Patient will ambulate with modified independence for 250 feet with the least restrictive device. PLOF: Pt reports she is normally (I), takes care of her  who is wheelchair bound. Pt's daughter is nearby and can help at times. Outcome: Progressing Towards Goal     PHYSICAL THERAPY TREATMENT    Patient: [place] Stacy (70 y.o. female)  Date: 6/2/2022  Diagnosis: CAD (coronary artery disease) [I25.10] S/P CABG x 4  Procedure(s) (LRB):  CORONARY ARTERY BYPASS GRAFT (CABG) TIMES FOUR/LEFT RADIAL ARTERY HARVEST / Bob Fabian (N/A)  TRANSESOPHAGEAL ECHOCARDIOGRAM (N/A) 6 Days Post-Op  Precautions: Fall,Sternal  PLOF: see above     ASSESSMENT:  Pt seen in recliner in NAD and agreeable. Pt able to recall sternal precautions, needs min cues throughout to maintain. Otherwise pt performs sit <> stand and ambulation of approx 250 ft this date with supervision with the RW with no LOB or shuffling noted. Pt reports no stairs at home and thus not practiced this date. Pt with mild SoB following activity and thus educated on PLB. Pt c/o of discomfort to L wrist graft sit and educated on hand pumps and wrist elevation to assist with swelling/edema. Pt would benefit from [facility] with family support and Rw/shower chair at discharge. Will continue to follow per POC.    Progression toward goals:   [x]      Improving appropriately and progressing toward goals  []      Improving slowly and progressing toward goals  []      Not making progress toward goals and plan of care will be adjusted     PLAN:  Patient continues to benefit from skilled intervention to address the above impairments. Continue treatment per established plan of care. Discharge Recommendations:  Home Health with family support as needed   Further Equipment Recommendations for Discharge:  rolling walker, shower chair     SUBJECTIVE:   Patient stated I am getting my chest tube out today.     OBJECTIVE DATA SUMMARY:   Critical Behavior:  Neurologic State: Alert,Appropriate for age,Eyes do not open to any stimulus  Orientation Level: Oriented X4  Cognition: Follows commands,Appropriate safety awareness,Appropriate decision making,Appropriate for age attention/concentration  Safety/Judgement: Awareness of environment,Fall prevention  Functional Mobility Training:  Bed Mobility:  NT up in chair         Transfers:  Sit to Stand: Supervision  Stand to Sit: Supervision       Balance:  Sitting: Intact  Standing: Intact; With support  Standing - Static: Good  Standing - Dynamic : Good         Ambulation/Gait Training:  Distance (ft): 250 Feet (ft)  Assistive Device: Walker, rolling  Ambulation - Level of Assistance: Supervision        Gait Abnormalities: Decreased step clearance              Speed/Katia: Pace decreased (<100 feet/min)        Pain:  Pain level pre-treatment: not rated, pain at L wrist graft site /10  Pain level post-treatment: \"    \" /10   Pain Intervention(s): Medication (see MAR); Rest, Ice, Repositioning   Response to intervention: Nurse notified    Activity Tolerance:   Good    Please refer to the flowsheet for vital signs taken during this treatment.   After treatment:   [x] Patient left in no apparent distress sitting up in chair  [] Patient left in no apparent distress in bed  [x] Call bell left within reach  [x] Nursing notified  [] Caregiver present  [] Bed alarm activated  [] SCDs applied      COMMUNICATION/EDUCATION:   [x]         Role of Physical Therapy in the acute care setting. [x]         Fall prevention education was provided and the patient/caregiver indicated understanding. [x]         Patient/family have participated as able in working toward goals and plan of care. [x]         Patient/family agree to work toward stated goals and plan of care. []         Patient understands intent and goals of therapy, but is neutral about his/her participation.   []         Patient is unable to participate in stated goals/plan of care: ongoing with therapy staff.  []         Other:        Amalia Austin   Time Calculation: 23 mins

## 2022-06-02 NOTE — ROUTINE PROCESS
1910 Bedside and Verbal shift change report given to [employer] (oncoming nurse). Report included the following information SBAR, Kardex, Intake/Output, MAR, Recent Results and Cardiac Rhythm NSR. Patient remains sinus rhythm the rest of the shift.  Patient for BMP and Mag level check at 10 PM.

## 2022-06-02 NOTE — PROGRESS NOTES
discussed discharged planning with the patient. The patient indicated that her grandson would be taken her home.  informed patient that [address] will be contacting her. The patient voiced an understanding. The patient voiced no questions or concerns at this time.       RAZIA Cid

## 2022-06-02 NOTE — PROGRESS NOTES
Discharge order noted for today. Pt has been accepted to [facility] agency. Met with patient  and she is agreeable to the transition plan today. Transport has been arranged through patient's grandson. Patient's discharge summary and home health  orders have been forwarded to [address] [place] [address] health  agency via [address] [place] [address] Rd Updated bedside RN, Jacob Ybarra,  to the transition plan.   Discharge information has been documented on the AVS.       RAZIA Murdock

## 2022-06-02 NOTE — PROGRESS NOTES
CT Surgery  Chest tube drainage has tapered. Chest tubes x3 removed without difficulty. Occlusive dressing placed. D/c to home today    Adddendum:  Pt converted to afib with RVR. Will give Lopressor now and observe. Will need to stay today.      Luther Malone PA-C  [facility]  [phone]

## 2022-06-02 NOTE — PROGRESS NOTES
Physician Progress Note      Tayo Solano  CSN #:                  [id number]  :                       1970  ADMIT DATE:       2022 6:13 AM  DISCH DATE:  RESPONDING  PROVIDER #:        Kameron Cai PA-C          QUERY TEXT:    Patient admitted for CABG x4 and noted to have WBCs 25.3 with 1 band ,Pulse 93 ,RR 35 . If possible, please document in progress notes and discharge summary if you are evaluating and/or treating any of the following: The medical record reflects the following:    Risk Factors: s/p CABG x4    Clinical Indicators:  WBCs 25.3 ,1 band ,Neuts 83 ,ANC 21.3  , wbc's  16.0  ,   Lactic acid 4.69,8.28 , 10.227,9.22 ,4.17  0.69  -VS   Pulse  93 ,RR 35 ,MAPs 50-60's ,BPs 85/53 ,88/53, 91/56 ,86/53 ,80/53 ,89/58    - MD - Critical Lactic Acid Result Result on E-poc. Arterial result 4.17.    -RN PN  -[id number]- Spoke with GLORY Najera in regards to pts BP dropping. Will give pt 500 bolus and dc swan    Treatment: IVFLDs ,IV levophed ,IV albumin ,Labs/I+Os/VS monitored carefully  Thank you  Evy [facility] HARMAN [email or web address]  Options provided:  -- SIRS of non-infectious origin due to s/p CABG x4  without acute organ dysfunction  -- SIRS of non-infectious origin due to s/p CABG x4 with associated acute organ dysfunction hypotension resolved  -- SIRS of non-infectious origin due to s/p CABG x4 with associated acute organ dysfunction of Lactic acidosis  resolved  -- Other - I will add my own diagnosis  -- Disagree - Not applicable / Not valid  -- Disagree - Clinically unable to determine / Unknown  -- Refer to Clinical Documentation Reviewer    PROVIDER RESPONSE TEXT:    Provider disagreed with this query. s/p CABG elevated lactic acid related to medications and post operative open heart surgery    Query created by:  Naida Miranda on 2022 10:22 AM      QUERY TEXT:    Patient admitted for CABG x4 , noted to have elevated lactic acid levels . If possible, please document in progress notes and discharge summary if you are evaluating and/or treating any of the following: The medical record reflects the following:  Risk Factors: s/p CABG x4    Clinical Indicators: Lactic acid 4/27 - 1.77  ,2.00 ,  4.69, 8.28 , 7.19  ,10.27, 9.22 ,  4/28 4.17  , and on 5/29 0.69  4/28 MD PN 0433- Critical Lactic Acid Result  , Result on E-poc. Arterial result 4.17. Treatment: IVFLDS , Labs/I+Os/VS monitored carefully  Thank you  Lane [facility] HARMAN [email or web address]  Options provided:  -- Lactic acidosis now resolved  -- lactic acidosis  not clinically significant  -- Other - I will add my own diagnosis  -- Disagree - Not applicable / Not valid  -- Disagree - Clinically unable to determine / Unknown  -- Refer to Clinical Documentation Reviewer    PROVIDER RESPONSE TEXT:    Provider disagreed with this query. s/p CABG elevated lactic acid related to medications and post operative open heart surgery    Query created by:  Caden Ariza on 6/2/2022 10:57 AM      Electronically signed by:  Arely Bautista PA-C 6/2/2022 1:49 PM

## 2022-06-02 NOTE — ROUTINE PROCESS
0725 Received report from [facility]. Patient alert and oriented x 4. Patient sitting in the bed. 1000 Patient ambulating in the hallway with PT.    1150 Chest tube and mellisa drainage removed by Diane Ellis. 1400 Patient's HR went up to 150's with activity. Informed Courtney He. 12 L EKG done and metoprolol given.

## 2022-06-03 VITALS
SYSTOLIC BLOOD PRESSURE: 125 MMHG | TEMPERATURE: 97.7 F | DIASTOLIC BLOOD PRESSURE: 81 MMHG | RESPIRATION RATE: 20 BRPM | BODY MASS INDEX: 40.6 KG/M2 | WEIGHT: 252.65 LBS | HEIGHT: 66 IN | HEART RATE: 85 BPM | OXYGEN SATURATION: 98 %

## 2022-06-03 LAB
ANION GAP SERPL CALC-SCNC: 5 MMOL/L (ref 3–18)
ATRIAL RATE: 153 BPM
BUN SERPL-MCNC: 7 MG/DL (ref 7–18)
BUN/CREAT SERPL: 11 (ref 12–20)
CALCIUM SERPL-MCNC: 8.8 MG/DL (ref 8.5–10.1)
CALCULATED R AXIS, ECG10: 17 DEGREES
CALCULATED T AXIS, ECG11: 14 DEGREES
CHLORIDE SERPL-SCNC: 109 MMOL/L (ref 100–111)
CO2 SERPL-SCNC: 27 MMOL/L (ref 21–32)
CREAT SERPL-MCNC: 0.65 MG/DL (ref 0.6–1.3)
DIAGNOSIS, 93000: NORMAL
ERYTHROCYTE [DISTWIDTH] IN BLOOD BY AUTOMATED COUNT: 15.9 % (ref 11.6–14.5)
GLUCOSE BLD STRIP.AUTO-MCNC: 78 MG/DL (ref 70–110)
GLUCOSE BLD STRIP.AUTO-MCNC: 80 MG/DL (ref 70–110)
GLUCOSE SERPL-MCNC: 89 MG/DL (ref 74–99)
HCT VFR BLD AUTO: 27.4 % (ref 35–45)
HGB BLD-MCNC: 8.6 G/DL (ref 12–16)
MAGNESIUM SERPL-MCNC: 2 MG/DL (ref 1.6–2.6)
MCH RBC QN AUTO: 27.7 PG (ref 24–34)
MCHC RBC AUTO-ENTMCNC: 31.4 G/DL (ref 31–37)
MCV RBC AUTO: 88.4 FL (ref 78–100)
NRBC # BLD: 0.08 K/UL (ref 0–0.01)
NRBC BLD-RTO: 0.7 PER 100 WBC
PLATELET # BLD AUTO: 289 K/UL (ref 135–420)
PMV BLD AUTO: 10.2 FL (ref 9.2–11.8)
POTASSIUM SERPL-SCNC: 4.1 MMOL/L (ref 3.5–5.5)
Q-T INTERVAL, ECG07: 322 MS
QRS DURATION, ECG06: 94 MS
QTC CALCULATION (BEZET), ECG08: 462 MS
RBC # BLD AUTO: 3.1 M/UL (ref 4.2–5.3)
SODIUM SERPL-SCNC: 141 MMOL/L (ref 136–145)
VENTRICULAR RATE, ECG03: 124 BPM
WBC # BLD AUTO: 11.8 K/UL (ref 4.6–13.2)

## 2022-06-03 PROCEDURE — 97530 THERAPEUTIC ACTIVITIES: CPT

## 2022-06-03 PROCEDURE — 99024 POSTOP FOLLOW-UP VISIT: CPT | Performed by: PHYSICIAN ASSISTANT

## 2022-06-03 PROCEDURE — APPNB45 APP NON BILLABLE 31-45 MINUTES: Performed by: PHYSICIAN ASSISTANT

## 2022-06-03 PROCEDURE — 97535 SELF CARE MNGMENT TRAINING: CPT

## 2022-06-03 PROCEDURE — 74011636637 HC RX REV CODE- 636/637: Performed by: PHYSICIAN ASSISTANT

## 2022-06-03 PROCEDURE — 36415 COLL VENOUS BLD VENIPUNCTURE: CPT

## 2022-06-03 PROCEDURE — 80048 BASIC METABOLIC PNL TOTAL CA: CPT

## 2022-06-03 PROCEDURE — 74011250637 HC RX REV CODE- 250/637: Performed by: THORACIC SURGERY (CARDIOTHORACIC VASCULAR SURGERY)

## 2022-06-03 PROCEDURE — 85027 COMPLETE CBC AUTOMATED: CPT

## 2022-06-03 PROCEDURE — 74011000250 HC RX REV CODE- 250: Performed by: PHYSICIAN ASSISTANT

## 2022-06-03 PROCEDURE — 82962 GLUCOSE BLOOD TEST: CPT

## 2022-06-03 PROCEDURE — 83735 ASSAY OF MAGNESIUM: CPT

## 2022-06-03 PROCEDURE — 74011250637 HC RX REV CODE- 250/637: Performed by: PHYSICIAN ASSISTANT

## 2022-06-03 RX ORDER — ACETAMINOPHEN 325 MG/1
650 TABLET ORAL
Qty: 90 TABLET | Refills: 1 | Status: SHIPPED
Start: 2022-06-03 | End: 2022-11-01 | Stop reason: ALTCHOICE

## 2022-06-03 RX ORDER — AMIODARONE HYDROCHLORIDE 200 MG/1
400 TABLET ORAL DAILY
Qty: 49 TABLET | Refills: 0 | Status: SHIPPED | OUTPATIENT
Start: 2022-06-03 | End: 2022-06-28 | Stop reason: DRUGHIGH

## 2022-06-03 RX ORDER — FUROSEMIDE 40 MG/1
40 TABLET ORAL DAILY
Qty: 30 TABLET | Refills: 5 | Status: SHIPPED | OUTPATIENT
Start: 2022-06-03 | End: 2022-06-03

## 2022-06-03 RX ORDER — METOPROLOL TARTRATE 25 MG/1
12.5 TABLET, FILM COATED ORAL EVERY 12 HOURS
Qty: 60 TABLET | Refills: 2 | Status: SHIPPED | OUTPATIENT
Start: 2022-06-03

## 2022-06-03 RX ADMIN — Medication 40 UNITS: at 09:14

## 2022-06-03 RX ADMIN — GUAIFENESIN 600 MG: 600 TABLET, EXTENDED RELEASE ORAL at 09:04

## 2022-06-03 RX ADMIN — Medication 1 TABLET: at 09:13

## 2022-06-03 RX ADMIN — THERA TABS 1 TABLET: TAB at 09:05

## 2022-06-03 RX ADMIN — METOPROLOL TARTRATE 12.5 MG: 25 TABLET, FILM COATED ORAL at 09:05

## 2022-06-03 RX ADMIN — GABAPENTIN 100 MG: 100 CAPSULE ORAL at 09:04

## 2022-06-03 RX ADMIN — DOCUSATE SODIUM 100 MG: 100 CAPSULE, LIQUID FILLED ORAL at 09:05

## 2022-06-03 RX ADMIN — POLYETHYLENE GLYCOL 3350 17 G: 17 POWDER, FOR SOLUTION ORAL at 09:07

## 2022-06-03 RX ADMIN — OXYCODONE AND ACETAMINOPHEN 1 TABLET: 325; 5 TABLET ORAL at 09:05

## 2022-06-03 RX ADMIN — CHLORHEXIDINE GLUCONATE 0.12% ORAL RINSE 10 ML: 1.2 LIQUID ORAL at 09:03

## 2022-06-03 RX ADMIN — BISACODYL 10 MG: 5 TABLET, COATED ORAL at 09:05

## 2022-06-03 RX ADMIN — ASPIRIN 81 MG: 81 TABLET, COATED ORAL at 09:04

## 2022-06-03 RX ADMIN — ISOSORBIDE DINITRATE 10 MG: 5 TABLET ORAL at 09:14

## 2022-06-03 RX ADMIN — SODIUM CHLORIDE, PRESERVATIVE FREE 10 ML: 5 INJECTION INTRAVENOUS at 06:00

## 2022-06-03 RX ADMIN — FUROSEMIDE 40 MG: 40 TABLET ORAL at 09:04

## 2022-06-03 RX ADMIN — AMIODARONE HYDROCHLORIDE 200 MG: 200 TABLET ORAL at 09:04

## 2022-06-03 RX ADMIN — POTASSIUM CHLORIDE 20 MEQ: 1500 TABLET, EXTENDED RELEASE ORAL at 09:05

## 2022-06-03 RX ADMIN — FAMOTIDINE 20 MG: 20 TABLET ORAL at 09:05

## 2022-06-03 RX ADMIN — OXYCODONE AND ACETAMINOPHEN 1 TABLET: 325; 5 TABLET ORAL at 01:02

## 2022-06-03 NOTE — PROGRESS NOTES
Discharge order noted for today. Pt has been accepted to [facility] agency. Met with patient  and she is agreeable to the transition plan today. Transport has been arranged through patient's grandson. Patient's discharge summary and home health  orders have been forwarded to [address] [place] [address] via [address] [place] [address] Rd Updated bedside RN, Mirian Man,  to the transition plan.   Discharge information has been documented on the AVS.     RAZIA Gray

## 2022-06-03 NOTE — DISCHARGE INSTRUCTIONS
Please CALL Cardiac Surgery office with any questions or concerns [phone]. - Take all medications as directed. - Wear Heart Hugger daily.   - Shower daily. Wash incisions with soap and water and pat dry. Keep lower leg incision dressed while draining.   - No heavy lifting.   - No driving until cleared by MD and while taking pain medications. - Follow up in Cardiac Surgery clinic in 1 week and 3 weeks. - Follow up with PCP in one week and Cardiology in 3 weeks. Patient Education        Coronary Artery Bypass Graft: What to Expect at Home  Your Recovery     Coronary artery bypass graft (CABG) is surgery to treat coronary artery disease. The surgery helps blood make a detour, or bypass, around one or more narrowed or blocked coronary arteries. Coronary arteries are the blood vessels that bring blood to the heart muscle. Your doctor did the surgery through a cut, called an incision, in your chest.  You will feel tired and sore for the first few weeks after surgery. You may have some brief, sharp pains on either side of your chest. Your chest, shoulders, and upper back may ache. These symptoms usually get better after 4 to 6 weeks. The incision in your chest and the area where the healthy blood vessel was taken may be sore or swollen. You will probably be able to do many of your usual activities after 4 to 6 weeks. But for at least 6 weeks, you'll avoid lifting heavy objects and doing activities that strain your chest or upper arm muscles. At first you may notice that you get tired easily and need to rest often. It may take 1 to 2 months to get your energy back. Some people find that they are more emotional after this surgery. You may cry easily or show emotion in ways that are unusual for you. This is common and may last for up to a year. Some people get depressed after the surgery. Talk with your doctor if you have sadness that continues or you are concerned about how you are feeling.  Treatment and other support can help you feel better. Even though the surgery may improve your symptoms, you will still follow a heart-healthy lifestyle to help lower your risk of a heart attack or stroke. It will be important to eat a heart-healthy diet, get regular exercise, stay at a healthy weight, manage other health problems, take your medicines, and not smoke. You may start a cardiac rehabilitation (rehab) program in the hospital. You will continue with this rehab program after you go home to help you recover and prevent problems with your heart. Talk to your doctor about whether rehab is right for you. This care sheet gives you a general idea about how long it will take for you to recover. But each person recovers at a different pace. Follow the steps below to get better as quickly as possible. How can you care for yourself at home? Activity    · Rest when you feel tired. Getting enough sleep will help you recover. Try to sleep on your back while you heal. If your breastbone (sternum) was cut, healing usually takes about 4 to 6 weeks.     · Try to walk each day. Start by walking a little more than you did the day before. Bit by bit, increase the amount you walk. Walking boosts blood flow and helps prevent pneumonia and constipation.     · Avoid strenuous activities, such as bicycle riding, jogging, weight lifting, or heavy aerobic exercise, until your doctor says it is okay.     · For 3 months, avoid activities that strain your chest or upper arm muscles. This includes pushing a  or vacuum, mopping floors, or swinging a golf club or tennis racquet.     · For at least 6 weeks, avoid lifting anything that would make you strain. This may include a child, heavy grocery bags and milk containers, a heavy briefcase or backpack, or cat litter or dog food bags.     · For at least 6 weeks, avoid pushing yourself up out of a bed or chair using your arms. Do not use your arms to pull yourself into or out of a vehicle.   · Hold a pillow firmly over your chest incision when you cough or take deep breaths. This will support your chest and reduce your pain.     · Do breathing exercises at home as instructed by your doctor. This will help prevent pneumonia.     · Ask your doctor when you can drive again.     · You may need to take 4 to 12 weeks off from work. It depends on the type of work you do and how you feel.     · Ask your doctor when it is okay for you to have sex. Diet    · Eat a heart-healthy diet. If you have not been eating this way, talk to your doctor. You also may want to talk to a dietitian. A dietitian can help you learn about healthy foods.     · Drink plenty of fluids (unless your doctor tells you not to).     · You may notice that your bowel movements are not regular right after your surgery. This is common. Try to avoid constipation and straining with bowel movements. You may want to take a fiber supplement every day. If you have not had a bowel movement after a couple of days, ask your doctor about taking a mild laxative. Medicines    · Your doctor will tell you if and when you can restart your medicines. You will also be given instructions about taking any new medicines.     · If you take aspirin or some other blood thinner, ask your doctor if and when to start taking it again. Make sure that you understand exactly what your doctor wants you to do.     · Be safe with medicines. Take your medicines exactly as prescribed. Call your doctor if you think you are having a problem with your medicine.     · Take pain medicines exactly as directed. ? If the doctor gave you a prescription medicine for pain, take it as prescribed. ? If you are not taking a prescription pain medicine, ask your doctor if you can take an over-the-counter medicine.   ? Do not take aspirin, ibuprofen (Advil, Motrin), naproxen (Aleve), or other nonsteroidal anti-inflammatory drugs (NSAIDs) unless your doctor says it is okay.     · If you think your pain medicine is making you sick to your stomach:  ? Take your medicine after meals (unless your doctor has told you not to). ? Ask your doctor for a different pain medicine.     · If your doctor prescribed antibiotics, take them as directed. Do not stop taking them just because you feel better. You need to take the full course of antibiotics.     · Your doctor may give you a blood thinner to prevent blood clots. If you take a blood thinner, be sure you get instructions about how to take your medicine safely. Blood thinners can cause serious bleeding problems. Incision care    · If you have strips of tape on the incisions the doctor made, leave the tape on for a week or until it falls off.     · Wash the area daily with warm, soapy water, and pat it dry. Don't use hydrogen peroxide or alcohol, which can slow healing. You may cover the area with a gauze bandage if it weeps or rubs against clothing. Change the bandage every day.     · You can take showers with your back to the showerhead. Allow the warm and soapy water to run across your shoulders and down over the incision. Pat the incision dry with a clean towel.     · Do not take a bath for the first 3 weeks, or until your doctor tells you it is okay.     · Do not swim or use a hot tub for at least 1 month, or until your doctor says it is okay.     · Do not use any creams, lotions, powders, ointments, or oils unless your doctor tells you it is okay.     · If a vein was removed from your leg, you can help prevent swelling by:  ? Wearing compression stockings if your doctor recommends them. ? Elevating your legs above the level of your heart whenever you lie down. Other instructions    · Keep track of your weight. Weigh yourself every day at the same time of day, on the same scale, in the same amount of clothing. A sudden increase in weight can be a sign of a problem with your heart.  Tell your doctor if you suddenly gain weight, such as 3 pounds or more in 2 to 3 days.     · Do not smoke. Smoking can make it harder for you to recover. And it will raise the chances of your arteries narrowing again. If you need help quitting, talk to your doctor about stop-smoking programs and medicines. These can increase your chances of quitting for good. Follow-up care is a key part of your treatment and safety. Be sure to make and go to all appointments, and call your doctor if you are having problems. It's also a good idea to know your test results and keep a list of the medicines you take. When should you call for help? Call 911 anytime you think you may need emergency care. For example, call if:    · You passed out (lost consciousness).     · You have severe trouble breathing.     · You have sudden chest pain and shortness of breath, or you cough up blood.     · You have severe pain in your chest.     · You have symptoms of a heart attack. These may include:  ? Chest pain or pressure, or a strange feeling in the chest.  ? Sweating. ? Shortness of breath. ? Nausea or vomiting. ? Pain, pressure, or a strange feeling in the back, neck, jaw, or upper belly or in one or both shoulders or arms. ? Lightheadedness or sudden weakness. ? A fast or irregular heartbeat. After you call 911, the  may tell you to chew 1 adult-strength or 2 to 4 low-dose aspirin. Wait for an ambulance. Do not try to drive yourself.     · You have angina symptoms (such as chest pain or pressure) that do not go away with rest or are not getting better within 5 minutes after you take a dose of nitroglycerin.     · You have symptoms of a stroke. These may include:  ? Sudden numbness, tingling, weakness, or loss of movement in your face, arm, or leg, especially on only one side of your body. ? Sudden vision changes. ? Sudden trouble speaking. ? Sudden confusion or trouble understanding simple statements. ? Sudden problems with walking or balance.   ? A sudden, severe headache that is different from past headaches. Call your doctor now or seek immediate medical care if:    · You have pain that does not get better after you take pain medicine.     · You have loose stitches, or your incision comes open.     · You are bleeding a lot from the incision.     · You have signs of infection, such as:  ? Increased pain, swelling, warmth, or redness. ? Red streaks leading from the incision. ? Pus draining from the incision. ? A fever.     · Your heartbeat feels very fast, skips beats, or flutters.     · You have signs of a blood clot in a leg. If you had a vein removed from your leg, you may have tenderness and swelling while your leg heals. But signs of a blood clot may be in a different part of your leg and may include:  ? Pain in your calf, back of the knee, thigh, or groin. ? Redness and swelling in your leg or groin.     · You have symptoms of heart failure, such as:  ? Swelling in your legs, ankles, or feet. ? Sudden weight gain, such as more than 2 to 3 pounds in a day or 5 pounds in a week. (Your doctor may suggest a different range of weight gain.)     · You are sick to your stomach or cannot keep fluids down. Watch closely for changes in your health, and be sure to contact your doctor if:    · You do not get better as expected. Where can you learn more? Go to http://www.gray.com/  Enter F759 in the search box to learn more about \"Coronary Artery Bypass Graft: What to Expect at Home. \"  Current as of: January 10, 2022               Content Version: 13.2  © 2006-2022 [employer]. Care instructions adapted under license by [employer] (which disclaims liability or warranty for this information). If you have questions about a medical condition or this instruction, always ask your healthcare professional. [place] [address] any warranty or liability for your use of this information.          Patient Education        Learning About Coronary Artery Disease (CAD)  What is coronary artery disease? Coronary artery disease is a condition that occurs when plaque builds up in the arteries that bring oxygen-rich blood to your heart. Plaque is a fatty substance made of cholesterol, calcium, and other substances in the blood. This process is called hardening of the arteries, or atherosclerosis. What happens when you have coronary artery disease? · Plaque may narrow the coronary arteries. Narrowed arteries cause poor blood flow. This can lead to angina symptoms such as chest pain or discomfort. If blood flow is completely blocked, you could have a heart attack. · You can slow and reduce the risk of future problems by making changes in your lifestyle. These include quitting smoking and eating heart-healthy foods. · Treatment, along with changes in your lifestyle, can help you live a longer and healthier life. How can you prevent coronary artery disease? · Do not smoke. It may be the best thing you can do to prevent coronary artery disease. If you need help quitting, talk to your doctor about stop-smoking programs and medicines. These can increase your chances of quitting for good. · Be active. Try to do moderate activity at least 2½ hours a week. Or try to do vigorous activity at least 1¼ hours a week. You may want to walk or try other activities, such as running, swimming, cycling, or playing tennis or team sports. · Eat heart-healthy foods. Eat more fruits and vegetables and less food that contains saturated and trans fats. Limit alcohol, sodium, and sweets. · Stay at a healthy weight. Lose weight if you need to. · Manage other health problems such as diabetes, high blood pressure, and high cholesterol. How is coronary artery disease treated? · Your doctor will suggest that you make lifestyle changes. For example, your doctor may ask you to eat healthy foods, quit smoking, lose extra weight, and be more active.   · You will take medicines that help prevent a heart attack. · Your doctor may suggest a procedure to open narrowed or blocked arteries. This is called angioplasty. Or your doctor may suggest using healthy blood vessels to create detours around narrowed or blocked arteries. This is called bypass surgery. Follow-up care is a key part of your treatment and safety. Be sure to make and go to all appointments, and call your doctor if you are having problems. It's also a good idea to know your test results and keep a list of the medicines you take. Where can you learn more? Go to http://www.gray.com/  Enter C643 in the search box to learn more about \"Learning About Coronary Artery Disease (CAD). \"  Current as of: January 10, 2022               Content Version: 13.2  © 2006-2022 [employer]. Care instructions adapted under license by [employer] (which disclaims liability or warranty for this information). If you have questions about a medical condition or this instruction, always ask your healthcare professional. [address] any warranty or liability for your use of this information.

## 2022-06-03 NOTE — PROGRESS NOTES
CARDIAC SURGERY PROGRESS NOTE    6/3/2022  11:20 AM     CC:  Post Operative Day # 7     Chart, images and labs reviewed. Discussed with available staff. Interval History/Events of Past 24 hours:   Walks in [place] without supplemental oxygen. Converted to NSR yesterday afternoon. Tolerates her diet and is moving her bowels. Pain control much better. Subjective:  Patient seen and examined on rounds today. No complaint  Pain level: controlled  Ambulating: well   Sleeping: well  Eating:  well  Sternal pain: Yes    BM: Yes    Objective:  Vital signs:   Visit Vitals  /81   Pulse 85   Temp 97.7 °F (36.5 °C)   Resp 20   Ht 5' 6\" (1.676 m)   Wt 114.6 kg (252 lb 10.4 oz)   SpO2 98%   BMI 40.78 kg/m²     Temp (24hrs), Av °F (36.7 °C), Min:97.7 °F (36.5 °C), Max:98.6 °F (37 °C)    Admission Weight: Last Weight   Weight: 111.1 kg (245 lb) Weight: 114.6 kg (252 lb 10.4 oz)     Telemetry: SR    Physical Examination:     General:  Alert, oriented   Lungs: course at bases and scattered right sided exp wheeze  Chest: Dressings clean and dry. Midline incision healing well. Sternum stable. Heart: regular rate and rhythm, No murmur. Abdomen: Soft and non-tender without masses. Bowel sounds present. Extremities: Warm and well perfused. Edema mild. Incisions: clean, dry, no drainage. Good cap refill left hand. Left forearm incisions healing well  Neuro: decreased sensory to left hand-ulnar distribution. Strong and equal strengths. Beta-blocker:  Yes  ASA: Yes   Statin: Yes  ACE-I: No  Diuretic: Yes     DVT Prophylaxis:   pneumatic compression boots: Yes  Compression stockings:  Yes  Enoxaparin:  Yes          Labs:  Lab Results   Component Value Date/Time    WBC 11.8 2022 04:24 AM    HCT 27.4 (L) 2022 04:24 AM     2022 04:24 AM      Lab Results   Component Value Date/Time     2022 04:24 AM    K 4.1 2022 04:24 AM     2022 04:24 AM    CO2 27 2022 04:24 AM    GLU 89 06/03/2022 04:24 AM    BUN 7 06/03/2022 04:24 AM    CREA 0.65 06/03/2022 04:24 AM    CREA 0.70 06/02/2022 09:40 PM    CREA 0.73 06/02/2022 09:35 AM     Assessment:  S/P  CABG x 4, left radial artery harvest  Respiratory parameters stable  Cardiac status stable     Plans:  1. Cont BB, ASA and statin. Will order tapering amio dose. 2.  Lasix and Kdur  3. Pain control regimen discussed. 4.  Strict sternal precautions and s/s infection discussed. 5.  OK for discharge today    Heart Hugger use is encouraged to mitigate pain and will also assist with deep breathing and incentive spirometry goals. Lisa Hong PA-C    PLEASE NOTE:  This document has been produced using voice recognition software. Unrecognized errors in transcription may be present. NOTE TO PATIENT:  The purpose of this note is to communicate optimally with the other providers involved in your care. It is written using standard medical terminology. If you have questions regarding details of the note please call my office at [phone] and make an appointment to discuss your concerns.

## 2022-06-03 NOTE — DIABETES MGMT
Diabetes/ Glycemic Control Plan of Care  Recommendations:   Blood glucose low normal this am 78 mg/dl. 60 mg/dl last night. Recommend lowering Lantus dose to 30 units daily  Continue corrective insulin coverage as needed. Will continue inpatient monitoring. Assessment:   DX:   1. S/P CABG x 4  oxyCODONE-acetaminophen (PERCOCET) 5-325 mg per tablet   2. Coronary artery disease of native artery of native heart with stable angina pectoris ([address]        Fasting/ Morning blood glucose:   Lab Results   Component Value Date/Time    Glucose 89 06/03/2022 04:24 AM    Glucose (POC) 78 06/03/2022 07:46 AM    Glucose (POC) 136 (H) 05/03/2017 06:10 PM    Glucose,  (H) 05/27/2022 04:47 PM     IV Fluids containing dextrose:   None   Steroids:   None     Blood glucose values:       Results for Jyothi Sen (MRN [id number]) as of 6/3/2022 11:09   Ref. Range 6/2/2022 07:30 6/2/2022 11:19 6/2/2022 16:28 6/2/2022 21:28 6/3/2022 07:46   GLUCOSE,FAST - POC Latest Ref Range: 70 - 110 mg/dL 93 86 86 60 (L) 78     Within target range (70-180mg/dL):  Progressing. Current insulin orders:   Lantus 40 units daily  Lispro corrective insulin coverage AC&HS  Total Daily Dose previous 24 hours =  40 units   Current A1c:   Lab Results   Component Value Date/Time    Hemoglobin A1c 5.9 (H) 05/28/2022 04:00 AM      equivalent  to ave Blood Glucose of 123 mg/dl for 2-3 months prior to admission  Adequate glycemic control PTA:   Yes   Nutrition/Diet:   Active Orders   Diet    ADULT DIET Regular; 4 carb choices (60 gm/meal); Low Fat/Low Chol/High Fiber/2 gm Na; no concentrated sweets. no red meat.       Meal Intake:  Patient Vitals for the past 168 hrs:   % Diet Eaten   06/03/22 0846 1 - 25%   06/02/22 1801 76 - 100%   06/02/22 1338 1 - 25%   06/02/22 0902 76 - 100%   06/01/22 1750 76 - 100%   06/01/22 1230 76 - 100%   06/01/22 0841 76 - 100%   05/31/22 0855 76 - 100%   05/30/22 1800 76 - 100%   05/30/22 1300 76 - 100%   05/30/22 0830 76 - 100% 05/29/22 1200 1 - 25%   05/29/22 0800 1 - 25%   05/28/22 1700 26 - 50%   05/28/22 1200 1 - 25%   05/28/22 0800 0%     Supplement Intake:  Patient Vitals for the past 168 hrs:   Supplement intake %   05/31/22 0855 1 - 25%   05/30/22 1800 1 - 25%   05/30/22 1300 1 - 25%   05/30/22 0830 26 - 50%   05/29/22 0800 0%   05/28/22 1700 0%   05/28/22 1200 0%   05/28/22 0800 0%     Home diabetes medications:   Key Antihyperglycemic Medications     Patient is on no antihyperglycemic meds. Plan/Goals:   Blood glucose will be within target of 70 - 180 mg/dl within 72 hours  Reinforce dietary and medication compliance at home.         Education:  [] Refer to Diabetes Education Record                          [x] Education not indicated at this time     Arthur Alcaraz, [address] [facility] CDE  Ext [phone]

## 2022-06-03 NOTE — ROUTINE PROCESS
0700 Bedside and Verbal shift change report given to Mandy Gamble (oncoming nurse) by nursing staff (offgoing nurse). Report included the following information SBAR, Intake/Output, MAR and Cardiac Rhythm sinus rhythym      0800 Patient up in recliner resting with no SOB reports of mild pain   1300 Patient discharged via private vehicle with family, discharge instructions sent with patient .

## 2022-06-03 NOTE — HOME CARE
5/31/22:  Received home health referral for [facility] for (SN, PT, OT). Spoke with patient in room;  patient identifiers verified. Explained home care services and routines. Demographics verified including insurance, phone and address confirmed. Patient has the following DME: none ordered. Caregivers available daughter Randi as primary caregiver. Orders noted and initiated. Awaiting additional orders and information for completion. 6/3/22:  Received discharge order for today. Updated and arrange additional information for central intake to process.         ----   Fernie Bradford LPN  [facility] - [facility] Liaison

## 2022-06-03 NOTE — PROGRESS NOTES
Problem: Self Care Deficits Care Plan (Adult)  Goal: *Acute Goals and Plan of Care (Insert Text)  Description: Occupational Therapy Goals  Initiated 5/28/2022 within 7 day(s). 1.  Patient will perform self-feeding with supervision/set-up. 2.  Patient will perform grooming with supervision/set-up standing at the sink with Good balance for >4 min. 3.  Patient will perform upper body and lower body dressing with supervision/set-up, using AE prn.  4.  Patient will perform toilet transfers with supervision/set-up. 5.  Patient will perform all aspects of toileting with supervision/set-up. 6.  Patient will verbalize sternal precautions and will follow them during functional activities without cues. 7.  Patient will utilize energy conservation techniques during functional activities without verbal cues. Prior Level of Function: Pt reports being independent with ADLs and functional mobility, pt lives with her spouse who she is primary caregiver for, pt's daughter lives nearby and checks on them. Outcome: Progressing Towards Goal   OCCUPATIONAL THERAPY TREATMENT    Patient: Elliot Benedict (72 y.o. female)  Date: 6/3/2022  Diagnosis: CAD (coronary artery disease) [I25.10] S/P CABG x 4  Procedure(s) (LRB):  CORONARY ARTERY BYPASS GRAFT (CABG) TIMES FOUR/LEFT RADIAL ARTERY HARVEST / Jaime Maker (N/A)  TRANSESOPHAGEAL ECHOCARDIOGRAM (N/A) 7 Days Post-Op  Precautions: Fall,Sternal  PLOF: Pt reports being independent with ADLs and functional mobility, pt lives with her spouse who she is primary caregiver for, pt's daughter lives nearby and checks on them. Chart, occupational therapy assessment, plan of care, and goals were reviewed. ASSESSMENT:  Pt cleared by RN for OT tx at this time. Pt presented in BR on toilet upon entry. She completed toileting task w/ Supervision utilizing compensatory strategies to adhere to sternal precautions. Once finished, she maneuvered to sink side~ 15 ft Supervision with RW.  Pt stood and performed hand hygiene for ~ 1 min with Supervision w/out UE support, no LOB noted. Pt requesting to walk in hallway without AD. She performed functional mobility ~ 150 ft in room and hallway Supervision without AD. Pt returned to reclining chair at end of session. Pt within increased SOB and instructed to perform PLB technique. HR assessed at 92 bpm. She was left with B LE's elevated and all needs left within reach. RN made aware of pt's participation and position. Progression toward goals:  []          Improving appropriately and progressing toward goals  [x]          Improving slowly and progressing toward goals  []          Not making progress toward goals and plan of care will be adjusted     PLAN:  Patient continues to benefit from skilled intervention to address the above impairments. Continue treatment per established plan of care. Discharge Recommendations:  [facility] with increased family assist/Supervision  Further Equipment Recommendations for Discharge:  Shower chair     SUBJECTIVE:   Patient stated  I should be leaving today. \"     OBJECTIVE DATA SUMMARY:   Cognitive/Behavioral Status:  Neurologic State: Alert  Orientation Level: Oriented X4  Cognition: Follows commands  Safety/Judgement: Awareness of environment,Fall prevention    Functional Mobility and Transfers for ADLs:      Transfers:  Sit to Stand: Supervision  Stand to Sit: Supervision       Balance:  Sitting: Intact  Standing: Intact; With support  Standing - Static: Good  Standing - Dynamic : Good    ADL Intervention:   Grooming: supervision with hand hygiene in stance    Toileting  Bladder Hygiene: Supervision  Adaptive Equipment: Elevated seat    Cognitive Retraining  Safety/Judgement: Awareness of environment; Fall prevention      Pain:  Pain level pre-treatment: 6/10   Pain level post-treatment: 6/10  Pain Intervention(s): Medication (see MAR);  Rest, Repositioning   Response to intervention: Nurse notified, See doc floe    Activity Tolerance:    Fair   Please refer to the flowsheet for vital signs taken during this treatment. After treatment:   [x]  Patient left in no apparent distress sitting up in chair  []  Patient left in no apparent distress in bed  [x]  Call bell left within reach  [x]  Nursing notified  []  Caregiver present  []  Bed alarm activated    COMMUNICATION/EDUCATION:   [x] Role of Occupational Therapy in the acute care setting  [x] Home safety education was provided and the patient/caregiver indicated understanding. [x] Patient/family have participated as able in working towards goals and plan of care. [x] Patient/family agree to work toward stated goals and plan of care. [] Patient understands intent and goals of therapy, but is neutral about his/her participation. [] Patient is unable to participate in goal setting and plan of care.       Thank you for this referral.  CLAIR Solis  Time Calculation: 23 mins

## 2022-06-04 ENCOUNTER — HOME CARE VISIT (OUTPATIENT)
Dept: SCHEDULING | Facility: HOME HEALTH | Age: 52
End: 2022-06-04
Payer: MEDICAID

## 2022-06-04 PROCEDURE — 400013 HH SOC

## 2022-06-04 PROCEDURE — G0299 HHS/HOSPICE OF RN EA 15 MIN: HCPCS

## 2022-06-04 NOTE — Clinical Note
thanks for the update  ----- Message -----  From: Abilio Sarah RN  Sent: 6/4/2022   8:48 PM EDT  To: Michelle Keene, PT      SOC completed on 6/4/22. SN/PT/OT all ordered and accepted by patient, caregiver. Patient ambulating well unaided. Incisions all look good, no drainage, well approximated and open to air. Patient was ordered Lasix 40 mg Daily, this prescription was never entered to pharmacy, I will reach out to Dr. Shan Padilla, Cardiovascular surgeon on Monday.

## 2022-06-04 NOTE — Clinical Note
SOC completed on 6/4/22. SN/PT/OT all ordered and accepted by patient, caregiver. Patient ambulating well unaided. Incisions all look good, no drainage, well approximated and open to air. Patient was ordered Lasix 40 mg Daily, this prescription was never entered to pharmacy, I will reach out to Dr. Chavo Mayer, Cardiovascular surgeon on Monday.

## 2022-06-05 ENCOUNTER — HOME CARE VISIT (OUTPATIENT)
Dept: SCHEDULING | Facility: HOME HEALTH | Age: 52
End: 2022-06-05
Payer: MEDICAID

## 2022-06-05 PROCEDURE — G0299 HHS/HOSPICE OF RN EA 15 MIN: HCPCS

## 2022-06-06 ENCOUNTER — HOME CARE VISIT (OUTPATIENT)
Dept: SCHEDULING | Facility: HOME HEALTH | Age: 52
End: 2022-06-06
Payer: MEDICAID

## 2022-06-06 ENCOUNTER — TELEPHONE (OUTPATIENT)
Dept: CARDIOLOGY CLINIC | Age: 52
End: 2022-06-06

## 2022-06-06 VITALS
SYSTOLIC BLOOD PRESSURE: 126 MMHG | TEMPERATURE: 98.7 F | RESPIRATION RATE: 20 BRPM | DIASTOLIC BLOOD PRESSURE: 74 MMHG | HEART RATE: 74 BPM | OXYGEN SATURATION: 98 %

## 2022-06-06 VITALS
DIASTOLIC BLOOD PRESSURE: 74 MMHG | RESPIRATION RATE: 16 BRPM | SYSTOLIC BLOOD PRESSURE: 118 MMHG | OXYGEN SATURATION: 93 % | TEMPERATURE: 96.9 F | HEART RATE: 86 BPM

## 2022-06-06 VITALS
OXYGEN SATURATION: 98 % | RESPIRATION RATE: 16 BRPM | DIASTOLIC BLOOD PRESSURE: 74 MMHG | HEART RATE: 74 BPM | SYSTOLIC BLOOD PRESSURE: 118 MMHG | TEMPERATURE: 98.4 F

## 2022-06-06 VITALS — OXYGEN SATURATION: 96 % | HEART RATE: 79 BPM

## 2022-06-06 PROCEDURE — G0151 HHCP-SERV OF PT,EA 15 MIN: HCPCS

## 2022-06-06 PROCEDURE — G0300 HHS/HOSPICE OF LPN EA 15 MIN: HCPCS

## 2022-06-06 PROCEDURE — G0152 HHCP-SERV OF OT,EA 15 MIN: HCPCS

## 2022-06-06 RX ORDER — FUROSEMIDE 40 MG/1
40 TABLET ORAL DAILY
Qty: 30 TABLET | Refills: 0 | Status: CANCELLED | OUTPATIENT
Start: 2022-06-06

## 2022-06-06 NOTE — Clinical Note
Therapy Functional Score Assessment  Question   Score   Grooming  1   Upper Dressing 1   Lower Dressing 2   Bathing  5   Toilet Transfer  0   Transfer  0           Ambulation  3   Dyspnea                     3   Pain Interfering with activity 4  Est number therapy visits      7

## 2022-06-06 NOTE — Clinical Note
thanks for the update  ----- Message -----  From: Tate Yeager OTR/L  Sent: 6/7/2022   5:55 AM EDT  To: Jeremy Brooks PT      OT evaluation completed 6.6.22. OT instructed pt importance, benefit, and purpose short term skilled OT for safety training functional transfers and while performing ADL/IADL tasks, balance training, endurance/activity tolerance training, and functional strengthening for improved safety, fall prevention, improved functional level, and decrease caregiver burden however pt continued to decline. pt requires setup A to SBA perform ADL/IADL tasks and MOD I to SBA for ambulation and transfers execute ADL/IADL tasks. pt demonstrates I sternal/cardiac precautions while performing ADL tasks, ambulation, and transfers. pt presents with increased SOB while performing functional activity, requiring pursed lip breathing for SOB management, however O2 remained 93% or greater with HR 84. pt fatigues easily. pt aware contact MD for OT referral if the need arises. D/C OT at this time per pt decline. pt agrees D/C.  D/C pt to care of Dr Torres Scott NP.

## 2022-06-06 NOTE — Clinical Note
Discussed PLOC with LPT working on pt savana, stength training. work on increasing distances on fllat level and uneven surfaces and stair training.

## 2022-06-06 NOTE — TELEPHONE ENCOUNTER
----- Message from Brigida Glover PA-C sent at 6/3/2022 11:46 AM EDT -----  Ms. Arreola is being discharged home today after CABG. Please make sure 1 week and 21 day follow up appointments have been scheduled with a PA. Thanks.

## 2022-06-06 NOTE — Clinical Note
OT evaluation completed 6.6.22. OT instructed pt importance, benefit, and purpose short term skilled OT for safety training functional transfers and while performing ADL/IADL tasks, balance training, endurance/activity tolerance training, and functional strengthening for improved safety, fall prevention, improved functional level, and decrease caregiver burden however pt continued to decline. pt requires setup A to SBA perform ADL/IADL tasks and MOD I to SBA for ambulation and transfers execute ADL/IADL tasks. pt demonstrates I sternal/cardiac precautions while performing ADL tasks, ambulation, and transfers. pt presents with increased SOB while performing functional activity, requiring pursed lip breathing for SOB management, however O2 remained 93% or greater with HR 84. pt fatigues easily. pt aware contact MD for OT referral if the need arises. D/C OT at this time per pt decline. pt agrees D/C.  D/C pt to care of Dr Denisha Goodrich, GIO.

## 2022-06-06 NOTE — Clinical Note
Therapy Functional Score Assessment    Question Score     Grooming 1     Upper Dressing 1     Lower Dressing 2     Bathing 5     Toilet Transfer 0     Transfer 0             Ambulation 3     Dyspnea                     3     Pain Interfering with activity 4    Est number therapy visits      1

## 2022-06-06 NOTE — TELEPHONE ENCOUNTER
Requested Prescriptions     Pending Prescriptions Disp Refills    furosemide (LASIX) 40 mg tablet 30 Tablet 2     Sig: Take 1 Tablet by mouth daily. As needed for Edema     Called and spoke with patient.  Patient states that Rx that was sent at discharge wasn't at pharmacy, please send new Rx

## 2022-06-06 NOTE — HOME HEALTH
Skilled services/Home bound verification: Daughter is Caregiver, she and patient understand homebound status. Skilled Reason for admission/summary of clinical condition:  Recent hospital stay for CABG . This patient is homebound for the following reasons Requires considerable and taxing effort to leave the home , Requires the assistance of 1 or more persons to leave the home  and Only leaves the home for medical reasons or Yarsani services and are infrequent and of short duration for other reasons . Caregiver: Daughter is caregiver, she is available most days and some evenings, (Patient also was the caregiver for  before this episode of hers. She has Paid Aide  to care for him at this juncture. ) . Daughter  Assists with ADLs, Medication management, Transportation to appointments, Meal prep and assists with ambulation. Medications reconciled and all medications are not available in the home this visit. Patient was ordered 40mg Lasix on discharge but none was at the pharmacy. I called Dr. Radha Coreas's office on 6/6/22. I spoke with Chicih Mcnally about Lasix, 40mg daily ordered for 7 days on DC instructions. She is going to have medication called to pharmacy. I called patient on 6/6/22 and let her know they would be at her [facility] today. She agrees to pick them up and start today. The following education was provided regarding medications: Take all medications as prescribed and around the same time each day. All other medications are in the home. High risk medication teaching regarding  opoid narcotics performed,  Patient is on Percocet I  Went over the danger of opioid's addictive nature as well as propensity for respiratory depression and side effect of constipation. Home health supplies by type and quantity ordered/delivered this visit include: n/a    Patient education provided this visit to include: Patient was a bit SOB when I arrived.   Her lungs were diminished to Ausculation. . I had her demonstrate her use of ICS. Malena Records She only got barely 750cc on it. I reviewed how to use and to take long slow breaths and try to hold them in as long as possible. I also encouraged her to do it 3-5x/hour all hours while awake, with a goal of 1500 for tomorrow. Sats ar 98%. Patient is a fall risk, went over the need of having someone with her when ambulating, keep hallways and living areas free of clutter and throw rugs. Patient level of understanding of education provided: Patient showed me how she had improved ICS performance. She was getting nearly 1000cc when I was leaving. Sharps Education Provided:n/a  Patient response to procedure performed:  Patient was engaged and cooperative throughout [facility] interview. Home exercise program/Homework provided:  I also discussed with patient and caregiver that sternal and calf incisions are healing well with no s/s of infection present. Pt aware to leave incisions open to air and to avoid rubbing incisions. patient made aware to monitor for s/s of infection [increased swelling, increased redness around site, increased pain, foul smelling drainage, fever] aware who to report to/when. MEDICATION ADHERENCE IS AN IMPORTANT COMPONENT OF HTN MANAGEMENT. PATIENT STATES THE IMPORTANCE TO TAKE HTN MEDS SAME TIME EACH DAY. Discussed s/s of infection to monitor for, s/s of UTI, who to report to/when. Instructed cg to notify staff/md/seek tx if complications occur. Patient instructed to maintain clear pathways in home and to minimize clutter to prevent falls from occurring/minimize fall potential.   Patient needing a well balanced diet with the 5 food groups, patient to increase fiber in diet, fresh fruits and vegetables, whole grains and increase water intake. Maintain healthy low sodium  diet, Continue to monitor B/P and observe for signs of infection. Work with PT to increase strength and f/u with PCP.   I went over the importance of taking all prescriptions as ordered. I discussed how to avoid extra sodium in her diet. We discussed the signs of infection and when to call MD.  We discussed the high risk for falls and ways to prevent falls in the future. We discussed taking B/P daily and keeping a log. We also discussed the need of a heart healthy diet, and the need to change positions frequently. Gaining strength with PT for increased mobility. Pt/Caregiver instructed on plan of care and are agreeable to plan of care at this time. NP  Grant Nunes notified of patient admission to home health and plan of care including anticipated frequency of visits  and treatments/interventions/modalities of SN/PT/OT. Discharge planning discussed with patient and caregiver. Discharge planning as follows: Will discharge from nursing when education is complete and patient is medically stable. Familia Landin Pt/Caregiver did verbalize understanding of discharge planning. Next MD appointment  TBD with Grant Nunes NP. Patient/caregiver encouraged/instructed to keep appointment as lack of follow through with physician appointment could result in discontinuation of home care services for non-compliance.

## 2022-06-06 NOTE — HOME HEALTH
Skilled reason for visit: Post Op Education     Caregiver involvement: Pt independent with care      Medications reviewed and all medication are not available in the home this visit. The following education was provided regarding medications:  Lasix  MD notified of any discrepancies/look a-like medications/medication interactions: NA  Medications are Effective  at this time. Home health supplies by type and quantity ordered/delivered this visit include: NA    Patient education provided this visit: Patient was educated on monitoring blood pressure prior to taking blood pressure medication, and be sure to keep a log of blood pressures and weight. Pt Does not have a scale and will be picking one up today. Educated on using her spirometer to help with her lungs and breathing. Educted pt that she needs t rest and allow someone else to change her bed sheets. Sharps education provided: NA    Patient level of understanding of education provided:     Skilled Care Performed this visit: Education and incision assessment     Patient response to procedure performed:  NA    Agency Progress toward goals: Progressing toward interventions above      Patient's Progress towards personal goals: Progressing toward interventions above      Home exercise program: pt conitnues to take  medication as ordered and follows up on all  md appintments    Continued need for the following skills: nursing    Plan for next visit: education    Patient and/or caregiver notified and agrees to changes in the Plan of Care yes    The following discharge planning was discussed with the pt/caregiver: when patient reaches goals and medication is managed, and disease processes are understood patient agrees and understand that discharge will take place.

## 2022-06-06 NOTE — HOME HEALTH
PMHx: List of Comorbidities:    CAD (coronary artery disease)      Displacement of lumbar intervertebral disc      HLD (hyperlipidemia)      Hypertension      Lumbar radiculitis      Lumbar sprain      Numbness        legs and arms    S/P CABG x 4 5/27/2022      LIMA-LAD, Left radial to OM, rSVG to PDA, rSVG to diag by Dr. Gita Sebastian STEMI (ST elevation myocardial infarction) ([facility] [address] 03/2020    . SUBJECTIVE: I just took my pain meds before you came. Pt reports that she had help with her  while she was in the hospital and on the weekend. She is workong on getting help during the day. She knows that she can not lift him at all   LIVING SITUATION: pt lives in a fist floor apartment with 1 step at entrence without railings. Pt lives with her  who is disabled   REQUIRES CAREGIVER ASSISTANCE FOR: transportation, ADLS, IADLS   PLOF: Pt was I with amb without A DEV. Pt was I with dressing and bathing   MEDICATIONS REVIEWED AND RECONCILED:  Pt still is waiting on Lasix from the pharmacy. MD and nursing are aware   NEXT MD APPT: 1 Go to Willian Love NP (Family Nurse Practitioner) on 6/10/2022; @8:30    2 Go to Shama Perez MD (Cardiovascular Disease Physician) on 6/28/2022; @9:30 AM    3 Go to Bonita Hartman MD (Cardiothoracic Surgery) on 6/7/2022; at 11:15 AM    4 Go to Bonita Hartman MD (Cardiothoracic Surgery) on 6/30/2022; at 10:30 AM  EQUIPMENT: none   ROM:B LE WFL   STRENGTH: B hip flexors, quads and hamstrings 4/5 B DF/PF 5/5  WOUNDS:sternal incison intact no drainage noted on upper portion. lower potion has a bandage covering it no drainage . L forearm incsion open to air clean dry and intact. L medial knee incsion open to air no signs or symptoms of infection nted   BED MOBILITY:supnie to sit and sit to supine MOD I  TRANSFERS: sit to and from couch, bed and commode MOD I without B UE support . Pt has a wide KADE ont all transfers.  Pt did use her sternal harness on transfers   GAIT: Pt amb household distances without A DEV with recipical gait pattern with SBA with wide KADE with amb  B step length noted. No B arm swing noted Increased SOB noted with amb O2 sat was 94 % with HR of 92 pt reports that she was fatigued after amb. STAIRS/ RAMP NA   BALANCE: Pt scored 23/28 on Tinetti Balance Assessment placing pt at med  risk for falls. PATIENT RESPONE TO TX: Pt pain level decreased to 6 after amb   PATIENT LEVEL OF UNDERSTANDING OF EDUCATION PROVIDED  Pt demonstated and verbalized use of sternal harness at all times when amb for safety   PATIENT EDUCATION PROVIDED THIS VISIT: safety, HEP, walking, deep breathing, Educated pt to change postion every HR for pressure relief. Educated pt to elevate B LE throughout the day to assit with swelling   HEP consisting of:  1. Walking every hour during the day   2. B LE  seated hip flexion, LAQS, ,AP 3 daily x 10   CONTINUED NEED FOR THE FOLLOWING SKILLS: HH PT is medically necessary to  address pain, decreased strength, impaired bed mobility, impaired gait, impaired stair negotiation, and impaired balance in order to improve functional independence, quality of life, return to PLOF, and reduce the risk for falls. PLAN:  Pt will be seen to establish and upgrade HEP. Gait training with  the least restrictive A DEV on flat and uneven surfacesStair training. Dynamic standing balance re -education. Reinforece general safety precautions. DISCHARGE PLANNING DISCUSSED: Discharge to self and family under MD supervision once all goals have been met or patient has reached max potential. Patient/caregiver verbalized understanding.

## 2022-06-06 NOTE — Clinical Note
no problem  ----- Message -----  From: Marliu Vasques, PT  Sent: 6/7/2022   7:20 AM EDT  To: Jean Greenwood OTR/L  Subject: RE:                                                thanks for the update  ----- Message -----  From: Anita Alejandra OTR/L  Sent: 6/7/2022   5:55 AM EDT  To: Meme Quiroga, RL      OT evaluation completed 6.6.22. OT instructed pt importance, benefit, and purpose short term skilled OT for safety training functional transfers and while performing ADL/IADL tasks, balance training, endurance/activity tolerance training, and functional strengthening for improved safety, fall prevention, improved functional level, and decrease caregiver burden however pt continued to decline. pt requires setup A to SBA perform ADL/IADL tasks and MOD I to SBA for ambulation and transfers execute ADL/IADL tasks. pt demonstrates I sternal/cardiac precautions while performing ADL tasks, ambulation, and transfers. pt presents with increased SOB while performing functional activity, requiring pursed lip breathing for SOB management, however O2 remained 93% or greater with HR 84. pt fatigues easily. pt aware contact MD for OT referral if the need arises. D/C OT at this time per pt decline. pt agrees D/C.  D/C pt to care of Dr Jeannie Damico NP.

## 2022-06-07 ENCOUNTER — OFFICE VISIT (OUTPATIENT)
Dept: CARDIOTHORACIC SURGERY | Age: 52
End: 2022-06-07
Payer: MEDICAID

## 2022-06-07 ENCOUNTER — HOME CARE VISIT (OUTPATIENT)
Dept: HOME HEALTH SERVICES | Facility: HOME HEALTH | Age: 52
End: 2022-06-07
Payer: MEDICAID

## 2022-06-07 VITALS
WEIGHT: 243 LBS | DIASTOLIC BLOOD PRESSURE: 84 MMHG | TEMPERATURE: 97.2 F | BODY MASS INDEX: 45.88 KG/M2 | OXYGEN SATURATION: 97 % | SYSTOLIC BLOOD PRESSURE: 124 MMHG | HEART RATE: 63 BPM | HEIGHT: 61 IN

## 2022-06-07 VITALS
TEMPERATURE: 99.1 F | DIASTOLIC BLOOD PRESSURE: 84 MMHG | HEART RATE: 72 BPM | SYSTOLIC BLOOD PRESSURE: 125 MMHG | OXYGEN SATURATION: 93 % | RESPIRATION RATE: 17 BRPM

## 2022-06-07 DIAGNOSIS — I25.110 CORONARY ARTERY DISEASE INVOLVING NATIVE HEART WITH UNSTABLE ANGINA PECTORIS, UNSPECIFIED VESSEL OR LESION TYPE (HCC): Primary | ICD-10-CM

## 2022-06-07 DIAGNOSIS — Z95.1 S/P CABG X 2: ICD-10-CM

## 2022-06-07 PROCEDURE — 99024 POSTOP FOLLOW-UP VISIT: CPT | Performed by: PHYSICIAN ASSISTANT

## 2022-06-07 RX ORDER — CEPHALEXIN 500 MG/1
500 CAPSULE ORAL 4 TIMES DAILY
Qty: 40 CAPSULE | Refills: 0 | Status: SHIPPED | OUTPATIENT
Start: 2022-06-07 | End: 2022-06-17

## 2022-06-07 RX ORDER — AMIODARONE HYDROCHLORIDE 200 MG/1
400 TABLET ORAL DAILY
Qty: 270 TABLET | Refills: 3 | Status: CANCELLED | OUTPATIENT
Start: 2022-06-07

## 2022-06-07 RX ORDER — FUROSEMIDE 40 MG/1
40 TABLET ORAL DAILY
Qty: 90 TABLET | Refills: 3 | Status: CANCELLED | OUTPATIENT
Start: 2022-06-07 | End: 2022-06-14

## 2022-06-07 NOTE — PATIENT INSTRUCTIONS
Keflex one tab 4 times per day for 10 days. Contact our office with any concerns about incisions to include drainage, warmth, increased redness or pain. Strict sternal precautions. Lasix and K dur daily for 30 days.

## 2022-06-07 NOTE — HOME HEALTH
Clinical Condition per EPIC:  \"HISTORY OF PRESENT ILLNESS: Holland Pfeiffer is a 46 y.o. female patient of Dr. Dior Palma who presented with exertional fatique and dyspnea and was found to have 3vCAD. Echocardiography revealed an ejection fraction of 55-60%. . She was admitted for surgical coronary revascularization. HOSPITAL COURSE: She was admitted to the hospital and underwent CABG x 4 with left radial artery harvest on 5/27 She was extubated on the first night following surgery and was eventually up and ambulating well and taking a diet well. Wires and tubes were removed. Notable postoperative events included did not tolerate low dose beta blocker earlier in admission. Atrial fibrillation occurred just prior to planned discharge and she remained in the hospital one extra day for IV amio treatment and observation. She converted to NSR after amiodarone bolus. Procedures:   Procedure(s):  CORONARY ARTERY BYPASS GRAFT (CABG) TIMES FOUR/LEFT RADIAL ARTERY HARVEST / Pawlet Sluder  TRANSESOPHAGEAL ECHOCARDIOGRAM      She is to be discharged to Home today. At the time of discharge, her lungs were course at bases bilaterally and the heart had a regular rate and rhythm. The sternum was stable and all wounds were well healed with no evidence of infection. There was mild pedal edema. Room air oximetry was 98%.       Past Medical History:  Diagnosis Date  o CAD (coronary artery disease)    o Displacement of lumbar intervertebral disc    o HLD (hyperlipidemia)    o Hypertension    o Lumbar radiculitis    o Lumbar sprain    o Numbness      legs and arms  o S/P CABG x 4 5/27/2022    LIMA-LAD, Left radial to OM, rSVG to PDA, rSVG to diag by Dr. Audelia Whiteside  o STEMI (ST elevation myocardial infarction) [facility]) 03/2020       Past Surgical History:  Procedure Laterality Date  o HX APPENDECTOMY   2014  o HX CHOLECYSTECTOMY   2013  o HX GI        multiple esophageal surgeries in childhood  o HX HEENT        \"surgery on esophagus\"  o HX HYSTERECTOMY   2003  o HX LUMBAR DISKECTOMY\"  . SUBJECTIVE:  Pt stated \"I have been doing the best I can. I'm actually taking care of myself with no problem. I'm just tired because I have to take care of my  as well. I don't lift him or do anything I'm not supposed to. \" pt sitting couch upon OT arrival. pt agreed tx. pt reports spouse did not have aide today therefore she has been caring for spouse. pt stated \"I know what I'm not supposed to do with my arms. I know how to take care of myself. I don't need Occupational Therapy. \" OT instructed pt benefit short term OT however pt continued to politely decline. CAREGIVER INVOLVEMENT: pt family provides A heavy IADL tasks, shopping, transportation. pt I medications. MEDICATION RECONCILIATION: OT reconcilled medications with pt with no changes   WOUNDS: sternal incision is intact with no drainage noted on upper portion. lower potion has a bandage which has no drainage noted. L forearm and medial knee incisions are open to air clean, dry, and intact with no s/s infection. DME ORDERED/RECOMMENEDED: NA  .  OBJECTIVE:  BATHING: pt demonstrated bathing standing tub shower S UB and perianal area bathing, SBA LB bathing SLS bringing LE off shower floor with use built in grab bar. pt reports perform bathing via sponge bath at sink as well. OT instructed pt perform bathing via sponge bath until balance improve for increased safety and fall prevention while standind in tub shower. pt verbalized good understanding. OT instructed pt use shower chair for energy conservation, SOB management, and safety/fall prevention however pt declined. pt already has device. TOILETING: S for safety while standing  UB DRESSING: setup A  LB DRESSING: S/SBA for safety/fall prevention. GROOMING: setup A  FEEDING: I   .  pt reports Modified Sean RPE 8/10 after performing ambulation, transfers, and ADL assessment.  pt verbalized/demonstrated I sternal/cardiac precautions during ADL assessment. .  OT instructed/demonstrated pt the following with good understanding:    - energy conservation while performing bathing, dressing, and setup such as set clothing out night before, gather items required to perform task in one trip, sit while performing tasks, take rest breaks as needed, perform shower/bathing on days with no other appointments or activities scheduled, as well as, pursed lip breathing for SOB management. - pt is to perform bathing/dressing tasks sitting, when possible, for safety/fall prevention.     - while sitting perform weight shift technique bringing LE contra laterally onto opposite LE while performing LB bathing/dressing for safety and fall prevention. pt unable to perform technique. -ADL training performed for improved body mechanics, safety, and technique for reduced risk of falls and improved functional level and participation of tasks. Silva Cadena IADL: NT per pt decline due to fatigue. Silva Cadena BALANCE:  STATIC STANDING: good/good-  DYNAMIC STANDING: good-/fair+  . AMBULATION: pt performed ambulation without AD reciprocal pattern with wide KADE SBA. pt presents with increased SOB during ambulation. pt O2 remained 93% or greater with HR 84  . EOB/BED TRANSFER: pt supine to/from sit MOD I, sit/stand EOB MOD I without use BUE with I use chest harness. COUCH: sit/stand I without use BUE demonstrating I use chest harness and sternal precautions. TOILET: sit/stand toilet I without use BUE demonstrating I use chest harness and sternal precautions. TUB SHOWER: distant SBA use built in grab bar. pt demonstrated good safety, I use chest harness, and I sternal precautions. .  -gait and transfer training performed for improved body mechanics and technique for fall prevention and safety while performing ADL/IADL tasks. Silva Cadena PATIENT RESPONSE TO TREATMENT:  pt reports no increased pain or discomfort with activity throughout tx. Silva Cadena   PATIENT EDUCATION PROVIDED THIS VISIT: UB HEP, OT role, energy conservation, pursed lip breathing, fall prevention/safety training ADL/IADL tasks, continue diet and medications as instructed per MD, consult MD or urgent care for medical assistance as opposed to ER unless situation emergent. PATIENT LEVEL OF UNDERSTANDING OF EDUCATION PROVIDED: pt verbalized good understanding energy conservation and pursed lip breathing, UB HEP (within sternal/cardiac precautions), performing I/ADL tasks within sternal/cardiac precautions, safety bathing. REHAB POTENTIAL: pt declined OT. HOME EXERCISE PROGRAM: Written HEP of the following issued. pt instructed/demonstrated BUE shoulder press, shoulder flexion, shoulder abduction, shoulder extension (1 arm at a time due to precautions), chest press, elbow flexion/extension x 10, x 2 per day. pt demonstrated I UB HEP. pt aware to perform exercises no greater than 90* B shoulder flex/abd due to sternal/cardiac precautions. UB HEP for pt to maintain functional endurance and strength to perform ADL/IADL tasks and ambulation/transfers to perform tasks with safety and for fall prevention. ASSESSMENT: OT evaluation completed 6.6.22. OT instructed pt importance, benefit, and purpose short term skilled OT for safety training functional transfers and while performing ADL/IADL tasks, balance training, endurance/activity tolerance training, and functional strengthening for improved safety, fall prevention, improved functional level, and decrease caregiver burden however pt continued to decline. pt requires setup A to SBA perform ADL/IADL tasks and MOD I to SBA for ambulation and transfers execute ADL/IADL tasks. pt demonstrates I sternal/cardiac precautions while performing ADL tasks, ambulation, and transfers. pt presents with increased SOB while performing functional activity, requiring pursed lip breathing for SOB management, however O2 remained 93% or greater with HR 84. pt fatigues easily.  pt aware contact MD for OT referral if the need arises. D/C OT at this time per pt decline. pt agrees D/C. D/C pt to care of Dr Kraig Otero, GIO. PLAN: D/C OT per pt decline. DISCHARGE PLANNING DISCUSSED WITH PT/CAREGIVER: D/C pt to self and family care under MD supervision. pt verbalized understanding and agrees D/C.

## 2022-06-07 NOTE — PROGRESS NOTES
[facility] Progress Note          Today's date: 6/7/2022    Interval History: This is the first postoperative visit after discharge on 6/3/2022 following CABG x4 with left radial artery harvest on 5/27/2022. She is progressing very well at home. She has very tolerable procedural discomfort. She continues with a scant cough and some improving dyspnea on exertion. She has been faithful with her sternal precautions and use of heart hugger. Her diet is good and she is moving her bowels. She just got her Lasix prescription. Assessment:     Satisfactory progress after CABG x4  Mild superficial cellulitis to left leg incision and lower pole of midline chest incision    Plan:     1. Shared decision making in prescribing Keflex for 10 days. She reports her penicillin reaction was a mild rash as a child. No anaphylaxis or angioedema. She asked that her allergy list to be updated including removing her codeine allergy which she states she has never had a problem with. The signs and symptoms of infection were described to the patient and she is asked to call our service with any questions or concerns. 2.  Chest tube sutures were removed. Band-Aids can be removed tomorrow. 3.  Continue to increase activity level using strict sternal precautions. 4.  The potential side effects and adverse reactions of all medications prescribed were thoroughly discussed with the patient and she verbalized understanding. 5.  Follow-up with PCP, cardiology and our service as scheduled. Subjective:     Chief Complaint: Right incisions without drainage        Objective:     Admission Weight: Last Weight   Weight: 110.2 kg (243 lb) Weight: 110.2 kg (243 lb)     No flowsheet data found.      Visit Vitals  /84 (BP 1 Location: Right arm, BP Patient Position: Sitting)   Pulse 63   Temp 97.2 °F (36.2 °C) (Temporal)   Ht 5' 1\" (1.549 m)   Wt 110.2 kg (243 lb)   SpO2 97%   BMI 45.91 kg/m²       BP Readings from Last 3 Encounters:   06/07/22 124/84   06/06/22 125/84   06/06/22 118/74     Wt Readings from Last 3 Encounters:   06/07/22 110.2 kg (243 lb)   06/02/22 114.6 kg (252 lb 10.4 oz)   05/23/22 110.7 kg (244 lb)       Physical Exam:  General: Well-appearing. Nontoxic. Neck: No JVD or tracheal deviation. Lungs: Clear to auscultation without wheezes, rales or rhonchi. Chest: Sternum is stable. Distal pole of midline incision with approximately 2 cm of blanchable erythema. No warmth, pain or induration. Chest tube incisions with minimal slough  Heart: Regular rate and rhythm without murmur or rub. PMI not displaced. Abdomen: Obese. Soft and nontender. Active sounds. Extremities: Warm and well-perfused. Bilateral pretibial and hand edema. Left leg below the knee incision slightly erythematous without warmth, pain or drainage. Left forearm without neurovascular or motor deficit. Bruising on the contralateral aspect of the forearm. Neuro: Moves all extremities x4 strong and equal.  No sensory or motor deficit left hand      Barbi MOHINDER Gallegos    PLEASE NOTE:  This document has been produced using voice recognition software. Unrecognized errors in transcription may be present. NOTE TO PATIENT:  The purpose of this note is to communicate optimally with the other providers involved in your care. It is written using standard medical terminology. If you have questions regarding details of the note please call my office at [phone] and make an appointment to discuss your concerns.

## 2022-06-07 NOTE — PROGRESS NOTES
1. Have you been to the ER, urgent care clinic since your last visit? Hospitalized since your last visit?     no    2. Have you seen or consulted any other health care providers outside of the [address] [place] [address] since your last visit? Include any pap smears or colon screening. No    Arm and shoulder pain. Patient still has SOB no pain, states SOB on exertion.

## 2022-06-09 RX ORDER — FUROSEMIDE 40 MG/1
40 TABLET ORAL DAILY
Qty: 30 TABLET | Refills: 2 | Status: SHIPPED | OUTPATIENT
Start: 2022-06-09

## 2022-06-10 ENCOUNTER — OFFICE VISIT (OUTPATIENT)
Dept: FAMILY MEDICINE CLINIC | Age: 52
End: 2022-06-10

## 2022-06-10 VITALS
DIASTOLIC BLOOD PRESSURE: 84 MMHG | RESPIRATION RATE: 16 BRPM | HEIGHT: 61 IN | TEMPERATURE: 96.4 F | BODY MASS INDEX: 45.54 KG/M2 | HEART RATE: 83 BPM | WEIGHT: 241.2 LBS | OXYGEN SATURATION: 94 % | SYSTOLIC BLOOD PRESSURE: 121 MMHG

## 2022-06-10 DIAGNOSIS — Z95.1 S/P CABG X 4: Primary | ICD-10-CM

## 2022-06-10 DIAGNOSIS — I10 PRIMARY HYPERTENSION: ICD-10-CM

## 2022-06-10 DIAGNOSIS — E66.01 OBESITY, MORBID (HCC): ICD-10-CM

## 2022-06-10 DIAGNOSIS — Z09 HOSPITAL DISCHARGE FOLLOW-UP: ICD-10-CM

## 2022-06-10 DIAGNOSIS — I25.810 CORONARY ARTERY DISEASE INVOLVING CORONARY BYPASS GRAFT OF NATIVE HEART WITHOUT ANGINA PECTORIS: ICD-10-CM

## 2022-06-10 PROCEDURE — 99213 OFFICE O/P EST LOW 20 MIN: CPT | Performed by: FAMILY MEDICINE

## 2022-06-10 PROCEDURE — 1111F DSCHRG MED/CURRENT MED MERGE: CPT | Performed by: FAMILY MEDICINE

## 2022-06-10 NOTE — PROGRESS NOTES
Marj Vargas is a 46 y.o. female (: 1970) presenting to address:    Chief Complaint   Patient presents with   [facility] Follow Up       There were no vitals filed for this visit. Hearing/Vision:   No exam data present    Learning Assessment:     Learning Assessment 2022   PRIMARY LEARNER Patient   PRIMARY LANGUAGE ENGLISH   LEARNER PREFERENCE PRIMARY LISTENING   ANSWERED BY patient   RELATIONSHIP SELF     Depression Screening:     3 most recent PHQ Screens 6/10/2022   Little interest or pleasure in doing things Not at all   Feeling down, depressed, irritable, or hopeless Not at all   Total Score PHQ 2 0     Fall Risk Assessment:     Fall Risk Assessment, last 12 mths 3/30/2020   Able to walk? Yes   Fall in past 12 months? No     Abuse Screening:     Abuse Screening Questionnaire 2022   Do you ever feel afraid of your partner? N   Are you in a relationship with someone who physically or mentally threatens you? N   Is it safe for you to go home? Y     ADL Assessment:   No flowsheet data found. Coordination of Care Questionaire:     1. \"Have you been to the ER, urgent care clinic since your last visit? Hospitalized since your last visit? \" Yes Where: [facility]    2. \"Have you seen or consulted any other health care providers outside of the [address] [place] [address] since your last visit? \" No     3. For patients aged 39-70: Has the patient had a colonoscopy / FIT/ Cologuard? No      If the patient is female:    4. For patients aged 41-77: Has the patient had a mammogram within the past 2 years? No      5. For patients aged 21-65: Has the patient had a pap smear?  No

## 2022-06-10 NOTE — PROGRESS NOTES
Faith Phillips is a 46 y.o.  female and presents with    Chief Complaint   Patient presents with   [facility] Follow Up    Complete Physical     5/27/2022 admitted for 3 V CAD and CABG  6/3/2022 discharged to home with home health physical therapy and nurse      Subjective: Well Adult Physical   Patient here for a comprehensive physical exam.The patient reports problems - s/p CABG X 4  Do you take any herbs or supplements that were not prescribed by a doctor? yes Are you taking calcium supplements? yes Are you taking aspirin daily? yes  Pt is followed by Dr. Huyen Keith  after she presented with exertional fatique and dyspnea and was found to have 3vCAD. Marquis Bryson She was admitted for surgical coronary revascularization. Atrial fibrillation occurred just prior to planned discharge and she remained in the hospital one extra day for IV amio treatment and observation. She converted to NSR after amiodarone bolus  Cardiovascular Review:  The patient has hypertension, hyperlipidemia, coronary artery disease, obesity and status post CABG. Diet and Lifestyle: generally follows a low fat low cholesterol diet, generally follows a low sodium diet, does not rigorously follow a diabetic diet, exercises sporadically, nonsmoker, alcohol intake occasional  Home BP Monitoring: is not measured at home. Pertinent ROS: taking medications as instructed, no medication side effects noted, no TIA's, noting some chest pains described as sharp, no swelling of ankles.      ROS   General ROS: negative for - chills or fever; + fatigue  Psychological ROS: positive for - depression associated with 's cancer and this illness  Ophthalmic ROS: negative for - blurry vision  ENT ROS: negative for - nasal congestion or sore throat; + posterior head pain  Endocrine ROS: negative for - polydipsia/polyuria  Respiratory ROS: no cough, shortness of breath, or wheezing  Gastrointestinal ROS: no abdominal pain, change in bowel habits, or black or bloody stools  Genito-Urinary ROS: no dysuria, trouble voiding, or hematuria  Musculoskeletal ROS: negative for - joint pain  Neurological ROS: negative for - numbness/tingling  Dermatological ROS: negative for - rash    All other systems reviewed and are negative. Objective:  Vitals:    06/10/22 0824 06/10/22 0826   BP: (!) 153/104 121/84   Pulse: 83    Resp: 16    Temp: (!) 96.4 °F (35.8 °C)    TempSrc: Temporal    SpO2: 94%    Weight: 241 lb 3.2 oz (109.4 kg)    Height: 5' 1\" (1.549 m)    PainSc:   8    PainLoc: Chest        alert, well appearing, and in no distress, oriented to person, place, and time and morbidly obese  Mental status - normal mood, behavior, speech, dress, motor activity, and thought processes  Chest - clear to auscultation, no wheezes, rales or rhonchi, symmetric air entry, chest wall tenderness noted at surgical site  Heart - normal rate, regular rhythm, normal S1, S2, no murmurs, rubs, clicks or gallops  Skin - well healing incision site on chest and left lower leg    LABS     TESTS   Echocardiography revealed an ejection fraction of 55-60%. Assessment/Plan:    1. S/P CABG x 4  Doing well post op; continue medication as prescribed; f/u with CT surgeon    2. Primary hypertension  Goal <130/80; well controlled with current treatment    3. Obesity, morbid ([address]  I have reviewed/discussed the above normal BMI with the patient. I have recommended the following interventions: dietary management education, guidance, and counseling and monitor weight . 4. Coronary artery disease involving coronary bypass graft of native heart without angina pectoris  F/u with dr. Rakel Baltazar as scheduled      Lab review: labs reviewed, I note that hemogram abnormal, basic metabolic panel mildly abnormal but acceptable      I have discussed the diagnosis with the patient and the intended plan as seen in the above orders.   The patient has received an after-visit summary and questions were answered concerning future plans. I have discussed medication side effects and warnings with the patient as well. I have reviewed the plan of care with the patient, accepted their input and they are in agreement with the treatment goals.

## 2022-06-14 ENCOUNTER — HOME CARE VISIT (OUTPATIENT)
Dept: SCHEDULING | Facility: HOME HEALTH | Age: 52
End: 2022-06-14
Payer: MEDICAID

## 2022-06-14 VITALS
OXYGEN SATURATION: 92 % | RESPIRATION RATE: 16 BRPM | TEMPERATURE: 97.1 F | DIASTOLIC BLOOD PRESSURE: 62 MMHG | SYSTOLIC BLOOD PRESSURE: 100 MMHG | HEART RATE: 81 BPM

## 2022-06-14 VITALS
TEMPERATURE: 97.6 F | RESPIRATION RATE: 18 BRPM | HEART RATE: 82 BPM | DIASTOLIC BLOOD PRESSURE: 70 MMHG | SYSTOLIC BLOOD PRESSURE: 120 MMHG | OXYGEN SATURATION: 96 %

## 2022-06-14 PROCEDURE — G0300 HHS/HOSPICE OF LPN EA 15 MIN: HCPCS

## 2022-06-14 PROCEDURE — G0157 HHC PT ASSISTANT EA 15: HCPCS

## 2022-06-14 NOTE — HOME HEALTH
Skilled reason for visit: SKin assessment and teaching     Caregiver involvement: Pt independent with care      Medications reviewed and all medications are  available in the home this visit. The following education was provided regarding medications:  CRISTIANO  MD notified of any discrepancies/look a-like medications/medication interactions: CRISTIANO  Medications are effective at this time. Home health supplies by type and quantity ordered/delivered this visit include: CRISTIANO    Patient education provided this visit: Educated pt to continue to use her spirometer meter to help with breathing. PT had her incision dehis she went to the MD they gave her antibotics. PT due to follow up with surgeon on the 30th and Cardiology on the 26th. Pt and  have t move to the apatment that is handicap to have more accessability and room. Sharps education provided: CRISTIANO    Patient level of understanding of education provided: Anum Loaiza all understanding to above education    Skilled Care Performed this visit: Education and skin assessment     Patient response to procedure performed:  Progressing toward interventions above      Agency Progress toward goals: Progressing toward interventions above      Patient's Progress towards personal goals: Progressing toward interventions above      Home exercise program: pt conitnues to take  medication as ordered and follows up on all  md appintments    Continued need for the following skills: nursing     Plan for next visit: Education and skin assessment     Patient and/or caregiver notified and agrees to changes in the Plan of Care yes    The following discharge planning was discussed with the pt/caregiver: when patient reaches goals and medication is managed, and disease processes are understood patient agrees and understand that discharge will take place.

## 2022-06-15 RX ORDER — AMIODARONE HYDROCHLORIDE 200 MG/1
400 TABLET ORAL DAILY
Qty: 49 TABLET | Refills: 5 | OUTPATIENT
Start: 2022-06-15

## 2022-06-15 NOTE — HOME HEALTH
SUBJECTIVE: Patient reported feeling pain from her chest #6/10  CAREGIVER INVOLVEMENT/ASSISTANCE NEEDED FOR: Pt's daughter or sister assists with transportation. HOME HEALTH SUPPLIES BY TYPE AND QUANTITY ORDERED/DELIVERED THIS VISIT INCLUDE: None needed for this visit. OBJECTIVE:  See interventions. PATIENT RESPONSE TO TREATMENT: Patient was short of breath after walking but SpO2=97% and HR=85 bpm.  PATIENT LEVEL OF UNDERSTANDING OF EDUCATION PROVIDED: Patient verbalized understanding on pain management techniques, fall prevention, and ROM precautions for B UE.  ASSESSMENT OF PROGRESS TOWARD GOALS: Patient addressed goals for toward goals for gait, patient education, and therapeutic exercises. Pt continues to need SBA to ambulate safely today. Patient ambulated with decreased silvana, decreased foot clearance, and step length. Gave multiple vc's for pt to lift B foot higher to clear floor safely and to take longer steps. Pt needs reinforcement for safety with gait and to improve gait mechanics. Pt also verbalized understanding on pain management techniques, fall prevention, and ROM precautions for B UE. In addition, pt performed 1 set of 10 repetitions of seated therapeutic exercises listed on HEP. Instructed pt to comply with HEP. CONTINUED NEED FOR THE FOLLOWING SKILLS: Gait Training, Stairs Training, Balance, and Therapeutic Exercises. PLAN FOR NEXT VISIT: Patient will be seen 1w1 2w2 to increase safety with gait, to improve transfer technique, and to increase strength of B LE. DISCHARGE PLANNING DISCUSSED WITH PATIENT/CAREGIVER: Discharge patient to family with MD supervision when all goals are met. Pt/Caregiver did verbalize understanding of discharge planning.

## 2022-06-16 ENCOUNTER — HOME CARE VISIT (OUTPATIENT)
Dept: SCHEDULING | Facility: HOME HEALTH | Age: 52
End: 2022-06-16
Payer: MEDICAID

## 2022-06-16 VITALS
TEMPERATURE: 97.6 F | RESPIRATION RATE: 18 BRPM | SYSTOLIC BLOOD PRESSURE: 124 MMHG | OXYGEN SATURATION: 95 % | HEART RATE: 84 BPM | DIASTOLIC BLOOD PRESSURE: 88 MMHG

## 2022-06-16 PROCEDURE — G0157 HHC PT ASSISTANT EA 15: HCPCS

## 2022-06-16 NOTE — HOME HEALTH
SUBJECTIVE: Patient reported feeling pain from her chest #8/10. CAREGIVER INVOLVEMENT/ASSISTANCE NEEDED FOR: Pt's daughter or sister assists with transportation. HOME HEALTH SUPPLIES BY TYPE AND QUANTITY ORDERED/DELIVERED THIS VISIT INCLUDE: None needed for this visit. OBJECTIVE:  See interventions. PATIENT RESPONSE TO TREATMENT: Patient was short of breath after walking and negotiating stairs but SpO2=95% and HR=87 bpm. Her pain was still about #8/10. PATIENT LEVEL OF UNDERSTANDING OF EDUCATION PROVIDED: Patient repeated back teaching on pain management techniques, fall prevention, and ROM precautions for B UE.  ASSESSMENT OF PROGRESS TOWARD GOALS: Patient addressed goals for toward goals for gait and stairs. Pt continues to need SBA to ambulate safely today but progressed to walking longer distance outside over uneven surfaces for gait training. Patient ambulated with decreased foot clearance. Gave multiple vc's for pt to lift B foot higher to clear floor safely. Pt demonstrated increased foot clearance after giving verbal cues. Pt also previously did not go up and down stairs but progressed to SBA for stairs training today. Pt took her time but demonstrated good safety awareness to negotiate steps. In addition, reinforced compliance with HEP. CONTINUED NEED FOR THE FOLLOWING SKILLS: Gait Training, Stairs Training, Balance, and Therapeutic Exercises. PLAN FOR NEXT VISIT: Patient will be seen 2w2 to increase safety with gait, to improve transfer technique, and to increase strength of B LE. DISCHARGE PLANNING DISCUSSED WITH PATIENT/CAREGIVER: Discharge patient to family with MD supervision when all goals are met. Pt/Caregiver did verbalize understanding of discharge planning.

## 2022-06-20 ENCOUNTER — HOME CARE VISIT (OUTPATIENT)
Dept: SCHEDULING | Facility: HOME HEALTH | Age: 52
End: 2022-06-20
Payer: MEDICAID

## 2022-06-20 VITALS
TEMPERATURE: 97.5 F | HEART RATE: 74 BPM | SYSTOLIC BLOOD PRESSURE: 110 MMHG | DIASTOLIC BLOOD PRESSURE: 77 MMHG | RESPIRATION RATE: 18 BRPM | OXYGEN SATURATION: 95 %

## 2022-06-20 PROCEDURE — G0157 HHC PT ASSISTANT EA 15: HCPCS

## 2022-06-21 NOTE — HOME HEALTH
SUBJECTIVE: Patient reported feeling pain from her chest #6/10. CAREGIVER INVOLVEMENT/ASSISTANCE NEEDED FOR: Pt's daughter or sister assists with transportation. HOME HEALTH SUPPLIES BY TYPE AND QUANTITY ORDERED/DELIVERED THIS VISIT INCLUDE: None needed for this visit. OBJECTIVE:  See interventions. PATIENT RESPONSE TO TREATMENT: Patient was short of breath after walking and performing transfers but SpO2=96% and HR=61 bpm. Her pain increased to #8/10. PATIENT LEVEL OF UNDERSTANDING OF EDUCATION PROVIDED: Patient repeated back teaching on pain management techniques, fall prevention, and ROM precautions for B UE.  ASSESSMENT OF PROGRESS TOWARD GOALS: Patient addressed goals for toward goals for gait and transfers. Pt previously needed SBA to ambulate safely today but progressed to Supervision for gait training. Patient ambulated with decreased silvana, decreased foot clearance, and decreased step length. Gave multiple vc's for pt to lift B foot higher to clear floor safely and to take longer steps. Pt needs reinforcement for safety with gait and to improve gait mechanics. Pt also demonstrated good technique and improved ability to perform all transfers independently. In addition, reinforced compliance with HEP. Discussed with patient/CG plan to discharge pt from [facility] PT services next week. Pt/CG verbalized understanding and is in agreement with discharge plan. CONTINUED NEED FOR THE FOLLOWING SKILLS: Gait Training, Stairs Training, Balance, and Therapeutic Exercises. PLAN FOR NEXT VISIT: Patient will be seen 1w1 2w1 to increase safety with gait, to improve transfer technique, and to increase strength of B LE. DISCHARGE PLANNING DISCUSSED WITH PATIENT/CAREGIVER: Discharge patient to family with MD supervision when all goals are met. Pt/Caregiver did verbalize understanding of discharge planning.

## 2022-06-22 ENCOUNTER — HOME CARE VISIT (OUTPATIENT)
Dept: SCHEDULING | Facility: HOME HEALTH | Age: 52
End: 2022-06-22
Payer: MEDICAID

## 2022-06-22 ENCOUNTER — HOME CARE VISIT (OUTPATIENT)
Dept: HOME HEALTH SERVICES | Facility: HOME HEALTH | Age: 52
End: 2022-06-22
Payer: MEDICAID

## 2022-06-22 VITALS
RESPIRATION RATE: 18 BRPM | SYSTOLIC BLOOD PRESSURE: 111 MMHG | HEART RATE: 84 BPM | TEMPERATURE: 97.9 F | OXYGEN SATURATION: 98 % | DIASTOLIC BLOOD PRESSURE: 75 MMHG

## 2022-06-22 VITALS
TEMPERATURE: 98.1 F | HEART RATE: 78 BPM | SYSTOLIC BLOOD PRESSURE: 122 MMHG | OXYGEN SATURATION: 98 % | DIASTOLIC BLOOD PRESSURE: 76 MMHG | RESPIRATION RATE: 18 BRPM

## 2022-06-22 PROCEDURE — G0157 HHC PT ASSISTANT EA 15: HCPCS

## 2022-06-22 PROCEDURE — G0300 HHS/HOSPICE OF LPN EA 15 MIN: HCPCS

## 2022-06-22 NOTE — HOME HEALTH
Skilled reason for visit: Education and skin assessment     Caregiver involvement: Pt independent with care      Medications reviewed and all medications are available in the home this visit. The following education was provided regarding medications:  CRISTIANO RIVERA notified of any discrepancies/look a-like medications/medication interactions: CRISTIANO  Medications are effective at this time. Home health supplies by type and quantity ordered/delivered this visit include: CRISTIANO    Patient education provided this visit:  Reviewed to reported any redness, swelling, warmth, fever, chills, increased heart/respiratory rate, uncontrolled pain/tenderness, drainage:green/yellow/brown, or odor to MD immediately. Educated pt to  monitor body changes due to over doing it as she is tring to pack and move this friday. Educated pt that she has not been released from MD to lift more then 10lbs and she needs to slow down     Sharps education provided: CRISTIANO    Patient level of understanding of education provided: Zuleika Pollock all understanding to above education    Skilled Care Performed this visit: Education and discharge    Patient response to procedure performed:  No pain     Agency Progress toward goals: Goals Met    Patient's Progress towards personal goals: Goals met     Home exercise program: pt conitnues to take  medication as ordered and follows up on all  md appintments    Continued need for the following skills: Physical THerapy  Plan for next visit: CRISTIANO  Patient and/or caregiver notified and agrees to changes in the Plan of Care yes    The following discharge planning was discussed with the pt/caregiver: when patient reaches goals and medication is managed, and disease processes are understood patient agrees and understand that discharge will take place.

## 2022-06-22 NOTE — Clinical Note
Pt was discharged from [facility] today as all goals have been met at this time. Pt is tring to pack so they can move this week. I educated to not push to much as she is still in recovery from a majot surgery.      Thanks in advance  555 Bashir Alvarez

## 2022-06-22 NOTE — HOME HEALTH
SUBJECTIVE: Patient reported feeling constant pain from her chest #4-5/10. They will move to [address] on Friday. CAREGIVER INVOLVEMENT/ASSISTANCE NEEDED FOR: Pt's daughter or sister assists with transportation. HOME HEALTH SUPPLIES BY TYPE AND QUANTITY ORDERED/DELIVERED THIS VISIT INCLUDE: None needed for this visit. OBJECTIVE:  See interventions. PATIENT RESPONSE TO TREATMENT: Patient had no c/o increased pain with therapeutic activities but was short of breath after walking and negotiating stairs. Her SpO2=97% and HR=94 bpm.   PATIENT LEVEL OF UNDERSTANDING OF EDUCATION PROVIDED: Goal met. ASSESSMENT OF PROGRESS TOWARD GOALS: Patient addressed goals for toward goals for gait and stairs. Pt previously needed Supervision to ambulate safely today but progressed to distant S for gait training today. Patient ambulated with good posture and step length but with decreased foot clearance, and forward flexed trunk. Gave vc's for pt to lift B foot higher to clear floor safely. Pt needs reinforcement for safety with gait and to increase foot clearance. Pt also previously needed SBA to go up and down steps in front of her home but progressed to distant S for stairs training today. Pt alternated between her R LE and L LE to negotiate stairs. Pt demonstrated good safety awareness and improved ability for stairs negotiation. In addition, instructed pt to perform pursed-lip breathing while ambulating and exercising to maximize respiration. Discussed with patient/CG plan to discharge pt from [facility] PT services next week. Pt/CG verbalized understanding and is in agreement with discharge plan. CONTINUED NEED FOR THE FOLLOWING SKILLS: Gait Training, Stairs Training, Balance, and Therapeutic Exercises. PLAN FOR NEXT VISIT: Patient will be seen 2w1 to increase safety with gait, to improve transfer technique, and to increase strength of B LE.   DISCHARGE PLANNING DISCUSSED WITH PATIENT/CAREGIVER: Discharge patient to family with MD supervision when all goals are met. Pt/Caregiver did verbalize understanding of discharge planning.

## 2022-06-22 NOTE — Clinical Note
thanks for the update  ----- Message -----  From: Armando Horton  Sent: 6/22/2022   7:44 PM EDT  To: Jeremy Brooks PT      Pt was discharged from [facility] today as all goals have been met at this time. Pt is tring to pack so they can move this week. I educated to not push to much as she is still in recovery from a majot surgery.      Thanks in advance  555 Bashir Alvarez

## 2022-06-24 RX ORDER — NITROGLYCERIN 0.4 MG/1
TABLET SUBLINGUAL
Qty: 25 TABLET | Refills: 5 | Status: SHIPPED | OUTPATIENT
Start: 2022-06-24

## 2022-06-27 ENCOUNTER — HOME CARE VISIT (OUTPATIENT)
Dept: SCHEDULING | Facility: HOME HEALTH | Age: 52
End: 2022-06-27
Payer: MEDICAID

## 2022-06-27 VITALS
RESPIRATION RATE: 18 BRPM | SYSTOLIC BLOOD PRESSURE: 122 MMHG | DIASTOLIC BLOOD PRESSURE: 69 MMHG | HEART RATE: 78 BPM | TEMPERATURE: 98.1 F | OXYGEN SATURATION: 95 %

## 2022-06-27 PROCEDURE — G0157 HHC PT ASSISTANT EA 15: HCPCS

## 2022-06-27 NOTE — Clinical Note
thanks for the update  ----- Message -----  From: Leeann Matos PTA  Sent: 6/27/2022   6:46 PM EDT  To: Marva Elias, RL      Patient is concerned about her scab coming of her L LE wound and rotten smell from her chest wounds. See Media for photo.  Patient is not able to go to MD today due to no ride but her daughter is scheduled to take her to cardiologist, Dr. Jean Carlos Duffy, on Tuesday, 6/28/22 at 9:30 am.

## 2022-06-27 NOTE — Clinical Note
Patient is concerned about her scab coming of her L LE wound and rotten smell from her chest wounds. See Media for photo.  Patient is not able to go to MD today due to no ride but her daughter is scheduled to take her to cardiologist, Dr. Kayden Estrada, on Tuesday, 6/28/22 at 9:30 am.

## 2022-06-27 NOTE — HOME HEALTH
SUBJECTIVE: Patient reported that her scab from her L leg came off and drainage from her chest wound smells rotten (see Case Communication). She likes her new apartment but is dealing with getting rid of cockroaches. CAREGIVER INVOLVEMENT/ASSISTANCE NEEDED FOR: Pt's daughter or sister assists with transportation. HOME HEALTH SUPPLIES BY TYPE AND QUANTITY ORDERED/DELIVERED THIS VISIT INCLUDE: None needed for this visit. OBJECTIVE:  See interventions. PATIENT RESPONSE TO TREATMENT: Patient had no c/o pain after walking and negotiating stairs. PATIENT LEVEL OF UNDERSTANDING OF EDUCATION PROVIDED: Goal met. ASSESSMENT OF PROGRESS TOWARD GOALS: Patient addressed goals for toward goals for gait and stairs. Pt previously needed distant S to ambulate safely today but progressed to [place] for gait training today. Pt ambulated with good stability and improved gait mechanics with no difficulty. Pt also previously needed distant S to go up and down steps in front of her home but progressed to [place] for stairs training today. Pt alternated between her R LE and L LE to go up and to go down stairs. Pt demonstrated good safety awareness with stairs negotiation. In addition, Patient demonstrated improved balance as evidence by improving his Tinetti balance assessment score from 23/28 from PT initial evaluation to 28/28 today. Discussed with patient/CG plan to discharge pt from [facility] PT services next visit. Pt/CG verbalized understanding and is in agreement with discharge plan. CONTINUED NEED FOR THE FOLLOWING SKILLS: None. PLAN FOR NEXT VISIT: Patient will be seen 1w1 to reassess safety with gait, stairs/ramp, transfers, balance, and strength of B LE. DISCHARGE PLANNING DISCUSSED WITH PATIENT/CAREGIVER: Discharge patient to family with MD supervision when all goals are met. Pt/Caregiver did verbalize understanding of discharge planning.   CASE COMMUNICATION: Patient is concerned about her scab coming of her L LE wound and rotten smell from her chest wounds. See Media for photo. Patient is not able to go to MD today due to no ride but her daughter is scheduled to take her to cardiologist, Dr. Celso Alonzo, on Tuesday, 6/28/22 at 9:30 am.  MD APPOINTMENTS: Dr. Celso Alonzo on Tuesday, 6/28/22, at 9:30 am and So De Leon MD on Thursday, 6/30/22. Assessment and Summary of Care:  Patient's current functional status before discharge is as follows:  Strength: Progressing. ROM: N/A  Bed Mobility: Mod I  Transfers: Mod I  Gait/WC mobility: Patient ambulated 3 minutes No AD I  Stairs: Negotiated Ramp and 2 steps No AD I  Special Tests: Tinetti = 28/28  Recommendations: Discharge patient from [facility] PT services.

## 2022-06-28 ENCOUNTER — OFFICE VISIT (OUTPATIENT)
Dept: CARDIOLOGY CLINIC | Age: 52
End: 2022-06-28
Payer: MEDICAID

## 2022-06-28 VITALS
WEIGHT: 235.6 LBS | SYSTOLIC BLOOD PRESSURE: 114 MMHG | BODY MASS INDEX: 44.52 KG/M2 | HEART RATE: 76 BPM | OXYGEN SATURATION: 97 % | TEMPERATURE: 97 F | DIASTOLIC BLOOD PRESSURE: 80 MMHG

## 2022-06-28 DIAGNOSIS — I25.810 CORONARY ARTERY DISEASE INVOLVING CORONARY BYPASS GRAFT OF NATIVE HEART WITHOUT ANGINA PECTORIS: Primary | ICD-10-CM

## 2022-06-28 PROCEDURE — 93000 ELECTROCARDIOGRAM COMPLETE: CPT | Performed by: INTERNAL MEDICINE

## 2022-06-28 PROCEDURE — 99214 OFFICE O/P EST MOD 30 MIN: CPT | Performed by: INTERNAL MEDICINE

## 2022-06-28 RX ORDER — CEPHALEXIN 500 MG/1
1000 CAPSULE ORAL 2 TIMES DAILY
Qty: 40 CAPSULE | Refills: 0 | Status: SHIPPED | OUTPATIENT
Start: 2022-06-28 | End: 2022-06-30 | Stop reason: ALTCHOICE

## 2022-06-28 RX ORDER — AMIODARONE HYDROCHLORIDE 200 MG/1
200 TABLET ORAL 2 TIMES DAILY
COMMUNITY
End: 2022-08-16 | Stop reason: ALTCHOICE

## 2022-06-28 NOTE — PROGRESS NOTES
Message received from PT on this patient along with images of sternal and leg wound. Pt c/o drainage and foul odor. She was prescribed Keflex at one week appt for same. S/w patient. Will order additional antibiotics and see pt in clinic for her appointment.      Dick Yu PA-C  [facility]  [phone]

## 2022-06-28 NOTE — PROGRESS NOTES
[facility]    Ms. Luis Alberto Nava is a 46 y.o. female with history of coronary artery disease status post inferior STEMI status post coronary stent, hypertension, hyperlipidemia, DJD, obesity    Patient had inferior STEMI in 03/2020 and underwent emergent cardiac catheterization. He she was found to have a three-vessel coronary disease. She had proximal as well as mid-distal RCA stenting with 3.0 x 33 mm as well as 3.0 x 18 mm Xience TEJAL. For unstable anginal symptoms, patient underwent cardiac catheterization again in 05/2022 which showed severe three-vessel coronary artery disease and patient underwent CABG x4 with LIMA to LAD, left radial to OM, SVG to PDA and SVG to diagonal [facility]. Patient is here today for follow-up appointment for her CAD and hypertension. Patient denies any chest pain concerning for angina. She continues to have pain at the surgical site. She had some discharges from the chest tube drainage site. She finished her antibiotic course approximately 5 days ago. She has appointment with surgery team in 2 days. She has some leg pain. She denies any palpitation. She denies presyncope or syncope  Denies any nausea, vomiting, abdominal pain, fever, chills, sputum production. No hematuria or other bleeding complaints    Past Medical History:   Diagnosis Date    CAD (coronary artery disease)     Displacement of lumbar intervertebral disc     HLD (hyperlipidemia)     Hypertension     Lumbar radiculitis     Lumbar sprain     Numbness     legs and arms    S/P CABG x 4 5/27/2022    LIMA-LAD, Left radial to OM, rSVG to PDA, rSVG to diag by Dr. Gita Sebastian STEMI (ST elevation myocardial infarction) [facility]) 03/2020       Review of Systems:  Cardiac symptoms as noted above in HPI. All others negative.     Current Outpatient Medications   Medication Sig    amiodarone (CORDARONE) 200 mg tablet Take 200 mg by mouth two (2) times a day.  nitroglycerin (NITROSTAT) 0.4 mg SL tablet place 1 tablet under the tongue if needed every 5 minutes for chest pain for 3 doses IF NO RELIEF AFTER THIRD DOSE CALL PRESCRIBER .  furosemide (LASIX) 40 mg tablet Take 1 Tablet by mouth daily. As needed for Edema    metoprolol tartrate (LOPRESSOR) 25 mg tablet Take 0.5 Tablets by mouth every twelve (12) hours.  atorvastatin (LIPITOR) 80 mg tablet Take 1 Tablet by mouth nightly.  aspirin 81 mg chewable tablet Take 1 Tablet by mouth daily.  isosorbide dinitrate (ISORDIL) 10 mg tablet Take 1 Tablet by mouth two (2) times a day.  acetaminophen (TYLENOL) 325 mg tablet Take 2 Tablets by mouth every six (6) hours as needed for Pain or Fever.  cyclobenzaprine (FLEXERIL) 10 mg tablet Take 0.5 Tablets by mouth three (3) times daily as needed for Muscle Spasm(s).  therapeutic multivitamin (THERAGRAN) tablet Take 1 Tablet by mouth daily.  albuterol (Proventil HFA) 90 mcg/actuation inhaler Take 2 Puffs by inhalation every four (4) hours as needed for Wheezing, Shortness of Breath or Respiratory Distress. No current facility-administered medications for this visit.        Past Surgical History:   Procedure Laterality Date    HX APPENDECTOMY  2014    HX CHOLECYSTECTOMY  2013    HX GI      multiple esophageal surgeries in childhood    HX HEENT      \"surgery on esophagus\"    HX HYSTERECTOMY  2003    HX LUMBAR DISKECTOMY         Allergies and Sensitivities:  Allergies   Allergen Reactions    Pcn [Penicillins] Itching     Mild pruritis as a child         Family History:  Family History   Problem Relation Age of Onset    Hypertension Mother     Hypertension Father     Heart Disease Father     Hypertension Maternal Grandmother     Heart Disease Maternal Grandmother     Hypertension Paternal Grandmother     Heart Disease Paternal Grandmother        Social History:  Social History Tobacco Use    Smoking status: Former Smoker     Packs/day: 1.00     Years: 35.00     Pack years: 35.00     Quit date: 2020     Years since quittin.3    Smokeless tobacco: Former User     Quit date: 2017    Tobacco comment: pt states she smoked about 1.5 packs a day for 30-35 years and quit on 17   Substance Use Topics    Alcohol use: Yes     Alcohol/week: 4.0 standard drinks     Types: 4 Shots of liquor per week     Comment: occ    Drug use: No     She  reports that she quit smoking about 2 years ago. She has a 35.00 pack-year smoking history. She quit smokeless tobacco use about 5 years ago. She  reports current alcohol use of about 4.0 standard drinks of alcohol per week. Physical Exam:  BP Readings from Last 3 Encounters:   22 114/80   22 122/69   22 122/76         Pulse Readings from Last 3 Encounters:   22 76   22 78   22 78          Wt Readings from Last 3 Encounters:   22 106.9 kg (235 lb 9.6 oz)   06/10/22 109.4 kg (241 lb 3.2 oz)   22 110.2 kg (243 lb)       Constitutional: Oriented to person, place, and time. HENT: Head: Normocephalic and atraumatic. Neck: No JVD present  Cardiovascular: Regular rhythm. No murmur, gallop or rubs appreciated  Lung: Breath sounds normal. No respiratory distress. No ronchi or rales appreciated  Abdominal: No tenderness. No rebound and no guarding. Musculoskeletal: There is no lower extremity edema.  No cynosis    LABS:   @  Lab Results   Component Value Date/Time    WBC 11.8 2022 04:24 AM    Hemoglobin, POC 7.8 (L) 2022 04:47 PM    HGB 8.6 (L) 2022 04:24 AM    Hematocrit, POC 23 (L) 2022 04:47 PM    HCT 27.4 (L) 2022 04:24 AM    PLATELET 963  04:24 AM    MCV 88.4 2022 04:24 AM     Lab Results   Component Value Date/Time    Sodium 141 2022 04:24 AM    Potassium 4.1 2022 04:24 AM    Chloride 109 2022 04:24 AM    CO2 27 2022 04:24 AM    Glucose 89 2022 04:24 AM    BUN 7 2022 04:24 AM    Creatinine 0.65 2022 04:24 AM     Lipids Latest Ref Rng & Units 2022 10/13/2021 3/16/2020   Chol, Total 100 - 199 mg/dL 223(H) 243(H) 184   HDL >39 mg/dL 50 53 34(L)   LDL 0 - 99 mg/dL 141(H) 150. 2(H) 110(H)   Trig 0 - 149 mg/dL 177(H) 199(H) 200(H)   Chol/HDL Ratio 0 - 5.0   - 4.6 5.4(H)   Some recent data might be hidden     Lab Results   Component Value Date/Time    ALT (SGPT) 43 2022 04:00 AM     Lab Results   Component Value Date/Time    Hemoglobin A1c 5.9 (H) 2022 04:00 AM     Lab Results   Component Value Date/Time    TSH 0.79 10/13/2021 11:04 AM     EK2022: Sinus rhythm at 66 bpm.  Inferior Q waves. No ST changes of ischemia    03/15/20    ECHO ADULT COMPLETE 2020 3/17/2020  Interpretation Summary  · Normal cavity size and wall thickness. Low normal systolic function. Estimated left ventricular ejection fraction is 50 - 55%. Visually measured ejection fraction. Abnormal left ventricular wall motion. Mild (grade 1) left ventricular diastolic dysfunction. · Mildly dilated left atrium. · Trace mitral valve regurgitation is present. · There is no evidence of pulmonary hypertension. CATH (2020)  · Acute inferior STEMI with ongoing chest pain and borderline shock. · Diffuse three-vessel CAD. · Culprit is mid % occlusion with distal segment collateralized from the left coronary system. · LAD long 85-90% stenosis in mid segment. · Circumflex with diffuse 20 to 30% with mid/distal focal 75% stenosis. · Successful angioplasty and stent of mid % occlusion to residual 0% with resumption of LAURA-3 flow. · The proximal RCA focal 85% stenosis stented to 0% using 3.5 mm TEJAL. · Patient will need staged LAD stent either during this hospitalization or within the next 3-4 weeks.  CABG was not entertained with the fact that she was having acute inferior STEMI with borderline cardiogenic shock, and severe, persistent chest pains. IMPRESSION & PLAN:  Ms. Karie Mcconnell is 46 y.o. female with coronary disease, hypertension, hyperlipidemia    CAD:  Inferior STEMI in 03/2020 s/p proximal as well as mid-distal RCA 3.5 x 18 and 3.0 x 23 mm TEJAL. Unstable angina requiring cardiac catheterization in 05/2022 which showed severe three-vessel coronary disease  S/p CABG with LIMA to LAD, left radial to OM, SVG to PDA and diagonal in 05/2022  Currently on aspirin, Lasix, amiodarone, metoprolol, atorvastatin. Continue Imdur. No angina at this time    Complaining of some surgical site pain and also drainage from the chest tube insertion site. Finished antibiotic course 5 days ago. She has follow-up appointment with surgery team in 2 days. Defer management to surgery team    Hypertension:  /80. Currently on metoprolol, isosorbide, Lasix. Continue same    Atrial fibrillation:  Patient had a postoperative episode of atrial fibrillation after CABG in 05/2022. She was given IV amiodarone and then oral amiodarone. Currently she remains on amiodarone. EKG today and exam suggest sinus rhythm. Will consider discontinuing amiodarone on next visit if remains in sinus rhythm. Only on aspirin    Hyperlipidemia:  Currently on atorvastatin 80 mg daily. Repeat fasting lipid profile    This plan was discussed with patient who is in agreement. Thank you for allowing me to participate in patient care. Please feel free to call me if you have any question or concern. Ramiro Graf MD  Please note: This document has been produced using voice recognition software. Unrecognized errors in transcription may be present.

## 2022-06-28 NOTE — PROGRESS NOTES
Identified pt with two pt identifiers(name and ). Reviewed record in preparation for visit and have obtained necessary documentation. Chu Christine presents today for No chief complaint on file. Pt c/o  SOB, CHEST PAIN/ PRESSURE    Chu Christine preferred language for health care discussion is english/other. Personal Protective Equipment:   Personal Protective Equipment was used including: mask-surgical and hands-gloves. Patient was placed on no precaution(s). Patient was masked. Precautions:   Patient currently on None  Patient currently roomed with door closed. Is someone accompanying this pt? No     Is the patient using any DME equipment during OV? No     Depression Screening:  3 most recent PHQ Screens 6/10/2022   Little interest or pleasure in doing things Not at all   Feeling down, depressed, irritable, or hopeless Not at all   Total Score PHQ 2 0       Learning Assessment:  Learning Assessment 2022   PRIMARY LEARNER Patient   PRIMARY LANGUAGE ENGLISH   LEARNER PREFERENCE PRIMARY LISTENING   ANSWERED BY patient   RELATIONSHIP SELF       Abuse Screening:  Abuse Screening Questionnaire 2022   Do you ever feel afraid of your partner? N   Are you in a relationship with someone who physically or mentally threatens you? N   Is it safe for you to go home? Y       Fall Risk  Fall Risk Assessment, last 12 mths 3/30/2020   Able to walk? Yes   Fall in past 12 months? No       Pt currently taking Anticoagulant therapy?no   Pt currently taking Antiplatelet therapy? Yes     Coordination of Care:  1. Have you been to the ER, urgent care clinic since your last visit? Hospitalized since your last visit? No     2. Have you seen or consulted any other health care providers outside of the [address] [place] [address] since your last visit? Include any pap smears or colon screening. No       Please see Red banners under Allergies and Med Rec to remove outside inquires.  All correct information has been verified with patient and added to chart.      Medication's patient's would liked removed has been marked not taking to be removed per Verbal order and read back per Pardeep Daniels MD

## 2022-06-28 NOTE — LETTER
6/28/2022    Patient: Johnny Nair   YOB: 1970   Date of Visit: 6/28/2022     Oriana Abreu NP  [id number] [facility] [phone] [facility]    Dear Oriana Abreu NP,      Thank you for referring Ms. Johnny Nair to [facility] for evaluation. My notes for this consultation are attached. If you have questions, please do not hesitate to call me. I look forward to following your patient along with you.       Sincerely,    Piero Everett MD

## 2022-06-30 ENCOUNTER — HOME CARE VISIT (OUTPATIENT)
Dept: SCHEDULING | Facility: HOME HEALTH | Age: 52
End: 2022-06-30
Payer: MEDICAID

## 2022-06-30 ENCOUNTER — OFFICE VISIT (OUTPATIENT)
Dept: CARDIOTHORACIC SURGERY | Age: 52
End: 2022-06-30
Payer: MEDICAID

## 2022-06-30 VITALS
TEMPERATURE: 97.2 F | HEART RATE: 84 BPM | OXYGEN SATURATION: 98 % | DIASTOLIC BLOOD PRESSURE: 62 MMHG | SYSTOLIC BLOOD PRESSURE: 126 MMHG

## 2022-06-30 VITALS
RESPIRATION RATE: 19 BRPM | HEIGHT: 61 IN | WEIGHT: 235 LBS | SYSTOLIC BLOOD PRESSURE: 131 MMHG | DIASTOLIC BLOOD PRESSURE: 89 MMHG | BODY MASS INDEX: 44.37 KG/M2 | HEART RATE: 81 BPM | OXYGEN SATURATION: 97 %

## 2022-06-30 DIAGNOSIS — Z95.1 S/P CABG X 2: Primary | ICD-10-CM

## 2022-06-30 PROCEDURE — 99024 POSTOP FOLLOW-UP VISIT: CPT | Performed by: PHYSICIAN ASSISTANT

## 2022-06-30 PROCEDURE — G0151 HHCP-SERV OF PT,EA 15 MIN: HCPCS

## 2022-06-30 NOTE — PATIENT INSTRUCTIONS
Continue to take acetaminophen or ibuprofen as needed for pain. Follow up in 1 week with virtual visit. Please make appointment with PCP to discuss prediabetes diagnosis and medications. You may stop wearing the white sternal harness. You may gradually return to all normal activities. You are cleared to drive as of      We recommend entrance into cardiac rehabilitation program.  Referral made. Please adhere to low-cholesterol, low-fat diet. You will need to begin regular exercise, ideally 30 minutes daily. Follow-up with your Primary Care Provider and Cardiologist in the future as directed.

## 2022-06-30 NOTE — Clinical Note
Patient is s/p CABG x 4  and has been treated for  strengthening, gait training, stair training, HEP training, safety training, and balance training. On [facility] 22 strength was B hip flexors, quads and hamstrings 4/5 B DF/PF 5/5. Today at [place] strength is  L LE: hip flexion 5/5, knee / 5/5, knee flexion 5/5     R LE: hip flexion 5/5, knee / 5/5, knee flexion 5/5. On [facility] bed mobility was supnie to sit and sit to supine MOD I. On [facility] transfers were sit to and from couch, bed and commode MOD I without B UE support . Pt has a wide KADE ont all transfers. Pt did use her sternal harness on transfers . Today at [place] transfers are  I sit<>stand from sofa without B UE support for push off MOD I, Pt reported no problem with car transfers. On SOC gait was Pt amb household distance I w/o AD nomal silvana and B step length noted. SaO2 91% and HR 107bpm after amb. Per PTA visit 22 Patient progressed to ambulating 3 minutes outside over uneven surfaces with no AD I to promote increased safety and improved stability using LRAD when ambulating. Pt ambulated with good stability and improved gait mechanics with no difficulty. Today at [place] gait is Pt amb household distances without A DEV with recipical gait pattern with SBA with wide KADE with amb  B step length noted. No B arm swing noted Increased SOB noted with amb O2 sat was 94 % with HR of 92 pt reports that she was fatigued after amb. On [facility] stairs/ Ramp were NA. Per PTA visit 22 Patient progressed to negotiating up and down 2 steps with no AD I to promote safety when entering and exiting her home in preparation for MD appointments and community ambulation. Pt alternated between her R LE and L LE to go up and to go down stairs. Pt demonstrated good safety awareness with stairs negotiation. On [facility] Pt scored 23/28 on Tinetti Balance Assessment placing pt at med  risk for falls. Today at [place] pt scored a 28/28 on Tinetti Balance Assessment placing pt as a low fall risk. Silva Cadena Pt did have more than a 4 point statistical improvement. Pt has made progress and has met all goals. Pt was seen by SN. All measurable cardia goals have been met at this time  Discharge plan: Discharge from [address] [place] [address] services as all goals have been met.   Fred Godinez NP office has been notified of DC from [place] [phone] with goals met

## 2022-06-30 NOTE — PROGRESS NOTES
1. Have you been to the ER, urgent care clinic since your last visit? Hospitalized since your last visit? No    2. Have you seen or consulted any other health care providers outside of the [address] [place] [address] since your last visit? Include any pap smears or colon screening. Yes Where: Cardiology Routine / PCP Routine    3. Since your last visit, have you had any of the following symptoms? chest pains, shortness of breath and swelling in legs/arms. 4.  Have you had any blood work, X-rays or cardiac testing? No      5. Where do you normally have your labs drawn? [facility]     6. Do you need any refills today?    No

## 2022-06-30 NOTE — HOME HEALTH
SUBJECTIVE:I went to see the cardiac surgeon today and they said to keep on my antibodics. I talked with them about my pain and they said the only thing I can take is tylenol I have a virtal visit with them next week for them to look at the incsions again   REQUIRES CAREGIVER ASSISTANCE FOR: transportation, IADLS   MEDICATIONS REVIEWED AND RECONCILED Cephalen 500 2 tab  mg 2x daily added   NEXT MD APPT:  Shawn Orozco TBD, sandie vist the [facility] surgeon next week   ROM:B LE WFL   STRENGTH: L LE: hip flexion 5/5, knee / 5/5, knee flexion 5/5     R LE: hip flexion 5/5, knee / 5/5, knee flexion 5/5  WOUNDS:  chest incision dehisced, open to air, no drainage or odor noted, wound bed is pink with defined edges. L LE wound: dehisced, open to air, small amount of yellow slough noted at base as well as granulation tissue. No odor or drainage noted. Cardiac surgeon saw wounds today. (see media)  TRANSFERS: I sit<>stand from sofa without B UE support for push off MOD I, Pt reported no problem with car transfers  GAIT:  Pt amb household distance I w/o AD nomal silvana and B step length noted. SaO2 91% and HR 107bpm after amb. Per PTA visit 6/27/22 Patient progressed to ambulating 3 minutes outside over uneven surfaces with no AD I to promote increased safety and improved stability using LRAD when ambulating. Pt ambulated with good stability and improved gait mechanics with no difficulty  STAIRS: Per PTA visit 6/27/22 Patient progressed to negotiating up and down 2 steps with no AD I to promote safety when entering and exiting her home in preparation for MD appointments and community ambulation. Pt alternated between her R LE and L LE to go up and to go down stairs. Pt demonstrated good safety awareness with stairs negotiation  BALANCE: Pt scored 28/28 on Tinetti Balance Assessment placing her at low risk for falls.    PATIENT RESPONE TO TX: Pt pain level remained the same throughout tx session  PATIENT LEVEL OF UNDERSTANDING OF EDUCATION PROVIDED Pt demonstrated and verbalized understanding of sternal precautions and was wearing sternal harness throughout tx   PATIENT EDUCATION PROVIDED THIS VISIT: safety, HEP, walking, deep breathing,      HEP consisting of: Stephanie Thayer Walking every hour during the daytime for 3-5 minutes. 2. Sitting: APs, LAQ, hip flexion, hip abd, and hip add x 10 reps each 3x/day. 3. Standing: HR/TR, mini squats, hip flexion, hip abduction, and hamstring curls x 10 reps each 3x/day. Written HEP issued, patient/caregiver verbalized understanding. Patient is s/p CABG x 4  and has been treated for  strengthening, gait training, stair training, HEP training, safety training, and balance training. On [facility] 22 strength was B hip flexors, quads and hamstrings 4/5 B DF/PF 5/5. Today at [place] strength is  L LE: hip flexion 5/5, knee / 5/5, knee flexion 5/5     R LE: hip flexion 5/5, knee / 5/5, knee flexion 5/5. On [facility] bed mobility was supnie to sit and sit to supine MOD I. On [facility] transfers were sit to and from couch, bed and commode MOD I without B UE support . Pt has a wide KADE ont all transfers. Pt did use her sternal harness on transfers . Today at [place] transfers are  I sit<>stand from sofa without B UE support for push off MOD I, Pt reported no problem with car transfers. On SOC gait was Pt amb household distance I w/o AD nomal silvana and B step length noted. SaO2 91% and HR 107bpm after amb. Per PTA visit 22 Patient progressed to ambulating 3 minutes outside over uneven surfaces with no AD I to promote increased safety and improved stability using LRAD when ambulating. Pt ambulated with good stability and improved gait mechanics with no difficulty. Today at [place] gait is Pt amb household distances without A DEV with recipical gait pattern with SBA with wide KADE with amb  B step length noted.  No B arm swing noted Increased SOB noted with amb O2 sat was 94 % with HR of 92 pt reports that she was fatigued after amb. On [facility] stairs/ Ramp were NA. Per PTA visit 6/27/22 Patient progressed to negotiating up and down 2 steps with no AD I to promote safety when entering and exiting her home in preparation for MD appointments and community ambulation. Pt alternated between her R LE and L LE to go up and to go down stairs. Pt demonstrated good safety awareness with stairs negotiation. On [facility] Pt scored 23/28 on Tinetti Balance Assessment placing pt at med  risk for falls. Today at [place] pt scored a 28/28 on Tinetti Balance Assessment placing pt as a low fall risk. .Pt did have more than a 4 point statistical improvement. Pt has made progress and has met all goals. Pt was seen by SN. All measurable cardia goals have been met at this time  Discharge plan: Discharge from [facility] services as all goals have been met.   Antonio Ye NP office has been notified of DC from [facility] with goals met

## 2022-06-30 NOTE — PROGRESS NOTES
CARDIAC SURGERY FOLLOW-UP NOTE    6/30/2022    Subjective:   Iman Wolfe returns for a follow-up visit following CABG x 2 on 5/27/22 by Dr. Neda Izquierdo:  Overall she is doing ok following open-heart surgery. Wound healing has been slow    Plans / Recommendations:   Finish antibiotic regimen  Pain control with acetaminophen alternating with ibuprofen if needed. Follow up in 1 week VV for wound check. Make PCP appointment to discuss prediabetes. Hemoglobin A1C is 5.9. Follow-up with Cardiologist in the future as directed. Recommend entrance into cardiac rehabilitation program.  Referral made. Adhere to low-cholesterol, low-fat diet. Begin regular exercise, preferably 30 minutes daily. Patient verbalizes understanding and agreement with the plan. Bessy Castillo PA-C  [facility]  [phone]  _______________________________________________________________________________________________________________________________________________________  Subjective:  She feels well overall. She continues to have some pain in the chest wound. Keflex was prescribed and she is taking since 3 days ago. Wound has stopped draining. See pics in chart from today and compared to previous. She is motivated to stay healthy. She is wearing her sternal harness. She denies any clicking, popping or movement in her chest.     Current medications include:  Keflex 1000mg BID x 10 days  Current Outpatient Medications   Medication Sig Dispense Refill    nitroglycerin (NITROSTAT) 0.4 mg SL tablet place 1 tablet under the tongue if needed every 5 minutes for chest pain for 3 doses IF NO RELIEF AFTER THIRD DOSE CALL PRESCRIBER . 66 Tablet 5    furosemide (LASIX) 40 mg tablet Take 1 Tablet by mouth daily. As needed for Edema 30 Tablet 2    acetaminophen (TYLENOL) 325 mg tablet Take 2 Tablets by mouth every six (6) hours as needed for Pain or Fever.  90 Tablet 1    metoprolol tartrate (LOPRESSOR) 25 mg tablet Take 0.5 Tablets by mouth every twelve (12) hours. 60 Tablet 2    atorvastatin (LIPITOR) 80 mg tablet Take 1 Tablet by mouth nightly. 30 Tablet 2    aspirin 81 mg chewable tablet Take 1 Tablet by mouth daily. 30 Tablet 2    cyclobenzaprine (FLEXERIL) 10 mg tablet Take 0.5 Tablets by mouth three (3) times daily as needed for Muscle Spasm(s). 15 Tablet 0    therapeutic multivitamin (THERAGRAN) tablet Take 1 Tablet by mouth daily. 30 Tablet 2    isosorbide dinitrate (ISORDIL) 10 mg tablet Take 1 Tablet by mouth two (2) times a day. 60 Tablet 2    albuterol (Proventil HFA) 90 mcg/actuation inhaler Take 2 Puffs by inhalation every four (4) hours as needed for Wheezing, Shortness of Breath or Respiratory Distress. 1 Inhaler 0    amiodarone (CORDARONE) 200 mg tablet Take 200 mg by mouth two (2) times a day. (Patient not taking: Reported on 6/30/2022)         Objective:   Vital signs:    BP Readings from Last 3 Encounters:   06/30/22 131/89   06/28/22 114/80   06/27/22 122/69     Wt Readings from Last 3 Encounters:   06/30/22 106.6 kg (235 lb)   06/28/22 106.9 kg (235 lb 9.6 oz)   06/10/22 109.4 kg (241 lb 3.2 oz)     @TMAX(24)@  Wt Readings from Last 3 Encounters:   06/30/22 106.6 kg (235 lb)   06/28/22 106.9 kg (235 lb 9.6 oz)   06/10/22 109.4 kg (241 lb 3.2 oz)     Ht Readings from Last 3 Encounters:   06/30/22 5' 1\" (1.549 m)   06/10/22 5' 1\" (1.549 m)   06/07/22 5' 1\" (1.549 m)     Respiratory exam: clear to auscultation bilaterally. Cardiac exam: regular rate and rhythm   Sternum: stable. Sternal wound: dry open at lower pole healing  Leg wounds: open, good granulation tissue, healing. Pedal edema: absent, improved.

## 2022-07-07 ENCOUNTER — VIRTUAL VISIT (OUTPATIENT)
Dept: CARDIOTHORACIC SURGERY | Age: 52
End: 2022-07-07
Payer: MEDICAID

## 2022-07-07 DIAGNOSIS — Z95.1 S/P CABG X 2: Primary | ICD-10-CM

## 2022-07-07 DIAGNOSIS — Z51.89 VISIT FOR WOUND CHECK: ICD-10-CM

## 2022-07-07 PROCEDURE — 99024 POSTOP FOLLOW-UP VISIT: CPT | Performed by: PHYSICIAN ASSISTANT

## 2022-07-07 NOTE — PROGRESS NOTES
[facility] Progress Note      Pursuant to the emergency declaration under the [facility], [facility] authority and the [employer] Act, this Virtual Visit was conducted with patient's (and/or legal guardian's) consent, to reduce the risk of exposure to COVID-19 and provide necessary medical care. A caregiver was present when appropriate. Services were provided through a video synchronous discussion virtually to substitute for in-person encounter. Today's date: 7/7/2022    Interval History: This is a follow-up wound check performed virtually after CABG x4 with left radial harvest on 6/3/2022. She had some lower chest incision erythema and has completed 2 courses of Keflex. She states her wound is healing up without issues and she has no complaints. No chest clicking or instability. No drainage, erythema, warmth or tenderness. Her activity level is progressing well. She still needs occasional acetaminophen for procedural pain. Assessment:     Satisfactory progress after CABG x4. Plan:     1. Continue to increase activity level. Progressing with lifting weight and use of arms discussed. 2.  Signs and symptoms of infection were discussed with the patient. She indicates understanding of when to call our service. 3.  Cardiac rehab referral placed  4. She states she has follow-up visits with PCP and cardiology. No regular scheduled visits with our service needed. Released from cardiac surgery. Subjective:     Chief Complaint: None    Follow-up sternal wound erythema    Objective:     Admission Weight: Last Weight           Patient-Reported Vitals 7/7/2022   Patient-Reported Weight 235lbs        There were no vitals taken for this visit.     BP Readings from Last 3 Encounters:   06/30/22 126/62   06/30/22 131/89   06/28/22 114/80     Wt Readings from Last 3 Encounters:   06/30/22 106.6 kg (235 lb)   06/28/22 106.9 kg (235 lb 9.6 oz)   06/10/22 109.4 kg (241 lb 3.2 oz)       Physical Exam:  General: Well-appearing. Nontoxic appearing. No apparent distress. Neck: No JVD or tracheal deviation noted. Chest: Midline incision and chest tube sites healing well. No erythema or drainage. Small area of scab lower pole of midline incision. Shannen Rico PA-C    PLEASE NOTE:  This document has been produced using voice recognition software. Unrecognized errors in transcription may be present. NOTE TO PATIENT:  The purpose of this note is to communicate optimally with the other providers involved in your care. It is written using standard medical terminology. If you have questions regarding details of the note please call my office at [phone] and make an appointment to discuss your concerns.

## 2022-07-07 NOTE — PROGRESS NOTES
1. Have you been to the ER, urgent care clinic since your last visit? Hospitalized since your last visit? No    2. Have you seen or consulted any other health care providers outside of the [address] [place] [address] since your last visit? Include any pap smears or colon screening.       No     Patient unable to get vitals

## 2022-07-18 ENCOUNTER — OFFICE VISIT (OUTPATIENT)
Dept: FAMILY MEDICINE CLINIC | Age: 52
End: 2022-07-18
Payer: MEDICAID

## 2022-07-18 VITALS
RESPIRATION RATE: 16 BRPM | BODY MASS INDEX: 43.8 KG/M2 | WEIGHT: 232 LBS | OXYGEN SATURATION: 98 % | TEMPERATURE: 97.9 F | HEART RATE: 79 BPM | DIASTOLIC BLOOD PRESSURE: 86 MMHG | SYSTOLIC BLOOD PRESSURE: 146 MMHG | HEIGHT: 61 IN

## 2022-07-18 DIAGNOSIS — Z09 HOSPITAL DISCHARGE FOLLOW-UP: ICD-10-CM

## 2022-07-18 DIAGNOSIS — Z76.89 ENCOUNTER TO ESTABLISH CARE: ICD-10-CM

## 2022-07-18 DIAGNOSIS — S29.011A MUSCLE STRAIN OF CHEST WALL, INITIAL ENCOUNTER: Primary | ICD-10-CM

## 2022-07-18 PROCEDURE — 99214 OFFICE O/P EST MOD 30 MIN: CPT | Performed by: NURSE PRACTITIONER

## 2022-07-18 RX ORDER — CYCLOBENZAPRINE HCL 10 MG
5 TABLET ORAL
Qty: 15 TABLET | Refills: 0 | Status: SHIPPED | OUTPATIENT
Start: 2022-07-18 | End: 2022-11-01 | Stop reason: SDUPTHER

## 2022-07-18 NOTE — PROGRESS NOTES
Amol Purdy is a 46 y.o. female  new patient, here for evaluation of the following chief complaint(s):  Chief Complaint   Patient presents with    Wound Check        Assessment and Plan  1. Muscle strain of chest wall, initial encounter  -     cyclobenzaprine (FLEXERIL) 10 mg tablet; Take 0.5 Tablets by mouth three (3) times daily as needed for Muscle Spasm(s). , Normal, Disp-15 Tablet, R-0  2. Encounter to establish care  3. Hospital discharge follow-up       Follow-up and Dispositions    · Return in about 2 weeks (around 8/1/2022) for routine and BP, 15. HPI:   Patient had recent heart surgery CABG X 2 5/27/2022. Patient in office for surgical wound left medial leg at knee height. Patient appears well. Patient had a non-healing incision on her left leg medial height of knee. She has been on antibiotics twice. This area appears to be healing. Picture below. Patient denies any pain in this area anymore. She says she threw her husbands wheel chair in her truck and pulled a muscle in her right chest/right upper abdomen. This pain is reproducible on palpation. She is taking Tylenol. I advised epsom salt bath. Patient has seen cardiothoracic surgery follow-up with Dr. Zhang Escobedo and Dr. Shanw Michael related to her CABG 5/27/2022. Patient sees Dr. Farzaneh Lowe with cardiology and follows up in a month.      ROS:    · General: negative for - chills, fever, weight changes or malaise  · HEENT: no sore throat, nasal congestion, vision problems or ear problems  · Respiratory: no cough, shortness of breath, or wheezing  · Cardiovascular: no chest pain, palpitations, or dyspnea on exertion  · Gastrointestinal: no abdominal pain, N/V, change in bowel habits  · Musculoskeletal: Positive for right side chest pain related to muscle pull  · Neurological: no headache or dizziness  · Endo:  No polyuria or polydipsia  · : no urinary  · Psychological: negative for - anxiety, depression, sleeps issues    Prior to Admission medications    Medication Sig Start Date End Date Taking? Authorizing Provider   cyclobenzaprine (FLEXERIL) 10 mg tablet Take 0.5 Tablets by mouth three (3) times daily as needed for Muscle Spasm(s). 7/18/22  Yes Amari Godfrey NP   nitroglycerin (NITROSTAT) 0.4 mg SL tablet place 1 tablet under the tongue if needed every 5 minutes for chest pain for 3 doses IF NO RELIEF AFTER THIRD DOSE CALL PRESCRIBER . 6/24/22  Yes Joshua MARR PA-C   furosemide (LASIX) 40 mg tablet Take 1 Tablet by mouth daily. As needed for Edema 6/9/22  Yes Bubba Smith PA-C   acetaminophen (TYLENOL) 325 mg tablet Take 2 Tablets by mouth every six (6) hours as needed for Pain or Fever. 6/3/22  Yes Bubba Smith PA-C   metoprolol tartrate (LOPRESSOR) 25 mg tablet Take 0.5 Tablets by mouth every twelve (12) hours. 6/3/22  Yes Bubba Smith PA-C   atorvastatin (LIPITOR) 80 mg tablet Take 1 Tablet by mouth nightly. 6/2/22  Yes Luiza Morfin PA-C   aspirin 81 mg chewable tablet Take 1 Tablet by mouth daily. 6/2/22  Yes Luiza Morfin PA-C   therapeutic multivitamin [facility]) tablet Take 1 Tablet by mouth daily. 6/3/22  Yes Luiza Morfin PA-C   isosorbide dinitrate (ISORDIL) 10 mg tablet Take 1 Tablet by mouth two (2) times a day. 6/2/22  Yes Luiza Morfin PA-C   albuterol (Proventil HFA) 90 mcg/actuation inhaler Take 2 Puffs by inhalation every four (4) hours as needed for Wheezing, Shortness of Breath or Respiratory Distress. 2/4/21  Yes Palak Hernández DO   amiodarone (CORDARONE) 200 mg tablet Take 200 mg by mouth two (2) times a day. Patient not taking: Reported on 6/30/2022    Provider, Historical   cyclobenzaprine (FLEXERIL) 10 mg tablet Take 0.5 Tablets by mouth three (3) times daily as needed for Muscle Spasm(s). Patient not taking: Reported on 7/18/2022 6/2/22 7/18/22  Wilson Avina PA-C         Physical Exam  Patient appears well, she is pleasant, alert, oriented x 3, in no distress. ENT normal.  Neck supple. No adenopathy or thyromegaly. NILESH. Lungs are clear, good air entry, no wheezes  Cardiovascular, S1 and S2 normal, no murmurs, regular rate and rhythm. Chest wall negative for tenderness  Abdomen is soft without tenderness, guarding  /Anorectal, deferred. Muscleskeletal, no swelling, no tenderness, no injury. Extremities show no edema  Neurological is normal without focal findings. Skin: Sternal incision related to CABG with 4 small surgical incisions, left leg surgical incision no warmth on palpation  Psych: normal affect. Mood good. Oriented x 3. Vitals:    07/18/22 1036 07/18/22 1059 07/18/22 1123   BP: (!) 150/99 (!) 151/93 (!) 146/86   Pulse: 80  79   Resp: 16     Temp: 97.9 °F (36.6 °C)     TempSrc: Temporal     SpO2: 98%     Weight: 232 lb (105.2 kg)     Height: 5' 1\" (1.549 m)     PainSc:  10 - Worst pain ever     PainLoc: Chest       Advised patient if her right side chest pain starts to become crushing and/or worrisome severe pain to respond to ED. Patient had CABG 5/27/2022. *Plan of care reviewed with patient. Patient in agreement with plan and expresses understanding. All questions answered and patient encouraged to call or RTO if further questions or concerns. On this date 07/18/2022 I have spent 44 minutes reviewing previous notes, test results and face to face with the patient discussing the diagnosis and importance of compliance with the treatment plan as well as documenting on the day of the visit.       AKIRA Gallegos

## 2022-07-18 NOTE — PROGRESS NOTES
Get Iglesias is a 46 y.o. female and presents with    Chief Complaint   Patient presents with    Wound Check         Is someone accompanying this pt? no    Is the patient using any DME equipment during OV? no    1. Have you been to the ER, urgent care clinic since your last visit? Hospitalized since your last visit? no    2. Have you seen or consulted any other health care providers outside of the [address] [place] [address] since your last visit? no     3. For patients aged 39-70: Has the patient had a colonoscopy / FIT/ Cologuard? due      If the patient is female:    4. For patients aged 41-77: Has the patient had a mammogram within the past 2 years? due      5. For patients aged 21-65: Has the patient had a pap smear?  NA- hysterectomy

## 2022-08-01 ENCOUNTER — OFFICE VISIT (OUTPATIENT)
Dept: FAMILY MEDICINE CLINIC | Age: 52
End: 2022-08-01
Payer: MEDICAID

## 2022-08-01 VITALS
HEIGHT: 61 IN | OXYGEN SATURATION: 95 % | HEART RATE: 85 BPM | RESPIRATION RATE: 17 BRPM | SYSTOLIC BLOOD PRESSURE: 129 MMHG | TEMPERATURE: 98.2 F | BODY MASS INDEX: 43.8 KG/M2 | DIASTOLIC BLOOD PRESSURE: 89 MMHG | WEIGHT: 232 LBS

## 2022-08-01 DIAGNOSIS — S29.011D MUSCLE STRAIN OF CHEST WALL, SUBSEQUENT ENCOUNTER: ICD-10-CM

## 2022-08-01 DIAGNOSIS — Z53.20 LUNG CANCER SCREENING DECLINED BY PATIENT: ICD-10-CM

## 2022-08-01 DIAGNOSIS — Z12.11 SCREEN FOR COLON CANCER: ICD-10-CM

## 2022-08-01 DIAGNOSIS — L76.82 PAIN AT SURGICAL INCISION: ICD-10-CM

## 2022-08-01 DIAGNOSIS — Z12.31 ENCOUNTER FOR SCREENING MAMMOGRAM FOR MALIGNANT NEOPLASM OF BREAST: ICD-10-CM

## 2022-08-01 DIAGNOSIS — I10 PRIMARY HYPERTENSION: Primary | ICD-10-CM

## 2022-08-01 PROCEDURE — 99214 OFFICE O/P EST MOD 30 MIN: CPT | Performed by: NURSE PRACTITIONER

## 2022-08-01 NOTE — PROGRESS NOTES
Oscar Mccray is a 46 y.o. female and presents with    Chief Complaint   Patient presents with    Hypertension         Is someone accompanying this pt? no    Is the patient using any DME equipment during OV? no    1. Have you been to the ER, urgent care clinic since your last visit? Hospitalized since your last visit? no    2. Have you seen or consulted any other health care providers outside of the [address] [place] [address] since your last visit? no     3. For patients aged 39-70: Has the patient had a colonoscopy / FIT/ Cologuard? due      If the patient is female:    4. For patients aged 41-77: Has the patient had a mammogram within the past 2 years? due      5. For patients aged 21-65: Has the patient had a pap smear?  Not due

## 2022-08-01 NOTE — PROGRESS NOTES
Kale Galeano is a 46 y.o. female  established patient, here for evaluation of the following chief complaint(s):  Chief Complaint   Patient presents with    Hypertension        Assessment and Plan  1. Primary hypertension  2. Pain at surgical incision  3. Muscle strain of chest wall, subsequent encounter       HPI:   In office visit. Patient she feels well. She looks well. She follows up with Dr. Viv Sullivan this month. Patient left lower leg incision appears healed. Patient had recent muscle pull 7/17/2022 ago in her right chest, has resolved. She threw a wheelchair in the back of her truck to take her  to the ED. Order lung cancer screening next time. Patient had recent heart surgery CABG X 2 5/27/2022. Patient in office for surgical wound left medial leg at knee height. Patient appears well. Patient had a non-healing incision on her left leg medial height of knee. She has been on antibiotics twice. This area appears to be healing. Picture below. Patient denies any pain in this area anymore. ROS:    General: negative for - chills, fever, weight changes or malaise  HEENT: no sore throat, nasal congestion, vision problems or ear problems  Respiratory: no cough, shortness of breath, or wheezing  Cardiovascular: no chest pain, palpitations, or dyspnea on exertion  Gastrointestinal: no abdominal pain, N/V, change in bowel habits  Musculoskeletal: no back pain or joint pain  Neurological: no headache or dizziness  Endo:  No polyuria or polydipsia  : no urinary  Psychological: negative for - anxiety, depression, sleeps issues    Prior to Admission medications    Medication Sig Start Date End Date Taking? Authorizing Provider   cyclobenzaprine (FLEXERIL) 10 mg tablet Take 0.5 Tablets by mouth three (3) times daily as needed for Muscle Spasm(s). 7/18/22   Jim Ramos, GIO   amiodarone (CORDARONE) 200 mg tablet Take 200 mg by mouth two (2) times a day.   Patient not taking: Reported on 6/30/2022 Provider, Historical   nitroglycerin (NITROSTAT) 0.4 mg SL tablet place 1 tablet under the tongue if needed every 5 minutes for chest pain for 3 doses IF NO RELIEF AFTER THIRD DOSE CALL PRESCRIBER . 6/24/22   Pito MARR PA-C   furosemide (LASIX) 40 mg tablet Take 1 Tablet by mouth daily. As needed for Edema 6/9/22   Rahul Dumont PA-C   acetaminophen (TYLENOL) 325 mg tablet Take 2 Tablets by mouth every six (6) hours as needed for Pain or Fever. 6/3/22   Rahul Dumont PA-C   metoprolol tartrate (LOPRESSOR) 25 mg tablet Take 0.5 Tablets by mouth every twelve (12) hours. 6/3/22   Rahul Dumont PA-C   atorvastatin (LIPITOR) 80 mg tablet Take 1 Tablet by mouth nightly. 6/2/22   Lonnie Morfin PA-C   aspirin 81 mg chewable tablet Take 1 Tablet by mouth daily. 6/2/22   Shanita Guzman PA-C   therapeutic multivitamin [facility]) tablet Take 1 Tablet by mouth daily. 6/3/22   Lonnie Morfin PA-C   isosorbide dinitrate (ISORDIL) 10 mg tablet Take 1 Tablet by mouth two (2) times a day. 6/2/22   Lonnie Morfin PA-C   albuterol (Proventil HFA) 90 mcg/actuation inhaler Take 2 Puffs by inhalation every four (4) hours as needed for Wheezing, Shortness of Breath or Respiratory Distress.  2/4/21   Anna Donate, DO        Results for orders placed or performed during the hospital encounter of 05/27/22   URINALYSIS W/ RFLX MICROSCOPIC   Result Value Ref Range    Color YELLOW      Appearance CLEAR      Specific gravity 1.020 1.005 - 1.030      pH (UA) 5.5 5.0 - 8.0      Protein Negative NEG mg/dL    Glucose Negative NEG mg/dL    Ketone TRACE (A) NEG mg/dL    Bilirubin Negative NEG      Blood Negative NEG      Urobilinogen 0.2 0.2 - 1.0 EU/dL    Nitrites Negative NEG      Leukocyte Esterase Negative NEG     TROPONIN-HIGH SENSITIVITY   Result Value Ref Range    Troponin-High Sensitivity 753 (HH) 0 - 54 ng/L   TROPONIN-HIGH SENSITIVITY   Result Value Ref Range    Troponin-High Sensitivity 4,101 (HH) 0 - 54 ng/L   METABOLIC PANEL, BASIC   Result Value Ref Range    Sodium 143 136 - 145 mmol/L    Potassium 4.0 3.5 - 5.5 mmol/L    Chloride 111 100 - 111 mmol/L    CO2 22 21 - 32 mmol/L    Anion gap 10 3.0 - 18 mmol/L    Glucose 164 (H) 74 - 99 mg/dL    BUN 12 7.0 - 18 MG/DL    Creatinine 1.02 0.6 - 1.3 MG/DL    BUN/Creatinine ratio 12 12 - 20      GFR est AA >60 >60 ml/min/1.73m2    GFR est non-AA 57 (L) >60 ml/min/1.73m2    Calcium 8.3 (L) 8.5 - 10.1 MG/DL   MAGNESIUM   Result Value Ref Range    Magnesium 2.3 1.6 - 2.6 mg/dL   CBC WITH AUTOMATED DIFF   Result Value Ref Range    WBC 25.3 (H) 4.6 - 13.2 K/uL    RBC 3.65 (L) 4.20 - 5.30 M/uL    HGB 10.3 (L) 12.0 - 16.0 g/dL    HCT 31.2 (L) 35.0 - 45.0 %    MCV 85.5 78.0 - 100.0 FL    MCH 28.2 24.0 - 34.0 PG    MCHC 33.0 31.0 - 37.0 g/dL    RDW 14.4 11.6 - 14.5 %    PLATELET 217 795 - 602 K/uL    MPV 10.6 9.2 - 11.8 FL    NRBC 0.0 0  WBC    ABSOLUTE NRBC 0.00 0.00 - 0.01 K/uL    NEUTROPHILS 83 (H) 40 - 73 %    BAND NEUTROPHILS 1 %    LYMPHOCYTES 12 (L) 21 - 52 %    MONOCYTES 4 3 - 10 %    EOSINOPHILS 0 0 - 5 %    BASOPHILS 0 0 - 2 %    IMMATURE GRANULOCYTES 0 0.0 - 0.5 %    ABS. NEUTROPHILS 21.3 (H) 1.8 - 8.0 K/UL    ABS. LYMPHOCYTES 3.0 0.9 - 3.6 K/UL    ABS. MONOCYTES 1.0 0.05 - 1.2 K/UL    ABS. EOSINOPHILS 0.0 0.0 - 0.4 K/UL    ABS. BASOPHILS 0.0 0.0 - 0.1 K/UL    ABS. IMM.  GRANS. 0.0 0.00 - 0.04 K/UL    DF MANUAL      PLATELET COMMENTS ADEQUATE PLATELETS      RBC COMMENTS NORMOCYTIC, NORMOCHROMIC      RBC COMMENTS SUSI CELLS  2+        RBC COMMENTS POIKILOCYTOSIS  1+       PTT   Result Value Ref Range    aPTT 27.8 23.0 - 36.4 SEC   PROTHROMBIN TIME + INR   Result Value Ref Range    Prothrombin time 16.4 (H) 11.5 - 15.2 sec    INR 1.3 (H) 0.8 - 1.2     LACTIC ACID   Result Value Ref Range    Lactic acid 6.7 (HH) 0.4 - 2.0 MMOL/L   HEPATIC FUNCTION PANEL   Result Value Ref Range    Protein, total 4.0 (L) 6.4 - 8.2 g/dL    Albumin 2.1 (L) 3.4 - 5.0 g/dL Globulin 1.9 (L) 2.0 - 4.0 g/dL    A-G Ratio 1.1 0.8 - 1.7      Bilirubin, total 0.6 0.2 - 1.0 MG/DL    Bilirubin, direct <0.1 0.0 - 0.2 MG/DL    Alk. phosphatase 55 45 - 117 U/L    AST (SGOT) 78 (H) 10 - 38 U/L    ALT (SGPT) 34 13 - 56 U/L   METABOLIC PANEL, BASIC   Result Value Ref Range    Sodium 144 136 - 145 mmol/L    Potassium 4.0 3.5 - 5.5 mmol/L    Chloride 111 100 - 111 mmol/L    CO2 24 21 - 32 mmol/L    Anion gap 9 3.0 - 18 mmol/L    Glucose 139 (H) 74 - 99 mg/dL    BUN 10 7.0 - 18 MG/DL    Creatinine 0.85 0.6 - 1.3 MG/DL    BUN/Creatinine ratio 12 12 - 20      GFR est AA >60 >60 ml/min/1.73m2    GFR est non-AA >60 >60 ml/min/1.73m2    Calcium 8.3 (L) 8.5 - 10.1 MG/DL   MAGNESIUM   Result Value Ref Range    Magnesium 1.9 1.6 - 2.6 mg/dL   CBC W/O DIFF   Result Value Ref Range    WBC 16.0 (H) 4.6 - 13.2 K/uL    RBC 3.36 (L) 4.20 - 5.30 M/uL    HGB 9.4 (L) 12.0 - 16.0 g/dL    HCT 28.4 (L) 35.0 - 45.0 %    MCV 84.5 78.0 - 100.0 FL    MCH 28.0 24.0 - 34.0 PG    MCHC 33.1 31.0 - 37.0 g/dL    RDW 14.6 (H) 11.6 - 14.5 %    PLATELET 748 246 - 741 K/uL    MPV 10.1 9.2 - 11.8 FL    NRBC 0.0 0  WBC    ABSOLUTE NRBC 0.00 0.00 - 0.01 K/uL   HEPATIC FUNCTION PANEL   Result Value Ref Range    Protein, total 5.3 (L) 6.4 - 8.2 g/dL    Albumin 2.9 (L) 3.4 - 5.0 g/dL    Globulin 2.4 2.0 - 4.0 g/dL    A-G Ratio 1.2 0.8 - 1.7      Bilirubin, total 0.2 0.2 - 1.0 MG/DL    Bilirubin, direct <0.1 0.0 - 0.2 MG/DL    Alk.  phosphatase 51 45 - 117 U/L    AST (SGOT) 129 (H) 10 - 38 U/L    ALT (SGPT) 43 13 - 56 U/L   LACTIC ACID   Result Value Ref Range    Lactic acid 2.2 (HH) 0.4 - 2.0 MMOL/L   HEMOGLOBIN A1C WITH EAG   Result Value Ref Range    Hemoglobin A1c 5.9 (H) 4.2 - 5.6 %    Est. average glucose 123 mg/dL   LACTIC ACID   Result Value Ref Range    Lactic acid 1.0 0.4 - 2.0 MMOL/L   MAGNESIUM   Result Value Ref Range    Magnesium 2.1 1.6 - 2.6 mg/dL   METABOLIC PANEL, BASIC   Result Value Ref Range    Sodium 141 136 - 145 mmol/L    Potassium 3.7 3.5 - 5.5 mmol/L    Chloride 108 100 - 111 mmol/L    CO2 29 21 - 32 mmol/L    Anion gap 4 3.0 - 18 mmol/L    Glucose 100 (H) 74 - 99 mg/dL    BUN 9 7.0 - 18 MG/DL    Creatinine 0.53 (L) 0.6 - 1.3 MG/DL    BUN/Creatinine ratio 17 12 - 20      GFR est AA >60 >60 ml/min/1.73m2    GFR est non-AA >60 >60 ml/min/1.73m2    Calcium 8.1 (L) 8.5 - 10.1 MG/DL   CBC W/O DIFF   Result Value Ref Range    WBC 14.8 (H) 4.6 - 13.2 K/uL    RBC 2.69 (L) 4.20 - 5.30 M/uL    HGB 7.5 (L) 12.0 - 16.0 g/dL    HCT 23.7 (L) 35.0 - 45.0 %    MCV 88.1 78.0 - 100.0 FL    MCH 27.9 24.0 - 34.0 PG    MCHC 31.6 31.0 - 37.0 g/dL    RDW 14.7 (H) 11.6 - 14.5 %    PLATELET 875 928 - 572 K/uL    MPV 11.1 9.2 - 11.8 FL    NRBC 0.0 0  WBC    ABSOLUTE NRBC 0.00 0.00 - 0.01 K/uL   POTASSIUM   Result Value Ref Range    Potassium 4.1 3.5 - 5.5 mmol/L   CBC WITH AUTOMATED DIFF   Result Value Ref Range    WBC 11.2 4.6 - 13.2 K/uL    RBC 2.70 (L) 4.20 - 5.30 M/uL    HGB 7.6 (L) 12.0 - 16.0 g/dL    HCT 23.7 (L) 35.0 - 45.0 %    MCV 87.8 78.0 - 100.0 FL    MCH 28.1 24.0 - 34.0 PG    MCHC 32.1 31.0 - 37.0 g/dL    RDW 14.6 (H) 11.6 - 14.5 %    PLATELET 922 322 - 517 K/uL    MPV 11.2 9.2 - 11.8 FL    NRBC 0.2 (H) 0  WBC    ABSOLUTE NRBC 0.02 (H) 0.00 - 0.01 K/uL    NEUTROPHILS 61 40 - 73 %    LYMPHOCYTES 26 21 - 52 %    MONOCYTES 9 3 - 10 %    EOSINOPHILS 3 0 - 5 %    BASOPHILS 1 0 - 2 %    IMMATURE GRANULOCYTES 0 0.0 - 0.5 %    ABS. NEUTROPHILS 6.9 1.8 - 8.0 K/UL    ABS. LYMPHOCYTES 3.0 0.9 - 3.6 K/UL    ABS. MONOCYTES 1.0 0.05 - 1.2 K/UL    ABS. EOSINOPHILS 0.3 0.0 - 0.4 K/UL    ABS. BASOPHILS 0.1 0.0 - 0.1 K/UL    ABS. IMM.  GRANS. 0.1 (H) 0.00 - 0.04 K/UL    DF AUTOMATED     METABOLIC PANEL, BASIC   Result Value Ref Range    Sodium 141 136 - 145 mmol/L    Potassium 2.4 (LL) 3.5 - 5.5 mmol/L    Chloride 120 (H) 100 - 111 mmol/L    CO2 20 (L) 21 - 32 mmol/L    Anion gap 1 (L) 3.0 - 18 mmol/L    Glucose 74 74 - 99 mg/dL    BUN 7 7.0 - 18 MG/DL    Creatinine 0.26 (L) 0.6 - 1.3 MG/DL    BUN/Creatinine ratio 27 (H) 12 - 20      GFR est AA >60 >60 ml/min/1.73m2    GFR est non-AA >60 >60 ml/min/1.73m2    Calcium 6.2 (L) 8.5 - 10.1 MG/DL   MAGNESIUM   Result Value Ref Range    Magnesium 1.4 (L) 1.6 - 2.6 mg/dL   METABOLIC PANEL, BASIC   Result Value Ref Range    Sodium 138 136 - 145 mmol/L    Potassium 3.6 3.5 - 5.5 mmol/L    Chloride 105 100 - 111 mmol/L    CO2 29 21 - 32 mmol/L    Anion gap 4 3.0 - 18 mmol/L    Glucose 102 (H) 74 - 99 mg/dL    BUN 10 7.0 - 18 MG/DL    Creatinine 0.58 (L) 0.6 - 1.3 MG/DL    BUN/Creatinine ratio 17 12 - 20      GFR est AA >60 >60 ml/min/1.73m2    GFR est non-AA >60 >60 ml/min/1.73m2    Calcium 8.3 (L) 8.5 - 10.1 MG/DL   MAGNESIUM   Result Value Ref Range    Magnesium 1.9 1.6 - 2.6 mg/dL   METABOLIC PANEL, BASIC   Result Value Ref Range    Sodium 140 136 - 145 mmol/L    Potassium 3.6 3.5 - 5.5 mmol/L    Chloride 107 100 - 111 mmol/L    CO2 26 21 - 32 mmol/L    Anion gap 7 3.0 - 18 mmol/L    Glucose 84 74 - 99 mg/dL    BUN 9 7.0 - 18 MG/DL    Creatinine 0.66 0.6 - 1.3 MG/DL    BUN/Creatinine ratio 14 12 - 20      GFR est AA >60 >60 ml/min/1.73m2    GFR est non-AA >60 >60 ml/min/1.73m2    Calcium 8.9 8.5 - 10.1 MG/DL   CBC W/O DIFF   Result Value Ref Range    WBC 11.2 4.6 - 13.2 K/uL    RBC 3.19 (L) 4.20 - 5.30 M/uL    HGB 8.9 (L) 12.0 - 16.0 g/dL    HCT 27.9 (L) 35.0 - 45.0 %    MCV 87.5 78.0 - 100.0 FL    MCH 27.9 24.0 - 34.0 PG    MCHC 31.9 31.0 - 37.0 g/dL    RDW 14.9 (H) 11.6 - 14.5 %    PLATELET 931 907 - 597 K/uL    MPV 10.5 9.2 - 11.8 FL    NRBC 1.1 (H) 0  WBC    ABSOLUTE NRBC 0.12 (H) 0.00 - 0.01 K/uL   CBC W/O DIFF   Result Value Ref Range    WBC 10.6 4.6 - 13.2 K/uL    RBC 3.20 (L) 4.20 - 5.30 M/uL    HGB 8.9 (L) 12.0 - 16.0 g/dL    HCT 28.4 (L) 35.0 - 45.0 %    MCV 88.8 78.0 - 100.0 FL    MCH 27.8 24.0 - 34.0 PG    MCHC 31.3 31.0 - 37.0 g/dL    RDW 15.8 (H) 11.6 - 14.5 %    PLATELET 233 068 - 981 K/uL    MPV 10.3 9.2 - 11.8 FL    NRBC 1.2 (H) 0  WBC    ABSOLUTE NRBC 0.13 (H) 0.00 - 5.99 K/uL   METABOLIC PANEL, BASIC   Result Value Ref Range    Sodium 140 136 - 145 mmol/L    Potassium 3.6 3.5 - 5.5 mmol/L    Chloride 106 100 - 111 mmol/L    CO2 30 21 - 32 mmol/L    Anion gap 4 3.0 - 18 mmol/L    Glucose 129 (H) 74 - 99 mg/dL    BUN 8 7.0 - 18 MG/DL    Creatinine 0.73 0.6 - 1.3 MG/DL    BUN/Creatinine ratio 11 (L) 12 - 20      GFR est AA >60 >60 ml/min/1.73m2    GFR est non-AA >60 >60 ml/min/1.73m2    Calcium 8.7 8.5 - 25.1 MG/DL   METABOLIC PANEL, BASIC   Result Value Ref Range    Sodium 141 136 - 145 mmol/L    Potassium 3.6 3.5 - 5.5 mmol/L    Chloride 107 100 - 111 mmol/L    CO2 27 21 - 32 mmol/L    Anion gap 7 3.0 - 18 mmol/L    Glucose 75 74 - 99 mg/dL    BUN 8 7.0 - 18 MG/DL    Creatinine 0.70 0.6 - 1.3 MG/DL    BUN/Creatinine ratio 11 (L) 12 - 20      GFR est AA >60 >60 ml/min/1.73m2    GFR est non-AA >60 >60 ml/min/1.73m2    Calcium 8.7 8.5 - 10.1 MG/DL   MAGNESIUM   Result Value Ref Range    Magnesium 2.0 1.6 - 2.6 mg/dL   CBC W/O DIFF   Result Value Ref Range    WBC 11.8 4.6 - 13.2 K/uL    RBC 3.10 (L) 4.20 - 5.30 M/uL    HGB 8.6 (L) 12.0 - 16.0 g/dL    HCT 27.4 (L) 35.0 - 45.0 %    MCV 88.4 78.0 - 100.0 FL    MCH 27.7 24.0 - 34.0 PG    MCHC 31.4 31.0 - 37.0 g/dL    RDW 15.9 (H) 11.6 - 14.5 %    PLATELET 684 291 - 752 K/uL    MPV 10.2 9.2 - 11.8 FL    NRBC 0.7 (H) 0  WBC    ABSOLUTE NRBC 0.08 (H) 0.00 - 9.31 K/uL   METABOLIC PANEL, BASIC   Result Value Ref Range    Sodium 141 136 - 145 mmol/L    Potassium 4.1 3.5 - 5.5 mmol/L    Chloride 109 100 - 111 mmol/L    CO2 27 21 - 32 mmol/L    Anion gap 5 3.0 - 18 mmol/L    Glucose 89 74 - 99 mg/dL    BUN 7 7.0 - 18 MG/DL    Creatinine 0.65 0.6 - 1.3 MG/DL    BUN/Creatinine ratio 11 (L) 12 - 20      GFR est AA >60 >60 ml/min/1.73m2    GFR est non-AA >60 >60 ml/min/1.73m2    Calcium 8.8 8.5 - 10.1 MG/DL   MAGNESIUM   Result Value Ref Range Magnesium 2.0 1.6 - 2.6 mg/dL   GLUCOSTABILIZER   Result Value Ref Range    Glucose 167 mg/dL    Insulin order 2.1 units/hour    Insulin adminstered 2.1 units/hour    Multiplier 0.020     Low target 80 mg/dL    High target 150 mg/dL    D50 order 0.0 ml    D50 administered 0.00 ml    Minutes until next BG 60 min    Order initials mmd     Administered initials mmd    GLUCOSTABILIZER   Result Value Ref Range    Glucose 158 mg/dL    Insulin order 2.9 units/hour    Insulin adminstered 2.9 units/hour    Multiplier 0.030     Low target 80 mg/dL    High target 150 mg/dL    D50 order 0.0 ml    D50 administered 0.00 ml    Minutes until next BG 60 min    Order initials cc     Administered initials cc    GLUCOSTABILIZER   Result Value Ref Range    Glucose 147 mg/dL    Insulin order 2.6 units/hour    Insulin adminstered 2.6 units/hour    Multiplier 0.030     Low target 80 mg/dL    High target 150 mg/dL    D50 order 0.0 ml    D50 administered 0.00 ml    Minutes until next BG 60 min    Order initials cc     Administered initials cc    GLUCOSTABILIZER   Result Value Ref Range    Glucose 144 mg/dL    Insulin order 2.5 units/hour    Insulin adminstered 2.5 units/hour    Multiplier 0.030     Low target 80 mg/dL    High target 150 mg/dL    D50 order 0.0 ml    D50 administered 0.00 ml    Minutes until next BG 60 min    Order initials cm     Administered initials cc    GLUCOSTABILIZER   Result Value Ref Range    Glucose 161 mg/dL    Insulin order 4.0 units/hour    Insulin adminstered 4.0 units/hour    Multiplier 0.040     Low target 80 mg/dL    High target 150 mg/dL    D50 order 0.0 ml    D50 administered 0.00 ml    Minutes until next BG 60 min    Order initials cm     Administered initials cc    GLUCOSTABILIZER   Result Value Ref Range    Glucose 161 mg/dL    Insulin order 5.1 units/hour    Insulin adminstered 5.1 units/hour    Multiplier 0.050     Low target 80 mg/dL    High target 150 mg/dL    D50 order 0.0 ml    D50 administered 0.00 ml    Minutes until next BG 60 min    Order initials cc     Administered initials cc    GLUCOSTABILIZER   Result Value Ref Range    Glucose 153 mg/dL    Insulin order 5.6 units/hour    Insulin adminstered 5.6 units/hour    Multiplier 0.060     Low target 80 mg/dL    High target 150 mg/dL    D50 order 0.0 ml    D50 administered 0.00 ml    Minutes until next BG 60 min    Order initials cm     Administered initials cc    GLUCOSTABILIZER   Result Value Ref Range    Glucose 156 mg/dL    Insulin order 6.7 units/hour    Insulin adminstered 6.7 units/hour    Multiplier 0.070     Low target 80 mg/dL    High target 150 mg/dL    D50 order 0.0 ml    D50 administered 0.00 ml    Minutes until next BG 60 min    Order initials cc     Administered initials cc    GLUCOSTABILIZER   Result Value Ref Range    Glucose 135 mg/dL    Insulin order 5.3 units/hour    Insulin adminstered 5.3 units/hour    Multiplier 0.070     Low target 80 mg/dL    High target 150 mg/dL    D50 order 0.0 ml    D50 administered 0.00 ml    Minutes until next BG 60 min    Order initials cc     Administered initials cc    GLUCOSTABILIZER   Result Value Ref Range    Glucose 129 mg/dL    Insulin order 4.8 units/hour    Insulin adminstered 4.8 units/hour    Multiplier 0.070     Low target 80 mg/dL    High target 150 mg/dL    D50 order 0.0 ml    D50 administered 0.00 ml    Minutes until next BG 60 min    Order initials cc     Administered initials cc    GLUCOSTABILIZER   Result Value Ref Range    Glucose 111 mg/dL    Insulin order 3.6 units/hour    Insulin adminstered 3.6 units/hour    Multiplier 0.070     Low target 80 mg/dL    High target 150 mg/dL    D50 order 0.0 ml    D50 administered 0.00 ml    Minutes until next BG 60 min    Order initials cc     Administered initials cc    GLUCOSTABILIZER   Result Value Ref Range    Glucose 102 mg/dL    Insulin order 2.9 units/hour    Insulin adminstered 2.9 units/hour    Multiplier 0.070     Low target 80 mg/dL    High target 150 mg/dL    D50 order 0.0 ml    D50 administered 0.00 ml    Minutes until next BG 60 min    Order initials npb     Administered initials npb    GLUCOSTABILIZER   Result Value Ref Range    Glucose 106 mg/dL    Insulin order 3.2 units/hour    Insulin adminstered 3.2 units/hour    Multiplier 0.070     Low target 80 mg/dL    High target 150 mg/dL    D50 order 0.0 ml    D50 administered 0.00 ml    Minutes until next BG 60 min    Order initials npb     Administered initials npb    GLUCOSTABILIZER   Result Value Ref Range    Glucose 105 mg/dL    Insulin order 3.2 units/hour    Insulin adminstered 3.2 units/hour    Multiplier 0.070     Low target 80 mg/dL    High target 150 mg/dL    D50 order 0.0 ml    D50 administered 0.00 ml    Minutes until next BG 60 min    Order initials npb     Administered initials npb    GLUCOSTABILIZER   Result Value Ref Range    Glucose 116 mg/dL    Insulin order 3.9 units/hour    Insulin adminstered 3.9 units/hour    Multiplier 0.070     Low target 80 mg/dL    High target 150 mg/dL    D50 order 0.0 ml    D50 administered 0.00 ml    Minutes until next BG 60 min    Order initials npb     Administered initials npb    GLUCOSTABILIZER   Result Value Ref Range    Glucose 109 mg/dL    Insulin order 3.4 units/hour    Insulin adminstered 3.4 units/hour    Multiplier 0.070     Low target 80 mg/dL    High target 150 mg/dL    D50 order 0.0 ml    D50 administered 0.00 ml    Minutes until next BG 60 min    Order initials npb     Administered initials npb    GLUCOSTABILIZER   Result Value Ref Range    Glucose 101 mg/dL    Insulin order 2.9 units/hour    Insulin adminstered 2.9 units/hour    Multiplier 0.070     Low target 80 mg/dL    High target 150 mg/dL    D50 order 0.0 ml    D50 administered 0.00 ml    Minutes until next BG 60 min    Order initials npb     Administered initials npb    GLUCOSTABILIZER   Result Value Ref Range    Glucose 98 mg/dL    Insulin order 2.7 units/hour    Insulin adminstered 2.7 units/hour    Multiplier 0.070     Low target 80 mg/dL    High target 150 mg/dL    D50 order 0.0 ml    D50 administered 0.00 ml    Minutes until next BG 60 min    Order initials npb     Administered initials npb    GLUCOSTABILIZER   Result Value Ref Range    Glucose 101 mg/dL    Insulin order 2.9 units/hour    Insulin adminstered 2.9 units/hour    Multiplier 0.070     Low target 80 mg/dL    High target 150 mg/dL    D50 order 0.0 ml    D50 administered 0.00 ml    Minutes until next BG 60 min    Order initials npb     Administered initials npb    GLUCOSTABILIZER   Result Value Ref Range    Glucose 100 mg/dL    Insulin order 2.8 units/hour    Insulin adminstered 2.8 units/hour    Multiplier 0.070     Low target 80 mg/dL    High target 150 mg/dL    D50 order 0.0 ml    D50 administered 0.00 ml    Minutes until next BG 60 min    Order initials npb     Administered initials npb    GLUCOSTABILIZER   Result Value Ref Range    Glucose 88 mg/dL    Insulin order 2.0 units/hour    Insulin adminstered 2.0 units/hour    Multiplier 0.070     Low target 80 mg/dL    High target 150 mg/dL    D50 order 0.0 ml    D50 administered 0.00 ml    Minutes until next BG 60 min    Order initials npb     Administered initials npb    GLUCOSTABILIZER   Result Value Ref Range    Glucose 123 mg/dL    Insulin order 4.4 units/hour    Insulin adminstered 4.4 units/hour    Multiplier 0.070     Low target 80 mg/dL    High target 150 mg/dL    D50 order 0.0 ml    D50 administered 0.00 ml    Minutes until next BG 60 min    Order initials npb     Administered initials npb    BLOOD GAS,CHEM 8 POC   Result Value Ref Range    pH (POC) 7.34 (L) 7.35 - 7.45      pCO2 (POC) 44.6 35.0 - 45.0 MMHG    pO2 (POC) 428 (H) 80 - 100 MMHG    HCO3 (POC) 24.2 22 - 26 MMOL/L    sO2 (POC) 100.0 (H) 92 - 97 %    Base deficit (POC) 1.7 mmol/L    Sodium,  136 - 145 MMOL/L    Potassium, POC 4.0 3.5 - 5.5 MMOL/L    Glucose, POC 99 74 - 106 MG/DL    Calcium, ionized (POC) 1.22 1.12 - 1.32 mmol/L    Chloride (POC) 106 98 - 107 mmol/L    Creatinine (POC) 0.72 0.6 - 1.3 mg/dL    Hematocrit, POC 39 36 - 49 %    Hemoglobin, POC 13.1 12 - 16 G/DL    Performed by Jacki Calhoun     Specimen type (POC) ARTERIAL      GFRAA, POC >60 >60 ml/min/1.73m2    GFRNA, POC >60 >60 ml/min/1.73m2    CO2, POC 25 (H) 19 - 24 MMOL/L   BLOOD GAS,CHEM8,LACTIC ACID POC   Result Value Ref Range    pH (POC) 7.35 7.35 - 7.45      pCO2 (POC) 42.1 35.0 - 45.0 MMHG    pO2 (POC) 415 (H) 80 - 100 MMHG    Calcium, ionized (POC) 1.20 1. 12 - 1.32 mmol/L    Base deficit (POC) 2.6 mmol/L    HCO3 (POC) 23.0 22 - 26 MMOL/L    CO2, POC 23 19 - 24 MMOL/L    O2  %    Sample source ARTERIAL      Performed by Jacki Calhoun     Sodium (POC) 141 136 - 145 mmol/L    Potassium (POC) 4.4 3.5 - 5.1 mmol/L    Glucose (POC) 139 (H) 65 - 100 mg/dL    Creatinine (POC) 0.90 0.6 - 1.3 mg/dL    Lactic Acid (POC) 1.77 0.40 - 2.00 mmol/L    Chloride (POC) 107 98 - 107 mmol/L    Anion gap, POC 12 10 - 20      GFRAA, POC >60 >60 ml/min/1.73m2    GFRNA, POC >60 >60 ml/min/1.73m2   BLOOD GAS,CHEM8,LACTIC ACID POC   Result Value Ref Range    Calcium, ionized (POC) 1.10 (L) 1.12 - 1.32 mmol/L    Base deficit (POC) 2.8 mmol/L    HCO3 (POC) 23.5 22 - 26 MMOL/L    CO2, POC 24 19 - 24 MMOL/L    O2 SAT 67 %    Sample source VENOUS BLOOD      Performed by Jacki Calhoun     Sodium (POC) 138 136 - 145 mmol/L    Potassium (POC) 4.4 3.5 - 5.1 mmol/L    Glucose (POC) 151 (H) 65 - 100 mg/dL    Creatinine (POC) 0.82 0.6 - 1.3 mg/dL    Lactic Acid (POC) 2.00 0.40 - 2.00 mmol/L    Chloride (POC) 103 98 - 107 mmol/L    Anion gap, POC 12 10 - 20      GFRAA, POC >60 >60 ml/min/1.73m2    GFRNA, POC >60 >60 ml/min/1.73m2    pH, venous (POC) 7.31 (L) 7.32 - 7.42      pCO2, venous (POC) 46.3 41 - 51 MMHG    pO2, venous (POC) 38 25 - 40 mmHg   BLOOD GAS,CHEM 8 POC   Result Value Ref Range    pH (POC) 7.44 7.35 - 7.45      pCO2 (POC) 32.5 (L) 35.0 - 45.0 MMHG    pO2 (POC) 495 (H) 80 - 100 MMHG    HCO3 (POC) 22.1 22 - 26 MMOL/L    sO2 (POC) 100.0 (H) 92 - 97 %    Base deficit (POC) 1.6 mmol/L    Sodium,  136 - 145 MMOL/L    Potassium, POC 4.2 3.5 - 5.5 MMOL/L    Glucose,  (H) 74 - 106 MG/DL    Calcium, ionized (POC) 1.03 (L) 1.12 - 1.32 mmol/L    Chloride (POC) 102 98 - 107 mmol/L    Creatinine (POC) 0.83 0.6 - 1.3 mg/dL    Hematocrit, POC 29 (L) 36 - 49 %    Hemoglobin, POC 9.8 (L) 12 - 16 G/DL    Performed by Shi Floor     Specimen type (POC) ARTERIAL      GFRAA, POC >60 >60 ml/min/1.73m2    GFRNA, POC >60 >60 ml/min/1.73m2    CO2, POC 22 19 - 24 MMOL/L   BLOOD GAS,CHEM 8 POC   Result Value Ref Range    pH (POC) 7.33 (L) 7.35 - 7.45      pCO2 (POC) 44.7 35.0 - 45.0 MMHG    pO2 (POC) 254 (H) 80 - 100 MMHG    HCO3 (POC) 23.7 22 - 26 MMOL/L    sO2 (POC) 99.8 (H) 92 - 97 %    Base deficit (POC) 2.2 mmol/L    Sodium,  136 - 145 MMOL/L    Potassium, POC 5.4 3.5 - 5.5 MMOL/L    Glucose,  (H) 74 - 106 MG/DL    Calcium, ionized (POC) 0.91 (L) 1.12 - 1.32 mmol/L    Chloride (POC) 102 98 - 107 mmol/L    Creatinine (POC) 0.92 0.6 - 1.3 mg/dL    Hematocrit, POC 26 (L) 36 - 49 %    Hemoglobin, POC 8.9 (L) 12 - 16 G/DL    Performed by [facility]     Specimen type (POC) ARTERIAL      GFRAA, POC >60 >60 ml/min/1.73m2    GFRNA, POC >60 >60 ml/min/1.73m2    CO2, POC 24 19 - 24 MMOL/L   BLOOD GAS,CHEM 8 POC   Result Value Ref Range    pH (POC) 7.34 (L) 7.35 - 7.45      pCO2 (POC) 43.2 35.0 - 45.0 MMHG    pO2 (POC) 297 (H) 80 - 100 MMHG    HCO3 (POC) 23.3 22 - 26 MMOL/L    sO2 (POC) 99.9 (H) 92 - 97 %    Base deficit (POC) 2.4 mmol/L    Sodium,  136 - 145 MMOL/L    Potassium, POC 5.3 3.5 - 5.5 MMOL/L    Glucose,  (H) 74 - 106 MG/DL    Calcium, ionized (POC) 1.02 (L) 1.12 - 1.32 mmol/L    Chloride (POC) 103 98 - 107 mmol/L    Creatinine (POC) 0.92 0.6 - 1.3 mg/dL    Hematocrit, POC 27 (L) 36 - 49 %    Hemoglobin, POC 9.3 (L) 12 - 16 G/DL Performed by [facility]     Specimen type (POC) ARTERIAL      GFRAA, POC >60 >60 ml/min/1.73m2    GFRNA, POC >60 >60 ml/min/1.73m2    CO2, POC 24 19 - 24 MMOL/L   BLOOD GAS,CHEM 8 POC   Result Value Ref Range    pH (POC) 7.32 (L) 7.35 - 7.45      pCO2 (POC) 45.3 (H) 35.0 - 45.0 MMHG    pO2 (POC) 286 (H) 80 - 100 MMHG    HCO3 (POC) 23.1 22 - 26 MMOL/L    sO2 (POC) 99.9 (H) 92 - 97 %    Base deficit (POC) 3.0 mmol/L    Sodium,  136 - 145 MMOL/L    Potassium, POC 4.6 3.5 - 5.5 MMOL/L    Glucose,  (H) 74 - 106 MG/DL    Calcium, ionized (POC) 1.06 (L) 1.12 - 1.32 mmol/L    Chloride (POC) 102 98 - 107 mmol/L    Creatinine (POC) 0.93 0.6 - 1.3 mg/dL    Hematocrit, POC 27 (L) 36 - 49 %    Hemoglobin, POC 9.1 (L) 12 - 16 G/DL    Performed by [facility]     Specimen type (POC) ARTERIAL      GFRAA, POC >60 >60 ml/min/1.73m2    GFRNA, POC >60 >60 ml/min/1.73m2    CO2, POC 23 19 - 24 MMOL/L   BLOOD GAS,CHEM8,LACTIC ACID POC   Result Value Ref Range    pH (POC) 7.38 7.35 - 7.45      pCO2 (POC) 41.8 35.0 - 45.0 MMHG    pO2 (POC) 252 (H) 80 - 100 MMHG    Calcium, ionized (POC) 1.00 (L) 1.12 - 1.32 mmol/L    Base deficit (POC) 0.5 mmol/L    HCO3 (POC) 24.7 22 - 26 MMOL/L    CO2, POC 25 (H) 19 - 24 MMOL/L    O2  %    Sample source ARTERIAL      Performed by [facility]     Sodium (POC) 138 136 - 145 mmol/L    Potassium (POC) 4.3 3.5 - 5.1 mmol/L    Glucose (POC) 222 (H) 65 - 100 mg/dL    Creatinine (POC) 0.93 0.6 - 1.3 mg/dL    Lactic Acid (POC) 4.69 (HH) 0.40 - 2.00 mmol/L    Chloride (POC) 101 98 - 107 mmol/L    Anion gap, POC 13 10 - 20      GFRAA, POC >60 >60 ml/min/1.73m2    GFRNA, POC >60 >60 ml/min/1.73m2   BLOOD GAS,CHEM 8 POC   Result Value Ref Range    pH (POC) 7.34 (L) 7.35 - 7.45      pCO2 (POC) 44.3 35.0 - 45.0 MMHG    pO2 (POC) 331 (H) 80 - 100 MMHG    HCO3 (POC) 24.1 22 - 26 MMOL/L    sO2 (POC) 99.9 (H) 92 - 97 %    Base deficit (POC) 1.6 mmol/L    Sodium,  136 - 145 MMOL/L Potassium, POC 4.3 3.5 - 5.5 MMOL/L    Glucose,  (H) 74 - 106 MG/DL    Calcium, ionized (POC) 1.06 (L) 1.12 - 1.32 mmol/L    Chloride (POC) 101 98 - 107 mmol/L    Creatinine (POC) 0.88 0.6 - 1.3 mg/dL    Hematocrit, POC 26 (L) 36 - 49 %    Hemoglobin, POC 8.7 (L) 12 - 16 G/DL    Performed by [facility]     Specimen type (POC) ARTERIAL      GFRAA, POC >60 >60 ml/min/1.73m2    GFRNA, POC >60 >60 ml/min/1.73m2    CO2, POC 24 19 - 24 MMOL/L   BLOOD GAS,CHEM 8 POC   Result Value Ref Range    pH (POC) 7.30 (L) 7.35 - 7.45      pCO2 (POC) 49.2 (H) 35.0 - 45.0 MMHG    pO2 (POC) 390 (H) 80 - 100 MMHG    HCO3 (POC) 24.0 22 - 26 MMOL/L    sO2 (POC) 99.9 (H) 92 - 97 %    Base deficit (POC) 2.5 mmol/L    Sodium,  136 - 145 MMOL/L    Potassium, POC 3.8 3.5 - 5.5 MMOL/L    Glucose,  (H) 74 - 106 MG/DL    Calcium, ionized (POC) 1.03 (L) 1.12 - 1.32 mmol/L    Chloride (POC) 102 98 - 107 mmol/L    Creatinine (POC) 1.03 0.6 - 1.3 mg/dL    Hematocrit, POC 25 (L) 36 - 49 %    Hemoglobin, POC 8.4 (L) 12 - 16 G/DL    Performed by [facility]     Specimen type (POC) ARTERIAL      GFRAA, POC >60 >60 ml/min/1.73m2    GFRNA, POC 56 (L) >60 ml/min/1.73m2    CO2, POC 24 19 - 24 MMOL/L   BLOOD GAS,CHEM8,LACTIC ACID POC   Result Value Ref Range    Calcium, ionized (POC) 0.65 (LL) 1.12 - 1.32 mmol/L    Base deficit (POC) 3.7 mmol/L    HCO3 (POC) 20.6 (L) 22 - 26 MMOL/L    CO2, POC 21 19 - 24 MMOL/L    O2 SAT 71 %    Sample source VENOUS BLOOD      Performed by Rene Dean     Sodium (POC) 141 136 - 145 mmol/L    Potassium (POC) 4.7 3.5 - 5.1 mmol/L    Glucose (POC) 334 (H) 65 - 100 mg/dL    Creatinine (POC) 0.86 0.6 - 1.3 mg/dL    Lactic Acid (POC) 8.28 (HH) 0.40 - 2.00 mmol/L    Chloride (POC) 99 98 - 107 mmol/L    Anion gap, POC 22 (H) 10 - 20      GFRAA, POC >60 >60 ml/min/1.73m2    GFRNA, POC >60 >60 ml/min/1.73m2    pH, venous (POC) 7.40 7.32 - 7.42      pCO2, venous (POC) 33.3 (L) 41 - 51 MMHG    pO2, venous (POC) 37 25 - 40 mmHg   BLOOD GAS,CHEM 8 POC   Result Value Ref Range    pH (POC) 7.31 (L) 7.35 - 7.45      pCO2 (POC) 44.2 35.0 - 45.0 MMHG    pO2 (POC) 269 (H) 80 - 100 MMHG    HCO3 (POC) 22.2 22 - 26 MMOL/L    sO2 (POC) 99.8 (H) 92 - 97 %    Base deficit (POC) 3.8 mmol/L    Sodium,  136 - 145 MMOL/L    Potassium, POC 2.9 (LL) 3.5 - 5.5 MMOL/L    Glucose,  (H) 74 - 106 MG/DL    Calcium, ionized (POC) 1.13 1.12 - 1.32 mmol/L    Chloride (POC) 104 98 - 107 mmol/L    Creatinine (POC) 1.01 0.6 - 1.3 mg/dL    Hematocrit, POC 23 (L) 36 - 49 %    Hemoglobin, POC 7.8 (L) 12 - 16 G/DL    Performed by Yarelis Green     Specimen type (POC) ARTERIAL      GFRAA, POC >60 >60 ml/min/1.73m2    GFRNA, POC 58 (L) >60 ml/min/1.73m2    CO2, POC 23 19 - 24 MMOL/L   BLOOD GAS,CHEM8,LACTIC ACID POC   Result Value Ref Range    pH (POC) 7.28 (L) 7.35 - 7.45      pCO2 (POC) 45.3 (H) 35.0 - 45.0 MMHG    pO2 (POC) 115 (H) 80 - 100 MMHG    Calcium, ionized (POC) 1.21 1.12 - 1.32 mmol/L    Base deficit (POC) 5.5 mmol/L    HCO3 (POC) 21.3 (L) 22 - 26 MMOL/L    CO2, POC 22 19 - 24 MMOL/L    O2 SAT 98 %    Patient temp. 97.8      Sample source ARTERIAL      SITE RIGHT RADIAL      Device: ADULT VENT      MODE ASSIST CONTROL      FIO2 100 %    Tidal volume 380      Total resp.  rate 28      PEEP/CPAP 5      Inspiratory Time 0.9 sec    IPAP/PIP 16      Mean Airway Pressure 10 cmH2O    Performed by Nicole Govea     Sodium (POC) 142 136 - 145 mmol/L    Potassium (POC) 4.0 3.5 - 5.1 mmol/L    Glucose (POC) 167 (H) 65 - 100 mg/dL    Creatinine (POC) 1.06 0.6 - 1.3 mg/dL    Lactic Acid (POC) 7.19 (HH) 0.40 - 2.00 mmol/L    Chloride (POC) 107 98 - 107 mmol/L    Anion gap, POC 14 10 - 20      GFRAA, POC >60 >60 ml/min/1.73m2    GFRNA, POC 55 (L) >60 ml/min/1.73m2   GLUCOSE, POC   Result Value Ref Range    Glucose (POC) 158 (H) 70 - 110 mg/dL   GLUCOSE, POC   Result Value Ref Range    Glucose (POC) 147 (H) 70 - 110 mg/dL   BLOOD GAS, ARTERIAL POC   Result Value Ref Range    Device: ADULT VENT      FIO2 (POC) 60 %    pH (POC) 7.34 (L) 7.35 - 7.45      pCO2 (POC) 19.2 (L) 35.0 - 45.0 MMHG    pO2 (POC) 109 (H) 80 - 100 MMHG    HCO3 (POC) 10.4 (L) 22 - 26 MMOL/L    sO2 (POC) 98.1 (H) 92 - 97 %    Base deficit (POC) 14.2 mmol/L    Mode ASSIST CONTROL      Tidal volume 383 ml    Set Rate 20 bpm    PEEP/CPAP (POC) 5 cmH2O    Mean Airway Pressure 8.4 cmH2O    PIP (POC) 11      Allens test (POC) NOT APPLICABLE      Inspiratory Time 0.9 sec    Total resp. rate 25      Site DRAWN FROM ARTERIAL LINE      Specimen type (POC) ARTERIAL      [facility]    BLOOD GAS, ARTERIAL POC   Result Value Ref Range    Device: ADULT VENT      FIO2 (POC) 60 %    pH (POC) 7.35 7.35 - 7.45      pCO2 (POC) 38.8 35.0 - 45.0 MMHG    pO2 (POC) 106 (H) 80 - 100 MMHG    HCO3 (POC) 21.4 (L) 22 - 26 MMOL/L    sO2 (POC) 97.8 (H) 92 - 97 %    Base deficit (POC) 3.9 mmol/L    Mode ASSIST CONTROL      Tidal volume 400 ml    Set Rate 20 bpm    PEEP/CPAP (POC) 5 cmH2O    Mean Airway Pressure 8 cmH2O    PIP (POC) 13      Allens test (POC) NOT APPLICABLE      Inspiratory Time 0.9 sec    Total resp.  rate 30      Site DRAWN FROM ARTERIAL LINE      Specimen type (POC) ARTERIAL      Performed by kaleb Schilder    GLUCOSE, POC   Result Value Ref Range    Glucose (POC) 144 (H) 70 - 110 mg/dL   BLOOD GAS,LACTIC ACID, POC   Result Value Ref Range    pH (POC) 7.32 (L) 7.35 - 7.45      pCO2 (POC) 43.2 35.0 - 45.0 MMHG    pO2 (POC) 36 (LL) 80 - 100 MMHG    HCO3 (POC) 22.4 22 - 26 MMOL/L    sO2 (POC) 63.4 (L) 92 - 97 %    Base deficit (POC) 3.5 mmol/L    Specimen type (POC) VENOUS BLOOD      Performed by Caitlin Gerber     Lactic Acid (POC) 10.27 (HH) 0.40 - 2.00 mmol/L    Critical value read back BELL PA    BLOOD GAS,LACTIC ACID, POC   Result Value Ref Range    pH (POC) 7.37 7.35 - 7.45      pCO2 (POC) 33.6 (L) 35.0 - 45.0 MMHG    pO2 (POC) 86 80 - 100 MMHG    HCO3 (POC) 19.6 (L) 22 - 26 MMOL/L    sO2 (POC) 96.3 92 - 97 %    Base deficit (POC) 4.9 mmol/L    Specimen type (POC) ARTERIAL      Performed by Elias Hernandez     Lactic Acid (POC) 9.22 (HH) 0.40 - 2.00 mmol/L   GLUCOSE, POC   Result Value Ref Range    Glucose (POC) 161 (H) 70 - 110 mg/dL   GLUCOSE, POC   Result Value Ref Range    Glucose (POC) 161 (H) 70 - 110 mg/dL   GLUCOSE, POC   Result Value Ref Range    Glucose (POC) 153 (H) 70 - 110 mg/dL   GLUCOSE, POC   Result Value Ref Range    Glucose (POC) 156 (H) 70 - 110 mg/dL   POC VENOUS BLOOD GAS   Result Value Ref Range    pH, venous (POC) 7.38 7.32 - 7.42      pCO2, venous (POC) 40.8 (L) 41 - 51 MMHG    pO2, venous (POC) 33 25 - 40 mmHg    HCO3, venous (POC) 23.9 23.0 - 28.0 MMOL/L    sO2, venous (POC) 62.9 (L) 65 - 88 %    Base deficit, venous (POC) 1.2 mmol/L    Specimen type (POC) VENOUS BLOOD      Performed by Elias Hernandez    BLOOD GAS,LACTIC ACID, POC   Result Value Ref Range    pH (POC) 7.42 7.35 - 7.45      pCO2 (POC) 38.2 35.0 - 45.0 MMHG    pO2 (POC) 74 (L) 80 - 100 MMHG    HCO3 (POC) 24.5 22 - 26 MMOL/L    sO2 (POC) 95.0 92 - 97 %    Base excess (POC) 0.0 mmol/L    Specimen type (POC) ARTERIAL      Performed by Elias Hernandez     Lactic Acid (POC) 4.17 (HH) 0.40 - 2.00 mmol/L   GLUCOSE, POC   Result Value Ref Range    Glucose (POC) 129 (H) 70 - 110 mg/dL   GLUCOSE, POC   Result Value Ref Range    Glucose (POC) 111 (H) 70 - 110 mg/dL   GLUCOSE, POC   Result Value Ref Range    Glucose (POC) 102 70 - 110 mg/dL   GLUCOSE, POC   Result Value Ref Range    Glucose (POC) 106 70 - 110 mg/dL   GLUCOSE, POC   Result Value Ref Range    Glucose (POC) 105 70 - 110 mg/dL   GLUCOSE, POC   Result Value Ref Range    Glucose (POC) 116 (H) 70 - 110 mg/dL   GLUCOSE, POC   Result Value Ref Range    Glucose (POC) 109 70 - 110 mg/dL   GLUCOSE, POC   Result Value Ref Range    Glucose (POC) 101 70 - 110 mg/dL   GLUCOSE, POC   Result Value Ref Range    Glucose (POC) 98 70 - 110 mg/dL   GLUCOSE, POC   Result Value Ref Range    Glucose (POC) 101 70 - 110 mg/dL   GLUCOSE, POC   Result Value Ref Range    Glucose (POC) 100 70 - 110 mg/dL   GLUCOSE, POC   Result Value Ref Range    Glucose (POC) 88 70 - 110 mg/dL   GLUCOSE, POC   Result Value Ref Range    Glucose (POC) 123 (H) 70 - 110 mg/dL   GLUCOSE, POC   Result Value Ref Range    Glucose (POC) 110 70 - 110 mg/dL   GLUCOSE, POC   Result Value Ref Range    Glucose (POC) 111 (H) 70 - 110 mg/dL   GLUCOSE, POC   Result Value Ref Range    Glucose (POC) 108 70 - 110 mg/dL   BLOOD GAS,LACTIC ACID, POC   Result Value Ref Range    Device: NASAL CANNULA      pH (POC) 7.38 7.35 - 7.45      pCO2 (POC) 41.6 35.0 - 45.0 MMHG    pO2 (POC) 34 (LL) 80 - 100 MMHG    HCO3 (POC) 24.8 22 - 26 MMOL/L    sO2 (POC) 63.5 (L) 92 - 97 %    Base deficit (POC) 0.2 mmol/L    Allens test (POC) NOT APPLICABLE      Site CENTRAL LINE      Patient temp. 99.2      Specimen type (POC) VENOUS BLOOD      Performed by Kerri Gutierrez     Lactic Acid (POC) 0.69 0.40 - 2.00 mmol/L   GLUCOSE, POC   Result Value Ref Range    Glucose (POC) 91 70 - 110 mg/dL   GLUCOSE, POC   Result Value Ref Range    Glucose (POC) 114 (H) 70 - 110 mg/dL   GLUCOSE, POC   Result Value Ref Range    Glucose (POC) 103 70 - 110 mg/dL   GLUCOSE, POC   Result Value Ref Range    Glucose (POC) 97 70 - 110 mg/dL   GLUCOSE, POC   Result Value Ref Range    Glucose (POC) 97 70 - 110 mg/dL   GLUCOSE, POC   Result Value Ref Range    Glucose (POC) 99 70 - 110 mg/dL   GLUCOSE, POC   Result Value Ref Range    Glucose (POC) 88 70 - 110 mg/dL   GLUCOSE, POC   Result Value Ref Range    Glucose (POC) 85 70 - 110 mg/dL   GLUCOSE, POC   Result Value Ref Range    Glucose (POC) 106 70 - 110 mg/dL   GLUCOSE, POC   Result Value Ref Range    Glucose (POC) 94 70 - 110 mg/dL   GLUCOSE, POC   Result Value Ref Range    Glucose (POC) 93 70 - 110 mg/dL   GLUCOSE, POC   Result Value Ref Range    Glucose (POC) 168 (H) 70 - 110 mg/dL   GLUCOSE, POC   Result Value Ref Range    Glucose (POC) 76 70 - 110 mg/dL   GLUCOSE, POC   Result Value Ref Range    Glucose (POC) 93 70 - 110 mg/dL   GLUCOSE, POC   Result Value Ref Range    Glucose (POC) 86 70 - 110 mg/dL   GLUCOSE, POC   Result Value Ref Range    Glucose (POC) 86 70 - 110 mg/dL   GLUCOSE, POC   Result Value Ref Range    Glucose (POC) 60 (L) 70 - 110 mg/dL   GLUCOSE, POC   Result Value Ref Range    Glucose (POC) 78 70 - 110 mg/dL   GLUCOSE, POC   Result Value Ref Range    Glucose (POC) 80 70 - 110 mg/dL   EKG, 12 LEAD, INITIAL   Result Value Ref Range    Ventricular Rate 53 BPM    Atrial Rate 53 BPM    P-R Interval 154 ms    QRS Duration 94 ms    Q-T Interval 484 ms    QTC Calculation (Bezet) 454 ms    Calculated P Axis 24 degrees    Calculated R Axis 25 degrees    Calculated T Axis 16 degrees    Diagnosis       Sinus bradycardia  Otherwise normal ECG  When compared with ECG of 04-FEB-2021 11:52,  No significant change was found  Confirmed by Max Antony ([id number]) on 5/27/2022 8:04:11 AM     EKG, 12 LEAD, INITIAL   Result Value Ref Range    Ventricular Rate 88 BPM    Atrial Rate 88 BPM    P-R Interval 138 ms    QRS Duration 80 ms    Q-T Interval 390 ms    QTC Calculation (Bezet) 471 ms    Calculated P Axis 33 degrees    Calculated R Axis 9 degrees    Calculated T Axis -2 degrees    Diagnosis       Normal sinus rhythm  Possible Inferior infarct , age undetermined  Abnormal ECG  When compared with ECG of 27-MAY-2022 07:28,  Vent.  rate has increased BY  35 BPM  Nonspecific T wave abnormality now evident in Lateral leads  Confirmed by Max Antony [id number]) on 5/28/2022 10:41:49 AM     EKG, 12 LEAD, SUBSEQUENT   Result Value Ref Range    Ventricular Rate 80 BPM    Atrial Rate 80 BPM    P-R Interval 140 ms    QRS Duration 98 ms    Q-T Interval 400 ms    QTC Calculation (Bezet) 461 ms    Calculated P Axis 24 degrees    Calculated R Axis 6 degrees    Calculated T Axis -10 degrees    Diagnosis       Normal sinus rhythm  Inferior infarct (cited on or before 28-MAY-2022)  Abnormal ECG  When compared with ECG of 28-MAY-2022 05:25,  No significant change was found  Confirmed by Tyson Roman MD, ----- ([id number]) on 6/1/2022 8:20:16 AM     EKG, 12 LEAD, SUBSEQUENT   Result Value Ref Range    Ventricular Rate 124 BPM    Atrial Rate 153 BPM    QRS Duration 94 ms    Q-T Interval 322 ms    QTC Calculation (Bezet) 462 ms    Calculated R Axis 17 degrees    Calculated T Axis 14 degrees    Diagnosis       Atrial fibrillation with rapid ventricular response  Inferior infarct (cited on or before 28-MAY-2022)  Abnormal ECG  When compared with ECG of 31-MAY-2022 12:08,  Atrial fibrillation has replaced Sinus rhythm  Vent. rate has increased BY  44 BPM  Confirmed by Kj Galdamez ([id number]) on 6/3/2022 3:52:04 PM     TYPE & SCREEN   Result Value Ref Range    Crossmatch Expiration 05/30/2022,2359     ABO/Rh(D) O POSITIVE     Antibody screen NEG     CALLED TO: KISHORE CVT PREOP, 0750, 5/27/22 BY 4768     Unit number [id number]     Blood component type RC LR     Unit division 00     Status of unit REL FROM [facility]     Crossmatch result Compatible     Unit number [id number]     Blood component type RC LR     Unit division 00     Status of unit REL FROM [facility]     Crossmatch result Compatible    RBC, ALLOCATE   Result Value Ref Range    HISTORY CHECKED?  Historical check performed    PLATELETS, ALLOCATE   Result Value Ref Range    CALLED TO: ЕКАТЕРИНА CVT OR, 1120, 5/27/22 BY 4768     Unit number [id number]     Blood component type PLP,LR PSTC2     Unit division 00     Status of unit TRANSFUSED     Unit number [id number]     Blood component type PLP,LR PSTC2     Unit division 00     Status of unit TRANSFUSED    PLATELETS, ALLOCATE   Result Value Ref Range    CALLED TO: REX MELENDEZ CVT OR AT 18:20 ON 05/27/2022 BY AKM     Unit number [id number]     Blood component type PLP,LR PSTC1     Unit division 00     Status of unit TRANSFUSED     Unit number [id number]     Blood component type PLP,LR PSTC2     Unit division 00     Status of unit TRANSFUSED         Physical Exam  Patient appears well, she is pleasant, alert, oriented x 3, in no distress. ENT normal.  Neck supple. No adenopathy or thyromegaly. NILESH. Lungs are clear, good air entry, no wheezes  Cardiovascular, S1 and S2 normal, no murmurs, regular rate and rhythm. Chest wall negative for tenderness  Abdomen is soft without tenderness, guarding  /Anorectal, deferred. Muscleskeletal, no swelling, no tenderness, no injury. Extremities show no edema  Neurological is normal without focal findings. Skin: no concerning lesions. Psych: normal affect. Mood good. Oriented x 3. Vitals:    08/01/22 0935   BP: 129/89   Pulse: 85   Resp: 17   Temp: 98.2 °F (36.8 °C)   TempSrc: Temporal   SpO2: 95%   Weight: 232 lb (105.2 kg)   Height: 5' 1\" (1.549 m)   PainSc:   0 - No pain       *Plan of care reviewed with patient. Patient in agreement with plan and expresses understanding. All questions answered and patient encouraged to call or RTO if further questions or concerns.            AKIRA Zuniga

## 2022-08-01 NOTE — PROGRESS NOTES
Balloon removed intact.   Problem: Discharge Planning  Goal: *Discharge to safe environment  Outcome: Progressing Towards Goal     Problem: Pressure Injury - Risk of  Goal: *Prevention of pressure injury  Description: Document Pipe Scale and appropriate interventions in the flowsheet. Outcome: Progressing Towards Goal  Note: Pressure Injury Interventions:  Sensory Interventions: Assess changes in LOC, Assess need for specialty bed, Keep linens dry and wrinkle-free    Moisture Interventions: Absorbent underpads, Apply protective barrier, creams and emollients, Assess need for specialty bed    Activity Interventions: Assess need for specialty bed, PT/OT evaluation, Pressure redistribution bed/mattress(bed type)    Mobility Interventions: Assess need for specialty bed, Float heels, HOB 30 degrees or less    Nutrition Interventions: Document food/fluid/supplement intake, Discuss nutritional consult with provider    Friction and Shear Interventions: Apply protective barrier, creams and emollients, Feet elevated on foot rest, HOB 30 degrees or less                Problem: Patient Education: Go to Patient Education Activity  Goal: Patient/Family Education  Outcome: Progressing Towards Goal     Problem: Falls - Risk of  Goal: *Absence of Falls  Description: Document Jordan Fall Risk and appropriate interventions in the flowsheet.   Outcome: Progressing Towards Goal  Note: Fall Risk Interventions:  Mobility Interventions: Assess mobility with egress test, Bed/chair exit alarm              Elimination Interventions: Bed/chair exit alarm, Call light in reach              Problem: Patient Education: Go to Patient Education Activity  Goal: Patient/Family Education  Outcome: Progressing Towards Goal     Problem: Patient Education: Go to Patient Education Activity  Goal: Patient/Family Education  Outcome: Progressing Towards Goal     Problem: Cath Lab Procedures: Post-Cath Day of Procedure (Initiate SCIP Measures for Post-Op Care)  Goal: Off Pathway (Use only if patient is Off Pathway)  Outcome: Progressing Towards Goal  Goal: Activity/Safety  Outcome: Progressing Towards Goal  Goal: Consults, if ordered  Outcome: Progressing Towards Goal  Goal: Diagnostic Test/Procedures  Outcome: Progressing Towards Goal  Goal: Nutrition/Diet  Outcome: Progressing Towards Goal  Goal: Discharge Planning  Outcome: Progressing Towards Goal  Goal: Medications  Outcome: Progressing Towards Goal  Goal: Respiratory  Outcome: Progressing Towards Goal  Goal: Treatments/Interventions/Procedures  Outcome: Progressing Towards Goal  Goal: Psychosocial  Outcome: Progressing Towards Goal  Goal: *Procedure site is without bleeding and signs of infection six hours post sheath removal  Outcome: Progressing Towards Goal  Goal: *Hemodynamically stable  Outcome: Progressing Towards Goal  Goal: *Optimal pain control at patient's stated goal  Outcome: Progressing Towards Goal     Problem: Cath Lab Procedures: Post-Cath Day 1  Goal: Off Pathway (Use only if patient is Off Pathway)  Outcome: Progressing Towards Goal  Goal: Activity/Safety  Outcome: Progressing Towards Goal  Goal: Diagnostic Test/Procedures  Outcome: Progressing Towards Goal  Goal: Nutrition/Diet  Outcome: Progressing Towards Goal  Goal: Discharge Planning  Outcome: Progressing Towards Goal  Goal: Medications  Outcome: Progressing Towards Goal  Goal: Respiratory  Outcome: Progressing Towards Goal  Goal: Treatments/Interventions/Procedures  Outcome: Progressing Towards Goal  Goal: Psychosocial  Outcome: Progressing Towards Goal     Problem: Cath Lab Procedures: Discharge Outcomes  Goal: *Stable cardiac rhythm  Outcome: Progressing Towards Goal  Goal: *Hemodynamically stable  Outcome: Progressing Towards Goal  Goal: *Optimal pain control at patient's stated goal  Outcome: Progressing Towards Goal  Goal: *Pulses palpable, skin color within defined limits, skin temperature warm  Outcome: Progressing Towards Goal  Goal: *Lungs clear or at baseline  Outcome: Progressing Towards Goal  Goal: *Demonstrates ability to perform prescribed activity without shortness of breath or discomfort  Outcome: Progressing Towards Goal  Goal: *Verbalizes home exercise program, activity guidelines, cardiac precautions  Outcome: Progressing Towards Goal  Goal: *Verbalizes understanding and describes prescribed diet  Outcome: Progressing Towards Goal  Goal: *Verbalizes understanding and describes medication purposes and frequencies  Outcome: Progressing Towards Goal  Goal: *Identifies cardiac risk factors  Outcome: Progressing Towards Goal  Goal: *No signs and symptoms of infection or wound complications  Outcome: Progressing Towards Goal  Goal: *Anxiety reduced or absent  Outcome: Progressing Towards Goal  Goal: *Verbalizes and demonstrates incision care  Outcome: Progressing Towards Goal  Goal: *Understands and describes signs and symptoms to report to providers(Stroke Metric)  Outcome: Progressing Towards Goal  Goal: *Describes follow-up/return visits to physicians  Outcome: Progressing Towards Goal  Goal: *Describes available resources and support systems  Outcome: Progressing Towards Goal  Goal: *Influenza immunization  Outcome: Progressing Towards Goal  Goal: *Pneumococcal immunization  Outcome: Progressing Towards Goal

## 2022-08-04 RX ORDER — ISOSORBIDE DINITRATE 10 MG/1
10 TABLET ORAL 2 TIMES DAILY
Qty: 60 TABLET | Refills: 1 | Status: SHIPPED | OUTPATIENT
Start: 2022-08-04 | End: 2022-08-16 | Stop reason: ALTCHOICE

## 2022-08-04 NOTE — TELEPHONE ENCOUNTER
PCP: Odalys Galaviz NP    Last appt: 6/28/2022  Future Appointments   Date Time Provider Miranda Osborne   8/16/2022  9:00 AM MD Kal Medina. [facility] [address] BS AMB   9/12/2022  8:45 AM Odalys Galaviz NP [facility] AMB   9/29/2022  9:00 AM Aniceto Lemos MD [facility] AMB   11/1/2022  9:15 AM Odalys Galaviz NP [facility] AMB       Requested Prescriptions      No prescriptions requested or ordered in this encounter

## 2022-08-15 LAB — COLOGUARD TEST, EXTERNAL: NEGATIVE

## 2022-08-16 ENCOUNTER — OFFICE VISIT (OUTPATIENT)
Dept: CARDIOLOGY CLINIC | Age: 52
End: 2022-08-16
Payer: MEDICAID

## 2022-08-16 VITALS
OXYGEN SATURATION: 98 % | WEIGHT: 239.2 LBS | BODY MASS INDEX: 45.2 KG/M2 | HEART RATE: 72 BPM | SYSTOLIC BLOOD PRESSURE: 122 MMHG | DIASTOLIC BLOOD PRESSURE: 82 MMHG | TEMPERATURE: 97.1 F

## 2022-08-16 DIAGNOSIS — I10 ESSENTIAL HYPERTENSION WITH GOAL BLOOD PRESSURE LESS THAN 140/90: ICD-10-CM

## 2022-08-16 DIAGNOSIS — E78.00 PURE HYPERCHOLESTEROLEMIA: ICD-10-CM

## 2022-08-16 DIAGNOSIS — I25.83 CORONARY ARTERY DISEASE DUE TO LIPID RICH PLAQUE: Primary | ICD-10-CM

## 2022-08-16 DIAGNOSIS — I25.10 CORONARY ARTERY DISEASE DUE TO LIPID RICH PLAQUE: Primary | ICD-10-CM

## 2022-08-16 PROCEDURE — 99214 OFFICE O/P EST MOD 30 MIN: CPT | Performed by: INTERNAL MEDICINE

## 2022-08-16 RX ORDER — LISINOPRIL 40 MG/1
40 TABLET ORAL DAILY
COMMUNITY
Start: 2022-07-22

## 2022-08-16 RX ORDER — AMIODARONE HYDROCHLORIDE 200 MG/1
200 TABLET ORAL
Qty: 90 TABLET | Refills: 1 | Status: SHIPPED | OUTPATIENT
Start: 2022-08-17

## 2022-08-16 RX ORDER — ISOSORBIDE MONONITRATE 30 MG/1
30 TABLET, EXTENDED RELEASE ORAL
Qty: 90 TABLET | Refills: 3 | Status: SHIPPED | OUTPATIENT
Start: 2022-08-16 | End: 2022-09-28

## 2022-08-16 NOTE — PROGRESS NOTES
Identified pt with two pt identifiers(name and ). Reviewed record in preparation for visit and have obtained necessary documentation. Alice Quiles presents today for   Chief Complaint   Patient presents with    Follow-up       Pt c/o CABG incision soreness and numbness            Alice Quiles preferred language for health care discussion is english/other. Personal Protective Equipment:   Personal Protective Equipment was used including: mask-surgical and hands-gloves. Patient was placed on no precaution(s). Patient was masked. Precautions:   Patient currently on None  Patient currently roomed with door closed. Is someone accompanying this pt? no    Is the patient using any DME equipment during [address]? no    Depression Screening:  3 most recent PHQ Screens 2022   Little interest or pleasure in doing things Not at all   Feeling down, depressed, irritable, or hopeless Not at all   Total Score PHQ 2 0       Learning Assessment:  Learning Assessment 2022   PRIMARY LEARNER Patient   HIGHEST LEVEL OF EDUCATION - PRIMARY LEARNER  GRADUATED HIGH SCHOOL OR GED   BARRIERS PRIMARY LEARNER NONE   CO-LEARNER CAREGIVER No   PRIMARY LANGUAGE ENGLISH   LEARNER PREFERENCE PRIMARY LISTENING   ANSWERED BY patient   RELATIONSHIP SELF       Abuse Screening:  Abuse Screening Questionnaire 2022   Do you ever feel afraid of your partner? N   Are you in a relationship with someone who physically or mentally threatens you? N   Is it safe for you to go home? Y       Fall Risk  Fall Risk Assessment, last 12 mths 3/30/2020   Able to walk? Yes   Fall in past 12 months? No       Pt currently taking Anticoagulant therapy? No   Pt currently taking Antiplatelet therapy? yes    Coordination of Care:  1. Have you been to the ER, urgent care clinic since your last visit? Hospitalized since your last visit? no    2.  Have you seen or consulted any other health care providers outside of the [address] [place] [address] since your last visit? Include any pap smears or colon screening. no      Please see Red banners under Allergies and Med Rec to remove outside inquires. All correct information has been verified with patient and added to chart.      Medication's patient's would liked removed has been marked not taking to be removed per Verbal order and read back per Janice Gallegos MD

## 2022-08-16 NOTE — PATIENT INSTRUCTIONS
New Medication/Medication Changes    Decrease Amiodarone to 200mg on Monday/Wednesday/Fridays (3 days/week)    Stop Isordil    Start Imdur 30 mg daily    **please allow 24-48 hrs for medication to be escribed to pharmacy** If you need any refills on medications please contact your pharmacy so that the request can be escribed to the provider for review.

## 2022-08-16 NOTE — PROGRESS NOTES
[facility]    Ms. Enrico Kehr is a 46 y.o. female with history of coronary artery disease status post inferior STEMI status post coronary stent, hypertension, hyperlipidemia, DJD, obesity    Patient had inferior STEMI in 03/2020 and underwent emergent cardiac catheterization. He she was found to have a three-vessel coronary disease. She had proximal as well as mid-distal RCA stenting with 3.0 x 33 mm as well as 3.0 x 18 mm Xience TEJAL. For unstable anginal symptoms, patient underwent cardiac catheterization again in 05/2022 which showed severe three-vessel coronary artery disease and patient underwent CABG x4 with LIMA to LAD, left radial to OM, SVG to PDA and SVG to diagonal by Dr. Mable Hong at [facility] in 05/2022. Patient is here today for follow-up appointment for her CAD and hypertension. Unfortunately patient is grieving at this time because of loss of her  last week. She denies any chest pain or chest tightness or use of any nitroglycerin. She uses Lasix only as needed probably once a week. She has some tactile numbness at the site of surgical incision. She is taking all her medication as prescribed  Denies any nausea, vomiting, abdominal pain, fever, chills, sputum production. No hematuria or other bleeding complaints    Past Medical History:   Diagnosis Date    CAD (coronary artery disease)     Displacement of lumbar intervertebral disc     HLD (hyperlipidemia)     Hypertension     Lumbar radiculitis     Lumbar sprain     Numbness     legs and arms    S/P CABG x 4 5/27/2022    LIMA-LAD, Left radial to OM, rSVG to PDA, rSVG to diag by Dr. Mable Hong    STEMI (ST elevation myocardial infarction) [facility]) 03/2020       Review of Systems:  Cardiac symptoms as noted above in HPI. All others negative.     Current Outpatient Medications   Medication Sig    isosorbide dinitrate (ISORDIL) 10 mg tablet Take 1 Tablet by mouth two (2) times a day. amiodarone (CORDARONE) 200 mg tablet Take 200 mg by mouth two (2) times a day. nitroglycerin (NITROSTAT) 0.4 mg SL tablet place 1 tablet under the tongue if needed every 5 minutes for chest pain for 3 doses IF NO RELIEF AFTER THIRD DOSE CALL PRESCRIBER .    furosemide (LASIX) 40 mg tablet Take 1 Tablet by mouth daily. As needed for Edema    metoprolol tartrate (LOPRESSOR) 25 mg tablet Take 0.5 Tablets by mouth every twelve (12) hours. atorvastatin (LIPITOR) 80 mg tablet Take 1 Tablet by mouth nightly. aspirin 81 mg chewable tablet Take 1 Tablet by mouth daily. lisinopriL (PRINIVIL, ZESTRIL) 40 mg tablet Take 40 mg by mouth in the morning. cyclobenzaprine (FLEXERIL) 10 mg tablet Take 0.5 Tablets by mouth three (3) times daily as needed for Muscle Spasm(s). acetaminophen (TYLENOL) 325 mg tablet Take 2 Tablets by mouth every six (6) hours as needed for Pain or Fever. therapeutic multivitamin (THERAGRAN) tablet Take 1 Tablet by mouth daily. albuterol (Proventil HFA) 90 mcg/actuation inhaler Take 2 Puffs by inhalation every four (4) hours as needed for Wheezing, Shortness of Breath or Respiratory Distress. No current facility-administered medications for this visit.        Past Surgical History:   Procedure Laterality Date    HX APPENDECTOMY  2014    HX CHOLECYSTECTOMY  2013    HX GI      multiple esophageal surgeries in childhood    HX HEENT      \"surgery on esophagus\"    HX HYSTERECTOMY  2003    HX LUMBAR DISKECTOMY         Allergies and Sensitivities:  Allergies   Allergen Reactions    Pcn [Penicillins] Itching     Mild pruritis as a child         Family History:  Family History   Problem Relation Age of Onset    Hypertension Mother     Hypertension Father     Heart Disease Father     Hypertension Maternal Grandmother     Heart Disease Maternal Grandmother     Hypertension Paternal Grandmother     Heart Disease Paternal Grandmother        Social History:  Social History     Tobacco Use    Smoking status: Former     Packs/day: 1.00     Years: 35.00     Pack years: 35.00     Types: Cigarettes     Quit date: 2020     Years since quittin.5    Smokeless tobacco: Former     Quit date: 2017    Tobacco comments:     pt states she smoked about 1.5 packs a day for 30-35 years and quit on 17   Substance Use Topics    Alcohol use: Yes     Alcohol/week: 4.0 standard drinks     Types: 4 Shots of liquor per week     Comment: occ    Drug use: No     She  reports that she quit smoking about 2 years ago. Her smoking use included cigarettes. She has a 35.00 pack-year smoking history. She quit smokeless tobacco use about 5 years ago. She  reports current alcohol use of about 4.0 standard drinks per week. Physical Exam:  BP Readings from Last 3 Encounters:   22 122/82   22 129/89   22 (!) 146/86         Pulse Readings from Last 3 Encounters:   22 72   22 85   22 79          Wt Readings from Last 3 Encounters:   22 108.5 kg (239 lb 3.2 oz)   22 105.2 kg (232 lb)   22 105.2 kg (232 lb)       Constitutional: Oriented to person, place, and time. HENT: Head: Normocephalic and atraumatic. Neck: No JVD present  Cardiovascular: Regular rhythm. No murmur, gallop or rubs appreciated  Lung: Breath sounds normal. No respiratory distress. No ronchi or rales appreciated  Abdominal: No tenderness. No rebound and no guarding. Musculoskeletal: There is no lower extremity edema.  No cynosis    LABS:   @  Lab Results   Component Value Date/Time    WBC 11.8 2022 04:24 AM    Hemoglobin, POC 7.8 (L) 2022 04:47 PM    HGB 8.6 (L) 2022 04:24 AM    Hematocrit, POC 23 (L) 2022 04:47 PM    HCT 27.4 (L) 2022 04:24 AM    PLATELET 655  04:24 AM    MCV 88.4 2022 04:24 AM     Lab Results   Component Value Date/Time    Sodium 141 2022 04:24 AM    Potassium 4.1 2022 04:24 AM    Chloride 109 2022 04:24 AM    CO2 27 2022 04:24 AM    Glucose 89 2022 04:24 AM    BUN 7 2022 04:24 AM    Creatinine 0.65 2022 04:24 AM     Lipids Latest Ref Rng & Units 2022 10/13/2021 3/16/2020   Chol, Total 100 - 199 mg/dL 223(H) 243(H) 184   HDL >39 mg/dL 50 53 34(L)   LDL 0 - 99 mg/dL 141(H) 150. 2(H) 110(H)   Trig 0 - 149 mg/dL 177(H) 199(H) 200(H)   Chol/HDL Ratio 0 - 5.0   - 4.6 5.4(H)   Some recent data might be hidden     Lab Results   Component Value Date/Time    ALT (SGPT) 43 2022 04:00 AM     Lab Results   Component Value Date/Time    Hemoglobin A1c 5.9 (H) 2022 04:00 AM     Lab Results   Component Value Date/Time    TSH 0.79 10/13/2021 11:04 AM     EK2022: Sinus rhythm at 66 bpm.  Inferior Q waves. No ST changes of ischemia    ECHO ADULT COMPLETE 05/10/2022 5/10/2022    Left Ventricle: Normal left ventricular systolic function with a visually estimated EF of 55 - 60%. Left ventricle size is normal. Mild septal thickening. See diagram for wall motion findings. Diastolic dysfunction present with normal LV EF. Aortic Valve: Tricuspid valve. Mitral Valve: Mildly thickened leaflet. Left Atrium: Left atrium is mildly dilated. Left atrial volume index is mildly increased (35-41 mL/m2). Aorta: Normal sized aortic arch. Mildly dilated aortic root. Ao Root diameter is 3.7 cm. CATH (2020)  Acute inferior STEMI with ongoing chest pain and borderline shock. Diffuse three-vessel CAD. Culprit is mid % occlusion with distal segment collateralized from the left coronary system. LAD long 85-90% stenosis in mid segment. Circumflex with diffuse 20 to 30% with mid/distal focal 75% stenosis. Successful angioplasty and stent of mid % occlusion to residual 0% with resumption of LAURA-3 flow. The proximal RCA focal 85% stenosis stented to 0% using 3.5 mm TEJAL.   Patient will need staged LAD stent either during this hospitalization or within the next 3-4 weeks. CABG was not entertained with the fact that she was having acute inferior STEMI with borderline cardiogenic shock, and severe, persistent chest pains. IMPRESSION & PLAN:  Ms. Alvaro Pichardo is 46 y.o. female with coronary disease, hypertension, hyperlipidemia    CAD:  Inferior STEMI in 03/2020 s/p proximal as well as mid-distal RCA 3.5 x 18 and 3.0 x 23 mm TEJAL. Unstable angina requiring cardiac catheterization in 05/2022 which showed severe three-vessel coronary disease  S/p CABG with LIMA to LAD, left radial to OM, SVG to PDA and diagonal in 05/2022  Currently on aspirin, Lasix, amiodarone, metoprolol, atorvastatin, Imdur. No angina at this time denies any use of nitroglycerin    Hypertension: /82. Currently on metoprolol, isosorbide, Lasix. I will change isosorbide 10 mg 3 times daily to Imdur 30 mg daily. Atrial fibrillation:  Patient had a postoperative episode of atrial fibrillation after CABG in 05/2022. She was given IV amiodarone and then oral amiodarone. Currently she remains on amiodarone. Exam suggest sinus rhythm. I will change amiodarone from 2 her milligrams twice daily to to her milligrams every other day. Plan is to consider discontinuing amiodarone on next visit if remains in sinus rhythm. Currently on aspirin    Hyperlipidemia:  Currently on atorvastatin 80 mg daily. Repeat fasting lipid profile    This plan was discussed with patient who is in agreement. Thank you for allowing me to participate in patient care. Please feel free to call me if you have any question or concern. Arvin Peterson MD  Please note: This document has been produced using voice recognition software. Unrecognized errors in transcription may be present.

## 2022-08-16 NOTE — LETTER
8/16/2022    Patient: Tripp Braswell   YOB: 1970   Date of Visit: 8/16/2022     Venkat Thayer NP  [id number] [facility] [address]    Dear Venkat Thayer NP,      Thank you for referring Ms. Tripp Braswell to [facility] AT [facility] for evaluation. My notes for this consultation are attached. If you have questions, please do not hesitate to call me. I look forward to following your patient along with you.       Sincerely,    Josiah Mendes MD

## 2022-09-12 ENCOUNTER — OFFICE VISIT (OUTPATIENT)
Dept: FAMILY MEDICINE CLINIC | Age: 52
End: 2022-09-12
Payer: MEDICAID

## 2022-09-12 VITALS
OXYGEN SATURATION: 93 % | DIASTOLIC BLOOD PRESSURE: 80 MMHG | RESPIRATION RATE: 15 BRPM | WEIGHT: 235 LBS | TEMPERATURE: 97.9 F | SYSTOLIC BLOOD PRESSURE: 117 MMHG | BODY MASS INDEX: 44.37 KG/M2 | HEART RATE: 71 BPM | HEIGHT: 61 IN

## 2022-09-12 DIAGNOSIS — J45.20 MILD INTERMITTENT ASTHMA WITHOUT STATUS ASTHMATICUS WITHOUT COMPLICATION: ICD-10-CM

## 2022-09-12 DIAGNOSIS — K30 INDIGESTION: ICD-10-CM

## 2022-09-12 DIAGNOSIS — F43.21 GRIEF REACTION: Primary | ICD-10-CM

## 2022-09-12 DIAGNOSIS — F51.04 PSYCHOPHYSIOLOGICAL INSOMNIA: ICD-10-CM

## 2022-09-12 DIAGNOSIS — M54.6 ACUTE MIDLINE THORACIC BACK PAIN: ICD-10-CM

## 2022-09-12 PROCEDURE — 99214 OFFICE O/P EST MOD 30 MIN: CPT | Performed by: NURSE PRACTITIONER

## 2022-09-12 RX ORDER — ALBUTEROL SULFATE 90 UG/1
2 AEROSOL, METERED RESPIRATORY (INHALATION)
Qty: 1 EACH | Refills: 4 | Status: SHIPPED | OUTPATIENT
Start: 2022-09-12

## 2022-09-12 RX ORDER — OMEPRAZOLE 20 MG/1
20 CAPSULE, DELAYED RELEASE ORAL
Qty: 30 CAPSULE | Refills: 3 | Status: SHIPPED | OUTPATIENT
Start: 2022-09-12

## 2022-09-12 RX ORDER — DULOXETIN HYDROCHLORIDE 30 MG/1
30 CAPSULE, DELAYED RELEASE ORAL DAILY
Qty: 30 CAPSULE | Refills: 0 | Status: SHIPPED | OUTPATIENT
Start: 2022-09-12

## 2022-09-12 NOTE — PROGRESS NOTES
Kayla Patel is a 46 y.o. female and presents with    Chief Complaint   Patient presents with    Hypertension           Headache    Back Pain         Is someone accompanying this pt? no    Is the patient using any DME equipment during OV? no    1. Have you been to the ER, urgent care clinic since your last visit? Hospitalized since your last visit? no    2. Have you seen or consulted any other health care providers outside of the [address] [place] [address] since your last visit? no     3. For patients aged 39-70: Has the patient had a colonoscopy / FIT/ Cologuard? Due      If the patient is female:    4. For patients aged 41-77: Has the patient had a mammogram within the past 2 years? Due      5. For patients aged 21-65: Has the patient had a pap smear?  Not due

## 2022-09-12 NOTE — PROGRESS NOTES
Jonathon Barber is a 46 y.o. female  established patient, here for evaluation of the following chief complaint(s):  Chief Complaint   Patient presents with    Hypertension           Headache    Back Pain        Assessment and Plan  1. Grief reaction  -     DULoxetine (Cymbalta) 30 mg capsule; Take 1 Capsule by mouth daily. For neuropathy  Indications: anxiousness associated with depression, Normal, Disp-30 Capsule, R-0  2. Psychophysiological insomnia  3. Indigestion  -     omeprazole (PRILOSEC) 20 mg capsule; Take 1 Capsule by mouth daily as needed (reflux). , Normal, Disp-30 Capsule, R-3  4. Mild intermittent asthma without status asthmaticus without complication  -     albuterol (Proventil HFA) 90 mcg/actuation inhaler; Take 2 Puffs by inhalation every four (4) hours as needed for Wheezing, Shortness of Breath or Respiratory Distress., Normal, Disp-1 Each, R-4  5. Acute midline thoracic back pain     Follow-up and Dispositions    Return in about 1 month (around 10/12/2022) for Mood (15). HPI:   In office visit. Patient appears well. Her  recently. She has been sad. She has children the area. Patent declined referral for talk therapy. Sh has had some thoracic burning back pain for week. We will do a trial of Prilosec related to acid indigestion. She sees Dr. Mian Mckeon with cardiology. ROS:    General: negative for - chills, fever, weight changes or malaise  HEENT: no sore throat, nasal congestion, vision problems or ear problems  Respiratory: no cough, shortness of breath, or wheezing  Cardiovascular: no chest pain, palpitations, or dyspnea on exertion  Gastrointestinal: no abdominal pain, N/V, change in bowel habits  Musculoskeletal: + burning thoracic   Neurological: no headache or dizziness  Endo:  No polyuria or polydipsia  : no urinary  Psychological: positive for - anxiety, depression, sleeps issues    Prior to Admission medications    Medication Sig Start Date End Date Taking?  Authorizing Provider   DULoxetine (Cymbalta) 30 mg capsule Take 1 Capsule by mouth daily. For neuropathy  Indications: anxiousness associated with depression 9/12/22  Yes Renaldo Godfrey NP   omeprazole (PRILOSEC) 20 mg capsule Take 1 Capsule by mouth daily as needed (reflux). 9/12/22  Yes Taiwo Godfrey NP   albuterol (Proventil HFA) 90 mcg/actuation inhaler Take 2 Puffs by inhalation every four (4) hours as needed for Wheezing, Shortness of Breath or Respiratory Distress. 9/12/22  Yes Renaldo Godfrey NP   lisinopriL (PRINIVIL, ZESTRIL) 40 mg tablet Take 40 mg by mouth in the morning. 7/22/22  Yes Provider, Vidya   isosorbide mononitrate ER (IMDUR) 30 mg tablet Take 1 Tablet by mouth every morning. 8/16/22  Yes Milka Menendez MD   amiodarone (CORDARONE) 200 mg tablet Take 1 Tablet by mouth every Monday, Wednesday, Friday. 8/17/22  Yes Milka Menendez MD   cyclobenzaprine (FLEXERIL) 10 mg tablet Take 0.5 Tablets by mouth three (3) times daily as needed for Muscle Spasm(s). 7/18/22  Yes Renaldo Godfrey NP   nitroglycerin (NITROSTAT) 0.4 mg SL tablet place 1 tablet under the tongue if needed every 5 minutes for chest pain for 3 doses IF NO RELIEF AFTER THIRD DOSE CALL PRESCRIBER . 6/24/22  Yes Cleo MARR PA-C   furosemide (LASIX) 40 mg tablet Take 1 Tablet by mouth daily. As needed for Edema 6/9/22  Yes Hazel Jain PA-C   acetaminophen (TYLENOL) 325 mg tablet Take 2 Tablets by mouth every six (6) hours as needed for Pain or Fever. 6/3/22  Yes Hazel Jain PA-C   metoprolol tartrate (LOPRESSOR) 25 mg tablet Take 0.5 Tablets by mouth every twelve (12) hours. 6/3/22  Yes Hazel Jain PA-C   atorvastatin (LIPITOR) 80 mg tablet Take 1 Tablet by mouth nightly. 6/2/22  Yes Bradford Morfin PA-C   aspirin 81 mg chewable tablet Take 1 Tablet by mouth daily. 6/2/22  Yes Bradford Morfin PA-C   therapeutic multivitamin [facility]) tablet Take 1 Tablet by mouth daily.  6/3/22  Yes Navjot Martha Hill PA-C   albuterol (Proventil HFA) 90 mcg/actuation inhaler Take 2 Puffs by inhalation every four (4) hours as needed for Wheezing, Shortness of Breath or Respiratory Distress. 2/4/21 9/12/22  Yina Fontana,         Physical Exam  Patient appears well, she is pleasant, alert, oriented x 3, in no distress. Morbidly obese. ENT normal.  Neck supple. No adenopathy or thyromegaly. NILESH. Lungs are clear, good air entry, no wheezes  Cardiovascular, S1 and S2 normal, no murmurs, regular rate and rhythm. Chest wall negative for tenderness  Abdomen is soft without tenderness, guarding  /Anorectal, deferred. Muscleskeletal, no swelling, no tenderness, no injury. Extremities show no edema  Neurological is normal without focal findings. Skin: no concerning lesions. Psych: Emotional. Mood good. Oriented x 3. Vitals:    09/12/22 0852 09/12/22 0909 09/12/22 0922   BP: (!) 122/90 133/89 117/80   Pulse: 75  71   Resp: 15     Temp: 97.9 °F (36.6 °C)     TempSrc: Temporal     SpO2: 93%     Weight: 235 lb (106.6 kg)     Height: 5' 1\" (1.549 m)     PainSc:   5     PainLoc: Back         *Plan of care reviewed with patient. Patient in agreement with plan and expresses understanding. All questions answered and patient encouraged to call or RTO if further questions or concerns. On this date 09/12/2022 I have spent 35 minutes reviewing previous notes, test results and face to face with the patient discussing the diagnosis and importance of compliance with the treatment plan as well as documenting on the day of the visit.       AKIRA Rodarte

## 2022-09-28 RX ORDER — ISOSORBIDE DINITRATE 10 MG/1
TABLET ORAL
Qty: 60 TABLET | Refills: 1 | Status: SHIPPED | OUTPATIENT
Start: 2022-09-28

## 2022-11-01 ENCOUNTER — HOSPITAL ENCOUNTER (OUTPATIENT)
Dept: WOMENS IMAGING | Age: 52
Discharge: HOME | End: 2022-11-01
Attending: NURSE PRACTITIONER
Payer: MEDICAID

## 2022-11-01 ENCOUNTER — OFFICE VISIT (OUTPATIENT)
Dept: FAMILY MEDICINE CLINIC | Age: 52
End: 2022-11-01
Payer: MEDICAID

## 2022-11-01 VITALS
BODY MASS INDEX: 45.05 KG/M2 | TEMPERATURE: 97.7 F | SYSTOLIC BLOOD PRESSURE: 98 MMHG | DIASTOLIC BLOOD PRESSURE: 66 MMHG | WEIGHT: 238.6 LBS | HEIGHT: 61 IN | HEART RATE: 67 BPM | RESPIRATION RATE: 17 BRPM | OXYGEN SATURATION: 97 %

## 2022-11-01 DIAGNOSIS — Z71.3 ENCOUNTER FOR WEIGHT LOSS COUNSELING: ICD-10-CM

## 2022-11-01 DIAGNOSIS — S46.819A STRAIN OF TRAPEZIUS MUSCLE, UNSPECIFIED LATERALITY, INITIAL ENCOUNTER: Primary | ICD-10-CM

## 2022-11-01 DIAGNOSIS — E66.01 CLASS 3 SEVERE OBESITY DUE TO EXCESS CALORIES WITH SERIOUS COMORBIDITY AND BODY MASS INDEX (BMI) OF 45.0 TO 49.9 IN ADULT (HCC): ICD-10-CM

## 2022-11-01 DIAGNOSIS — F41.8 MIXED ANXIETY AND DEPRESSIVE DISORDER: ICD-10-CM

## 2022-11-01 DIAGNOSIS — S29.011A MUSCLE STRAIN OF CHEST WALL, INITIAL ENCOUNTER: ICD-10-CM

## 2022-11-01 DIAGNOSIS — M62.831 MUSCLE SPASM OF CALF: ICD-10-CM

## 2022-11-01 DIAGNOSIS — M25.551 CHRONIC HIP PAIN, BILATERAL: ICD-10-CM

## 2022-11-01 DIAGNOSIS — G89.29 CHRONIC HIP PAIN, BILATERAL: ICD-10-CM

## 2022-11-01 DIAGNOSIS — M25.552 CHRONIC HIP PAIN, BILATERAL: ICD-10-CM

## 2022-11-01 DIAGNOSIS — Z23 ENCOUNTER FOR IMMUNIZATION: ICD-10-CM

## 2022-11-01 DIAGNOSIS — Z12.31 ENCOUNTER FOR SCREENING MAMMOGRAM FOR MALIGNANT NEOPLASM OF BREAST: ICD-10-CM

## 2022-11-01 PROCEDURE — 3074F SYST BP LT 130 MM HG: CPT | Performed by: NURSE PRACTITIONER

## 2022-11-01 PROCEDURE — 90686 IIV4 VACC NO PRSV 0.5 ML IM: CPT | Performed by: NURSE PRACTITIONER

## 2022-11-01 PROCEDURE — 3078F DIAST BP <80 MM HG: CPT | Performed by: NURSE PRACTITIONER

## 2022-11-01 PROCEDURE — 77063 BREAST TOMOSYNTHESIS BI: CPT

## 2022-11-01 PROCEDURE — 99214 OFFICE O/P EST MOD 30 MIN: CPT | Performed by: NURSE PRACTITIONER

## 2022-11-01 RX ORDER — CYCLOBENZAPRINE HCL 10 MG
5 TABLET ORAL
Qty: 90 TABLET | Refills: 0 | Status: SHIPPED | OUTPATIENT
Start: 2022-11-01

## 2022-11-01 RX ORDER — DEXTROMETHORPHAN HYDROBROMIDE, GUAIFENESIN 5; 100 MG/5ML; MG/5ML
650 LIQUID ORAL EVERY 8 HOURS
Qty: 90 TABLET | Refills: 1 | Status: SHIPPED | OUTPATIENT
Start: 2022-11-01

## 2022-11-01 NOTE — PROGRESS NOTES
Jennifer Rowan is a 46 y.o. presents today for No chief complaint on file. Is someone accompanying this pt? No    Is the patient using any DME equipment during OV? No    There were no vitals taken for this visit. Depression Screening:   3 most recent PHQ Screens 11/1/2022   Little interest or pleasure in doing things Not at all   Feeling down, depressed, irritable, or hopeless Not at all   Total Score PHQ 2 0       Health Maintenance: reviewed and discussed and ordered per Provider. Health Maintenance Due   Topic Date Due    Hepatitis C Screening  Never done    Pneumococcal 0-64 years (1 - PCV) Never done    DTaP/Tdap/Td series (1 - Tdap) Never done    Shingrix Vaccine Age 50> (1 of 2) Never done    Low dose CT lung screening  Never done    Breast Cancer Screen Mammogram  05/28/2020    COVID-19 Vaccine (4 - Booster for Pfizer series) 04/12/2022    Flu Vaccine (1) 08/01/2022         Coordination of Care:   1. \"Have you been to the ER, urgent care clinic since your last visit? Hospitalized since your last visit? \" No    2. \"Have you seen or consulted any other health care providers outside of the [address] [place] [address] since your last visit? \" Yes , Cardiologist       3. For patients aged 39-70: Has the patient had a colonoscopy / FIT/ Cologuard? Yes - no Care Gap present    If the patient is female:    4. For patients aged 41-77: Has the patient had a mammogram within the past 2 years? No, has a apt on 11/1/2022    5. For patients aged 21-65: Has the patient had a pap smear? No     Advanced Directive:  1. Do you have an Advanced Directive? No     2. Would you like information on Advanced Directives?  No    Joseph High [facility]

## 2022-11-01 NOTE — PROGRESS NOTES
After obtaining verbal consent from Nanda Tariq, [facility] patient signed consent form to receive the FLU vaccine. Patient tolerated well with no adverse reactions. All questions answered and most recent VIS given to the patient upon departure.

## 2022-11-01 NOTE — PROGRESS NOTES
Edgar Desai is a 46 y.o. female  established patient, here for evaluation of the following chief complaint(s):  Chief Complaint   Patient presents with    Follow-up    Back Pain     Going on for months 8/10        Assessment and Plan  1. Strain of trapezius muscle, unspecified laterality, initial encounter  -     REFERRAL TO PHYSICAL THERAPY  -     acetaminophen (Tylenol Arthritis Pain) 650 mg TbER; Take 1 Tablet by mouth every eight (8) hours. Indications: pain, Normal, Disp-90 Tablet, R-1  2. Muscle strain of chest wall, initial encounter  -     cyclobenzaprine (FLEXERIL) 10 mg tablet; Take 0.5 Tablets by mouth three (3) times daily as needed for Muscle Spasm(s). , Normal, Disp-90 Tablet, R-0  3. Muscle spasm of calf  -     REFERRAL TO PHYSICAL THERAPY  4. Mixed anxiety and depressive disorder  5. Class 3 severe obesity due to excess calories with serious comorbidity and body mass index (BMI) of 45.0 to 49.9 in adult (HCC)  -     REFERRAL TO PHARMACIST  6. Encounter for immunization  -     INFLUENZA, FLUARIX, FLULAVAL, FLUZONE (AGE 6 MO+), AFLURIA(AGE 3Y+) IM, PF, 0.5 ML  7. Encounter for weight loss counseling  -     REFERRAL TO PHARMACIST  8. Chronic hip pain, bilateral  -     acetaminophen (Tylenol Arthritis Pain) 650 mg TbER; Take 1 Tablet by mouth every eight (8) hours. Indications: pain, Normal, Disp-90 Tablet, R-1     Follow-up and Dispositions    Return in about 3 months (around 2/1/2023) for routine, 15. HPI:   In office visit. Patient appears well. She has been sad and stressed related to her  dying. She has had bilateral trapezius pain that started a month. She denies any falls or injury. Patient sees cardiology with Dr. Luis Pak.  HX of  STEMI and CABG x4     ROS:    General: negative for - chills, fever, weight changes or malaise  HEENT: no sore throat, nasal congestion, vision problems or ear problems  Respiratory: no cough, shortness of breath, or wheezing  Cardiovascular: no chest pain, palpitations, or dyspnea on exertion  Gastrointestinal: no abdominal pain, N/V, change in bowel habits  Musculoskeletal: Positive for bilateral trapeze and neck pain  Neurological: no headache or dizziness  Endo:  No polyuria or polydipsia  : no urinary  Psychological: negative for - anxiety, depression, sleeps issues    Prior to Admission medications    Medication Sig Start Date End Date Taking? Authorizing Provider   cyclobenzaprine (FLEXERIL) 10 mg tablet Take 0.5 Tablets by mouth three (3) times daily as needed for Muscle Spasm(s). 11/1/22  Yes Luna KIRKPATRICK NP   acetaminophen (Tylenol Arthritis Pain) 650 mg TbER Take 1 Tablet by mouth every eight (8) hours. Indications: pain 11/1/22  Yes Luna KIRKPATRICK NP   isosorbide dinitrate (ISORDIL) 10 mg tablet take 1 tablet by mouth twice a day 9/28/22  Yes Zac Bryant MD   DULoxetine (Cymbalta) 30 mg capsule Take 1 Capsule by mouth daily. For neuropathy  Indications: anxiousness associated with depression 9/12/22  Yes Maxwell Godfrey NP   omeprazole (PRILOSEC) 20 mg capsule Take 1 Capsule by mouth daily as needed (reflux). 9/12/22  Yes Sonal Godfrey NP   albuterol (Proventil HFA) 90 mcg/actuation inhaler Take 2 Puffs by inhalation every four (4) hours as needed for Wheezing, Shortness of Breath or Respiratory Distress. 9/12/22  Yes Maxwell Godfrey NP   lisinopriL (PRINIVIL, ZESTRIL) 40 mg tablet Take 40 mg by mouth in the morning. 7/22/22  Yes Provider, Historical   amiodarone (CORDARONE) 200 mg tablet Take 1 Tablet by mouth every Monday, Wednesday, Friday. 8/17/22  Yes Zac Bryant MD   nitroglycerin (NITROSTAT) 0.4 mg SL tablet place 1 tablet under the tongue if needed every 5 minutes for chest pain for 3 doses IF NO RELIEF AFTER THIRD DOSE CALL PRESCRIBER . 6/24/22  Yes Jose MARR PA-C   furosemide (LASIX) 40 mg tablet Take 1 Tablet by mouth daily.  As needed for Edema 6/9/22  Yes Tyrone Charles PA-C   metoprolol tartrate (LOPRESSOR) 25 mg tablet Take 0.5 Tablets by mouth every twelve (12) hours. 6/3/22  Yes Jason Wagner PA-C   atorvastatin (LIPITOR) 80 mg tablet Take 1 Tablet by mouth nightly. 6/2/22  Yes Aren Morfin PA-C   aspirin 81 mg chewable tablet Take 1 Tablet by mouth daily. 6/2/22  Yes Aren Morfin PA-C   therapeutic multivitamin [facility]) tablet Take 1 Tablet by mouth daily. 6/3/22  Yes Aren Morfin PA-C   cyclobenzaprine (FLEXERIL) 10 mg tablet Take 0.5 Tablets by mouth three (3) times daily as needed for Muscle Spasm(s). 7/18/22 11/1/22  Ibis Lema NP   acetaminophen (TYLENOL) 325 mg tablet Take 2 Tablets by mouth every six (6) hours as needed for Pain or Fever. Patient not taking: Reported on 11/1/2022 6/3/22 11/1/22  Jason Wagner PA-C        Results for orders placed or performed in visit on 10/06/22    COLONOSCOPY   Result Value Ref Range    Cologuard Test, External Negative         Physical Exam  Patient appears well, she is pleasant, alert, oriented x 3, in no distress. Morbidly obese  ENT normal.  Neck supple. No adenopathy or thyromegaly. NILESH. Lungs are clear, good air entry, no wheezes  Cardiovascular, S1 and S2 normal, no murmurs, regular rate and rhythm. Chest wall negative for tenderness  Abdomen is soft without tenderness, guarding  /Anorectal, deferred. Muscleskeletal, no swelling, no tenderness, no injury. Extremities show no edema  Neurological is normal without focal findings. Skin: no concerning lesions. Psych: normal affect. Mood good. Oriented x 3. Vitals:    11/01/22 0940   BP: 98/66   Pulse: 67   Resp: 17   Temp: 97.7 °F (36.5 °C)   TempSrc: Temporal   SpO2: 97%   Weight: 238 lb 9.6 oz (108.2 kg)   Height: 5' 1\" (1.549 m)   PainSc:   8   PainLoc: Back       *Plan of care reviewed with patient. Patient in agreement with plan and expresses understanding.  All questions answered and patient encouraged to call or RTO if further questions or concerns. On this date 11/01/2022 I have spent 35 minutes reviewing previous notes, test results and face to face with the patient discussing the diagnosis and importance of compliance with the treatment plan as well as documenting on the day of the visit.       Sanya Aleman, NILAMC

## 2022-11-15 ENCOUNTER — OFFICE VISIT (OUTPATIENT)
Dept: FAMILY MEDICINE CLINIC | Age: 52
End: 2022-11-15

## 2022-11-15 VITALS — WEIGHT: 243 LBS | BODY MASS INDEX: 45.91 KG/M2

## 2022-11-15 DIAGNOSIS — R73.03 PREDIABETES: ICD-10-CM

## 2022-11-15 DIAGNOSIS — E66.01 CLASS 3 SEVERE OBESITY DUE TO EXCESS CALORIES WITH SERIOUS COMORBIDITY AND BODY MASS INDEX (BMI) OF 45.0 TO 49.9 IN ADULT (HCC): Primary | ICD-10-CM

## 2022-11-15 RX ORDER — PEN NEEDLE, DIABETIC 32 GX 1/6"
NEEDLE, DISPOSABLE MISCELLANEOUS
Qty: 100 PEN NEEDLE | Refills: 0 | Status: SHIPPED | OUTPATIENT
Start: 2022-11-15 | End: 2022-11-21

## 2022-11-15 NOTE — PROGRESS NOTES
Pharmacy Progress Note - Weight Management    S/O: Ms. Yohannes Welch is a 46 y.o. female with a PMH of CAD s/p CABG x 4, HTN, HLD, pre-diabetes, and class III obesity, referred by Clarisse Martínez NP, who was seen today for weight management visit. Current weight: 243 lbs / BMI: 45.91 kg/m2    Previous weight loss attempts:  - Patient denies using any medications to assist weight loss in the past.  - She reports losing 20-30 pounds several times in the past, but always gains it back. She reports that she lost weight previously by eating small meals/snacks frequently throughout the day. She gains the weight back when she gets stressed and changes her eating habits/makes poor diet choices. Nutrition/Lifestyle Modifications:  Diet:  - Eats 1 meals/day ; typically skips breakfast and lunch and just eats dinner. She eats fast food for dinner most nights of the week. - Reports drinking mostly water with lemon. Drinks Coke several days per week but not daily. Physical Activity:   - Patient reports being on her feet all day at work. She walks for about 30 minutes 3-4 times per week. Past Medical History:   Diagnosis Date    CAD (coronary artery disease)     Displacement of lumbar intervertebral disc     HLD (hyperlipidemia)     Hypertension     Lumbar radiculitis     Lumbar sprain     Numbness     legs and arms    S/P CABG x 4 5/27/2022    LIMA-LAD, Left radial to OM, rSVG to PDA, rSVG to diag by Dr. Ella Miller    STEMI (ST elevation myocardial infarction) [facility]) 03/2020       Labs/Vitals:   Wt Readings from Last 3 Encounters:   11/01/22 238 lb 9.6 oz (108.2 kg)   09/12/22 235 lb (106.6 kg)   08/16/22 239 lb 3.2 oz (108.5 kg)     BP Readings from Last 3 Encounters:   11/01/22 98/66   09/12/22 117/80   08/16/22 122/82       Lab Results   Component Value Date/Time    Sodium 141 06/03/2022 04:24 AM    Potassium 4.1 06/03/2022 04:24 AM    Chloride 109 06/03/2022 04:24 AM    CO2 27 06/03/2022 04:24 AM    Anion gap 5 06/03/2022 04:24 AM    Glucose 89 06/03/2022 04:24 AM    BUN 7 06/03/2022 04:24 AM    Creatinine 0.65 06/03/2022 04:24 AM    BUN/Creatinine ratio 11 (L) 06/03/2022 04:24 AM    GFR est AA >60 06/03/2022 04:24 AM    GFR est non-AA >60 06/03/2022 04:24 AM    Calcium 8.8 06/03/2022 04:24 AM    Bilirubin, total 0.2 05/28/2022 04:00 AM    Alk. phosphatase 51 05/28/2022 04:00 AM    Protein, total 5.3 (L) 05/28/2022 04:00 AM    Albumin 2.9 (L) 05/28/2022 04:00 AM    Globulin 2.4 05/28/2022 04:00 AM    A-G Ratio 1.2 05/28/2022 04:00 AM    ALT (SGPT) 43 05/28/2022 04:00 AM       Lab Results   Component Value Date/Time    Cholesterol, total 223 (H) 05/16/2022 12:00 AM    HDL Cholesterol 50 05/16/2022 12:00 AM    LDL, calculated 141 (H) 05/16/2022 12:00 AM    LDL, calculated 150.2 (H) 10/13/2021 11:04 AM    VLDL, calculated 32 05/16/2022 12:00 AM    VLDL, calculated 39.8 10/13/2021 11:04 AM    Triglyceride 177 (H) 05/16/2022 12:00 AM    CHOL/HDL Ratio 4.6 10/13/2021 11:04 AM       Lab Results   Component Value Date/Time    Hemoglobin A1c 5.9 (H) 05/28/2022 04:00 AM       A/P:    Weight Management:  - Patients has class III obesity with a BMI of 45.91 kg/m2. Patient has a history of several weight related co morbidities including cardiovascular disease and pre-diabetes. Patient is a candidate for medication therapy to assist with weight management. - Patients insurance does not cover anti-obesity medications. However, patient has a history of pre-diabetes (last A1c 5.9% in June 2022). Recommend starting Victoza to address glycemic issues and help facilitate weight loss. Received verbal authorization from PCP. Max dose of Victoza is lower than that of Saxenda; however, can still expect to provide some weight loss benefit. Reviewed medication benefits, side effects, and dosing/titration directions with patient. Demonstrated injection technique with demo pen.  Advised patient that medications are to be used in combination with diet, exercise, and  behavioral modifications to be most effective. - Educated patient on the impact that obesity has on her health. Encouraged patient to find eating style that is sustainable to have long term success. Recommend patient avoid skipping meals and instead eat small, frequent, low-calorie meals.   - Educated patient on eating at calorie deficit to promote weight loss. Avoid high fat meals and fast food which are high in calories. Avoid consumption of sugar sweetened beverages. Recommend eating more fruits, vegetables, proteins and whole grains.  - Discussed that a healthy and sustainable weight loss goal is 1-2 pounds per week. Goal weight loss after 3 months is ~5% of total body weight (12 lbs). - Follow up in 1 month. There are no discontinued medications. Orders Placed This Encounter    liraglutide (VICTOZA) 0.6 mg/0.1 mL (18 mg/3 mL) pnij     Sig: Inject 0.6 mg under the skin once daily for 1 week, then increase to 1.2 mg once daily for 1 week, then increase to 1.8 mg once daily thereafter. Dispense:  9 mL     Refill:  0    pen needle, diabetic (NovoFine Plus) 32 gauge x 1/6\" ndle     Sig: Use to inject victoza once daily     Dispense:  100 Pen Needle     Refill:  0       Patient verbalized understanding of the information presented and all of the patients questions were answered. AVS was handed to the patient. Patient advised to call the office with any additional questions or concerns. Notifications of recommendations will be sent to Kacey Cloud NP for review.     Thank you,  Talib Trinidad, [address] in place: Yes  Recommendation Provided To: Patient/Caregiver: 4 via In person  Intervention Detail: Device Training, New Rx: 2, reason: Needs Additional Therapy, and Scheduled Appointment  Intervention Accepted By: Patient/Caregiver: 4  Time Spent (min):  90

## 2022-11-16 DIAGNOSIS — R82.90 ABNORMAL URINE ODOR: Primary | ICD-10-CM

## 2022-11-20 ENCOUNTER — PATIENT MESSAGE (OUTPATIENT)
Dept: FAMILY MEDICINE CLINIC | Age: 52
End: 2022-11-20

## 2022-11-20 DIAGNOSIS — R73.03 PREDIABETES: Primary | ICD-10-CM

## 2022-11-21 ENCOUNTER — TELEPHONE (OUTPATIENT)
Dept: FAMILY MEDICINE CLINIC | Age: 52
End: 2022-11-21

## 2022-11-21 RX ORDER — PEN NEEDLE, DIABETIC 31 GX3/16"
NEEDLE, DISPOSABLE MISCELLANEOUS
Qty: 100 PEN NEEDLE | Refills: 1 | Status: SHIPPED | OUTPATIENT
Start: 2022-11-21

## 2022-11-21 NOTE — TELEPHONE ENCOUNTER
Order for BD pen needles sent in place of Novofine pen needles.         For Pharmacy [address] in place: Yes  Recommendation Provided To: Patient/Caregiver: 2 via [address] [place] [address] Detail: Discontinued Rx: 1, reason: Cost/Formulary Change and New Rx: 1, reason: Cost/Formulary Change  Intervention Accepted By: Patient/Caregiver: 2  Time Spent (min): 10

## 2022-11-21 NOTE — TELEPHONE ENCOUNTER
From: Liv Weller  To: Sarah Judd NP  Sent: 11/20/2022 9:34 AM EST  Subject: Mauricio Mate     At our visit Ata Magallanes gave me 2 prescriptions insurance paid for the pens but the needles have to be another brand if you can please let her know to call in a different prescription.  Thank you I don't have her name on here to let her know

## 2022-11-21 NOTE — TELEPHONE ENCOUNTER
Pt  needs a new rx sent over to pharmacy for insulin needles her insurance co will not cover to original rx.  [address]

## 2022-12-13 ENCOUNTER — OFFICE VISIT (OUTPATIENT)
Dept: FAMILY MEDICINE CLINIC | Age: 52
End: 2022-12-13

## 2022-12-13 VITALS — BODY MASS INDEX: 45.91 KG/M2 | WEIGHT: 243 LBS

## 2022-12-13 DIAGNOSIS — E66.01 CLASS 3 SEVERE OBESITY DUE TO EXCESS CALORIES WITH SERIOUS COMORBIDITY AND BODY MASS INDEX (BMI) OF 45.0 TO 49.9 IN ADULT (HCC): Primary | ICD-10-CM

## 2022-12-13 DIAGNOSIS — R73.03 PREDIABETES: ICD-10-CM

## 2022-12-13 NOTE — PROGRESS NOTES
Pharmacy Progress Note - Weight Management    S/O: Ms. Kimmy Medellin is a 46 y.o. female with a PMH of CAD s/p CABG x 4, HTN, HLD, pre-diabetes, and class III obesity, referred by Moira Licona NP, who was seen today for weight management follow up. Today's weight: 243 lbs  Weight at last visit (11/15/22): 243 lbs    Patient started on Victoza last month for pre-diabetes and to assist with weight loss. Patient states it is going well and she has not had any side effects. States she has not been eating potatoes and is trying to eat more meats and vegetables. She is also trying to eat more often throughout the day instead of skipping meals and eating one large meal a day. Patient denies any increase in physical activity. States she is still on her feet at work all day and her legs and knees hurt from this. Patient states she has a scale at home but has only weighed herself once in the past month. Key Obesity Meds               liraglutide (VICTOZA) 0.6 mg/0.1 mL (18 mg/3 mL) pnij Inject 0.6 mg under the skin once daily for 1 week, then increase to 1.2 mg once daily for 1 week, then increase to 1.8 mg once daily thereafter. Past Medical History:   Diagnosis Date    CAD (coronary artery disease)     Displacement of lumbar intervertebral disc     HLD (hyperlipidemia)     Hypertension     Lumbar radiculitis     Lumbar sprain     Numbness     legs and arms    S/P CABG x 4 5/27/2022    LIMA-LAD, Left radial to OM, rSVG to PDA, rSVG to diag by Dr. Sherry Jacobson    STEMI (ST elevation myocardial infarction) [facility]) 03/2020       Labs/Vitals:   Wt Readings from Last 3 Encounters:   11/15/22 243 lb (110.2 kg)   11/01/22 238 lb 9.6 oz (108.2 kg)   09/12/22 235 lb (106.6 kg)     BP Readings from Last 3 Encounters:   11/01/22 98/66   09/12/22 117/80   08/16/22 122/82       Lab Results   Component Value Date/Time    Sodium 141 06/03/2022 04:24 AM    Potassium 4.1 06/03/2022 04:24 AM    Chloride 109 06/03/2022 04:24 AM    CO2 27 06/03/2022 04:24 AM    Anion gap 5 06/03/2022 04:24 AM    Glucose 89 06/03/2022 04:24 AM    BUN 7 06/03/2022 04:24 AM    Creatinine 0.65 06/03/2022 04:24 AM    BUN/Creatinine ratio 11 (L) 06/03/2022 04:24 AM    GFR est AA >60 06/03/2022 04:24 AM    GFR est non-AA >60 06/03/2022 04:24 AM    Calcium 8.8 06/03/2022 04:24 AM    Bilirubin, total 0.2 05/28/2022 04:00 AM    Alk. phosphatase 51 05/28/2022 04:00 AM    Protein, total 5.3 (L) 05/28/2022 04:00 AM    Albumin 2.9 (L) 05/28/2022 04:00 AM    Globulin 2.4 05/28/2022 04:00 AM    A-G Ratio 1.2 05/28/2022 04:00 AM    ALT (SGPT) 43 05/28/2022 04:00 AM     Lab Results   Component Value Date/Time    Hemoglobin A1c 5.9 (H) 05/28/2022 04:00 AM       A/P:    Weight Management:  - Patients has class III obesity with a BMI of 45.91 kg/m2. Patient has a history of several weight related co morbidities including cardiovascular disease and pre-diabetes. Patient down 0 lbs since last visit. - Discussed that healthy weight loss is 1-2 lbs/week. Advised patient to weigh herself at home at least once weekly to assess progress. If her weight is not coming down, need to adjust lifestyle to increase calorie deficit.  - Encouraged patient to continue making positive dietary changes. Provided patient with educational handout discussing the link between calories and weight, how to track calories you take in and burn, and how to burn more calories than you take in.  - Continue Victoza 1.8 mg once daily.  - Follow up in ~6 weeks. Patient verbalized understanding of the information presented and all of the patients questions were answered. AVS was handed to the patient. Patient advised to call the office with any additional questions or concerns. Notifications of recommendations will be sent to Oren Al NP for review. Patient will return to clinic in 6 week(s) for follow up.      Thank you,  Sena Saenz, PharmD, [address] in [address]:  Yes  Recommendation Provided To: Patient/Caregiver: 2 via In person  Intervention Detail: Scheduled Appointment  Intervention Accepted By: Patient/Caregiver: 2  Time Spent (min):  35

## 2022-12-15 RX ORDER — ISOSORBIDE DINITRATE 10 MG/1
TABLET ORAL
Qty: 60 TABLET | Refills: 1 | Status: SHIPPED | OUTPATIENT
Start: 2022-12-15

## 2023-01-05 DIAGNOSIS — S29.011A MUSCLE STRAIN OF CHEST WALL, INITIAL ENCOUNTER: ICD-10-CM

## 2023-01-05 DIAGNOSIS — F43.21 GRIEF REACTION: ICD-10-CM

## 2023-01-05 NOTE — TELEPHONE ENCOUNTER
This patient contacted the office for the following prescriptions to be refilled:    Medication requested :   Requested Prescriptions     Pending Prescriptions Disp Refills    cyclobenzaprine (FLEXERIL) 10 mg tablet 90 Tablet 0     Sig: Take 0.5 Tablets by mouth three (3) times daily as needed for Muscle Spasm(s). DULoxetine (Cymbalta) 30 mg capsule 30 Capsule 0     Sig: Take 1 Capsule by mouth daily. For neuropathy  Indications: anxiousness associated with depression      PCP: Junior Sara NP  LOV: 12/13/2022  NOV DMA: 1/31/2023  FUTURE APPT:   Future Appointments   Date Time Provider Miranda India   1/31/2023 10:15 AM Junior Sara NP [facility] BS AMB   1/31/2023 11:00 AM Shanell Warren PHARMD [facility]   2/14/2023  9:15 AM Candace Callahan MD [facility]   11/3/2023 11:00 AM [facility] RUTH STEREO BX RM 1 [facility] [address] [facility]         Thank you.

## 2023-01-06 DIAGNOSIS — F43.21 GRIEF REACTION: ICD-10-CM

## 2023-01-06 RX ORDER — DULOXETIN HYDROCHLORIDE 30 MG/1
30 CAPSULE, DELAYED RELEASE ORAL DAILY
Qty: 30 CAPSULE | Refills: 0 | Status: CANCELLED | OUTPATIENT
Start: 2023-01-06

## 2023-01-06 RX ORDER — DULOXETIN HYDROCHLORIDE 30 MG/1
30 CAPSULE, DELAYED RELEASE ORAL DAILY
Qty: 30 CAPSULE | Refills: 0 | Status: SHIPPED | OUTPATIENT
Start: 2023-01-06

## 2023-01-06 RX ORDER — CYCLOBENZAPRINE HCL 10 MG
5 TABLET ORAL
Qty: 90 TABLET | Refills: 0 | Status: SHIPPED | OUTPATIENT
Start: 2023-01-06

## 2023-01-19 DIAGNOSIS — R73.03 PREDIABETES: ICD-10-CM

## 2023-01-20 RX ORDER — PEN NEEDLE, DIABETIC 31 GX3/16"
NEEDLE, DISPOSABLE MISCELLANEOUS
Qty: 100 PEN NEEDLE | Refills: 1 | Status: SHIPPED | OUTPATIENT
Start: 2023-01-20

## 2023-01-20 NOTE — TELEPHONE ENCOUNTER
This patient contacted the office for the following prescriptions to be refilled:    Medication requested :   Requested Prescriptions     Pending Prescriptions Disp Refills    Insulin Needles, Disposable, (BD Tri 2nd Gen Pen Needle) 32 gauge x 5/32\" ndle 100 Pen Needle 1     Sig: Use to inject Victoza daily      PCP: Jong Rhodes NP  LOV: 12/13/2022  NOV DMA: 1/31/2023  FUTURE APPT:   Future Appointments   Date Time Provider Miranda Osborne   1/31/2023 10:15 AM Jong Rhodes NP [facility] BS AMB   1/31/2023 11:00 AM Arpan Pool PHARMD [facility] BS AMB   2/14/2023  9:15 AM Nhi Fuentes MD [facility] BS AMB   11/3/2023 11:00 AM [address] [place] [address] BX RM 1 [address]         Thank you.

## 2023-01-28 ENCOUNTER — TRANSCRIBE ORDERS (OUTPATIENT)
Facility: HOSPITAL | Age: 53
End: 2023-01-28

## 2023-01-28 DIAGNOSIS — Z12.31 VISIT FOR SCREENING MAMMOGRAM: Primary | ICD-10-CM

## 2023-01-31 ENCOUNTER — OFFICE VISIT (OUTPATIENT)
Dept: FAMILY MEDICINE CLINIC | Age: 53
End: 2023-01-31
Payer: MEDICAID

## 2023-01-31 ENCOUNTER — OFFICE VISIT (OUTPATIENT)
Dept: FAMILY MEDICINE CLINIC | Age: 53
End: 2023-01-31

## 2023-01-31 VITALS
SYSTOLIC BLOOD PRESSURE: 127 MMHG | TEMPERATURE: 98 F | HEART RATE: 74 BPM | RESPIRATION RATE: 16 BRPM | WEIGHT: 241 LBS | OXYGEN SATURATION: 95 % | DIASTOLIC BLOOD PRESSURE: 85 MMHG | BODY MASS INDEX: 45.5 KG/M2 | HEIGHT: 61 IN

## 2023-01-31 DIAGNOSIS — E66.01 CLASS 3 SEVERE OBESITY DUE TO EXCESS CALORIES WITH SERIOUS COMORBIDITY AND BODY MASS INDEX (BMI) OF 45.0 TO 49.9 IN ADULT (HCC): Primary | ICD-10-CM

## 2023-01-31 DIAGNOSIS — G89.29 CHRONIC BILATERAL LOW BACK PAIN WITHOUT SCIATICA: Primary | ICD-10-CM

## 2023-01-31 DIAGNOSIS — M54.50 CHRONIC BILATERAL LOW BACK PAIN WITHOUT SCIATICA: Primary | ICD-10-CM

## 2023-01-31 DIAGNOSIS — R73.03 PREDIABETES: ICD-10-CM

## 2023-01-31 PROCEDURE — 3074F SYST BP LT 130 MM HG: CPT | Performed by: NURSE PRACTITIONER

## 2023-01-31 PROCEDURE — 99214 OFFICE O/P EST MOD 30 MIN: CPT | Performed by: NURSE PRACTITIONER

## 2023-01-31 PROCEDURE — 3079F DIAST BP 80-89 MM HG: CPT | Performed by: NURSE PRACTITIONER

## 2023-01-31 RX ORDER — NAPROXEN 500 MG/1
500 TABLET ORAL 2 TIMES DAILY WITH MEALS
Qty: 42 TABLET | Refills: 0 | Status: SHIPPED | OUTPATIENT
Start: 2023-01-31

## 2023-01-31 NOTE — PROGRESS NOTES
Pharmacy Progress Note - Weight Management    S/O: Ms. Tez Luque is a 46 y.o. female with a PMH of CAD s/p CABG x 4, HTN, HLD, pre-diabetes, and class III obesity, referred by Nhan Aguilera NP, who was seen today for weight management follow up. Today's weight: 241 lbs  Weight at last visit (12/13/22): 243 lbs    Interim Update: Patient started Victoza for pre-diabetes and to assist with weight loss in November. She states that she has been out of this medication for the past 2 weeks due to medication being on backorder at the pharmacy. Patient has continued to work on improving eating habits and has increased activity. She has been trying to stretch and do chair exercises and these have helped her back pain. Patient is on her feet all day at work and this causes pain in her feet and back. Key Obesity Meds               liraglutide (VICTOZA) 0.6 mg/0.1 mL (18 mg/3 mL) pnij Inject 0.6 mg under the skin once daily for 1 week, then increase to 1.2 mg once daily for 1 week, then increase to 1.8 mg once daily thereafter. Past Medical History:   Diagnosis Date    CAD (coronary artery disease)     Displacement of lumbar intervertebral disc     HLD (hyperlipidemia)     Hypertension     Lumbar radiculitis     Lumbar sprain     Numbness     legs and arms    S/P CABG x 4 5/27/2022    LIMA-LAD, Left radial to OM, rSVG to PDA, rSVG to diag by Dr. Clay Arnold    STEMI (ST elevation myocardial infarction) ([facility] [address] 03/2020     Current Outpatient Medications on File Prior to Visit   Medication Sig Dispense Refill    naproxen (NAPROSYN) 500 mg tablet Take 1 Tablet by mouth two (2) times daily (with meals). Indications: pain 42 Tablet 0    [DISCONTINUED] Insulin Needles, Disposable, (BD Tri 2nd Gen Pen Needle) 32 gauge x 5/32\" ndle Use to inject Victoza daily 100 Pen Needle 1    cyclobenzaprine (FLEXERIL) 10 mg tablet Take 0.5 Tablets by mouth three (3) times daily as needed for Muscle Spasm(s).  90 Tablet 0    DULoxetine (Cymbalta) 30 mg capsule Take 1 Capsule by mouth daily. For neuropathy  Indications: anxiousness associated with depression 30 Capsule 0    isosorbide dinitrate (ISORDIL) 10 mg tablet take 1 tablet by mouth twice a day 60 Tablet 1    [DISCONTINUED] liraglutide (VICTOZA) 0.6 mg/0.1 mL (18 mg/3 mL) pnij Inject 1.8 mg under the skin once daily 9 mL 0    acetaminophen (Tylenol Arthritis Pain) 650 mg TbER Take 1 Tablet by mouth every eight (8) hours. Indications: pain 90 Tablet 1    omeprazole (PRILOSEC) 20 mg capsule Take 1 Capsule by mouth daily as needed (reflux). 30 Capsule 3    albuterol (Proventil HFA) 90 mcg/actuation inhaler Take 2 Puffs by inhalation every four (4) hours as needed for Wheezing, Shortness of Breath or Respiratory Distress. 1 Each 4    lisinopriL (PRINIVIL, ZESTRIL) 40 mg tablet Take 40 mg by mouth in the morning. amiodarone (CORDARONE) 200 mg tablet Take 1 Tablet by mouth every Monday, Wednesday, Friday. 90 Tablet 1    nitroglycerin (NITROSTAT) 0.4 mg SL tablet place 1 tablet under the tongue if needed every 5 minutes for chest pain for 3 doses IF NO RELIEF AFTER THIRD DOSE CALL PRESCRIBER . 25 Tablet 5    furosemide (LASIX) 40 mg tablet Take 1 Tablet by mouth daily. As needed for Edema 30 Tablet 2    metoprolol tartrate (LOPRESSOR) 25 mg tablet Take 0.5 Tablets by mouth every twelve (12) hours. 60 Tablet 2    atorvastatin (LIPITOR) 80 mg tablet Take 1 Tablet by mouth nightly. 30 Tablet 2    aspirin 81 mg chewable tablet Take 1 Tablet by mouth daily. 30 Tablet 2    therapeutic multivitamin (THERAGRAN) tablet Take 1 Tablet by mouth daily. 30 Tablet 2     No current facility-administered medications on file prior to visit. Labs/Vitals:   Wt Readings from Last 3 Encounters:   01/31/23 241 lb (109.3 kg)   12/13/22 243 lb (110.2 kg)   11/15/22 243 lb (110.2 kg)     BMI Readings from Last 3 Encounters:   01/31/23 45.54 kg/m²   12/13/22 45.91 kg/m²   11/15/22 45.91 kg/m²     BP Readings from Last 3 Encounters:   01/31/23 127/85   11/01/22 98/66   09/12/22 117/80       Lab Results   Component Value Date/Time    Sodium 141 06/03/2022 04:24 AM    Potassium 4.1 06/03/2022 04:24 AM    Chloride 109 06/03/2022 04:24 AM    CO2 27 06/03/2022 04:24 AM    Anion gap 5 06/03/2022 04:24 AM    Glucose 89 06/03/2022 04:24 AM    BUN 7 06/03/2022 04:24 AM    Creatinine 0.65 06/03/2022 04:24 AM    BUN/Creatinine ratio 11 (L) 06/03/2022 04:24 AM    GFR est AA >60 06/03/2022 04:24 AM    GFR est non-AA >60 06/03/2022 04:24 AM    Calcium 8.8 06/03/2022 04:24 AM    Bilirubin, total 0.2 05/28/2022 04:00 AM    Alk. phosphatase 51 05/28/2022 04:00 AM    Protein, total 5.3 (L) 05/28/2022 04:00 AM    Albumin 2.9 (L) 05/28/2022 04:00 AM    Globulin 2.4 05/28/2022 04:00 AM    A-G Ratio 1.2 05/28/2022 04:00 AM    ALT (SGPT) 43 05/28/2022 04:00 AM     Lab Results   Component Value Date/Time    Hemoglobin A1c 5.8 (H) 01/31/2023 10:47 AM       A/P:    Weight Management:  - Patients has class III obesity with a BMI of 45.54 kg/m2. Patient has a history of several weight related comorbidities including cardiovascular disease and pre-diabetes. Patient down 2 lbs since last visit. - Patient has failed non-pharmacologic treatment plan for weight loss. Patient unable to get Victoza due to not being available at the pharmacy. Recommend WBOERO for weight loss due to higher efficacy and simplicity of use use (weekly vs daily). Medication also beneficial due to patients history of ASCVD and pre-diabetes. Discussed with PCP, PCP in agreeance with plan. Reviewed medication benefits, side effects, and directions for use. Educated patient on titration schedule and how to use [place]. Will submit PA for insurance as required. - Continue working on improving diet with goal of eating 4826-7154 calories/day. Increase activity with goal of at least 150 minutes of exercise/week.  Advised patient to weigh herself at home at least once weekly to assess progress. If her weight is not coming down, need to adjust lifestyle to increase calorie deficit.  - Follow up in 4 weeks. Medications Discontinued During This Encounter   Medication Reason    liraglutide (VICTOZA) 0.6 mg/0.1 mL (18 mg/3 mL) pnij Alternate Therapy    Insulin Needles, Disposable, (BD Tri 2nd Gen Pen Needle) 32 gauge x 5/32\" ndle Therapy Completed     Orders Placed This Encounter    semaglutide, weight loss, (Wegovy) 0.5 mg/0.5 mL pnij     Si.5 mg by SubCUTAneous route every seven (7) days. Indications: weight loss management for an obese person     Dispense:  1 Each     Refill:  0     Patient verbalized understanding of the information presented and all of the patients questions were answered. AVS was handed to the patient. Patient advised to call the office with any additional questions or concerns. Notifications of recommendations will be sent to Ciaran Abrams NP for review. Patient will return to clinic in 4 week(s) for follow up.      Thank you,  Mile Arellano, PharmD, [address] [place] [address] [address] Only    Program: Medical Group  CPA in place: Yes  Recommendation Provided To: Patient/Caregiver: 6 via In person  Intervention Detail: Benefit Assistance, Device Training, Discontinued Rx: 2, reason: Therapy Complete, New Rx: 1, reason: Needs Additional Therapy, and Scheduled Appointment  Intervention Accepted By: Patient/Caregiver: 6  Time Spent (min): 45

## 2023-01-31 NOTE — PROGRESS NOTES
Leela Glass is a 46 y.o. female  established patient, here for evaluation of the following chief complaint(s):  Chief Complaint   Patient presents with    Follow-up      Assessment and Plan  1. Chronic bilateral low back pain without sciatica  -     URINALYSIS W/MICROSCOPIC; Future  -     REFERRAL TO PHYSICAL THERAPY  -     naproxen (NAPROSYN) 500 mg tablet; Take 1 Tablet by mouth two (2) times daily (with meals). Indications: pain, Normal, Disp-42 Tablet, R-0     Follow-up and Dispositions    Return in about 1 month (around 2023) for routine, 20. HPI:   In office visit. Patient appears well. Patient's   a few months ago and his birthday was yesterday. Patient's taken Victoza in the past for prediabetes and weight loss. She has been unable to get it at her drugstore because of backorder. Patient c/o chronic bilateral low back pain for 2 weeks to comes goes over the years. She at times has numbness in her right upper thigh. Patient denies saddle numbness or incontinence of bowel or bladder. She has agreed to PT. She works at [employer] and is on her feet a lot. She has a friend that gave her some lidocaine pain patches. She will alternate naproxen and tylenol. Patient sees cardiology with Dr. Dell Jacobo. HX of  STEMI and CABG x4. She sess him next week. ROS:    General: negative for - chills, fever, weight changes or malaise  HEENT: no sore throat, nasal congestion, vision problems or ear problems  Respiratory: no cough, shortness of breath, or wheezing  Cardiovascular: no chest pain, palpitations, or dyspnea on exertion  Gastrointestinal: no abdominal pain, N/V, change in bowel habits  Musculoskeletal: see HPI  Neurological: no headache or dizziness  Endo:  No polyuria or polydipsia  : no urinary  Psychological: negative for - anxiety, depression, sleeps issues    Prior to Admission medications    Medication Sig Start Date End Date Taking?  Authorizing Provider   naproxen (NAPROSYN) 500 mg tablet Take 1 Tablet by mouth two (2) times daily (with meals). Indications: pain 1/31/23  Yes Ramon Godfrey NP   Insulin Needles, Disposable, (BD Tri 2nd Gen Pen Needle) 32 gauge x 5/32\" ndle Use to inject Victoza daily 1/20/23   Anjana Springer NP   cyclobenzaprine (FLEXERIL) 10 mg tablet Take 0.5 Tablets by mouth three (3) times daily as needed for Muscle Spasm(s). 1/6/23   Anjana Springer NP   DULoxetine (Cymbalta) 30 mg capsule Take 1 Capsule by mouth daily. For neuropathy  Indications: anxiousness associated with depression 1/6/23   Anjana Springer NP   isosorbide dinitrate (ISORDIL) 10 mg tablet take 1 tablet by mouth twice a day 12/15/22   Paul Wahl MD   liraglutide (VICTOZA) 0.6 mg/0.1 mL (18 mg/3 mL) pnij Inject 1.8 mg under the skin once daily 12/13/22   Anjana Springer NP   acetaminophen (Tylenol Arthritis Pain) 650 mg TbER Take 1 Tablet by mouth every eight (8) hours. Indications: pain 11/1/22   Anjana Springer NP   omeprazole (PRILOSEC) 20 mg capsule Take 1 Capsule by mouth daily as needed (reflux). 9/12/22   Anjana Springer NP   albuterol (Proventil HFA) 90 mcg/actuation inhaler Take 2 Puffs by inhalation every four (4) hours as needed for Wheezing, Shortness of Breath or Respiratory Distress. 9/12/22   Anjana Springer NP   lisinopriL (PRINIVIL, ZESTRIL) 40 mg tablet Take 40 mg by mouth in the morning. 7/22/22   Provider, Historical   amiodarone (CORDARONE) 200 mg tablet Take 1 Tablet by mouth every Monday, Wednesday, Friday. 8/17/22   Paul Wahl MD   nitroglycerin (NITROSTAT) 0.4 mg SL tablet place 1 tablet under the tongue if needed every 5 minutes for chest pain for 3 doses IF NO RELIEF AFTER THIRD DOSE CALL PRESCRIBER . 6/24/22   Mia MARR PA-C   furosemide (LASIX) 40 mg tablet Take 1 Tablet by mouth daily.  As needed for Edema 6/9/22   Valentin Carney PA-C   metoprolol tartrate (LOPRESSOR) 25 mg tablet Take 0.5 Tablets by mouth every twelve (12) hours. 6/3/22   Lela Low PA-C   atorvastatin (LIPITOR) 80 mg tablet Take 1 Tablet by mouth nightly. 6/2/22   Manda Morfin PA-C   aspirin 81 mg chewable tablet Take 1 Tablet by mouth daily. 6/2/22   Ciaran Hu PA-C   therapeutic multivitamin [facility]) tablet Take 1 Tablet by mouth daily. 6/3/22   Ciaran Hu PA-C        Results for orders placed or performed in visit on 10/06/22    COLONOSCOPY   Result Value Ref Range    Cologuard Test, External Negative       Physical Exam  Patient appears well, she is pleasant, alert, oriented x 3, in no distress. ENT normal.  Neck supple. No adenopathy or thyromegaly. NILESH. Lungs are clear, good air entry, no wheezes  Cardiovascular, S1 and S2 normal, no murmurs, regular rate and rhythm. Abdomen is soft without tenderness  /Anorectal, deferred. Muscleskeletal, no swelling  Extremities show no edema  Neurological is normal without focal findings. Skin: no concerning lesions. Psych: normal affect. Mood good. Oriented x 3. Vitals:    01/31/23 1018 01/31/23 1021 01/31/23 1036   BP: (!) 162/97 (!) 148/90 127/85   Pulse: 70  74   Resp: 16     Temp: 98 °F (36.7 °C)     TempSrc: Temporal     SpO2: 95%     Weight: 241 lb (109.3 kg)     Height: 5' 1\" (1.549 m)         *Plan of care reviewed with patient. Patient in agreement with plan and expresses understanding. All questions answered and patient encouraged to call or RTO if further questions or concerns. On this date 01/31/2023 I have spent 32 minutes reviewing previous notes, test results and face to face with the patient discussing the diagnosis and importance of compliance with the treatment plan as well as documenting on the day of the visit.       AKIRA Casper

## 2023-01-31 NOTE — PROGRESS NOTES
Amy Allen is a 46 y.o. presents today for No chief complaint on file. Is someone accompanying this pt? No    Is the patient using any DME equipment during OV? No    There were no vitals taken for this visit. Depression Screening:   3 most recent PHQ Screens 11/1/2022   Little interest or pleasure in doing things Not at all   Feeling down, depressed, irritable, or hopeless Not at all   Total Score PHQ 2 0       Health Maintenance: reviewed and discussed and ordered per Provider. Health Maintenance Due   Topic Date Due    Hepatitis C Screening  Never done    Pneumococcal 0-64 years (1 - PCV) Never done    DTaP/Tdap/Td series (1 - Tdap) Never done    Shingles Vaccine (1 of 2) Never done    Low dose CT lung screening  Never done    COVID-19 Vaccine (4 - Booster for Pfizer series) 04/12/2022         Coordination of Care:   1. \"Have you been to the ER, urgent care clinic since your last visit? Hospitalized since your last visit? \" No    2. \"Have you seen or consulted any other health care providers outside of the [address] [place] [address] since your last visit? \" No     3. For patients aged 39-70: Has the patient had a colonoscopy / FIT/ Cologuard? Yes - no Care Gap present    If the patient is female:    4. For patients aged 41-77: Has the patient had a mammogram within the past 2 years? Yes - no Care Gap present    5. For patients aged 21-65: Has the patient had a pap smear? No     Advanced Directive:  1. Do you have an Advanced Directive? Yes     2. Would you like information on Advanced Directives?  No    Stormy Martinez, [facility]

## 2023-02-01 LAB
APPEARANCE UR: CLEAR
BACTERIA #/AREA URNS HPF: ABNORMAL /[HPF]
BILIRUB UR QL STRIP: NEGATIVE
CASTS URNS QL MICRO: ABNORMAL /LPF
COLOR UR: YELLOW
EPI CELLS #/AREA URNS HPF: ABNORMAL /HPF (ref 0–10)
EST. AVERAGE GLUCOSE BLD GHB EST-MCNC: 120 MG/DL
GLUCOSE UR QL STRIP: NEGATIVE
HBA1C MFR BLD: 5.8 % (ref 4.8–5.6)
HGB UR QL STRIP: NEGATIVE
KETONES UR QL STRIP: NEGATIVE
LEUKOCYTE ESTERASE UR QL STRIP: NEGATIVE
MICRO URNS: NORMAL
MICRO URNS: NORMAL
NITRITE UR QL STRIP: NEGATIVE
PH UR STRIP: 6.5 [PH] (ref 5–7.5)
PROT UR QL STRIP: NEGATIVE
RBC #/AREA URNS HPF: ABNORMAL /HPF (ref 0–2)
SP GR UR STRIP: 1.02 (ref 1–1.03)
UROBILINOGEN UR STRIP-MCNC: 1 MG/DL (ref 0.2–1)
WBC #/AREA URNS HPF: ABNORMAL /HPF (ref 0–5)

## 2023-02-01 RX ORDER — SEMAGLUTIDE 0.5 MG/.5ML
0.5 INJECTION, SOLUTION SUBCUTANEOUS
Qty: 1 EACH | Refills: 0 | Status: SHIPPED | OUTPATIENT
Start: 2023-02-01

## 2023-02-02 ENCOUNTER — TELEPHONE (OUTPATIENT)
Dept: FAMILY MEDICINE CLINIC | Age: 53
End: 2023-02-02

## 2023-02-02 NOTE — TELEPHONE ENCOUNTER
Pharmacy Progress Note     PA for Wegovy 0.5 mg approved. PA Case: [id number]. Coverage Starts on: 2/1/2023. Coverage Ends on: 7/31/2023. Called patient to notify her of PA approval. Patient expressed understanding, all questions answered at this time.     Thank you,  Keturah Castellanos, PharmD, [address] [place] [address]    Program: Medical Group  CPA in place: Yes  Recommendation Provided To: Patient/Caregiver: 1 via Telephone  Intervention Detail: Patient Access Assistance/Sample Provided  Intervention Accepted By: Patient/Caregiver: 1  Time Spent (min): 5

## 2023-02-07 ENCOUNTER — TELEPHONE (OUTPATIENT)
Dept: FAMILY MEDICINE CLINIC | Age: 53
End: 2023-02-07

## 2023-02-07 NOTE — TELEPHONE ENCOUNTER
Pharmacy Progress Note - Telephone Call    Ms. Alfa Person contacted me today regarding SJBTTL. Patient states that the pharmacy told they did not receive notice the medication had been approved yet. I contacted the pharmacy to discuss, as the medication was approved last week. Had the [facility] pharmacist run the prescription and it was in fact approved. They will have to order the medication and it should be in tomorrow. Called patient back to notify her of this. Patient expressed understanding, all questions answered at this time.     Thank you,  Yuri Maldonado, PharmD, [facility]    Program: Medical Group  CPA in place: Yes  Recommendation Provided To: Patient/Caregiver: 1 via Telephone and Pharmacy: 1  Intervention Detail: Patient Access Assistance/Sample Provided  Intervention Accepted By: Patient/Caregiver: 1 and Pharmacy: 1  Time Spent (min): 15

## 2023-02-16 ENCOUNTER — TELEPHONE (OUTPATIENT)
Facility: CLINIC | Age: 53
End: 2023-02-16

## 2023-02-16 DIAGNOSIS — E66.01 MORBID (SEVERE) OBESITY DUE TO EXCESS CALORIES (HCC): Primary | ICD-10-CM

## 2023-02-16 RX ORDER — SEMAGLUTIDE 0.5 MG/.5ML
0.5 INJECTION, SOLUTION SUBCUTANEOUS
Qty: 2 ML | Refills: 0 | Status: SHIPPED | OUTPATIENT
Start: 2023-02-16

## 2023-02-16 NOTE — TELEPHONE ENCOUNTER
Pharmacy Progress Note - Telephone Call    Ms. Thomas Chappell contacted me today regarding CUEONK. Pt states that [facility] has still not been able to get the medication in stock. Advised patient that we may need to try an alternative pharmacy. Pt requested Rx be sent to [facility]. Patient to  follow up with me if she is unable to get the medication. Patient expressed understanding, all questions answered at this time.     Orders Placed This Encounter    Semaglutide-Weight Management (WEGOVY) 0.5 MG/0.5ML SOAJ SC injection     Sig: Inject 0.5 mg into the skin every 7 days     Dispense:  2 mL     Refill:  0        Thank you,  Suzan Mejia, PharmD, [facility] Only    Program: Medical Group  CPA in place:  Yes  Recommendation Provided To: Patient/Caregiver: 1 via Telephone  Intervention Detail: New Rx: 1, reason: Needs Additional Therapy  Intervention Accepted By: Patient/Caregiver: 1  Time Spent (min): 10

## 2023-03-09 ENCOUNTER — OFFICE VISIT (OUTPATIENT)
Facility: CLINIC | Age: 53
End: 2023-03-09
Payer: MEDICAID

## 2023-03-09 ENCOUNTER — PHARMACY VISIT (OUTPATIENT)
Facility: CLINIC | Age: 53
End: 2023-03-09

## 2023-03-09 VITALS
BODY MASS INDEX: 45.31 KG/M2 | SYSTOLIC BLOOD PRESSURE: 111 MMHG | OXYGEN SATURATION: 96 % | HEART RATE: 74 BPM | TEMPERATURE: 97.5 F | RESPIRATION RATE: 16 BRPM | DIASTOLIC BLOOD PRESSURE: 77 MMHG | WEIGHT: 240 LBS | HEIGHT: 61 IN

## 2023-03-09 DIAGNOSIS — Z87.891 FORMER CIGARETTE SMOKER: ICD-10-CM

## 2023-03-09 DIAGNOSIS — J30.9 ALLERGIC RHINITIS, UNSPECIFIED SEASONALITY, UNSPECIFIED TRIGGER: Primary | ICD-10-CM

## 2023-03-09 DIAGNOSIS — E66.01 MORBID (SEVERE) OBESITY DUE TO EXCESS CALORIES (HCC): Primary | ICD-10-CM

## 2023-03-09 DIAGNOSIS — J34.89 SINUS PRESSURE: ICD-10-CM

## 2023-03-09 DIAGNOSIS — I10 PRIMARY HYPERTENSION: ICD-10-CM

## 2023-03-09 PROCEDURE — 3078F DIAST BP <80 MM HG: CPT | Performed by: NURSE PRACTITIONER

## 2023-03-09 PROCEDURE — 3074F SYST BP LT 130 MM HG: CPT | Performed by: NURSE PRACTITIONER

## 2023-03-09 PROCEDURE — 99214 OFFICE O/P EST MOD 30 MIN: CPT | Performed by: NURSE PRACTITIONER

## 2023-03-09 RX ORDER — FLUTICASONE PROPIONATE 50 MCG
1 SPRAY, SUSPENSION (ML) NASAL DAILY
Qty: 16 G | Refills: 0 | Status: SHIPPED | OUTPATIENT
Start: 2023-03-09

## 2023-03-09 RX ORDER — SEMAGLUTIDE 1 MG/.5ML
1 INJECTION, SOLUTION SUBCUTANEOUS
Qty: 2 ML | Refills: 0 | Status: SHIPPED | OUTPATIENT
Start: 2023-03-09

## 2023-03-09 RX ORDER — CETIRIZINE HYDROCHLORIDE 10 MG/1
10 TABLET ORAL DAILY
Qty: 30 TABLET | Refills: 1 | Status: SHIPPED | OUTPATIENT
Start: 2023-03-09 | End: 2023-05-08

## 2023-03-09 SDOH — ECONOMIC STABILITY: HOUSING INSECURITY
IN THE LAST 12 MONTHS, WAS THERE A TIME WHEN YOU DID NOT HAVE A STEADY PLACE TO SLEEP OR SLEPT IN A SHELTER (INCLUDING NOW)?: NO

## 2023-03-09 SDOH — ECONOMIC STABILITY: FOOD INSECURITY: WITHIN THE PAST 12 MONTHS, THE FOOD YOU BOUGHT JUST DIDN'T LAST AND YOU DIDN'T HAVE MONEY TO GET MORE.: NEVER TRUE

## 2023-03-09 SDOH — ECONOMIC STABILITY: INCOME INSECURITY: HOW HARD IS IT FOR YOU TO PAY FOR THE VERY BASICS LIKE FOOD, HOUSING, MEDICAL CARE, AND HEATING?: SOMEWHAT HARD

## 2023-03-09 SDOH — ECONOMIC STABILITY: FOOD INSECURITY: WITHIN THE PAST 12 MONTHS, YOU WORRIED THAT YOUR FOOD WOULD RUN OUT BEFORE YOU GOT MONEY TO BUY MORE.: NEVER TRUE

## 2023-03-09 ASSESSMENT — PATIENT HEALTH QUESTIONNAIRE - PHQ9
SUM OF ALL RESPONSES TO PHQ QUESTIONS 1-9: 0
SUM OF ALL RESPONSES TO PHQ QUESTIONS 1-9: 0
1. LITTLE INTEREST OR PLEASURE IN DOING THINGS: 0
SUM OF ALL RESPONSES TO PHQ9 QUESTIONS 1 & 2: 0
2. FEELING DOWN, DEPRESSED OR HOPELESS: 0
SUM OF ALL RESPONSES TO PHQ QUESTIONS 1-9: 0
SUM OF ALL RESPONSES TO PHQ QUESTIONS 1-9: 0

## 2023-03-09 NOTE — PROGRESS NOTES
Reji Cazares is a 46 y.o. presents today for No chief complaint on file. Is someone accompanying this pt? no    Is the patient using any DME equipment during OV? no  There were no vitals filed for this visit. Depression Screening:   PHQ-9 Questionaire 1/31/2023 11/1/2022 9/12/2022 8/16/2022 8/16/2022 7/18/2022 7/18/2022   Little interest or pleasure in doing things 0 0 1 0 0 0 0   Feeling down, depressed, or hopeless 0 0 1 0 0 0 0   PHQ-9 Total Score 0 0 2 0 0 0 0        Abuse Screening:  No flowsheet data found. Learning Assessment Screening:   No question data found. Fall Risk Screening:   No flowsheet data found. Health Maintenance: reviewed and discussed and ordered per Provider. Health Maintenance Due   Topic Date Due    Pneumococcal 0-64 years Vaccine (1 - PCV) Never done    HIV screen  Never done    Hepatitis C screen  Never done    DTaP/Tdap/Td vaccine (1 - Tdap) Never done    Shingles vaccine (1 of 2) Never done    Low dose CT lung screening  Never done    COVID-19 Vaccine (4 - Booster for Pfizer series) 04/12/2022         Coordination of Care:   1. \"Have you been to the ER, urgent care clinic since your last visit? Hospitalized since your last visit? \" yes, 02/03/2023     2. \"Have you seen or consulted any other health care providers outside of the [address] [place] [address] since your last visit? \" no    3. For patients aged 39-70: Has the patient had a colonoscopy / FIT/ Cologuard? Yes - no Care Gap present  If the patient is female:    4. For patients aged 41-77: Has the patient had a mammogram within the past 2 years? Yes - no Care Gap present    5. For patients aged 21-65: Has the patient had a pap smear? NA  Advanced Directive:  1. Do you have an Advanced Directive? Yes     2. Would you like information on Advanced Directives?  No    Pedro Silva CMA

## 2023-03-09 NOTE — PROGRESS NOTES
Pharmacy Progress Note - Weight Management    S/O: Ms. Andria Nyhan is a 46 y.o. female with a PMH of CAD s/p CABG x 4, HTN, HLD, pre-diabetes, and class III obesity, referred by Martínez Smith NP, who was seen today for weight management follow up. Today's weight: 240 lbs  Weight at last visit (1/31/23): 241 lbs    Interim Update: Patient started on Wegovy 0.5 mg weekly about 3 weeks ago. Pt states it is going well so far, denies any side effects with medication. Pt states she has been eating a lot of chicken breast and vegetables lately and is not eating meals out. She is also trying to eat smaller meals throughout the day now whereas before she was often skipping meals and eating one large meal per day. Past Medical History:   Diagnosis Date    CAD (coronary artery disease)     Displacement of lumbar intervertebral disc     HLD (hyperlipidemia)     Hypertension     Lumbar radiculitis     Lumbar sprain     Numbness     legs and arms    S/P CABG x 4 5/27/2022    LIMA-LAD, Left radial to OM, rSVG to PDA, rSVG to diag by Dr. Kamila Beach    STEMI (ST elevation myocardial infarction) [facility]) 03/2020       Current Outpatient Medications   Medication Sig    Semaglutide-Weight Management (WEGOVY) 1 MG/0.5ML SOAJ SC injection Inject 1 mg into the skin every 7 days    fluticasone (FLONASE) 50 MCG/ACT nasal spray 1 spray by Each Nostril route daily    cetirizine (ZYRTEC) 10 MG tablet Take 1 tablet by mouth daily    acetaminophen (TYLENOL) 650 MG extended release tablet Take 650 mg by mouth in the morning and 650 mg at noon and 650 mg in the evening.     albuterol sulfate HFA (PROVENTIL;VENTOLIN;PROAIR) 108 (90 Base) MCG/ACT inhaler Inhale 2 puffs into the lungs every 4 hours as needed    amiodarone (CORDARONE) 200 MG tablet Take 200 mg by mouth    aspirin 81 MG chewable tablet Take 81 mg by mouth daily    atorvastatin (LIPITOR) 80 MG tablet Take 80 mg by mouth    cyclobenzaprine (FLEXERIL) 10 MG tablet Take 5 mg by mouth 3 times daily as needed    DULoxetine (CYMBALTA) 30 MG extended release capsule Take 30 mg by mouth daily    furosemide (LASIX) 40 MG tablet Take 40 mg by mouth daily    isosorbide dinitrate (ISORDIL) 10 MG tablet take 1 tablet by mouth twice a day    lisinopril (PRINIVIL;ZESTRIL) 40 MG tablet Take 40 mg by mouth daily    metoprolol tartrate (LOPRESSOR) 25 MG tablet Take 12.5 mg by mouth in the morning and 12.5 mg in the evening. nitroGLYCERIN (NITROSTAT) 0.4 MG SL tablet place 1 tablet under the tongue if needed every 5 minutes for chest pain for 3 doses IF NO RELIEF AFTER THIRD DOSE CALL PRESCRIBER .    omeprazole (PRILOSEC) 20 MG delayed release capsule Take 20 mg by mouth daily as needed     No current facility-administered medications for this visit. Labs/Vitals: Wt Readings from Last 3 Encounters:   03/09/23 240 lb (108.9 kg)   01/31/23 241 lb (109.3 kg)   12/13/22 243 lb (110.2 kg)     BP Readings from Last 3 Encounters:   03/09/23 111/77   01/31/23 127/85   11/01/22 98/66       Lab Results   Component Value Date/Time     06/03/2022 04:24 AM    K 4.1 06/03/2022 04:24 AM     06/03/2022 04:24 AM    CO2 27 06/03/2022 04:24 AM    BUN 7 06/03/2022 04:24 AM    GFRAA >60 06/03/2022 04:24 AM    GLOB 2.4 05/28/2022 04:00 AM    ALT 43 05/28/2022 04:00 AM       A/P:    Weight Management:  - Patients has class III obesity with a BMI of 45.35 kg/m2. Patient has a history of several weight related comorbidities including cardiovascular disease and pre-diabetes. Patient down 1 lb since last visit. - Patient tolerating Wegovy well. Recommend continuing with dose titration to 1 mg weekly, received verbal authorization from provider. Goal to titrate 2.4 mg weekly if tolerated. - Encouraged patient to continue working on diet and reducing calorie intake with goal intake of 8706-5724 calories per day. - Follow up in 4 weeks.       Medications Discontinued During This Encounter   Medication Reason Semaglutide-Weight Management (WEGOVY) 0.5 MG/0.5ML SOAJ SC injection Therapy completed     Orders Placed This Encounter    Semaglutide-Weight Management (WEGOVY) 1 MG/0.5ML SOAJ SC injection     Sig: Inject 1 mg into the skin every 7 days     Dispense:  2 mL     Refill:  0     Patient verbalized understanding of the information presented and all of the patients questions were answered. Patient advised to call the office with any additional questions or concerns. Notifications of recommendations will be sent to [facility], APRN - [facility] for review.     Thank you,  Travis Steel, PharmD, [facility] Only    Program: Medical Group  CPA in place:  Yes  Recommendation Provided To: Patient/Caregiver: 1 via In person  Intervention Detail: Dose Adjustment: 1, reason: Therapy Optimization  Intervention Accepted By: Patient/Caregiver: 1  Time Spent (min): 30

## 2023-03-09 NOTE — PROGRESS NOTES
Ashish Mason is a 46 y.o. female  established patient, here for evaluation of the following chief complaint(s):  Chief Complaint   Patient presents with    Headache    Sinus Problem    Follow-up     1 Month F/U      Assessment and Plan  1. Allergic rhinitis, unspecified seasonality, unspecified trigger  -     fluticasone (FLONASE) 50 MCG/ACT nasal spray; 1 spray by Each Nostril route daily, Disp-16 g, R-0Normal  -     cetirizine (ZYRTEC) 10 MG tablet; Take 1 tablet by mouth daily, Disp-30 tablet, R-1Normal  2. Sinus pressure  -     fluticasone (FLONASE) 50 MCG/ACT nasal spray; 1 spray by Each Nostril route daily, Disp-16 g, R-0Normal  -     cetirizine (ZYRTEC) 10 MG tablet; Take 1 tablet by mouth daily, Disp-30 tablet, R-1Normal  3. Primary hypertension  4. Former cigarette smoker  -     CT LUNG SCREENING; Future     Return in about 3 months (around 6/9/2023) for Routine, 20. HPI:   In office visit. Pt c/o sinus pressure and headache with warm winter. Pt is working on her weight. She Wegovy 0.5 mg weekly. She feels she has lost a little bit of weight. She has had no nausea. Patient sees cardiology with Dr. Yogi Jean. HX of  STEMI and CABG x4. ROS:    General: negative for - chills, fever, weight changes or malaise  HEENT: + for sinus pressure and clear nasal discharge, no sore throat, nasal congestion, vision problems or ear problems  Respiratory: no cough, shortness of breath, or wheezing  Cardiovascular: no chest pain, palpitations, or dyspnea on exertion  Gastrointestinal: no abdominal pain, N/V, change in bowel habits  Musculoskeletal: no back pain or joint pain  Neurological: no headache or dizziness  Endo:  No polyuria or polydipsia  : no urinary  Skin:   Psychological: negative for - anxiety, depression, sleeps issues    Prior to Admission medications    Medication Sig Start Date End Date Taking?  Authorizing Provider   fluticasone (FLONASE) 50 MCG/ACT nasal spray 1 spray by Each Nostril route daily 3/9/23  Yes MEENU Benavides CNP   cetirizine (ZYRTEC) 10 MG tablet Take 1 tablet by mouth daily 3/9/23 5/8/23 Yes MEENU Benavides CNP   Semaglutide-Weight Management (WEGOVY) 0.5 MG/0.5ML SOAJ SC injection Inject 0.5 mg into the skin every 7 days 2/16/23   MEENU Benavides CNP   acetaminophen (TYLENOL) 650 MG extended release tablet Take 650 mg by mouth in the morning and 650 mg at noon and 650 mg in the evening. 11/1/22   Ar Automatic Reconciliation   albuterol sulfate HFA (PROVENTIL;VENTOLIN;PROAIR) 108 (90 Base) MCG/ACT inhaler Inhale 2 puffs into the lungs every 4 hours as needed 9/12/22   Ar Automatic Reconciliation   amiodarone (CORDARONE) 200 MG tablet Take 200 mg by mouth 8/17/22   Ar Automatic Reconciliation   aspirin 81 MG chewable tablet Take 81 mg by mouth daily 6/2/22   Ar Automatic Reconciliation   atorvastatin (LIPITOR) 80 MG tablet Take 80 mg by mouth 6/2/22   Ar Automatic Reconciliation   cyclobenzaprine (FLEXERIL) 10 MG tablet Take 5 mg by mouth 3 times daily as needed 11/1/22   Ar Automatic Reconciliation   DULoxetine (CYMBALTA) 30 MG extended release capsule Take 30 mg by mouth daily 9/12/22   Ar Automatic Reconciliation   furosemide (LASIX) 40 MG tablet Take 40 mg by mouth daily 6/9/22   Ar Automatic Reconciliation   isosorbide dinitrate (ISORDIL) 10 MG tablet take 1 tablet by mouth twice a day 12/15/22   Ar Automatic Reconciliation   Liraglutide (VICTOZA) 18 MG/3ML SOPN SC injection Inject 1.8 mg under the skin once daily 12/13/22   Ar Automatic Reconciliation   lisinopril (PRINIVIL;ZESTRIL) 40 MG tablet Take 40 mg by mouth daily 7/22/22   Ar Automatic Reconciliation   metoprolol tartrate (LOPRESSOR) 25 MG tablet Take 12.5 mg by mouth in the morning and 12.5 mg in the evening. 6/3/22   Ar Automatic Reconciliation   nitroGLYCERIN (NITROSTAT) 0.4 MG SL tablet place 1 tablet under the tongue if needed every 5 minutes for chest pain for 3 doses IF NO RELIEF AFTER THIRD  DOSE CALL PRESCRIBER . 6/24/22   Ar Automatic Reconciliation   omeprazole (PRILOSEC) 20 MG delayed release capsule Take 20 mg by mouth daily as needed 9/12/22   Ar Automatic Reconciliation        No results found for this visit on 03/09/23. Physical Exam  Patient appears well, she is pleasant, alert, oriented x 3, in no distress. Morbidly obese  ENT normal.  Neck supple. No adenopathy or thyromegaly. ROGERIO. Lungs are clear, good air entry, no wheezes  Cardiovascular, S1 and S2 normal, no murmurs, regular rate and rhythm. Abdomen is soft without tenderness, guarding  /Anorectal, deferred. Muscleskeletal, no swelling  Extremities show no edema  Neurological is normal without focal findings. Skin: no concerning lesions. Psych: normal affect. Mood good. Oriented x 3. Vitals:    03/09/23 1020   BP: 111/77   Site: Left Upper Arm   Position: Sitting   Cuff Size: Large Adult   Pulse: 74   Resp: 16   Temp: 97.5 °F (36.4 °C)   TempSrc: Temporal   SpO2: 96%   Weight: 240 lb (108.9 kg)   Height: 5' 1\" (1.549 m)            On this date 03/09/23  have spent 30 minutes reviewing previous notes, test results and face to face with the patient discussing the diagnosis and importance of compliance with the treatment plan as well as documenting on the day of the visit.       GERTRUDIS Garcia

## 2023-03-29 RX ORDER — CLOPIDOGREL BISULFATE 75 MG/1
75 TABLET ORAL DAILY
COMMUNITY
Start: 2023-02-26

## 2023-03-29 NOTE — PROGRESS NOTES
received verbal authorization from provider. Goal to titrate 2.4 mg weekly next month and continue on this medication for at least 3 months. If 5% of baseline body weight loss not achieved after 3 months on 2.4 mg dose, consider alternative therapy. - Patient reports her appetite has not been reduced yet like it was when she was on Victoza. If she does not see success with Wegovy once max dose reached, consider switching to JÄRVENPÄÄ, which has the same active ingredient as Victoza. - Encouraged patient to continue working on diet and reducing calorie intake with goal intake of ~1800 calories per day. Patient advised to download calorie tracking eren and start tracking her food intake. Discussed that if calories are not tracked, cannot be certain that she is eating at a calorie deficit and will have lower chance of success with weight loss. - Follow up with PCP in 4 weeks for [facility] titration to max dose of 2.4 mg weekly. Medications Discontinued During This Encounter   Medication Reason    Semaglutide-Weight Management (WEGOVY) 1 MG/0.5ML SOAJ SC injection DOSE ADJUSTMENT     Orders Placed This Encounter    Semaglutide-Weight Management (WEGOVY) 1.7 MG/0.75ML SOAJ SC injection     Sig: Inject 1.7 mg into the skin every 7 days     Dispense:  2 mL     Refill:  0     Patient verbalized understanding of the information presented and all of the patients questions were answered. Patient advised to call the office with any additional questions or concerns. Notifications of recommendations will be sent to [facility], [facility] for review.     Thank you,  Rylee Arzola, PharmD, [address] Only    Program: Medical Group  CPA in place:  Yes  Recommendation Provided To: Patient/Caregiver: 2 via In person  Intervention Detail: Dose Adjustment: 1, reason: Therapy Optimization and Scheduled Appointment  Intervention Accepted By: Patient/Caregiver: 2  Time Spent (min): 30

## 2023-04-05 ENCOUNTER — HOSPITAL ENCOUNTER (OUTPATIENT)
Facility: HOSPITAL | Age: 53
Discharge: HOME | End: 2023-04-08
Payer: MEDICAID

## 2023-04-05 DIAGNOSIS — Z87.891 FORMER CIGARETTE SMOKER: ICD-10-CM

## 2023-04-05 PROCEDURE — 71271 CT THORAX LUNG CANCER SCR C-: CPT

## 2023-04-06 ENCOUNTER — OFFICE VISIT (OUTPATIENT)
Facility: CLINIC | Age: 53
End: 2023-04-06

## 2023-04-06 VITALS — HEIGHT: 61 IN | BODY MASS INDEX: 44.93 KG/M2 | WEIGHT: 238 LBS

## 2023-04-06 DIAGNOSIS — E66.01 MORBID (SEVERE) OBESITY DUE TO EXCESS CALORIES (HCC): Primary | ICD-10-CM

## 2023-04-06 RX ORDER — SEMAGLUTIDE 1.7 MG/.75ML
1.7 INJECTION, SOLUTION SUBCUTANEOUS
Qty: 2 ML | Refills: 0 | Status: SHIPPED | OUTPATIENT
Start: 2023-04-06

## 2023-05-04 ENCOUNTER — OFFICE VISIT (OUTPATIENT)
Facility: CLINIC | Age: 53
End: 2023-05-04
Payer: MEDICAID

## 2023-05-04 VITALS
TEMPERATURE: 96.9 F | OXYGEN SATURATION: 96 % | SYSTOLIC BLOOD PRESSURE: 150 MMHG | WEIGHT: 235.2 LBS | RESPIRATION RATE: 16 BRPM | HEIGHT: 61 IN | DIASTOLIC BLOOD PRESSURE: 95 MMHG | HEART RATE: 68 BPM | BODY MASS INDEX: 44.4 KG/M2

## 2023-05-04 DIAGNOSIS — Z71.3 WEIGHT LOSS COUNSELING, ENCOUNTER FOR: ICD-10-CM

## 2023-05-04 DIAGNOSIS — F99 INSOMNIA DUE TO OTHER MENTAL DISORDER: ICD-10-CM

## 2023-05-04 DIAGNOSIS — S76.212A STRAIN OF GROIN, LEFT, INITIAL ENCOUNTER: ICD-10-CM
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
## 2023-05-04 DIAGNOSIS — F41.8 MIXED ANXIETY AND DEPRESSIVE DISORDER: ICD-10-CM

## 2023-05-04 DIAGNOSIS — I10 PRIMARY HYPERTENSION: ICD-10-CM

## 2023-05-04 DIAGNOSIS — F51.05 INSOMNIA DUE TO OTHER MENTAL DISORDER: ICD-10-CM

## 2023-05-04 DIAGNOSIS — E66.01 MORBID (SEVERE) OBESITY DUE TO EXCESS CALORIES (HCC): Primary | ICD-10-CM

## 2023-05-04 DIAGNOSIS — J45.20 MILD INTERMITTENT ASTHMA WITHOUT STATUS ASTHMATICUS WITHOUT COMPLICATION: ICD-10-CM

## 2023-05-04 PROCEDURE — 99214 OFFICE O/P EST MOD 30 MIN: CPT | Performed by: NURSE PRACTITIONER

## 2023-05-04 PROCEDURE — 3075F SYST BP GE 130 - 139MM HG: CPT | Performed by: NURSE PRACTITIONER

## 2023-05-04 PROCEDURE — 3079F DIAST BP 80-89 MM HG: CPT | Performed by: NURSE PRACTITIONER

## 2023-05-04 RX ORDER — LIDOCAINE 50 MG/G
1 PATCH TOPICAL DAILY
Qty: 30 PATCH | Refills: 1 | Status: SHIPPED | OUTPATIENT
Start: 2023-05-04 | End: 2023-07-03

## 2023-05-04 RX ORDER — DULOXETIN HYDROCHLORIDE 30 MG/1
30 CAPSULE, DELAYED RELEASE ORAL 2 TIMES DAILY
Qty: 180 CAPSULE | Refills: 1 | Status: SHIPPED | OUTPATIENT
Start: 2023-05-04

## 2023-05-04 RX ORDER — ALBUTEROL SULFATE 90 UG/1
2 AEROSOL, METERED RESPIRATORY (INHALATION) EVERY 4 HOURS PRN
Qty: 18 G | Refills: 5 | Status: SHIPPED | OUTPATIENT
Start: 2023-05-04

## 2023-05-04 RX ORDER — LISINOPRIL 40 MG/1
40 TABLET ORAL DAILY
Qty: 90 TABLET | Refills: 3 | Status: SHIPPED | OUTPATIENT
Start: 2023-05-04

## 2023-05-04 RX ORDER — SEMAGLUTIDE 2.4 MG/.75ML
2.4 INJECTION, SOLUTION SUBCUTANEOUS
Qty: 3 ML | Refills: 5 | Status: SHIPPED | OUTPATIENT
Start: 2023-05-04

## 2023-05-04 NOTE — PROGRESS NOTES
Aubrey Barraza is a 46 y.o. presents today for   Chief Complaint   Patient presents with    Results    Weight Loss    Leg Pain     Left leg, fell on Monday 8/10 even hurts laying in bed. Sleep Problem     Is someone accompanying this pt? no    Is the patient using any DME equipment during OV? no  Vitals:    05/04/23 0956   Resp: 16   Temp: 96.9 °F (36.1 °C)       Depression Screening:   PHQ-9 Questionaire 3/9/2023 1/31/2023 11/1/2022 9/12/2022 8/16/2022 8/16/2022 7/18/2022   Little interest or pleasure in doing things 0 0 0 1 0 0 0   Feeling down, depressed, or hopeless 0 0 0 1 0 0 0   PHQ-9 Total Score 0 0 0 2 0 0 0        Abuse Screening:  No flowsheet data found. Learning Assessment Screening:   No question data found. Fall Risk Screening:   No flowsheet data found. Health Maintenance: reviewed and discussed and ordered per Provider. Health Maintenance Due   Topic Date Due    Pneumococcal 0-64 years Vaccine (1 - PCV) Never done    HIV screen  Never done    Hepatitis C screen  Never done    DTaP/Tdap/Td vaccine (1 - Tdap) Never done    Shingles vaccine (1 of 2) Never done    COVID-19 Vaccine (4 - Booster for Pfizer series) 04/12/2022    Lipids  05/16/2023         Coordination of Care:   1. \"Have you been to the ER, urgent care clinic since your last visit? Hospitalized since your last visit? \" yes, UTI in April     2. \"Have you seen or consulted any other health care providers outside of the [address] [place] [address] since your last visit? \" no    3. For patients aged 39-70: Has the patient had a colonoscopy / FIT/ Cologuard? Yes - no Care Gap present  If the patient is female:    4. For patients aged 41-77: Has the patient had a mammogram within the past 2 years? Yes - no Care Gap present    5. For patients aged 21-65: Has the patient had a pap smear? Yes - no Care Gap present    Advanced Directive:  1. Do you have an Advanced Directive? Yes     2.  Would you like information on Advanced
Skin: no concerning lesions. Psych: normal affect. Mood good. Oriented x 3. Vitals:    05/04/23 1000 05/04/23 1004 05/04/23 1005 05/04/23 1024   BP: 139/85 (!) 145/97 (!) 152/99 (!) 150/95   Site: Left Upper Arm Right Wrist Left Wrist    Position: Sitting Sitting Sitting    Cuff Size: Large Adult Small Adult Small Adult    Pulse:  67  68   Resp:       Temp:       TempSrc:       SpO2:       Weight:       Height:          Pain Score:   8     On this date 05/04/23  have spent 30 minutes reviewing previous notes, test results and face to face with the patient discussing the diagnosis and importance of compliance with the treatment plan as well as documenting on the day of the visit.       GERTRUDIS Clark

## 2023-05-22 RX ORDER — CLOPIDOGREL BISULFATE 75 MG/1
75 TABLET ORAL DAILY
Qty: 30 TABLET | Refills: 5 | Status: SHIPPED | OUTPATIENT
Start: 2023-05-22

## 2023-05-22 RX ORDER — CLOPIDOGREL BISULFATE 75 MG/1
TABLET ORAL
Qty: 90 TABLET | OUTPATIENT
Start: 2023-05-22

## 2023-05-22 NOTE — TELEPHONE ENCOUNTER
PCP: MEENU Worthy CNP    Last appt:  8/16/2022   Future Appointments   Date Time Provider Seb Russell   6/1/2023  9:15 AM Colin Garcia MD CAG BS AMB   6/5/2023  8:40 AM MEENU Garcia CNP, DMA BS AMB   11/3/2023 11:00 AM [address] [facility]       Requested Prescriptions     Pending Prescriptions Disp Refills    clopidogrel (PLAVIX) 75 MG tablet 30 tablet 5     Sig: Take 1 tablet by mouth daily

## 2023-06-01 ENCOUNTER — OFFICE VISIT (OUTPATIENT)
Age: 53
End: 2023-06-01

## 2023-06-01 VITALS
SYSTOLIC BLOOD PRESSURE: 160 MMHG | WEIGHT: 237 LBS | BODY MASS INDEX: 44.78 KG/M2 | HEART RATE: 70 BPM | TEMPERATURE: 97.9 F | DIASTOLIC BLOOD PRESSURE: 98 MMHG | OXYGEN SATURATION: 97 %

## 2023-06-01 DIAGNOSIS — I25.10 ATHEROSCLEROSIS OF NATIVE CORONARY ARTERY WITHOUT ANGINA PECTORIS, UNSPECIFIED WHETHER NATIVE OR TRANSPLANTED HEART: ICD-10-CM

## 2023-06-01 DIAGNOSIS — I25.83 CORONARY ATHEROSCLEROSIS DUE TO LIPID RICH PLAQUE (CODE): Primary | ICD-10-CM

## 2023-06-01 DIAGNOSIS — I25.83 CORONARY ATHEROSCLEROSIS DUE TO LIPID RICH PLAQUE (CODE): ICD-10-CM

## 2023-06-01 DIAGNOSIS — I20.0 UNSTABLE ANGINA (HCC): ICD-10-CM

## 2023-06-01 PROCEDURE — 3077F SYST BP >= 140 MM HG: CPT | Performed by: INTERNAL MEDICINE

## 2023-06-01 PROCEDURE — 3080F DIAST BP >= 90 MM HG: CPT | Performed by: INTERNAL MEDICINE

## 2023-06-01 RX ORDER — AMIODARONE HYDROCHLORIDE 200 MG/1
100 TABLET ORAL DAILY
Qty: 30 TABLET | Refills: 5 | Status: SHIPPED | OUTPATIENT
Start: 2023-06-01

## 2023-06-01 NOTE — PROGRESS NOTES
Identified pt with two pt identifiers(name and ). Reviewed record in preparation for visit and have obtained necessary documentation. Chani Bradley presents today for   Chief Complaint   Patient presents with    Follow-up     6m       Pt c/o SOB, CHEST PAIN/ PRESSURE. Chani Bradley preferred language for health care discussion is english/other. Personal Protective Equipment:   Personal Protective Equipment was used including: mask-surgical and hands-gloves. Patient was placed on no precaution(s). Patient was masked. Precautions:   Patient currently on None  Patient currently roomed with door closed. Is someone accompanying this pt? no    Is the patient using any DME equipment during OV? no    Depression Screening:  PHQ-9 Questionaire 3/9/2023 2023 2022 2022 2022 2022 2022   Little interest or pleasure in doing things 0 0 0 1 0 0 0   Feeling down, depressed, or hopeless 0 0 0 1 0 0 0   PHQ-9 Total Score 0 0 0 2 0 0 0        Learning Assessment:  No question data found. Abuse Screening: AMB Abuse Screening 2023   Do you ever feel afraid of your partner? N   Are you in a relationship with someone who physically or mentally threatens you? N   Is it safe for you to go home? Y          Fall Risk  Fall Risk 2023   2 or more falls in past year? no   Fall with injury in past year? no         Pt currently taking Anticoagulant therapy? no  Pt currently taking Antiplatelet therapy? Aspirin 81 mg    Coordination of Care:  1. Have you been to the ER, urgent care clinic since your last visit? Hospitalized since your last visit? no    2. Have you seen or consulted any other health care providers outside of the [address] [place] [address] since your last visit? Include any pap smears or colon screening. no      Please see Red banners under Allergies and Med Rec to remove outside inquires. All correct information has been verified with patient and added to chart.

## 2023-06-01 NOTE — PATIENT INSTRUCTIONS
Testing   Echo limited   **call office 3-5 days after testing is completed for results** Please ensure testing is completed prior to scheduled follow up appointment. If it is not completed your appointment may be rescheduled**    New Medication/Medication Changes  Metoprolol ( lopressor) 25 mg twice a day     **please allow 24-48 hrs for medication to be escribed to pharmacy** If you need any refills on medications please contact your pharmacy so that the request can be escribed to the provider for review seven to 10 days prior to being out of medication. Labs  Fasting lipid    No appointment required for lab work  [facility] [place] [address] [place] [address]  ( [facility],Third Floor to 3:30pm.) - You must call to make an appointment for blood work at this site.    Contact :[address] [place] [address], Rosas [address] at [facility], [address], [address], [facility], [address] [place] [address], Prabhu Olivera

## 2023-06-01 NOTE — PROGRESS NOTES
[facility]    Jessica Nash is 48 y.o. female with history of coronary artery disease status post inferior STEMI status post coronary stent, hypertension, hyperlipidemia,  DJD, obesity      Patient had inferior STEMI in 03/2020 and underwent emergent cardiac catheterization. He she was found to have a three-vessel coronary disease. She had proximal as well as mid- distal RCA stenting with 3.0 x 33 mm as well as 3.0 x 18 mm Xience YANCI. For unstable anginal symptoms, patient underwent cardiac catheterization again in 05/2022 which showed severe three-vessel coronary artery disease and patient underwent CABG x4 with  LIMA to LAD, left radial to OM, SVG to PDA and SVG to diagonal by Dr. Mihaela Domingo at [facility] in 05/2022. Patient is here today for follow-up appointment for her CAD and hypertension. Patient states that she has been noticing some dyspnea and also lower extremity swelling especially towards the end of the day. Swelling is in the ankle and sometimes ankle gets painful because of swelling. Taking her medication as prescribed. Blood pressure at home usually runs 1 73-7 30 systolic. Denies any nausea, vomiting, abdominal pain, fever, chills, sputum production. No hematuria or other bleeding complaints     Past Medical History:   Diagnosis Date    CAD (coronary artery disease)     Displacement of lumbar intervertebral disc     HLD (hyperlipidemia)     Hypertension     Lumbar radiculitis     Lumbar sprain     Numbness     legs and arms    S/P CABG x 4 5/27/2022    LIMA-LAD, Left radial to OM, rSVG to PDA, rSVG to diag by Dr. Mihaela Domingo    STEMI (ST elevation myocardial infarction) [facility]) 03/2020       Review of Systems:  Cardiac symptoms as noted above in HPI. All others negative.     Current Outpatient Medications   Medication Sig    metoprolol tartrate (LOPRESSOR) 25 MG tablet Take 0.5 tablets by mouth in the morning and 0.5 tablets in the

## 2023-06-02 LAB
CHOLEST SERPL-MCNC: 258 MG/DL (ref 100–199)
HDLC SERPL-MCNC: 59 MG/DL
LDLC SERPL CALC-MCNC: 175 MG/DL (ref 0–99)
TRIGL SERPL-MCNC: 135 MG/DL (ref 0–149)
VLDLC SERPL CALC-MCNC: 24 MG/DL (ref 5–40)

## 2023-06-06 ENCOUNTER — TELEPHONE (OUTPATIENT)
Age: 53
End: 2023-06-06

## 2023-06-06 DIAGNOSIS — E78.00 PURE HYPERCHOLESTEROLEMIA, UNSPECIFIED: Primary | ICD-10-CM

## 2023-06-06 RX ORDER — ROSUVASTATIN CALCIUM 40 MG/1
40 TABLET, COATED ORAL DAILY
Qty: 90 TABLET | Refills: 1 | Status: SHIPPED | OUTPATIENT
Start: 2023-06-06

## 2023-06-06 NOTE — TELEPHONE ENCOUNTER
Contacted pt at [facility] number. Two patient Identifiers confirmed. Advised pt per Dr Lidia Osuna. Pt stated she will start new med and work on diet and exercise. Repatha to be reviewed at December appt post lipid. Pt verbalized understanding.

## 2023-06-06 NOTE — TELEPHONE ENCOUNTER
----- Message from Sergio Morris MD sent at 6/2/2023  3:49 PM EDT -----  Please inform patient regarding abnormal test findings.    Lipid profile getting worse  Diet and exercise  Change to crestor 40 daily  We need to talk about vika or Laney        Thank you  SP

## 2023-07-03 ENCOUNTER — OFFICE VISIT (OUTPATIENT)
Facility: CLINIC | Age: 53
End: 2023-07-03
Payer: MEDICAID

## 2023-07-03 VITALS
TEMPERATURE: 97.1 F | BODY MASS INDEX: 44.18 KG/M2 | WEIGHT: 234 LBS | HEART RATE: 77 BPM | RESPIRATION RATE: 16 BRPM | SYSTOLIC BLOOD PRESSURE: 126 MMHG | DIASTOLIC BLOOD PRESSURE: 77 MMHG | HEIGHT: 61 IN | OXYGEN SATURATION: 94 %

## 2023-07-03 DIAGNOSIS — N17.9 AKI (ACUTE KIDNEY INJURY) (HCC): ICD-10-CM

## 2023-07-03 DIAGNOSIS — I25.83 CORONARY ARTERY DISEASE DUE TO LIPID RICH PLAQUE: ICD-10-CM

## 2023-07-03 DIAGNOSIS — F41.8 MIXED ANXIETY AND DEPRESSIVE DISORDER: ICD-10-CM

## 2023-07-03 DIAGNOSIS — Z09 HOSPITAL DISCHARGE FOLLOW-UP: Primary | ICD-10-CM

## 2023-07-03 DIAGNOSIS — J45.20 MILD INTERMITTENT ASTHMA WITHOUT STATUS ASTHMATICUS WITHOUT COMPLICATION: ICD-10-CM

## 2023-07-03 DIAGNOSIS — I25.10 CORONARY ARTERY DISEASE DUE TO LIPID RICH PLAQUE: ICD-10-CM

## 2023-07-03 DIAGNOSIS — E66.01 OBESITY, MORBID (HCC): ICD-10-CM

## 2023-07-03 PROCEDURE — 3078F DIAST BP <80 MM HG: CPT | Performed by: NURSE PRACTITIONER

## 2023-07-03 PROCEDURE — 99214 OFFICE O/P EST MOD 30 MIN: CPT | Performed by: NURSE PRACTITIONER

## 2023-07-03 PROCEDURE — 3074F SYST BP LT 130 MM HG: CPT | Performed by: NURSE PRACTITIONER

## 2023-07-03 NOTE — PROGRESS NOTES
Melissa Masters is a 48 y.o. presents today for   Chief Complaint   Patient presents with    Follow-Up from Hospital     Is someone accompanying this pt? no    Is the patient using any DME equipment during OV? no  Vitals:    07/03/23 1346   BP: 126/77   Pulse:    Resp:    Temp:    SpO2:        Depression Screening:   PHQ-9 Questionaire 6/13/2023 3/9/2023 1/31/2023 11/1/2022 9/12/2022 8/16/2022 8/16/2022   Little interest or pleasure in doing things 0 0 0 0 1 0 0   Feeling down, depressed, or hopeless 0 0 0 0 1 0 0   PHQ-9 Total Score 0 0 0 0 2 0 0        Abuse Screening: AMB Abuse Screening 6/13/2023 6/1/2023   Do you ever feel afraid of your partner? N N   Are you in a relationship with someone who physically or mentally threatens you? N N   Is it safe for you to go home? Y Y        Learning Assessment Screening:   No question data found. Fall Risk Screening:   Fall Risk 6/1/2023   2 or more falls in past year? no   Fall with injury in past year? no           Health Maintenance: reviewed and discussed and ordered per Provider. Health Maintenance Due   Topic Date Due    Pneumococcal 0-64 years Vaccine (1 - PCV) Never done    DTaP/Tdap/Td vaccine (1 - Tdap) Never done    Shingles vaccine (1 of 2) Never done    COVID-19 Vaccine (4 - Booster for Pfizer series) 04/12/2022         Coordination of Care:   1. \"Have you been to the ER, urgent care clinic since your last visit? Hospitalized since your last visit? \" yes, 06/22-06-23/2023    2. \"Have you seen or consulted any other health care providers outside of the [address] [place] [address] since your last visit? \" no    3. For patients aged 43-73: Has the patient had a colonoscopy / FIT/ Cologuard? Yes - no Care Gap present  If the patient is female:    4. For patients aged 43-66: Has the patient had a mammogram within the past 2 years? Yes - no Care Gap present    5. For patients aged 21-65: Has the patient had a pap smear?  NA - based on age or sex    Advanced
visit.       Marty Figueroa NP-C

## 2023-07-04 LAB
BUN SERPL-MCNC: 13 MG/DL (ref 6–24)
BUN/CREAT SERPL: 16 (ref 9–23)
CALCIUM SERPL-MCNC: 9.7 MG/DL (ref 8.7–10.2)
CHLORIDE SERPL-SCNC: 102 MMOL/L (ref 96–106)
CO2 SERPL-SCNC: 21 MMOL/L (ref 20–29)
CREAT SERPL-MCNC: 0.8 MG/DL (ref 0.57–1)
EGFRCR SERPLBLD CKD-EPI 2021: 88 ML/MIN/1.73
GLUCOSE SERPL-MCNC: 77 MG/DL (ref 70–99)
POTASSIUM SERPL-SCNC: 4.6 MMOL/L (ref 3.5–5.2)
SODIUM SERPL-SCNC: 138 MMOL/L (ref 134–144)

## 2023-07-25 DIAGNOSIS — J34.89 SINUS PRESSURE: ICD-10-CM

## 2023-07-25 DIAGNOSIS — J30.9 ALLERGIC RHINITIS, UNSPECIFIED SEASONALITY, UNSPECIFIED TRIGGER: ICD-10-CM

## 2023-07-25 RX ORDER — CETIRIZINE HYDROCHLORIDE 10 MG/1
10 TABLET ORAL DAILY
Qty: 90 TABLET | Refills: 1 | Status: SHIPPED | OUTPATIENT
Start: 2023-07-25 | End: 2024-01-21

## 2023-07-25 RX ORDER — FLUTICASONE PROPIONATE 50 MCG
1 SPRAY, SUSPENSION (ML) NASAL DAILY
Qty: 16 G | Refills: 5 | Status: SHIPPED | OUTPATIENT
Start: 2023-07-25

## 2023-07-27 ENCOUNTER — OFFICE VISIT (OUTPATIENT)
Facility: CLINIC | Age: 53
End: 2023-07-27
Payer: MEDICAID

## 2023-07-27 VITALS
HEART RATE: 77 BPM | RESPIRATION RATE: 18 BRPM | TEMPERATURE: 97.9 F | OXYGEN SATURATION: 95 % | BODY MASS INDEX: 43.99 KG/M2 | SYSTOLIC BLOOD PRESSURE: 112 MMHG | HEIGHT: 61 IN | WEIGHT: 233 LBS | DIASTOLIC BLOOD PRESSURE: 81 MMHG

## 2023-07-27 DIAGNOSIS — F41.8 MIXED ANXIETY AND DEPRESSIVE DISORDER: ICD-10-CM

## 2023-07-27 DIAGNOSIS — I50.9 HEART FAILURE, UNSPECIFIED HF CHRONICITY, UNSPECIFIED HEART FAILURE TYPE (HCC): ICD-10-CM

## 2023-07-27 DIAGNOSIS — R20.0 NUMBNESS AND TINGLING OF BOTH FEET: ICD-10-CM

## 2023-07-27 DIAGNOSIS — R20.2 NUMBNESS AND TINGLING OF BOTH FEET: ICD-10-CM

## 2023-07-27 DIAGNOSIS — I25.2 HISTORY OF MI (MYOCARDIAL INFARCTION): ICD-10-CM

## 2023-07-27 DIAGNOSIS — E66.01 OBESITY, MORBID (HCC): ICD-10-CM

## 2023-07-27 DIAGNOSIS — M79.671 BILATERAL FOOT PAIN: Primary | ICD-10-CM

## 2023-07-27 DIAGNOSIS — J34.89 SINUS DRAINAGE: ICD-10-CM

## 2023-07-27 DIAGNOSIS — J01.00 ACUTE NON-RECURRENT MAXILLARY SINUSITIS: ICD-10-CM

## 2023-07-27 DIAGNOSIS — M79.672 BILATERAL FOOT PAIN: Primary | ICD-10-CM

## 2023-07-27 PROCEDURE — 3074F SYST BP LT 130 MM HG: CPT | Performed by: NURSE PRACTITIONER

## 2023-07-27 PROCEDURE — 3079F DIAST BP 80-89 MM HG: CPT | Performed by: NURSE PRACTITIONER

## 2023-07-27 PROCEDURE — 99214 OFFICE O/P EST MOD 30 MIN: CPT | Performed by: NURSE PRACTITIONER

## 2023-07-27 RX ORDER — PREGABALIN 50 MG/1
50 CAPSULE ORAL EVERY EVENING
Qty: 90 CAPSULE | Refills: 1 | Status: SHIPPED | OUTPATIENT
Start: 2023-07-27 | End: 2024-01-23

## 2023-07-27 RX ORDER — DOXYCYCLINE HYCLATE 100 MG
100 TABLET ORAL 2 TIMES DAILY
Qty: 14 TABLET | Refills: 0 | Status: SHIPPED | OUTPATIENT
Start: 2023-07-27 | End: 2023-08-03

## 2023-07-27 SDOH — ECONOMIC STABILITY: FOOD INSECURITY: WITHIN THE PAST 12 MONTHS, YOU WORRIED THAT YOUR FOOD WOULD RUN OUT BEFORE YOU GOT MONEY TO BUY MORE.: NEVER TRUE

## 2023-07-27 SDOH — ECONOMIC STABILITY: INCOME INSECURITY: HOW HARD IS IT FOR YOU TO PAY FOR THE VERY BASICS LIKE FOOD, HOUSING, MEDICAL CARE, AND HEATING?: NOT HARD AT ALL

## 2023-07-27 SDOH — ECONOMIC STABILITY: FOOD INSECURITY: WITHIN THE PAST 12 MONTHS, THE FOOD YOU BOUGHT JUST DIDN'T LAST AND YOU DIDN'T HAVE MONEY TO GET MORE.: NEVER TRUE

## 2023-07-27 ASSESSMENT — PATIENT HEALTH QUESTIONNAIRE - PHQ9
SUM OF ALL RESPONSES TO PHQ9 QUESTIONS 1 & 2: 0
SUM OF ALL RESPONSES TO PHQ QUESTIONS 1-9: 0
1. LITTLE INTEREST OR PLEASURE IN DOING THINGS: 0
2. FEELING DOWN, DEPRESSED OR HOPELESS: 0
SUM OF ALL RESPONSES TO PHQ QUESTIONS 1-9: 0

## 2023-07-27 NOTE — PROGRESS NOTES
Erika Phillips is a 48 y.o. presents today for   Chief Complaint   Patient presents with    Nasal Congestion    Cough    Shortness of Breath       Is someone accompanying this pt? No    Is the patient using any DME equipment during OV? No    Depression Screening:   PHQ-9 Questionaire 7/27/2023 6/13/2023 3/9/2023 1/31/2023 11/1/2022 9/12/2022 8/16/2022   Little interest or pleasure in doing things 0 0 0 0 0 1 0   Feeling down, depressed, or hopeless 0 0 0 0 0 1 0   PHQ-9 Total Score 0 0 0 0 0 2 0       Abuse Screening: AMB Abuse Screening 6/13/2023 6/1/2023   Do you ever feel afraid of your partner? N N   Are you in a relationship with someone who physically or mentally threatens you? N N   Is it safe for you to go home? Y Y       Learning Assessment:  No question data found. Fall Risk:  Fall Risk 6/1/2023   2 or more falls in past year? no   Fall with injury in past year? no           Coordination of Care:   1. \"Have you been to the ER, urgent care clinic since your last visit? Hospitalized since your last visit? \" No    2. \"Have you seen or consulted any other health care providers outside of the [address] [place] [address] since your last visit? \" No    3. For patients aged 43-73: Has the patient had a colonoscopy / FIT/ Cologuard? Not due    If the patient is female:    4. For patients aged 43-66: Has the patient had a mammogram within the past 2 years? Not due    5. For patients aged 21-65: Has the patient had a pap smear? Not due     Health Maintenance: reviewed and discussed and ordered per Provider.     Health Maintenance Due   Topic Date Due    Pneumococcal 0-64 years Vaccine (1 - PCV) Never done    DTaP/Tdap/Td vaccine (1 - Tdap) Never done    Shingles vaccine (1 of 2) Never done    COVID-19 Vaccine (4 - Booster for Pfizer series) 04/12/2022        - Seble Moya LPN  [address] [facility]  Phone: [phone]  Fax: [phone]
(LYRICA) 50 MG capsule Take 1 capsule by mouth every evening for 180 days. Max Daily Amount: 50 mg 7/27/23 1/23/24 Yes MEENU Mo CNP   doxycycline hyclate (VIBRA-TABS) 100 MG tablet Take 1 tablet by mouth 2 times daily for 7 days 7/27/23 8/3/23 Yes MEENU Mo CNP   fluticasone (FLONASE) 50 MCG/ACT nasal spray 1 spray by Each Nostril route daily 7/25/23  Yes MEENU Mo CNP   cetirizine (ZYRTEC) 10 MG tablet Take 1 tablet by mouth daily 7/25/23 1/21/24 Yes MEENU Mo CNP   rosuvastatin (CRESTOR) 40 MG tablet Take 1 tablet by mouth daily 6/6/23  Yes Kathleen Ash PA-C   metoprolol tartrate (LOPRESSOR) 25 MG tablet Take 1 tablet by mouth 2 times daily 6/1/23  Yes Cindy Benedict MD   amiodarone (CORDARONE) 200 MG tablet Take 0.5 tablets by mouth daily 6/1/23  Yes Colin Harrington MD   Semaglutide-Weight Management (WEGOVY) 2.4 MG/0.75ML SOAJ SC injection Inject 2.4 mg into the skin every 7 days 5/4/23  Yes MEENU Mo CNP   albuterol sulfate HFA (PROVENTIL;VENTOLIN;PROAIR) 108 (90 Base) MCG/ACT inhaler Inhale 2 puffs into the lungs every 4 hours as needed for Shortness of Breath or Wheezing 5/4/23  Yes MEENU Mo CNP   metoprolol tartrate (LOPRESSOR) 25 MG tablet Take 0.5 tablets by mouth in the morning and 0.5 tablets in the evening. 5/4/23  Yes MEENU Mo CNP   lisinopril (PRINIVIL;ZESTRIL) 40 MG tablet Take 1 tablet by mouth daily 5/4/23  Yes MEENU Mo CNP   DULoxetine (CYMBALTA) 30 MG extended release capsule Take 1 capsule by mouth 2 times daily 5/4/23  Yes Michelle Brod, APRN - CNP   acetaminophen (TYLENOL) 650 MG extended release tablet Take 1 tablet by mouth in the morning and 1 tablet at noon and 1 tablet in the evening.  11/1/22  Yes Ar Automatic Reconciliation   aspirin 81 MG chewable tablet Take 1 tablet by mouth daily 6/2/22  Yes Ar Automatic Reconciliation   cyclobenzaprine (FLEXERIL) 10 MG tablet Take 0.5

## 2023-08-07 ENCOUNTER — PATIENT MESSAGE (OUTPATIENT)
Facility: CLINIC | Age: 53
End: 2023-08-07

## 2023-08-07 DIAGNOSIS — J45.20 MILD INTERMITTENT ASTHMA WITHOUT STATUS ASTHMATICUS WITHOUT COMPLICATION: ICD-10-CM

## 2023-08-14 NOTE — TELEPHONE ENCOUNTER
From: AFSANEH PEREZ  To: Doni Ron  Sent: 8/7/2023 3:42 PM EDT  Subject: Augusto Schwartz Afternoon, We got a request for a refill from your pharmacy for your inhaler. It looks like in May Ms. Deneen Yates put 5 refills in with your inhaler. Please check with pharmacy to see if they have refills on your inhaler. If not please let me know so we can get that refilled for you.  Thank you     Kenton Steel  Office Phone: [phone]  Fax: [phone]

## 2023-08-16 RX ORDER — ALBUTEROL SULFATE 90 UG/1
2 AEROSOL, METERED RESPIRATORY (INHALATION) EVERY 4 HOURS PRN
Qty: 18 G | Refills: 5 | Status: SHIPPED | OUTPATIENT
Start: 2023-08-16

## 2023-08-21 DIAGNOSIS — Z71.3 WEIGHT LOSS COUNSELING, ENCOUNTER FOR: ICD-10-CM

## 2023-08-21 DIAGNOSIS — E66.01 MORBID (SEVERE) OBESITY DUE TO EXCESS CALORIES (HCC): ICD-10-CM

## 2023-08-21 RX ORDER — SEMAGLUTIDE 2.4 MG/.75ML
2.4 INJECTION, SOLUTION SUBCUTANEOUS
Qty: 3 ML | Refills: 5 | Status: CANCELLED | OUTPATIENT
Start: 2023-08-21

## 2023-08-21 NOTE — TELEPHONE ENCOUNTER
From: Bibiana Minus  To:  Office of Anjum Chester  Sent: 8/20/2023 3:07 PM EDT  Subject: Medication Renewal Request    Refills have been requested for the following medications:     Semaglutide-Weight Management (Bradley Bone) 2.4 MG/0.75ML SOAJ SC injection Anjum Chester, APRN - CNP]    Preferred pharmacy: [address] [place] [phone]

## 2023-08-23 ENCOUNTER — OFFICE VISIT (OUTPATIENT)
Facility: CLINIC | Age: 53
End: 2023-08-23
Payer: MEDICAID

## 2023-08-23 VITALS
WEIGHT: 235 LBS | SYSTOLIC BLOOD PRESSURE: 100 MMHG | RESPIRATION RATE: 16 BRPM | HEART RATE: 76 BPM | TEMPERATURE: 96.8 F | HEIGHT: 61 IN | OXYGEN SATURATION: 93 % | BODY MASS INDEX: 44.37 KG/M2 | DIASTOLIC BLOOD PRESSURE: 58 MMHG

## 2023-08-23 DIAGNOSIS — R39.89 SENSATION OF PRESSURE IN BLADDER AREA: Primary | ICD-10-CM

## 2023-08-23 DIAGNOSIS — Z71.3 WEIGHT LOSS COUNSELING, ENCOUNTER FOR: ICD-10-CM

## 2023-08-23 DIAGNOSIS — E66.01 MORBID (SEVERE) OBESITY DUE TO EXCESS CALORIES (HCC): ICD-10-CM

## 2023-08-23 DIAGNOSIS — B96.20 E. COLI UTI: ICD-10-CM

## 2023-08-23 DIAGNOSIS — R30.0 DYSURIA: ICD-10-CM

## 2023-08-23 DIAGNOSIS — N39.0 E. COLI UTI: ICD-10-CM

## 2023-08-23 DIAGNOSIS — R82.2 BILIRUBINURIA: ICD-10-CM

## 2023-08-23 DIAGNOSIS — M54.50 LEFT LUMBAR PAIN: ICD-10-CM

## 2023-08-23 LAB
BILIRUBIN, URINE, POC: NORMAL
BLOOD URINE, POC: NEGATIVE
GLUCOSE URINE, POC: NORMAL
KETONES, URINE, POC: NORMAL
LEUKOCYTE ESTERASE, URINE, POC: NORMAL
NITRITE, URINE, POC: POSITIVE
PH, URINE, POC: 5 (ref 4.6–8)
PROTEIN,URINE, POC: NORMAL
SPECIFIC GRAVITY, URINE, POC: 1 (ref 1–1.03)
URINALYSIS CLARITY, POC: NORMAL
URINALYSIS COLOR, POC: NORMAL
UROBILINOGEN, POC: NORMAL

## 2023-08-23 PROCEDURE — 99214 OFFICE O/P EST MOD 30 MIN: CPT | Performed by: NURSE PRACTITIONER

## 2023-08-23 PROCEDURE — 3078F DIAST BP <80 MM HG: CPT | Performed by: NURSE PRACTITIONER

## 2023-08-23 PROCEDURE — 81001 URINALYSIS AUTO W/SCOPE: CPT | Performed by: NURSE PRACTITIONER

## 2023-08-23 PROCEDURE — 3074F SYST BP LT 130 MM HG: CPT | Performed by: NURSE PRACTITIONER

## 2023-08-23 RX ORDER — SEMAGLUTIDE 2.4 MG/.75ML
2.4 INJECTION, SOLUTION SUBCUTANEOUS
Qty: 3 ML | Refills: 5 | Status: SHIPPED | OUTPATIENT
Start: 2023-08-23

## 2023-08-23 RX ORDER — PHENAZOPYRIDINE HYDROCHLORIDE 100 MG/1
100 TABLET, FILM COATED ORAL 3 TIMES DAILY PRN
Qty: 6 TABLET | Refills: 0 | Status: SHIPPED | OUTPATIENT
Start: 2023-08-23 | End: 2023-08-25

## 2023-08-23 RX ORDER — SULFAMETHOXAZOLE AND TRIMETHOPRIM 400; 80 MG/1; MG/1
1 TABLET ORAL DAILY
Qty: 7 TABLET | Refills: 0 | Status: SHIPPED | OUTPATIENT
Start: 2023-08-23 | End: 2023-08-30

## 2023-08-23 NOTE — PROGRESS NOTES
Albania Melo is a 48 y.o. female  established patient, here for evaluation of the following chief complaint(s):  Chief Complaint   Patient presents with    Kidney Problem      Assessment and Plan  1. Sensation of pressure in bladder area  -     AMB POC URINALYSIS DIP STICK AUTO W/ MICRO  -     Culture, Urine; Future  -     Urinalysis with Microscopic; Future  -     CBC with Auto Differential; Future  -     US RETROPERITONEAL LIMITED; Future  -     sulfamethoxazole-trimethoprim (BACTRIM) 400-80 MG per tablet; Take 1 tablet by mouth daily for 7 days, Disp-7 tablet, R-0Normal  -     phenazopyridine (PYRIDIUM) 100 MG tablet; Take 1 tablet by mouth 3 times daily as needed for Pain, Disp-6 tablet, R-0Normal  2. Morbid (severe) obesity due to excess calories (HCC)  -     Semaglutide-Weight Management (WEGOVY) 2.4 MG/0.75ML SOAJ SC injection; Inject 2.4 mg into the skin every 7 days, Disp-3 mL, R-5Normal  -     Comprehensive Metabolic Panel; Future  3. Weight loss counseling, encounter for  -     Semaglutide-Weight Management (WEGOVY) 2.4 MG/0.75ML SOAJ SC injection; Inject 2.4 mg into the skin every 7 days, Disp-3 mL, R-5Normal  4. Left lumbar pain  -     AMB POC URINALYSIS DIP STICK AUTO W/ MICRO  -     Culture, Urine; Future  -     Urinalysis with Microscopic; Future  -     CBC with Auto Differential; Future  -     US RETROPERITONEAL LIMITED; Future  -     Comprehensive Metabolic Panel; Future  5. Bilirubinuria  -     Hepatic Function Panel; Future  6. E. coli UTI  -     sulfamethoxazole-trimethoprim (BACTRIM) 400-80 MG per tablet; Take 1 tablet by mouth daily for 7 days, Disp-7 tablet, R-0Normal  -     phenazopyridine (PYRIDIUM) 100 MG tablet; Take 1 tablet by mouth 3 times daily as needed for Pain, Disp-6 tablet, R-0Normal  7. Dysuria  -     sulfamethoxazole-trimethoprim (BACTRIM) 400-80 MG per tablet; Take 1 tablet by mouth daily for 7 days, Disp-7 tablet, R-0Normal  -     phenazopyridine (PYRIDIUM) 100 MG tablet;  Take

## 2023-08-23 NOTE — PROGRESS NOTES
Molly Fernandes is a 48 y.o. presents today for   Chief Complaint   Patient presents with    Kidney Problem     Is someone accompanying this pt? no    Is the patient using any DME equipment during OV? no  Vitals:    08/23/23 1419   BP: 99/63   Pulse:    Resp:    Temp:    SpO2:        Depression Screening:   PHQ-9 Questionaire 7/27/2023 6/13/2023 3/9/2023 1/31/2023 11/1/2022 9/12/2022 8/16/2022   Little interest or pleasure in doing things 0 0 0 0 0 1 0   Feeling down, depressed, or hopeless 0 0 0 0 0 1 0   PHQ-9 Total Score 0 0 0 0 0 2 0        Abuse Screening: AMB Abuse Screening 6/13/2023 6/1/2023   Do you ever feel afraid of your partner? N N   Are you in a relationship with someone who physically or mentally threatens you? N N   Is it safe for you to go home? Y Y        Learning Assessment Screening:   No question data found. Fall Risk Screening:   Fall Risk 6/1/2023   2 or more falls in past year? no   Fall with injury in past year? no           Health Maintenance: reviewed and discussed and ordered per Provider. Health Maintenance Due   Topic Date Due    Pneumococcal 0-64 years Vaccine (1 - PCV) Never done    DTaP/Tdap/Td vaccine (1 - Tdap) Never done    Shingles vaccine (1 of 2) Never done    COVID-19 Vaccine (4 - Booster for Pfizer series) 04/12/2022    Flu vaccine (1) 08/01/2023         Coordination of Care:   1. \"Have you been to the ER, urgent care clinic since your last visit? Hospitalized since your last visit? \" no    2. \"Have you seen or consulted any other health care providers outside of the [address] [place] [address] since your last visit? \" no    3. For patients aged 43-73: Has the patient had a colonoscopy / FIT/ Cologuard? Yes - no Care Gap present  If the patient is female:    4. For patients aged 43-66: Has the patient had a mammogram within the past 2 years? Yes - no Care Gap present    5. For patients aged 21-65: Has the patient had a pap smear? No    Advanced Directive:  1.  Do you have an

## 2023-08-25 LAB
ALBUMIN SERPL-MCNC: 4.2 G/DL (ref 3.8–4.9)
ALBUMIN/GLOB SERPL: 1.7 {RATIO} (ref 1.2–2.2)
ALP SERPL-CCNC: 106 IU/L (ref 44–121)
ALT SERPL-CCNC: 22 IU/L (ref 0–32)
APPEARANCE UR: ABNORMAL
AST SERPL-CCNC: 20 IU/L (ref 0–40)
BACTERIA #/AREA URNS HPF: ABNORMAL /[HPF]
BASOPHILS # BLD AUTO: 0.1 X10E3/UL (ref 0–0.2)
BASOPHILS NFR BLD AUTO: 1 %
BILIRUB DIRECT SERPL-MCNC: <0.1 MG/DL (ref 0–0.4)
BILIRUB SERPL-MCNC: 0.3 MG/DL (ref 0–1.2)
BILIRUB UR QL STRIP: NEGATIVE
BUN SERPL-MCNC: 10 MG/DL (ref 6–24)
BUN/CREAT SERPL: 10 (ref 9–23)
CALCIUM SERPL-MCNC: 9.7 MG/DL (ref 8.7–10.2)
CASTS URNS QL MICRO: ABNORMAL /LPF
CHLORIDE SERPL-SCNC: 104 MMOL/L (ref 96–106)
CO2 SERPL-SCNC: 21 MMOL/L (ref 20–29)
COLOR UR: ABNORMAL
CREAT SERPL-MCNC: 1.01 MG/DL (ref 0.57–1)
CRYSTALS URNS MICRO: ABNORMAL
EGFRCR SERPLBLD CKD-EPI 2021: 67 ML/MIN/1.73
EOSINOPHIL # BLD AUTO: 0.2 X10E3/UL (ref 0–0.4)
EOSINOPHIL NFR BLD AUTO: 2 %
EPI CELLS #/AREA URNS HPF: ABNORMAL /HPF (ref 0–10)
ERYTHROCYTE [DISTWIDTH] IN BLOOD BY AUTOMATED COUNT: 14 % (ref 11.7–15.4)
GLOBULIN SER CALC-MCNC: 2.5 G/DL (ref 1.5–4.5)
GLUCOSE SERPL-MCNC: 100 MG/DL (ref 70–99)
GLUCOSE UR QL STRIP: NEGATIVE
HCT VFR BLD AUTO: 41.7 % (ref 34–46.6)
HGB BLD-MCNC: 13.2 G/DL (ref 11.1–15.9)
HGB UR QL STRIP: NEGATIVE
IMM GRANULOCYTES # BLD AUTO: 0 X10E3/UL (ref 0–0.1)
IMM GRANULOCYTES NFR BLD AUTO: 0 %
KETONES UR QL STRIP: NEGATIVE
LEUKOCYTE ESTERASE UR QL STRIP: ABNORMAL
LYMPHOCYTES # BLD AUTO: 2.8 X10E3/UL (ref 0.7–3.1)
LYMPHOCYTES NFR BLD AUTO: 35 %
MCH RBC QN AUTO: 27 PG (ref 26.6–33)
MCHC RBC AUTO-ENTMCNC: 31.7 G/DL (ref 31.5–35.7)
MCV RBC AUTO: 86 FL (ref 79–97)
MICRO URNS: ABNORMAL
MONOCYTES # BLD AUTO: 0.4 X10E3/UL (ref 0.1–0.9)
MONOCYTES NFR BLD AUTO: 5 %
NEUTROPHILS # BLD AUTO: 4.6 X10E3/UL (ref 1.4–7)
NEUTROPHILS NFR BLD AUTO: 57 %
NITRITE UR QL STRIP: POSITIVE
PH UR STRIP: 7.5 [PH] (ref 5–7.5)
PLATELET # BLD AUTO: 278 X10E3/UL (ref 150–450)
POTASSIUM SERPL-SCNC: 4.3 MMOL/L (ref 3.5–5.2)
PROT SERPL-MCNC: 6.7 G/DL (ref 6–8.5)
PROT UR QL STRIP: ABNORMAL
RBC # BLD AUTO: 4.88 X10E6/UL (ref 3.77–5.28)
RBC #/AREA URNS HPF: >30 /HPF (ref 0–2)
SODIUM SERPL-SCNC: 142 MMOL/L (ref 134–144)
SP GR UR STRIP: 1.02 (ref 1–1.03)
SPECIMEN STATUS REPORT: NORMAL
UNIDENT CRYS URNS QL MICRO: PRESENT
UROBILINOGEN UR STRIP-MCNC: 1 MG/DL (ref 0.2–1)
WBC # BLD AUTO: 8.2 X10E3/UL (ref 3.4–10.8)
WBC #/AREA URNS HPF: ABNORMAL /HPF (ref 0–5)

## 2023-08-27 LAB — BACTERIA UR CULT: ABNORMAL

## 2023-08-29 LAB — BACTERIA UR CULT: ABNORMAL

## 2023-09-06 DIAGNOSIS — E78.00 PURE HYPERCHOLESTEROLEMIA, UNSPECIFIED: ICD-10-CM

## 2023-09-07 ENCOUNTER — HOSPITAL ENCOUNTER (OUTPATIENT)
Facility: HOSPITAL | Age: 53
Discharge: HOME | End: 2023-09-07
Payer: MEDICAID

## 2023-09-07 DIAGNOSIS — R39.89 SENSATION OF PRESSURE IN BLADDER AREA: ICD-10-CM

## 2023-09-07 DIAGNOSIS — M54.50 LEFT LUMBAR PAIN: ICD-10-CM

## 2023-09-07 PROCEDURE — 76770 US EXAM ABDO BACK WALL COMP: CPT

## 2023-09-11 ENCOUNTER — TELEPHONE (OUTPATIENT)
Facility: CLINIC | Age: 53
End: 2023-09-11

## 2023-09-11 NOTE — TELEPHONE ENCOUNTER
Cover my meds is calling on behalf of pt who needs a prior authorization for [place] 2.4mg    Please send to [facility] for authorization      [employer] My Med[employer]: Idania Del Valle    Ph : [phone]  Ref#: [id number]

## 2023-09-15 DIAGNOSIS — Z71.3 WEIGHT LOSS COUNSELING, ENCOUNTER FOR: ICD-10-CM

## 2023-09-15 DIAGNOSIS — E66.01 MORBID (SEVERE) OBESITY DUE TO EXCESS CALORIES (HCC): ICD-10-CM

## 2023-09-15 RX ORDER — SEMAGLUTIDE 2.4 MG/.75ML
2.4 INJECTION, SOLUTION SUBCUTANEOUS
Qty: 3 ML | Refills: 5 | Status: SHIPPED | OUTPATIENT
Start: 2023-09-15

## 2023-09-15 NOTE — TELEPHONE ENCOUNTER
From: Melissa Masters  To:  Office of Fredy Diaz  Sent: 9/13/2023 2:40 PM EDT  Subject: Medication Renewal Request    Refills have been requested for the following medications:     Semaglutide-Weight Management (Rannie Board) 2.4 MG/0.75ML SOAJ SC injection Fredy Diaz, APRN - CNP]    Preferred pharmacy: [address] [place] [address] [phone]

## 2023-09-15 NOTE — TELEPHONE ENCOUNTER
----- Message from Steven Montejo sent at 9/13/2023  2:42 PM EDT -----  Regarding: Naprosyn refill  Contact: [phone]  Was wondering if you could call me in some Naprosyn.

## 2023-09-25 ENCOUNTER — OFFICE VISIT (OUTPATIENT)
Facility: CLINIC | Age: 53
End: 2023-09-25
Payer: MEDICAID

## 2023-09-25 VITALS
SYSTOLIC BLOOD PRESSURE: 122 MMHG | HEIGHT: 61 IN | TEMPERATURE: 97.9 F | WEIGHT: 233 LBS | RESPIRATION RATE: 16 BRPM | DIASTOLIC BLOOD PRESSURE: 86 MMHG | OXYGEN SATURATION: 94 % | HEART RATE: 72 BPM | BODY MASS INDEX: 43.99 KG/M2

## 2023-09-25 DIAGNOSIS — G47.00 INSOMNIA, UNSPECIFIED TYPE: ICD-10-CM

## 2023-09-25 DIAGNOSIS — J45.20 MILD INTERMITTENT ASTHMA WITHOUT STATUS ASTHMATICUS WITHOUT COMPLICATION: ICD-10-CM

## 2023-09-25 DIAGNOSIS — I25.10 CORONARY ARTERY DISEASE DUE TO LIPID RICH PLAQUE: ICD-10-CM

## 2023-09-25 DIAGNOSIS — I50.9 HEART FAILURE, UNSPECIFIED HF CHRONICITY, UNSPECIFIED HEART FAILURE TYPE (HCC): ICD-10-CM

## 2023-09-25 DIAGNOSIS — R06.83 SNORES: ICD-10-CM

## 2023-09-25 DIAGNOSIS — I25.83 CORONARY ARTERY DISEASE DUE TO LIPID RICH PLAQUE: ICD-10-CM

## 2023-09-25 DIAGNOSIS — Z87.891 HISTORY OF SMOKING: ICD-10-CM

## 2023-09-25 DIAGNOSIS — E66.01 OBESITY, MORBID (HCC): ICD-10-CM

## 2023-09-25 DIAGNOSIS — Z23 IMMUNIZATION DUE: ICD-10-CM

## 2023-09-25 DIAGNOSIS — R40.0 DAYTIME SLEEPINESS: ICD-10-CM

## 2023-09-25 DIAGNOSIS — R06.02 SOB (SHORTNESS OF BREATH): Primary | ICD-10-CM

## 2023-09-25 DIAGNOSIS — Z87.891 FORMER CIGARETTE SMOKER: ICD-10-CM

## 2023-09-25 PROCEDURE — 90694 VACC AIIV4 NO PRSRV 0.5ML IM: CPT | Performed by: NURSE PRACTITIONER

## 2023-09-25 PROCEDURE — 90471 IMMUNIZATION ADMIN: CPT | Performed by: NURSE PRACTITIONER

## 2023-09-25 PROCEDURE — 3078F DIAST BP <80 MM HG: CPT | Performed by: NURSE PRACTITIONER

## 2023-09-25 PROCEDURE — 99214 OFFICE O/P EST MOD 30 MIN: CPT | Performed by: NURSE PRACTITIONER

## 2023-09-25 PROCEDURE — 90677 PCV20 VACCINE IM: CPT | Performed by: NURSE PRACTITIONER

## 2023-09-25 PROCEDURE — 90472 IMMUNIZATION ADMIN EACH ADD: CPT | Performed by: NURSE PRACTITIONER

## 2023-09-25 PROCEDURE — 3074F SYST BP LT 130 MM HG: CPT | Performed by: NURSE PRACTITIONER

## 2023-09-25 RX ORDER — FLUTICASONE PROPIONATE AND SALMETEROL 100; 50 UG/1; UG/1
1 POWDER RESPIRATORY (INHALATION) EVERY 12 HOURS
Qty: 1 EACH | Refills: 2 | Status: SHIPPED | OUTPATIENT
Start: 2023-09-25

## 2023-09-25 RX ORDER — ALBUTEROL SULFATE 90 UG/1
2 AEROSOL, METERED RESPIRATORY (INHALATION) EVERY 4 HOURS PRN
Qty: 18 G | Refills: 5 | Status: SHIPPED | OUTPATIENT
Start: 2023-09-25

## 2023-10-25 ENCOUNTER — OFFICE VISIT (OUTPATIENT)
Facility: CLINIC | Age: 53
End: 2023-10-25
Payer: MEDICAID

## 2023-10-25 VITALS
HEART RATE: 67 BPM | OXYGEN SATURATION: 95 % | WEIGHT: 245 LBS | TEMPERATURE: 97.8 F | DIASTOLIC BLOOD PRESSURE: 90 MMHG | BODY MASS INDEX: 46.26 KG/M2 | RESPIRATION RATE: 16 BRPM | HEIGHT: 61 IN | SYSTOLIC BLOOD PRESSURE: 169 MMHG

## 2023-10-25 DIAGNOSIS — Z87.891 FORMER SMOKER: ICD-10-CM

## 2023-10-25 DIAGNOSIS — R60.0 EDEMA OF BOTH LOWER LEGS: ICD-10-CM

## 2023-10-25 DIAGNOSIS — I50.9 HEART FAILURE, UNSPECIFIED HF CHRONICITY, UNSPECIFIED HEART FAILURE TYPE (HCC): ICD-10-CM

## 2023-10-25 DIAGNOSIS — I10 PRIMARY HYPERTENSION: ICD-10-CM

## 2023-10-25 DIAGNOSIS — I25.10 CORONARY ARTERY DISEASE DUE TO LIPID RICH PLAQUE: ICD-10-CM

## 2023-10-25 DIAGNOSIS — I25.83 CORONARY ARTERY DISEASE DUE TO LIPID RICH PLAQUE: ICD-10-CM

## 2023-10-25 DIAGNOSIS — J45.20 MILD INTERMITTENT ASTHMA WITHOUT STATUS ASTHMATICUS WITHOUT COMPLICATION: Primary | ICD-10-CM

## 2023-10-25 PROCEDURE — 3080F DIAST BP >= 90 MM HG: CPT | Performed by: NURSE PRACTITIONER

## 2023-10-25 PROCEDURE — 3077F SYST BP >= 140 MM HG: CPT | Performed by: NURSE PRACTITIONER

## 2023-10-25 PROCEDURE — 99214 OFFICE O/P EST MOD 30 MIN: CPT | Performed by: NURSE PRACTITIONER

## 2023-10-25 RX ORDER — BUMETANIDE 1 MG/1
1 TABLET ORAL 2 TIMES DAILY
Qty: 180 TABLET | Refills: 1 | Status: SHIPPED | OUTPATIENT
Start: 2023-10-25

## 2023-10-25 RX ORDER — LISINOPRIL 20 MG/1
40 TABLET ORAL DAILY
Qty: 90 TABLET | Refills: 1 | Status: SHIPPED | OUTPATIENT
Start: 2023-10-25

## 2023-10-25 NOTE — PROGRESS NOTES
Hazel Hernandez is a 48 y.o. presents today for   Chief Complaint   Patient presents with    Follow-up     1 month f/u     Fatigue     Is someone accompanying this pt? no    Is the patient using any DME equipment during OV? no  Vitals:    10/25/23 1033   BP: (!) 169/90   Pulse:    Resp:    Temp:    SpO2:        Depression Screenin/27/2023     1:28 PM 2023     8:26 AM 3/9/2023    10:16 AM 2023    10:17 AM 2022     9:36 AM 2022     8:58 AM 2022     9:01 AM   PHQ-9 Questionaire   Little interest or pleasure in doing things 0 0 0 0 0 1 0   Feeling down, depressed, or hopeless 0 0 0 0 0 1 0   PHQ-9 Total Score 0 0 0 0 0 2 0        Abuse Screenin/13/2023     8:00 AM 2023     9:00 AM   AMB Abuse Screening   Do you ever feel afraid of your partner? N N   Are you in a relationship with someone who physically or mentally threatens you? N N   Is it safe for you to go home? Y Y        Learning Assessment Screening:   No question data found. Fall Risk Screenin/1/2023     9:43 AM   Fall Risk   2 or more falls in past year? no   Fall with injury in past year? no           Health Maintenance: reviewed and discussed and ordered per Provider. Health Maintenance Due   Topic Date Due    Hepatitis B vaccine (1 of 3 - 3-dose series) Never done    DTaP/Tdap/Td vaccine (1 - Tdap) Never done    Shingles vaccine (1 of 2) Never done    COVID-19 Vaccine (4 - Pfizer series) 2022         Coordination of Care:   1. \"Have you been to the ER, urgent care clinic since your last visit? Hospitalized since your last visit? \" no    2. \"Have you seen or consulted any other health care providers outside of the [address] [place] [address] since your last visit? \" no    3. For patients aged 43-73: Has the patient had a colonoscopy / FIT/ Cologuard? Yes - no Care Gap present  If the patient is female:    4. For patients aged 43-66: Has the patient had a mammogram within the past 2 years?  Yes - no

## 2023-10-25 NOTE — PROGRESS NOTES
Caren De Jesus is a 48 y.o. female  established patient, here for evaluation of the following chief complaint(s):  Chief Complaint   Patient presents with    Follow-up     1 month f/u     Fatigue      Assessment and Plan  1. Mild intermittent asthma without status asthmaticus without complication  2. Former smoker  3. Coronary artery disease due to lipid rich plaque  4. Primary hypertension  -     bumetanide (BUMEX) 1 MG tablet; Take 1 tablet by mouth 2 times daily, Disp-180 tablet, R-1Normal  -     metoprolol tartrate (LOPRESSOR) 25 MG tablet; Take 0.5 tablets by mouth in the morning and 0.5 tablets in the evening., Disp-90 tablet, R-1Normal  -     lisinopril (PRINIVIL;ZESTRIL) 20 MG tablet; Take 2 tablets by mouth daily, Disp-90 tablet, R-1Normal  5. Heart failure, unspecified HF chronicity, unspecified heart failure type (HCC)  -     bumetanide (BUMEX) 1 MG tablet; Take 1 tablet by mouth 2 times daily, Disp-180 tablet, R-1Normal  6. Edema of both lower legs  -     bumetanide (BUMEX) 1 MG tablet; Take 1 tablet by mouth 2 times daily, Disp-180 tablet, R-1Normal       Return in about 3 weeks (around 11/15/2023) for Routine, 20. HPI:   In office visit. Pt appears well. She has gained 11 lbs seen last visit. BP is high in the office today. Add back lisinopril 20 mg and metoprolol 12.5 mg BID, increase Bumex 1 mg BID, stay hydrated, low salt diet   Referral to sleep medicine and chest CT  H/O asthma and smoking     Last visit, Hospital stay 9/21/2023 til 9/24/2023 related chest pain and dizziness. Pt says she has dizziness since last week. Pt says she has to use several pillows at night laying in bed to she can [place] better. \" Pt reports for months she has been using her \"albuterol 6 times a day\" at times She feels the albuterol helps w/ her breathing. Her recent hopsital stay they requested she have a sleep study, low CT scan of her chest and PFT. Pt tested negative for covid and the flu.     Started bumetanide 1

## 2023-11-28 ENCOUNTER — OFFICE VISIT (OUTPATIENT)
Facility: CLINIC | Age: 53
End: 2023-11-28
Payer: MEDICAID

## 2023-11-28 VITALS
HEART RATE: 80 BPM | WEIGHT: 247 LBS | SYSTOLIC BLOOD PRESSURE: 99 MMHG | RESPIRATION RATE: 18 BRPM | BODY MASS INDEX: 46.63 KG/M2 | DIASTOLIC BLOOD PRESSURE: 65 MMHG | HEIGHT: 61 IN | TEMPERATURE: 97.2 F | OXYGEN SATURATION: 96 %

## 2023-11-28 DIAGNOSIS — I50.43 ACUTE ON CHRONIC COMBINED SYSTOLIC (CONGESTIVE) AND DIASTOLIC (CONGESTIVE) HEART FAILURE (HCC): ICD-10-CM

## 2023-11-28 DIAGNOSIS — Z09 HOSPITAL DISCHARGE FOLLOW-UP: Primary | ICD-10-CM

## 2023-11-28 DIAGNOSIS — E66.01 OBESITY, MORBID (HCC): ICD-10-CM

## 2023-11-28 DIAGNOSIS — I48.0 PAF (PAROXYSMAL ATRIAL FIBRILLATION) (HCC): ICD-10-CM

## 2023-11-28 DIAGNOSIS — Z87.01 HISTORY OF PNEUMONIA: ICD-10-CM

## 2023-11-28 PROBLEM — J45.20 MILD INTERMITTENT ASTHMA: Status: ACTIVE | Noted: 2023-11-06

## 2023-11-28 PROBLEM — J18.9 PNEUMONIA OF RIGHT MIDDLE LOBE DUE TO INFECTIOUS ORGANISM: Status: ACTIVE | Noted: 2023-11-28

## 2023-11-28 PROCEDURE — 3078F DIAST BP <80 MM HG: CPT | Performed by: NURSE PRACTITIONER

## 2023-11-28 PROCEDURE — 99214 OFFICE O/P EST MOD 30 MIN: CPT | Performed by: NURSE PRACTITIONER

## 2023-11-28 PROCEDURE — 3074F SYST BP LT 130 MM HG: CPT | Performed by: NURSE PRACTITIONER

## 2023-11-30 ENCOUNTER — TELEPHONE (OUTPATIENT)
Facility: CLINIC | Age: 53
End: 2023-11-30

## 2023-11-30 NOTE — TELEPHONE ENCOUNTER
Nicky with [facility] is calling to report the following: Today she saw patient and she had elevated BP: 158/96 sitting and then: 144/98 and then standin/86. Pt was complaining of dizziness for the last 2 days with any movement. The hospital did hold BP meds for today due to maybe patient being dehydrated. Please call with any questions. [phone]. Thank you.

## 2023-12-07 ENCOUNTER — PATIENT MESSAGE (OUTPATIENT)
Age: 53
End: 2023-12-07

## 2023-12-07 ENCOUNTER — OFFICE VISIT (OUTPATIENT)
Age: 53
End: 2023-12-07
Payer: MEDICAID

## 2023-12-07 VITALS
WEIGHT: 248 LBS | SYSTOLIC BLOOD PRESSURE: 116 MMHG | BODY MASS INDEX: 46.86 KG/M2 | DIASTOLIC BLOOD PRESSURE: 78 MMHG | HEART RATE: 87 BPM | OXYGEN SATURATION: 94 %

## 2023-12-07 DIAGNOSIS — I25.10 CORONARY ARTERY DISEASE DUE TO LIPID RICH PLAQUE: Primary | ICD-10-CM

## 2023-12-07 DIAGNOSIS — I25.83 CORONARY ARTERY DISEASE DUE TO LIPID RICH PLAQUE: Primary | ICD-10-CM

## 2023-12-07 DIAGNOSIS — E78.00 PURE HYPERCHOLESTEROLEMIA: ICD-10-CM

## 2023-12-07 DIAGNOSIS — I10 ESSENTIAL HYPERTENSION WITH GOAL BLOOD PRESSURE LESS THAN 140/90: ICD-10-CM

## 2023-12-07 DIAGNOSIS — I48.0 PAF (PAROXYSMAL ATRIAL FIBRILLATION) (HCC): ICD-10-CM

## 2023-12-07 PROCEDURE — 99214 OFFICE O/P EST MOD 30 MIN: CPT | Performed by: INTERNAL MEDICINE

## 2023-12-07 PROCEDURE — 3074F SYST BP LT 130 MM HG: CPT | Performed by: INTERNAL MEDICINE

## 2023-12-07 PROCEDURE — 3078F DIAST BP <80 MM HG: CPT | Performed by: INTERNAL MEDICINE

## 2023-12-07 RX ORDER — AMIODARONE HYDROCHLORIDE 100 MG/1
100 TABLET ORAL DAILY
Qty: 30 TABLET | Refills: 5 | Status: SHIPPED | OUTPATIENT
Start: 2023-12-07

## 2023-12-07 NOTE — PROGRESS NOTES
[facility]    Molly Fernandes is 48 y.o. female with history of coronary artery disease status post inferior STEMI status post coronary stent, hypertension, hyperlipidemia,  DJD, obesity      Patient had inferior STEMI in 03/2020 and underwent emergent cardiac catheterization. He she was found to have a three-vessel coronary disease. She had proximal as well as mid- distal RCA stenting with 3.0 x 33 mm as well as 3.0 x 18 mm Xience YANCI. For unstable anginal symptoms, patient underwent cardiac catheterization again in 05/2022 which showed severe three-vessel coronary artery disease and patient underwent CABG x4 with  LIMA to LAD, left radial to OM, SVG to PDA and SVG to diagonal by Dr. Betzy Stuart at [facility] in 05/2022. Hospital admission and 11/2023 with HFpEF. Echo in 08/2023 showed EF 45-50% an echo in 11/2023 showed EF of 55%    Patient continues to have some shortness of breath. She is taking Bumex 1 mg twice daily. She is following with telehealth and daily checking her weight. Her weight is slightly up. She is watching her salt intake. She is taking her medication as prescribed. Has to use nitroglycerin couple weeks ago because of shortness of breath and some chest tightness associated with it. No exertional chest pain or chest tightness. Denies any nausea, vomiting, abdominal pain, fever, chills, sputum production. No hematuria or other bleeding complaints     Past Medical History:   Diagnosis Date    CAD (coronary artery disease)     Displacement of lumbar intervertebral disc     HLD (hyperlipidemia)     Hypertension     Lumbar radiculitis     Lumbar sprain     Numbness     legs and arms    S/P CABG x 4 5/27/2022    LIMA-LAD, Left radial to OM, rSVG to PDA, rSVG to diag by Dr. Betzy Stuart    STEMI (ST elevation myocardial infarction) [facility]) 03/2020       Review of Systems:  Cardiac symptoms as noted above in HPI.  All others

## 2023-12-07 NOTE — TELEPHONE ENCOUNTER
I called pt and she sounded SOB, offered her sooner appnt to see michael but she declined and will keep appnt with michael in jan because she saw dr Paulie Aburto today and has an appnt with the pulmonologist next week.

## 2023-12-07 NOTE — PROGRESS NOTES
Identified pt with two pt identifiers(name and ). Reviewed record in preparation for visit and have obtained necessary documentation. Glo Nguyen presents today for   Chief Complaint   Patient presents with    Follow-up       Pt c/o SOB,  FATIGUE/WEAKNESS, HEADACHES, SWELLING. Glo Nguyen preferred language for health care discussion is english/other. Personal Protective Equipment:   Personal Protective Equipment was used including: mask-surgical and hands-gloves. Patient was placed on no precaution(s). Patient was masked. Precautions:   Patient currently on None  Patient currently roomed with door closed. Is someone accompanying this pt? no    Is the patient using any DME equipment during OV? no    Depression Screenin/27/2023     1:28 PM 2023     8:26 AM 3/9/2023    10:16 AM 2023    10:17 AM 2022     9:36 AM 2022     8:58 AM 2022     9:01 AM   PHQ-9 Questionaire   Little interest or pleasure in doing things 0 0 0 0 0 1 0   Feeling down, depressed, or hopeless 0 0 0 0 0 1 0   PHQ-9 Total Score 0 0 0 0 0 2 0        Learning Assessment:  No question data found. Abuse Screenin/7/2023    10:00 AM 2023     8:00 AM 2023     9:00 AM   AMB Abuse Screening   Do you ever feel afraid of your partner? N N N   Are you in a relationship with someone who physically or mentally threatens you? N N N   Is it safe for you to go home? Mariana Gillespie          Fall Risk      2023    10:31 AM 2023     9:43 AM   Fall Risk   2 or more falls in past year? no no   Fall with injury in past year? no no         Pt currently taking Anticoagulant therapy? no  Pt currently taking Antiplatelet therapy? no    Coordination of Care:  1. Have you been to the ER, urgent care clinic since your last visit? Hospitalized since your last visit? Yes, 10 days    2.  Have you seen or consulted any other health care providers outside of the [address] [place] [address] since your last

## 2023-12-07 NOTE — PATIENT INSTRUCTIONS
New Medication/Medication Changes  Stop lisinopril wait 48 hrs then start entresto 2426 mg tab twice day    **please allow 24-48 hrs for medication to be escribed to pharmacy** If you need any refills on medications please contact your pharmacy so that the request can be escribed to the provider for review seven to 10 days prior to being out of medication.

## 2023-12-11 ENCOUNTER — TELEPHONE (OUTPATIENT)
Age: 53
End: 2023-12-11

## 2023-12-11 NOTE — TELEPHONE ENCOUNTER
Patient was instructed to take an extra lisinopril when her blood pressure was high. Michell called and was asking what the parameters is for taking the extra dose. She has a blood pressure of 163/110 and complaining of a headache. Maddidameon would like a phone call as well to the parameters. [phone].

## 2023-12-12 ENCOUNTER — TELEPHONE (OUTPATIENT)
Facility: CLINIC | Age: 53
End: 2023-12-12

## 2023-12-12 NOTE — TELEPHONE ENCOUNTER
Home health called and said pt is having Swelling of fluid and hypertension  running 156/103  and SOB. They can't get a hold of cardiology. Home health spoke with Ms. Devon King. Restart lisinopril (PRINIVIL;ZESTRIL) 20 mg twice a day by mouth daily   Pt will follow up in office this week.

## 2023-12-14 RX ORDER — VALSARTAN 80 MG/1
80 TABLET ORAL DAILY
Qty: 90 TABLET | Refills: 2 | Status: SHIPPED | OUTPATIENT
Start: 2023-12-14

## 2023-12-14 NOTE — TELEPHONE ENCOUNTER
PCP: MEENU Maddox CNP    Last appt:  12/7/2023   Future Appointments   Date Time Provider [address]   1/2/2024  8:45 AM MEENU Maddox CNP [facility]   6/20/2024 11:15 AM Royce Pierson MD [facility]       Requested Prescriptions     Pending Prescriptions Disp Refills    valsartan (DIOVAN) 80 MG tablet 90 tablet 2     Sig: Take 1 tablet by mouth daily           Other Comments:  Verbal order with read back Colin Aburto MD.  Insurance denial of entresto. Change to valsartan 80 mgt tab daily.

## 2023-12-29 NOTE — TELEPHONE ENCOUNTER
Verbal order with read back Colin Willis MD.  I do not advise pt to take extra doses. I will usually do a med change or increase in dosage of medication. I do not have that notes in my OV note.

## 2024-01-15 ENCOUNTER — OFFICE VISIT (OUTPATIENT)
Facility: CLINIC | Age: 54
End: 2024-01-15
Payer: MEDICAID

## 2024-01-15 VITALS
RESPIRATION RATE: 18 BRPM | WEIGHT: 251 LBS | DIASTOLIC BLOOD PRESSURE: 45 MMHG | BODY MASS INDEX: 47.39 KG/M2 | HEART RATE: 69 BPM | HEIGHT: 61 IN | SYSTOLIC BLOOD PRESSURE: 66 MMHG | TEMPERATURE: 98.1 F | OXYGEN SATURATION: 95 %

## 2024-01-15 DIAGNOSIS — Z09 HOSPITAL DISCHARGE FOLLOW-UP: Primary | ICD-10-CM

## 2024-01-15 DIAGNOSIS — I95.9 HYPOTENSION, UNSPECIFIED HYPOTENSION TYPE: ICD-10-CM

## 2024-01-15 DIAGNOSIS — J45.31 MILD PERSISTENT ASTHMA WITH ACUTE EXACERBATION: ICD-10-CM

## 2024-01-15 DIAGNOSIS — I50.43 ACUTE ON CHRONIC COMBINED SYSTOLIC (CONGESTIVE) AND DIASTOLIC (CONGESTIVE) HEART FAILURE (HCC): ICD-10-CM

## 2024-01-15 DIAGNOSIS — Z95.1 HX OF CABG: ICD-10-CM

## 2024-01-15 DIAGNOSIS — Z87.09 HISTORY OF RESPIRATORY FAILURE: ICD-10-CM

## 2024-01-15 DIAGNOSIS — Z87.828 HISTORY OF KIDNEY INJURY: ICD-10-CM

## 2024-01-15 PROCEDURE — 3078F DIAST BP <80 MM HG: CPT | Performed by: NURSE PRACTITIONER

## 2024-01-15 PROCEDURE — 99214 OFFICE O/P EST MOD 30 MIN: CPT | Performed by: NURSE PRACTITIONER

## 2024-01-15 PROCEDURE — 3074F SYST BP LT 130 MM HG: CPT | Performed by: NURSE PRACTITIONER

## 2024-01-15 ASSESSMENT — PATIENT HEALTH QUESTIONNAIRE - PHQ9
8. MOVING OR SPEAKING SO SLOWLY THAT OTHER PEOPLE COULD HAVE NOTICED. OR THE OPPOSITE, BEING SO FIGETY OR RESTLESS THAT YOU HAVE BEEN MOVING AROUND A LOT MORE THAN USUAL: 0
3. TROUBLE FALLING OR STAYING ASLEEP: 0
1. LITTLE INTEREST OR PLEASURE IN DOING THINGS: 0
SUM OF ALL RESPONSES TO PHQ QUESTIONS 1-9: 0
SUM OF ALL RESPONSES TO PHQ9 QUESTIONS 1 & 2: 0
6. FEELING BAD ABOUT YOURSELF - OR THAT YOU ARE A FAILURE OR HAVE LET YOURSELF OR YOUR FAMILY DOWN: 0
SUM OF ALL RESPONSES TO PHQ QUESTIONS 1-9: 0
5. POOR APPETITE OR OVEREATING: 0
10. IF YOU CHECKED OFF ANY PROBLEMS, HOW DIFFICULT HAVE THESE PROBLEMS MADE IT FOR YOU TO DO YOUR WORK, TAKE CARE OF THINGS AT HOME, OR GET ALONG WITH OTHER PEOPLE: 0
4. FEELING TIRED OR HAVING LITTLE ENERGY: 0
2. FEELING DOWN, DEPRESSED OR HOPELESS: 0
9. THOUGHTS THAT YOU WOULD BE BETTER OFF DEAD, OR OF HURTING YOURSELF: 0
SUM OF ALL RESPONSES TO PHQ QUESTIONS 1-9: 0
7. TROUBLE CONCENTRATING ON THINGS, SUCH AS READING THE NEWSPAPER OR WATCHING TELEVISION: 0
SUM OF ALL RESPONSES TO PHQ QUESTIONS 1-9: 0

## 2024-01-15 NOTE — PROGRESS NOTES
Anjelica Mcgee is a 53 y.o. female  established patient, here for evaluation of the following chief complaint(s):  Chief Complaint   Patient presents with    Follow-Up from Hospital     [facility] from 12/28/2023 - 1/8/2024    Follow-up     [facility] ER Visit 1/11/2024      Assessment and Plan  1. Hospital discharge follow-up  2. Acute on chronic combined systolic (congestive) and diastolic (congestive) heart failure (HCC)  3. History of respiratory failure  4. History of kidney injury  5. Class 3 severe obesity due to excess calories with serious comorbidity and body mass index (BMI) of 45.0 to 49.9 in adult ([facility])  6. Mild persistent asthma with acute exacerbation  7. Hx of CABG  8. Suspected sleep apnea  9. Recurrent major depressive disorder, remission status unspecified ([facility])  10. Bradycardia     Return in about 1 month (around 2/15/2024) for low blood pressure, 15.   HPI:   In office visit and in office with her daughter.    Hospital follow-up 1/5/24-1/8/24 related to septic shock, treated for acute cystitis and likely pyelonephritis due to patient's flank pain.  Patient was enrolled in [facility] to home acute outpatient home monitoring due to multiple hospitalizations for respiratory issues.  Hospital visit December 12 through December 23, 2023 with suspected asthma flare.  Patient's ACE inhibitor and metoprolol were discontinued due to hypotension and bradycardia.    Today 1/15/2024, her BP is low and she says has been low. She has been told to go to the ED by [facility] acute outpatient home monitoring program. Most recent BP here today is 66/45 and 74/44  She has said she has \"dizzy spells and sees spots.\" Pt is in no acute distress at this time.  I have asked pt to go to the ED. Her daughter will wheel her to the exist. Pt feels her BP goes down when she walks around. She is high fall risk.  ROS:    General: negative for - chills, fever, weight changes or malaise  HEENT: no sore throat, nasal

## 2024-01-31 ENCOUNTER — TELEPHONE (OUTPATIENT)
Facility: CLINIC | Age: 54
End: 2024-01-31

## 2024-01-31 ENCOUNTER — PATIENT MESSAGE (OUTPATIENT)
Facility: CLINIC | Age: 54
End: 2024-01-31

## 2024-01-31 DIAGNOSIS — R20.2 NUMBNESS AND TINGLING OF BOTH FEET: ICD-10-CM

## 2024-01-31 DIAGNOSIS — R20.0 NUMBNESS AND TINGLING OF BOTH FEET: ICD-10-CM

## 2024-01-31 NOTE — TELEPHONE ENCOUNTER
From: Anjelica Mcgee  To: Office of Magali Smalls  Sent: 1/31/2024 11:49 AM EST  Subject: Medication Renewal Request    Refills have been requested for the following medications:     pregabalin (LYRICA) 50 MG capsule [Magali Smalls, APRN - CNP]    Pref[facility]: [facility]

## 2024-01-31 NOTE — TELEPHONE ENCOUNTER
Tita with [facility] called to inform Magali patient was admitted back into the hospital (She did not know which one at this time), and will need to resume care.    Phone #:  [phone].    Please call to discuss.  Thank you.

## 2024-02-06 NOTE — TELEPHONE ENCOUNTER
Medication(s) requesting:   Requested Prescriptions     Pending Prescriptions Disp Refills    apixaban (ELIQUIS) 5 MG TABS tablet 180 tablet 1     Sig: Take 1 tablet by mouth 2 times daily       Next office visit [facility]: 1/31/2024   Hatchet Flap Text: The defect edges were debeveled with a #15 scalpel blade.  Given the location of the defect, shape of the defect and the proximity to free margins a hatchet flap was deemed most appropriate.  Using a sterile surgical marker, an appropriate hatchet flap was drawn incorporating the defect and placing the expected incisions within the relaxed skin tension lines where possible.    The area thus outlined was incised deep to adipose tissue with a #15 scalpel blade.  The skin margins were undermined to an appropriate distance in all directions utilizing iris scissors.

## 2024-02-06 NOTE — TELEPHONE ENCOUNTER
From: Anjelica Mcgee  To: Magali Smalls  Sent: 1/31/2024 11:54 AM EST  Subject: Refills    Need refills for eliquis, and cetirizine sent to pharmacy for refills please. [facility] pharmacy   Thank you Anjelica Mcgee

## 2024-02-09 NOTE — TELEPHONE ENCOUNTER
----- Message from Anjelica Mcgee sent at 1/31/2024 11:54 AM EST -----  Regarding: Refills  Contact: [phone]  Need refills for eliquis,  and cetirizine sent to pharmacy for refills please.  [facility] pharmacy   Thank you Anjelica Mcgee

## 2024-02-10 DIAGNOSIS — I48.0 PAF (PAROXYSMAL ATRIAL FIBRILLATION) (HCC): Primary | ICD-10-CM

## 2024-02-10 DIAGNOSIS — J30.1 NON-SEASONAL ALLERGIC RHINITIS DUE TO POLLEN: ICD-10-CM

## 2024-02-10 RX ORDER — PREGABALIN 50 MG/1
50 CAPSULE ORAL EVERY EVENING
Qty: 90 CAPSULE | Refills: 1 | Status: SHIPPED | OUTPATIENT
Start: 2024-02-10 | End: 2024-08-08

## 2024-02-10 RX ORDER — CETIRIZINE HYDROCHLORIDE 10 MG/1
10 TABLET ORAL DAILY
Qty: 90 TABLET | Refills: 1 | Status: SHIPPED | OUTPATIENT
Start: 2024-02-10 | End: 2024-08-08

## 2024-03-21 ENCOUNTER — TELEPHONE (OUTPATIENT)
Facility: CLINIC | Age: 54
End: 2024-03-21

## 2024-03-21 NOTE — TELEPHONE ENCOUNTER
----- Message from Cathie Cummins sent at 2/26/2024 12:03 PM EST -----  Subject: Hospital Follow Up    QUESTIONS  What hospital was the Patient Discharged from? [facility]  Date of Discharge? 2024-02-26  Discharge Location? Home  Reason for hospitalization as patient stated? acute kidney injury  What question does the patient have, if applicable?   ---------------------------------------------------------------------------  --------------  CALL BACK INFO  What is the best way for the office to contact you? OK to leave message on   voicemail  Preferred Call Back Phone Number? [phone]  ---------------------------------------------------------------------------  --------------  SCRIPT ANSWERS  Relationship to Patient? Covered Entity  Covered Entity Type? Hospital?  Representative Name? Anabela

## 2024-03-25 ENCOUNTER — OFFICE VISIT (OUTPATIENT)
Facility: CLINIC | Age: 54
End: 2024-03-25
Payer: MEDICAID

## 2024-03-25 VITALS
SYSTOLIC BLOOD PRESSURE: 131 MMHG | TEMPERATURE: 97.2 F | RESPIRATION RATE: 18 BRPM | DIASTOLIC BLOOD PRESSURE: 86 MMHG | WEIGHT: 244 LBS | BODY MASS INDEX: 49.19 KG/M2 | HEIGHT: 59 IN | OXYGEN SATURATION: 96 % | HEART RATE: 83 BPM

## 2024-03-25 DIAGNOSIS — Z95.1 S/P CABG X 4: ICD-10-CM

## 2024-03-25 DIAGNOSIS — J45.20 MILD INTERMITTENT ASTHMA WITHOUT STATUS ASTHMATICUS WITHOUT COMPLICATION: Primary | ICD-10-CM

## 2024-03-25 DIAGNOSIS — G89.29 CHRONIC BILATERAL THORACIC BACK PAIN: ICD-10-CM

## 2024-03-25 DIAGNOSIS — I50.9 CHRONIC CONGESTIVE HEART FAILURE, UNSPECIFIED HEART FAILURE TYPE (HCC): ICD-10-CM

## 2024-03-25 DIAGNOSIS — J30.1 NON-SEASONAL ALLERGIC RHINITIS DUE TO POLLEN: ICD-10-CM

## 2024-03-25 DIAGNOSIS — K21.9 GASTROESOPHAGEAL REFLUX DISEASE WITHOUT ESOPHAGITIS: ICD-10-CM

## 2024-03-25 DIAGNOSIS — R73.03 PREDIABETES: ICD-10-CM

## 2024-03-25 DIAGNOSIS — M54.6 CHRONIC BILATERAL THORACIC BACK PAIN: ICD-10-CM

## 2024-03-25 PROCEDURE — 99214 OFFICE O/P EST MOD 30 MIN: CPT | Performed by: NURSE PRACTITIONER

## 2024-03-25 PROCEDURE — 3075F SYST BP GE 130 - 139MM HG: CPT | Performed by: NURSE PRACTITIONER

## 2024-03-25 PROCEDURE — 3079F DIAST BP 80-89 MM HG: CPT | Performed by: NURSE PRACTITIONER

## 2024-03-25 RX ORDER — OMEPRAZOLE 20 MG/1
20 CAPSULE, DELAYED RELEASE ORAL DAILY PRN
Qty: 90 CAPSULE | Refills: 1 | Status: SHIPPED | OUTPATIENT
Start: 2024-03-25

## 2024-03-25 RX ORDER — LIDOCAINE 50 MG/G
1 PATCH TOPICAL DAILY
Qty: 30 PATCH | Refills: 1 | Status: SHIPPED | OUTPATIENT
Start: 2024-03-25 | End: 2024-05-24

## 2024-03-25 RX ORDER — CETIRIZINE HYDROCHLORIDE 10 MG/1
10 TABLET ORAL DAILY
Qty: 90 TABLET | Refills: 1 | Status: SHIPPED | OUTPATIENT
Start: 2024-03-25 | End: 2024-09-21

## 2024-03-25 NOTE — PROGRESS NOTES
Anjelica Mcgee is a 53 y.o. year old female who presents today for   Chief Complaint   Patient presents with    Follow-Up from Hospital       Is someone accompanying this pt? no    Is the patient using any DME equipment during OV? Yes, oxygen    Depression Screenin/15/2024     2:22 PM 2023     1:28 PM 2023     8:26 AM 3/9/2023    10:16 AM 2023    10:17 AM 2022     9:36 AM 2022     8:58 AM   PHQ-9 Questionaire   Little interest or pleasure in doing things 0 0 0 0 0 0 1   Feeling down, depressed, or hopeless 0 0 0 0 0 0 1   Trouble falling or staying asleep, or sleeping too much 0         Feeling tired or having little energy 0         Poor appetite or overeating 0         Feeling bad about yourself - or that you are a failure or have let yourself or your family down 0         Trouble concentrating on things, such as reading the newspaper or watching television 0         Moving or speaking so slowly that other people could have noticed. Or the opposite - being so fidgety or restless that you have been moving around a lot more than usual 0         Thoughts that you would be better off dead, or of hurting yourself in some way 0         PHQ-9 Total Score 0 0 0 0 0 0 2   If you checked off any problems, how difficult have these problems made it for you to do your work, take care of things at home, or get along with other people? 0             Abuse Screenin/7/2023    10:00 AM 2023     8:00 AM 2023     9:00 AM   AMB Abuse Screening   Do you ever feel afraid of your partner? N N N   Are you in a relationship with someone who physically or mentally threatens you? N N N   Is it safe for you to go home? Y Y Y       Learning Assessment:  No question data found.    Fall Risk:      2023    10:31 AM 2023     9:43 AM   Fall Risk   2 or more falls in past year? no no   Fall with injury in past year? no no           Coordination of Care:   1. \"Have you been to the ER, 
chest pain is strain.  Ordered thoracic x-ray.  Lidocaine patches for pain.  She is not interested in physical therapy at this time.    History of prediabetes, November 2023 A1c 6%, on no medications    Home health has been completed   ROS:    General: negative for - chills, fever, weight changes or malaise  HEENT: no sore throat, nasal congestion, vision problems or ear problems  Respiratory: no cough, shortness of breath, or wheezing  Cardiovascular: no chest pain, palpitations, or dyspnea on exertion  Gastrointestinal: no abdominal pain, N/V, change in bowel habits  Musculoskeletal: no back pain or joint pain  Neurological: no headache or dizziness  Endo:  No polyuria or polydipsia  : no urinary  Skin: none   Psychological: negative for - anxiety, depression, sleeps issues    Prior to Admission medications    Medication Sig Start Date End Date Taking? Authorizing Provider   cetirizine (ZYRTEC) 10 MG tablet Take 1 tablet by mouth daily 3/25/24 9/21/24 Yes Magali Smalls APRN - CNP   omeprazole (PRILOSEC) 20 MG delayed release capsule Take 1 capsule by mouth daily as needed (acid reflux) 3/25/24  Yes Magali Smalls APRN - CNP   lidocaine (LIDODERM) 5 % Place 1 patch onto the skin daily 12 hours on, 12 hours off. 3/25/24 5/24/24 Yes Magali Smalls APRN - CNP   pregabalin (LYRICA) 50 MG capsule Take 1 capsule by mouth every evening for 180 days. Max Daily Amount: 50 mg 2/10/24 8/8/24 Yes Magali Smalls APRN - CNP   apixaban (ELIQUIS) 5 MG TABS tablet Take 1 tablet by mouth 2 times daily 2/10/24  Yes Magali Smalls APRN - CNP   amiodarone (PACERONE) 100 MG tablet Take 1 tablet by mouth daily 12/7/23  Yes Colin Cook MD   albuterol sulfate HFA (PROVENTIL;VENTOLIN;PROAIR) 108 (90 Base) MCG/ACT inhaler Inhale 2 puffs into the lungs every 4 hours as needed for Shortness of Breath or Wheezing 9/25/23  Yes Magali Smalls APRN - CNP   fluticasone-salmeterol (ADVAIR DISKUS) 100-50 MCG/ACT AEPB diskus

## 2024-03-29 ENCOUNTER — PATIENT MESSAGE (OUTPATIENT)
Facility: CLINIC | Age: 54
End: 2024-03-29

## 2024-04-03 DIAGNOSIS — I50.9 CHRONIC CONGESTIVE HEART FAILURE, UNSPECIFIED HEART FAILURE TYPE (HCC): ICD-10-CM

## 2024-04-03 DIAGNOSIS — Z99.81 ON HOME O2: Primary | ICD-10-CM

## 2024-04-03 DIAGNOSIS — Z87.09 HISTORY OF COPD: ICD-10-CM

## 2024-04-29 ENCOUNTER — OFFICE VISIT (OUTPATIENT)
Facility: CLINIC | Age: 54
End: 2024-04-29
Payer: MEDICAID

## 2024-04-29 VITALS
HEART RATE: 74 BPM | TEMPERATURE: 97.9 F | RESPIRATION RATE: 17 BRPM | SYSTOLIC BLOOD PRESSURE: 114 MMHG | OXYGEN SATURATION: 95 % | DIASTOLIC BLOOD PRESSURE: 78 MMHG | BODY MASS INDEX: 43.61 KG/M2 | WEIGHT: 237 LBS | HEIGHT: 62 IN

## 2024-04-29 DIAGNOSIS — J45.41 MODERATE PERSISTENT ASTHMA WITH ACUTE EXACERBATION: ICD-10-CM

## 2024-04-29 DIAGNOSIS — E66.01 CLASS 3 SEVERE OBESITY DUE TO EXCESS CALORIES WITH SERIOUS COMORBIDITY AND BODY MASS INDEX (BMI) OF 45.0 TO 49.9 IN ADULT (HCC): ICD-10-CM

## 2024-04-29 DIAGNOSIS — R73.03 PREDIABETES: ICD-10-CM

## 2024-04-29 DIAGNOSIS — Z71.3 WEIGHT LOSS COUNSELING, ENCOUNTER FOR: Primary | ICD-10-CM

## 2024-04-29 PROCEDURE — 3078F DIAST BP <80 MM HG: CPT | Performed by: NURSE PRACTITIONER

## 2024-04-29 PROCEDURE — 3074F SYST BP LT 130 MM HG: CPT | Performed by: NURSE PRACTITIONER

## 2024-04-29 PROCEDURE — 99214 OFFICE O/P EST MOD 30 MIN: CPT | Performed by: NURSE PRACTITIONER

## 2024-04-29 RX ORDER — PREDNISONE 20 MG/1
40 TABLET ORAL DAILY
Qty: 10 TABLET | Refills: 0 | Status: SHIPPED | OUTPATIENT
Start: 2024-04-29 | End: 2024-05-04

## 2024-04-29 RX ORDER — SEMAGLUTIDE 0.25 MG/.5ML
0.25 INJECTION, SOLUTION SUBCUTANEOUS
Qty: 2 ML | Refills: 5 | Status: SHIPPED | OUTPATIENT
Start: 2024-04-29

## 2024-04-29 NOTE — PROGRESS NOTES
Anjelica Mcgee is a 53 y.o. female  established patient, here for evaluation of the following chief complaint(s):  Chief Complaint   Patient presents with    Follow-up     Coughing at night      Assessment and Plan  1. Weight loss counseling, encounter for  -     Semaglutide-Weight Management (WEGOVY) 0.25 MG/0.5ML SOAJ SC injection; Inject 0.25 mg into the skin every 7 days, Disp-2 mL, R-5Normal  2. Prediabetes  -     Semaglutide-Weight Management (WEGOVY) 0.25 MG/0.5ML SOAJ SC injection; Inject 0.25 mg into the skin every 7 days, Disp-2 mL, R-5Normal  3. Class 3 severe obesity due to excess calories with serious comorbidity and body mass index (BMI) of 45.0 to 49.9 in adult (HCC)  -     Semaglutide-Weight Management (WEGOVY) 0.25 MG/0.5ML SOAJ SC injection; Inject 0.25 mg into the skin every 7 days, Disp-2 mL, R-5Normal  4. Moderate persistent asthma with acute exacerbation  -     predniSONE (DELTASONE) 20 MG tablet; Take 2 tablets by mouth daily for 5 days, Disp-10 tablet, R-0Normal       Return in about 3 months (around 7/29/2024), or if symptoms worsen or fail to improve, for Routine, 15.   HPI:   In office visit.    Pt has a cough and scratchy throat that started 2 days ago. Pt denies she feels bad. No fever or chest pain. She is trying a honey cough syrup. Advised salt water gargles 3 times a day. No sick contacts. Pt looks well.     Pt has broken a tooth and sees a dentist today     Pulmonary is trying to get pt a portable oxygen condenser     Pt has been off of Wegovy, out of stock    ROS:    General: negative for - chills, fever, weight changes or malaise  HEENT: no sore throat, nasal congestion, vision problems or ear problems  Respiratory: no cough, shortness of breath, or wheezing  Cardiovascular: no chest pain, palpitations, or dyspnea on exertion  Gastrointestinal: no abdominal pain, N/V, change in bowel habits  Musculoskeletal: no back pain or joint pain  Neurological: no headache or dizziness  Endo:

## 2024-04-29 NOTE — PROGRESS NOTES
Anjelica Mcgee is a 53 y.o. year old female who presents today for   Chief Complaint   Patient presents with    Follow-up       Is someone accompanying this pt? no    Is the patient using any DME equipment during OV? Yes, oxygen tank    Depression Screenin/15/2024     2:22 PM 2023     1:28 PM 2023     8:26 AM 3/9/2023    10:16 AM 2023    10:17 AM 2022     9:36 AM 2022     8:58 AM   PHQ-9 Questionaire   Little interest or pleasure in doing things 0 0 0 0 0 0 1   Feeling down, depressed, or hopeless 0 0 0 0 0 0 1   Trouble falling or staying asleep, or sleeping too much 0         Feeling tired or having little energy 0         Poor appetite or overeating 0         Feeling bad about yourself - or that you are a failure or have let yourself or your family down 0         Trouble concentrating on things, such as reading the newspaper or watching television 0         Moving or speaking so slowly that other people could have noticed. Or the opposite - being so fidgety or restless that you have been moving around a lot more than usual 0         Thoughts that you would be better off dead, or of hurting yourself in some way 0         PHQ-9 Total Score 0 0 0 0 0 0 2   If you checked off any problems, how difficult have these problems made it for you to do your work, take care of things at home, or get along with other people? 0             Abuse Screenin/7/2023    10:00 AM 2023     8:00 AM 2023     9:00 AM   AMB Abuse Screening   Do you ever feel afraid of your partner? N N N   Are you in a relationship with someone who physically or mentally threatens you? N N N   Is it safe for you to go home? Y Y Y       Learning Assessment:  No question data found.    Fall Risk:      2023    10:31 AM 2023     9:43 AM   Fall Risk   2 or more falls in past year? no no   Fall with injury in past year? no no           Coordination of Care:   1. \"Have you been to the ER, urgent care

## 2024-05-01 ENCOUNTER — TELEPHONE (OUTPATIENT)
Facility: CLINIC | Age: 54
End: 2024-05-01

## 2024-05-01 NOTE — TELEPHONE ENCOUNTER
----- Message from Vinita Malhotra LPN sent at 4/30/2024  4:09 PM EDT -----  Regarding: FW: Handicap paper for DMV  Contact: [phone]    ----- Message -----  From: Anjelica Mcgee  Sent: 4/30/2024   3:42 PM EDT  To: [facility] Clinical Staff  Subject: Handicap paper for DMV                           She did not she put 6 months

## 2024-05-01 NOTE — TELEPHONE ENCOUNTER
----- Message from MEENU Benavides CNP sent at 5/1/2024  7:54 AM EDT -----  Regarding: FW: Handicap paper for DMV  Contact: [phone]  Call patient.  Patient is 53 years old.  I do not think I can substantiate giving her a handicap placard for the rest of her life.  Please fill out the paperwork for 6 months and I will sign it and send it to her.  I will reevaluate in 6 months.      ----- Message -----  From: Vinita Malhotra LPN  Sent: 4/30/2024   4:09 PM EDT  To: MEENU Benavides CNP; Lila Wilkes  Subject: FW: Handicap paper for DMV                         ----- Message -----  From: Anjelica Mcgee  Sent: 4/30/2024   3:42 PM EDT  To:  [facility] Clinical Staff  Subject: Handicap paper for DMV                           She did not she put 6 months

## 2024-05-29 DIAGNOSIS — R20.2 NUMBNESS AND TINGLING OF BOTH FEET: ICD-10-CM

## 2024-05-29 DIAGNOSIS — R20.0 NUMBNESS AND TINGLING OF BOTH FEET: ICD-10-CM

## 2024-05-29 DIAGNOSIS — J45.20 MILD INTERMITTENT ASTHMA WITHOUT STATUS ASTHMATICUS WITHOUT COMPLICATION: ICD-10-CM

## 2024-05-30 RX ORDER — ALBUTEROL SULFATE 90 UG/1
2 AEROSOL, METERED RESPIRATORY (INHALATION) EVERY 4 HOURS PRN
Qty: 18 G | Refills: 5 | Status: SHIPPED | OUTPATIENT
Start: 2024-05-30

## 2024-05-30 RX ORDER — FLUTICASONE PROPIONATE AND SALMETEROL 100; 50 UG/1; UG/1
1 POWDER RESPIRATORY (INHALATION) EVERY 12 HOURS
Qty: 1 EACH | Refills: 5 | Status: SHIPPED | OUTPATIENT
Start: 2024-05-30

## 2024-05-30 RX ORDER — PREGABALIN 50 MG/1
50 CAPSULE ORAL EVERY EVENING
Qty: 90 CAPSULE | Refills: 1 | Status: SHIPPED | OUTPATIENT
Start: 2024-05-30 | End: 2024-11-26

## 2024-06-18 DIAGNOSIS — I50.9 HEART FAILURE, UNSPECIFIED HF CHRONICITY, UNSPECIFIED HEART FAILURE TYPE (HCC): ICD-10-CM

## 2024-06-18 DIAGNOSIS — I10 ESSENTIAL HYPERTENSION WITH GOAL BLOOD PRESSURE LESS THAN 140/90: Primary | ICD-10-CM

## 2024-06-18 DIAGNOSIS — I10 PRIMARY HYPERTENSION: ICD-10-CM

## 2024-06-18 DIAGNOSIS — I10 ESSENTIAL (PRIMARY) HYPERTENSION: ICD-10-CM

## 2024-06-18 DIAGNOSIS — I48.0 PAF (PAROXYSMAL ATRIAL FIBRILLATION) (HCC): ICD-10-CM

## 2024-06-18 DIAGNOSIS — J30.1 NON-SEASONAL ALLERGIC RHINITIS DUE TO POLLEN: ICD-10-CM

## 2024-06-18 DIAGNOSIS — R60.0 EDEMA OF BOTH LOWER LEGS: ICD-10-CM

## 2024-06-18 DIAGNOSIS — E78.00 PURE HYPERCHOLESTEROLEMIA: ICD-10-CM

## 2024-06-18 RX ORDER — ROSUVASTATIN CALCIUM 40 MG/1
40 TABLET, COATED ORAL DAILY
Qty: 90 TABLET | Refills: 1 | Status: SHIPPED | OUTPATIENT
Start: 2024-06-18

## 2024-06-18 RX ORDER — AMIODARONE HYDROCHLORIDE 100 MG/1
100 TABLET ORAL DAILY
Qty: 30 TABLET | Refills: 5 | Status: SHIPPED | OUTPATIENT
Start: 2024-06-18

## 2024-06-18 NOTE — TELEPHONE ENCOUNTER
From: Anjelica Mcgee  To: Office of Magali Smalls  Sent: 6/18/2024 10:40 AM EDT  Subject: Medication Renewal Request    Refills have been requested for the following medications:     bumetanide (BUMEX) 1 MG tablet [Magali Smalls, APRN - CNP]     apixaban (ELIQUIS) 5 MG TABS tablet [Magali Smalls, APRN - CNP]     cetirizine (ZYRTEC) 10 MG tablet [Magali Smalls, APRN - CNP]    Preferred pharmacy: [address] - P [phone] - F [phone]      Medication renewals requested in this message routed separately:     rosuvastatin (CRESTOR) 40 MG tablet     amiodarone (PACERONE) 100 MG tablet [Dr. Colin Cook MD]

## 2024-06-18 NOTE — TELEPHONE ENCOUNTER
PCP: Magali Smalls APRN - CNP    Last appt:  12/7/2023   Future Appointments   Date Time Provider Department Center   6/20/2024 11:15 AM Colin Cook MD [facility] AMB   7/29/2024  9:00 AM Magali Smalls APRN - CNP [facility] AMB       Requested Prescriptions     Pending Prescriptions Disp Refills    rosuvastatin (CRESTOR) 40 MG tablet 90 tablet 1     Sig: Take 1 tablet by mouth daily    amiodarone (PACERONE) 100 MG tablet 30 tablet 5     Sig: Take 1 tablet by mouth daily       Request for a 30 or 90 day supply? Provider Discretion    Pharmacy: confirm    Other Comments: n/a

## 2024-06-18 NOTE — TELEPHONE ENCOUNTER
Medication(s) requesting:   Requested Prescriptions     Pending Prescriptions Disp Refills    bumetanide (BUMEX) 1 MG tablet 180 tablet 1     Sig: Take 1 tablet by mouth 2 times daily    apixaban (ELIQUIS) 5 MG TABS tablet 180 tablet 1     Sig: Take 1 tablet by mouth 2 times daily    cetirizine (ZYRTEC) 10 MG tablet 90 tablet 1     Sig: Take 1 tablet by mouth daily       Last office visit:  4/29/24  Next office visit [facility]: 7/29/2024  FUTURE APPT:   Future Appointments   Date Time Provider Department Center   6/20/2024 11:15 AM Colin Cook MD [facility] AM[facility]   7/29/2024  9:00 AM Magali Smalls APRN - CNP [facility] AMB         Lab Results   Component Value Date     08/23/2023    K 4.3 08/23/2023     08/23/2023    CO2 21 08/23/2023    BUN 10 08/23/2023    CREATININE 1.01 (H) 08/23/2023    GLUCOSE 100 (H) 08/23/2023    CALCIUM 9.7 08/23/2023    BILITOT 0.3 08/23/2023    ALKPHOS 106 08/23/2023    AST 20 08/23/2023    ALT 22 08/23/2023    LABGLOM 67 08/23/2023    GFRAA >60 06/03/2022    AGRATIO 1.7 08/23/2023    GLOB 2.4 05/28/2022         Patient

## 2024-06-20 ENCOUNTER — OFFICE VISIT (OUTPATIENT)
Age: 54
End: 2024-06-20
Payer: MEDICAID

## 2024-06-20 VITALS
OXYGEN SATURATION: 98 % | HEIGHT: 61 IN | HEART RATE: 65 BPM | WEIGHT: 238 LBS | DIASTOLIC BLOOD PRESSURE: 114 MMHG | BODY MASS INDEX: 44.93 KG/M2 | SYSTOLIC BLOOD PRESSURE: 186 MMHG

## 2024-06-20 DIAGNOSIS — I25.10 CORONARY ARTERY DISEASE DUE TO LIPID RICH PLAQUE: Primary | ICD-10-CM

## 2024-06-20 DIAGNOSIS — I10 ESSENTIAL HYPERTENSION WITH GOAL BLOOD PRESSURE LESS THAN 140/90: ICD-10-CM

## 2024-06-20 DIAGNOSIS — I25.83 CORONARY ARTERY DISEASE DUE TO LIPID RICH PLAQUE: Primary | ICD-10-CM

## 2024-06-20 DIAGNOSIS — E78.00 PURE HYPERCHOLESTEROLEMIA: ICD-10-CM

## 2024-06-20 PROCEDURE — 99214 OFFICE O/P EST MOD 30 MIN: CPT | Performed by: INTERNAL MEDICINE

## 2024-06-20 PROCEDURE — 3077F SYST BP >= 140 MM HG: CPT | Performed by: INTERNAL MEDICINE

## 2024-06-20 PROCEDURE — 3080F DIAST BP >= 90 MM HG: CPT | Performed by: INTERNAL MEDICINE

## 2024-06-20 RX ORDER — CETIRIZINE HYDROCHLORIDE 10 MG/1
10 TABLET ORAL DAILY
Qty: 90 TABLET | Refills: 1 | Status: SHIPPED | OUTPATIENT
Start: 2024-06-20 | End: 2024-12-17

## 2024-06-20 RX ORDER — MIDODRINE HYDROCHLORIDE 5 MG/1
5 TABLET ORAL
COMMUNITY
Start: 2024-04-08 | End: 2024-06-20

## 2024-06-20 RX ORDER — METOPROLOL SUCCINATE 25 MG/1
25 TABLET, EXTENDED RELEASE ORAL DAILY
Qty: 90 TABLET | Refills: 2 | Status: SHIPPED | OUTPATIENT
Start: 2024-06-20

## 2024-06-20 RX ORDER — ONDANSETRON 4 MG/1
TABLET, ORALLY DISINTEGRATING ORAL
COMMUNITY
Start: 2023-08-28

## 2024-06-20 RX ORDER — BUMETANIDE 1 MG/1
1 TABLET ORAL 2 TIMES DAILY
Qty: 180 TABLET | Refills: 1 | Status: SHIPPED | OUTPATIENT
Start: 2024-06-20

## 2024-06-20 RX ORDER — IBUPROFEN 800 MG/1
TABLET ORAL
COMMUNITY

## 2024-06-20 RX ORDER — METHOCARBAMOL 500 MG/1
TABLET, FILM COATED ORAL
COMMUNITY

## 2024-06-20 ASSESSMENT — PATIENT HEALTH QUESTIONNAIRE - PHQ9
SUM OF ALL RESPONSES TO PHQ QUESTIONS 1-9: 0
3. TROUBLE FALLING OR STAYING ASLEEP: NOT AT ALL
1. LITTLE INTEREST OR PLEASURE IN DOING THINGS: NOT AT ALL
SUM OF ALL RESPONSES TO PHQ QUESTIONS 1-9: 0
SUM OF ALL RESPONSES TO PHQ9 QUESTIONS 1 & 2: 0
2. FEELING DOWN, DEPRESSED OR HOPELESS: NOT AT ALL

## 2024-06-20 NOTE — PATIENT INSTRUCTIONS
New Medication/Medication Changes/Medication Refill  Toprol 25 xl tab daily   Entresto 24/26 mg tab twice a day    **please allow 24-48 hrs for medication to be escribed to pharmacy** If you need any refills on medications please contact your pharmacy so that the request can be escribed to the provider for review seven to 10 days prior to being out of medication.    Follow Up  Nurse visit for BP x 2 weeks

## 2024-06-20 NOTE — PROGRESS NOTES
Screenin/20/2024    10:00 AM 2023    10:00 AM 2023     8:00 AM 2023     9:00 AM   AMB Abuse Screening   Do you ever feel afraid of your partner? N N N N   Are you in a relationship with someone who physically or mentally threatens you? N N N N   Is it safe for you to go home? Y Y Y Y          Fall Risk      2024    10:54 AM 2023    10:31 AM 2023     9:43 AM   Fall Risk   2 or more falls in past year? no no no   Fall with injury in past year? no no no         Pt currently taking Anticoagulant /Antiplatelet therapy? eliquis    Coordination of Care:  1. Have you been to the ER, urgent care clinic since your last visit? Hospitalized since your last visit? 2024 for low blood pressure  2. Have you seen or consulted any other health care providers outside of the [facility] since your last visit? Include any pap smears or colon screening. no      Please see Red banners under Allergies and Med Rec to remove outside inquires. All correct information has been verified with patient and added to chart.     Medication's patient's would liked removed has been marked not taking to be removed per Verbal order and read back per Colin Cook MD    
01/2024 by ED physician because of hypotension.  Will restart Toprol-XL 25 mg daily.  Will try Entresto for HFpEF     Atrial fibrillation:   Patient had a postoperative episode of atrial fibrillation after CABG in 05/2022.  She was given IV amiodarone and then oral amiodarone.  Currently she remains on amiodarone.  Exam suggest sinus  rhythm.  Continue amiodarone to 100 mg every day.  Currently on amiodarone.  Starting Toprol as mentioned above      Hyperlipidemia:  Currently on rosuvastatin 40 mg daily.  Repeat fasting lipid profile before next visit    This plan was discussed with patient who is in agreement.    Thank you for allowing me to participate in patient care. Please feel free to call me if you have any question or concern.     Colin Cook MD  Please note: This document has been produced using voice recognition software. Unrecognized errors in transcription may be present.

## 2024-08-06 ENCOUNTER — OFFICE VISIT (OUTPATIENT)
Facility: CLINIC | Age: 54
End: 2024-08-06
Payer: MEDICAID

## 2024-08-06 VITALS
RESPIRATION RATE: 18 BRPM | HEART RATE: 52 BPM | HEIGHT: 61 IN | SYSTOLIC BLOOD PRESSURE: 135 MMHG | WEIGHT: 225 LBS | TEMPERATURE: 97.9 F | DIASTOLIC BLOOD PRESSURE: 74 MMHG | OXYGEN SATURATION: 95 % | BODY MASS INDEX: 42.48 KG/M2

## 2024-08-06 DIAGNOSIS — I10 PRIMARY HYPERTENSION: ICD-10-CM

## 2024-08-06 DIAGNOSIS — J34.89 SINUS PRESSURE: ICD-10-CM

## 2024-08-06 DIAGNOSIS — S46.911A STRAIN OF RIGHT SHOULDER, INITIAL ENCOUNTER: ICD-10-CM

## 2024-08-06 DIAGNOSIS — R09.81 CHRONIC NASAL CONGESTION: ICD-10-CM

## 2024-08-06 DIAGNOSIS — S39.011A STRAIN OF MUSCLE OF RIGHT GROIN REGION: Primary | ICD-10-CM

## 2024-08-06 DIAGNOSIS — M62.838 MUSCLE SPASM OF RIGHT LEG: ICD-10-CM

## 2024-08-06 PROCEDURE — 99214 OFFICE O/P EST MOD 30 MIN: CPT | Performed by: NURSE PRACTITIONER

## 2024-08-06 PROCEDURE — 3075F SYST BP GE 130 - 139MM HG: CPT | Performed by: NURSE PRACTITIONER

## 2024-08-06 PROCEDURE — 3078F DIAST BP <80 MM HG: CPT | Performed by: NURSE PRACTITIONER

## 2024-08-06 SDOH — ECONOMIC STABILITY: INCOME INSECURITY: HOW HARD IS IT FOR YOU TO PAY FOR THE VERY BASICS LIKE FOOD, HOUSING, MEDICAL CARE, AND HEATING?: NOT VERY HARD

## 2024-08-06 SDOH — ECONOMIC STABILITY: FOOD INSECURITY: WITHIN THE PAST 12 MONTHS, THE FOOD YOU BOUGHT JUST DIDN'T LAST AND YOU DIDN'T HAVE MONEY TO GET MORE.: NEVER TRUE

## 2024-08-06 SDOH — ECONOMIC STABILITY: FOOD INSECURITY: WITHIN THE PAST 12 MONTHS, YOU WORRIED THAT YOUR FOOD WOULD RUN OUT BEFORE YOU GOT MONEY TO BUY MORE.: NEVER TRUE

## 2024-08-06 ASSESSMENT — PATIENT HEALTH QUESTIONNAIRE - PHQ9
SUM OF ALL RESPONSES TO PHQ QUESTIONS 1-9: 0
5. POOR APPETITE OR OVEREATING: NOT AT ALL
7. TROUBLE CONCENTRATING ON THINGS, SUCH AS READING THE NEWSPAPER OR WATCHING TELEVISION: NOT AT ALL
6. FEELING BAD ABOUT YOURSELF - OR THAT YOU ARE A FAILURE OR HAVE LET YOURSELF OR YOUR FAMILY DOWN: NOT AT ALL
2. FEELING DOWN, DEPRESSED OR HOPELESS: NOT AT ALL
SUM OF ALL RESPONSES TO PHQ QUESTIONS 1-9: 0
SUM OF ALL RESPONSES TO PHQ QUESTIONS 1-9: 0
9. THOUGHTS THAT YOU WOULD BE BETTER OFF DEAD, OR OF HURTING YOURSELF: NOT AT ALL
3. TROUBLE FALLING OR STAYING ASLEEP: NOT AT ALL
1. LITTLE INTEREST OR PLEASURE IN DOING THINGS: NOT AT ALL
10. IF YOU CHECKED OFF ANY PROBLEMS, HOW DIFFICULT HAVE THESE PROBLEMS MADE IT FOR YOU TO DO YOUR WORK, TAKE CARE OF THINGS AT HOME, OR GET ALONG WITH OTHER PEOPLE: NOT DIFFICULT AT ALL
4. FEELING TIRED OR HAVING LITTLE ENERGY: NOT AT ALL
8. MOVING OR SPEAKING SO SLOWLY THAT OTHER PEOPLE COULD HAVE NOTICED. OR THE OPPOSITE, BEING SO FIGETY OR RESTLESS THAT YOU HAVE BEEN MOVING AROUND A LOT MORE THAN USUAL: NOT AT ALL
SUM OF ALL RESPONSES TO PHQ9 QUESTIONS 1 & 2: 0
SUM OF ALL RESPONSES TO PHQ QUESTIONS 1-9: 0

## 2024-08-06 NOTE — PROGRESS NOTES
Anjelica Mcgee is a 54 y.o. female  established patient, here for evaluation of the following chief complaint(s):  Chief Complaint   Patient presents with    Follow-up     Had a fall last week      Assessment and Plan  1. Strain of muscle of right groin region  2. Muscle spasm of right leg  3. Strain of right shoulder, initial encounter  4. Chronic nasal congestion  -     External Referral To ENT  5. Sinus pressure  -     External Referral To ENT  6. Primary hypertension  Comments:  controlled, no changes       Return in about 1 month (around 9/6/2024) for shoulder and right groin pain, sinus pressure, 15.   HPI:   In office visit.    Pt had a sinus infection 1 week ago. Home health nurse prescribed an antibiotic. Pt finished it. Pt now has clear nasal discharge. Will see ENT    Pt had a fall a week ago. She pulled her right groin and right shoulder strain. She has right arm pain. She has been to the urgent care.   Take Tylenol as directed and Flexeril. Declined physical therapy.    Pt has had swelling in her right lower leg. Pt is on diuretics. 7/4/2024 GFR > 60    Pt reports she is unable to wear CPAP because of her nasal congestion.   ROS:    General: negative for - chills, fever, weight changes or malaise  HEENT: no sore throat, nasal congestion, vision problems or ear problems  Respiratory: no cough, shortness of breath, or wheezing  Cardiovascular: no chest pain, palpitations, or dyspnea on exertion  Gastrointestinal: no abdominal pain, N/V, change in bowel habits  Musculoskeletal: see HPI  Neurological: no headache or dizziness  Endo:  No polyuria or polydipsia  : no urinary  Skin: none   Psychological: negative for - anxiety, depression, sleeps issues    Prior to Admission medications    Medication Sig Start Date End Date Taking? Authorizing Provider   bumetanide (BUMEX) 1 MG tablet Take 1 tablet by mouth 2 times daily 6/20/24  Yes Magali Smalls, APRN - CNP   apixaban (ELIQUIS) 5 MG TABS tablet Take 1

## 2024-08-06 NOTE — PROGRESS NOTES
Anjelica Mcgee is a 54 y.o. year old female who presents today for   Chief Complaint   Patient presents with    Follow-up     Had a fall last week       Is someone accompanying this pt? no    Is the patient using any DME equipment during OV? Yes, POC    Depression Screenin/6/2024    11:10 AM 2024    10:54 AM 1/15/2024     2:22 PM 2023     1:28 PM 2023     8:26 AM 3/9/2023    10:16 AM 2023    10:17 AM   PHQ-9 Questionaire   Little interest or pleasure in doing things 0 0 0 0 0 0 0   Feeling down, depressed, or hopeless 0 0 0 0 0 0 0   Trouble falling or staying asleep, or sleeping too much 0 0 0       Feeling tired or having little energy 0  0       Poor appetite or overeating 0  0       Feeling bad about yourself - or that you are a failure or have let yourself or your family down 0  0       Trouble concentrating on things, such as reading the newspaper or watching television 0  0       Moving or speaking so slowly that other people could have noticed. Or the opposite - being so fidgety or restless that you have been moving around a lot more than usual 0  0       Thoughts that you would be better off dead, or of hurting yourself in some way 0  0       PHQ-9 Total Score 0 0 0 0 0 0 0   If you checked off any problems, how difficult have these problems made it for you to do your work, take care of things at home, or get along with other people? 0  0           Abuse Screenin/20/2024    10:00 AM 2023    10:00 AM 2023     8:00 AM 2023     9:00 AM   AMB Abuse Screening   Do you ever feel afraid of your partner? N N N N   Are you in a relationship with someone who physically or mentally threatens you? N N N N   Is it safe for you to go home? Y Y Y Y       Learning Assessment:  No question data found.    Fall Risk:      2024    10:54 AM 2023    10:31 AM 2023     9:43 AM   Fall Risk   Do you feel unsteady or are you worried about falling?  no no no   2 or more

## 2024-08-13 DIAGNOSIS — M62.838 MUSCLE SPASM OF RIGHT LEG: ICD-10-CM

## 2024-08-13 DIAGNOSIS — S39.011A STRAIN OF MUSCLE OF RIGHT GROIN REGION: Primary | ICD-10-CM

## 2024-08-13 RX ORDER — CYCLOBENZAPRINE HCL 10 MG
10 TABLET ORAL NIGHTLY PRN
Qty: 10 TABLET | Refills: 0 | Status: SHIPPED | OUTPATIENT
Start: 2024-08-13 | End: 2024-08-23

## 2024-10-02 ENCOUNTER — OFFICE VISIT (OUTPATIENT)
Facility: CLINIC | Age: 54
End: 2024-10-02

## 2024-10-02 VITALS
WEIGHT: 270 LBS | RESPIRATION RATE: 16 BRPM | TEMPERATURE: 97.3 F | HEIGHT: 61 IN | HEART RATE: 93 BPM | SYSTOLIC BLOOD PRESSURE: 137 MMHG | DIASTOLIC BLOOD PRESSURE: 85 MMHG | BODY MASS INDEX: 50.98 KG/M2 | OXYGEN SATURATION: 93 %

## 2024-10-02 DIAGNOSIS — I10 PRIMARY HYPERTENSION: ICD-10-CM

## 2024-10-02 DIAGNOSIS — Z23 IMMUNIZATION DUE: Primary | ICD-10-CM

## 2024-10-02 DIAGNOSIS — I50.9 HEART FAILURE, UNSPECIFIED HF CHRONICITY, UNSPECIFIED HEART FAILURE TYPE (HCC): ICD-10-CM

## 2024-10-02 DIAGNOSIS — R60.0 EDEMA OF BOTH LOWER LEGS: ICD-10-CM

## 2024-10-02 RX ORDER — BUMETANIDE 1 MG/1
TABLET ORAL
Qty: 360 TABLET | Refills: 1 | Status: SHIPPED | OUTPATIENT
Start: 2024-10-02

## 2024-10-02 SDOH — ECONOMIC STABILITY: FOOD INSECURITY: WITHIN THE PAST 12 MONTHS, THE FOOD YOU BOUGHT JUST DIDN'T LAST AND YOU DIDN'T HAVE MONEY TO GET MORE.: NEVER TRUE

## 2024-10-02 SDOH — ECONOMIC STABILITY: INCOME INSECURITY: HOW HARD IS IT FOR YOU TO PAY FOR THE VERY BASICS LIKE FOOD, HOUSING, MEDICAL CARE, AND HEATING?: NOT VERY HARD

## 2024-10-02 SDOH — ECONOMIC STABILITY: FOOD INSECURITY: WITHIN THE PAST 12 MONTHS, YOU WORRIED THAT YOUR FOOD WOULD RUN OUT BEFORE YOU GOT MONEY TO BUY MORE.: NEVER TRUE

## 2024-10-02 ASSESSMENT — PATIENT HEALTH QUESTIONNAIRE - PHQ9
SUM OF ALL RESPONSES TO PHQ QUESTIONS 1-9: 0
5. POOR APPETITE OR OVEREATING: NOT AT ALL
SUM OF ALL RESPONSES TO PHQ QUESTIONS 1-9: 0
1. LITTLE INTEREST OR PLEASURE IN DOING THINGS: NOT AT ALL
3. TROUBLE FALLING OR STAYING ASLEEP: NOT AT ALL
8. MOVING OR SPEAKING SO SLOWLY THAT OTHER PEOPLE COULD HAVE NOTICED. OR THE OPPOSITE, BEING SO FIGETY OR RESTLESS THAT YOU HAVE BEEN MOVING AROUND A LOT MORE THAN USUAL: NOT AT ALL
10. IF YOU CHECKED OFF ANY PROBLEMS, HOW DIFFICULT HAVE THESE PROBLEMS MADE IT FOR YOU TO DO YOUR WORK, TAKE CARE OF THINGS AT HOME, OR GET ALONG WITH OTHER PEOPLE: NOT DIFFICULT AT ALL
SUM OF ALL RESPONSES TO PHQ9 QUESTIONS 1 & 2: 0
6. FEELING BAD ABOUT YOURSELF - OR THAT YOU ARE A FAILURE OR HAVE LET YOURSELF OR YOUR FAMILY DOWN: NOT AT ALL
9. THOUGHTS THAT YOU WOULD BE BETTER OFF DEAD, OR OF HURTING YOURSELF: NOT AT ALL
2. FEELING DOWN, DEPRESSED OR HOPELESS: NOT AT ALL
4. FEELING TIRED OR HAVING LITTLE ENERGY: NOT AT ALL
SUM OF ALL RESPONSES TO PHQ QUESTIONS 1-9: 0
SUM OF ALL RESPONSES TO PHQ QUESTIONS 1-9: 0
7. TROUBLE CONCENTRATING ON THINGS, SUCH AS READING THE NEWSPAPER OR WATCHING TELEVISION: NOT AT ALL

## 2024-10-02 NOTE — PROGRESS NOTES
Anjelica Mcgee is a 54 y.o. year old female who presents today for   Chief Complaint   Patient presents with    1 Month Follow-Up    Joint Swelling     ANKLES    Medication Check     TAKING EXTRA BUMEX        \"Have you been to the ER, urgent care clinic since your last visit?  Hospitalized since your last visit?\"   NO     “Have you seen or consulted any other health care providers outside our system since your last visit?”   NO             Brenda Epstein [facility]  Phone: [phone]  Fax: [phone]

## 2024-10-02 NOTE — PROGRESS NOTES
Anjelica Mcgee is a 54 y.o. female  established patient, here for evaluation of the following chief complaint(s):  Chief Complaint   Patient presents with    1 Month Follow-Up    Joint Swelling     ANKLES    Medication Check     TAKING EXTRA BUMEX        Assessment and Plan  1. Immunization due  -     Influenza, FLUCELVAX Trivalent, (age 6 mo+) IM, Preservative Free, 0.5mL  2. Primary hypertension  -     bumetanide (BUMEX) 1 MG tablet; Take 2 tabs by mouth twice daily and monitor your swelling, Disp-360 tablet, R-1Normal  3. Heart failure, unspecified HF chronicity, unspecified heart failure type (HCC)  -     bumetanide (BUMEX) 1 MG tablet; Take 2 tabs by mouth twice daily and monitor your swelling, Disp-360 tablet, R-1Normal  4. Edema of both lower legs  -     bumetanide (BUMEX) 1 MG tablet; Take 2 tabs by mouth twice daily and monitor your swelling, Disp-360 tablet, R-1Normal       Return in about 2 weeks (around 10/16/2024) for fluid management, go to ED if not better or worse, 15.   HPI:   In office visit.    Pt reports she has blurry vision at times that started one week.    Pt reports she cannot be on her feet for long because her ankles swell. Per pt for 4-5 months her lower extremity swelling has been stable.  This issue is chronic. She sees cardiology next month. She takes an extra bumex for 3 days for this swelling as directed by cardiology. Normally she takes bumex 1 mg BID. Pt weighs herself and says she has gained 5 lbs since yesterday. She feels she has gotten more short of breath recently. She says she has cut back on her fluids. She has been told in the hospital to drink 32 ounces of water a day.   We discussed she watch her salt and process food intake. Advised pt she take Bumex 1 mg, 2 tabs BID for 4 days and monitor her swelling and weight. Pt was 238 lbs 6/20/2024 at cardiology and she is 270 lbs today. I have advised pt not to wait to go to the hospital if her her condition gets worse or doesn't

## 2024-10-02 NOTE — PROGRESS NOTES
After obtaining verbal consent from Magali Smalls, MEENU-CNP patient gave consent form to receive the Influenza vaccine. Most current VIS given to the patient/guardian to review before administration. All questions were answered. Patient tolerated immunization(s) well with no adverse reactions.

## 2024-10-22 ENCOUNTER — OFFICE VISIT (OUTPATIENT)
Facility: CLINIC | Age: 54
End: 2024-10-22
Payer: MEDICAID

## 2024-10-22 VITALS
HEART RATE: 69 BPM | DIASTOLIC BLOOD PRESSURE: 88 MMHG | WEIGHT: 266.4 LBS | OXYGEN SATURATION: 94 % | RESPIRATION RATE: 20 BRPM | TEMPERATURE: 97.9 F | BODY MASS INDEX: 50.3 KG/M2 | SYSTOLIC BLOOD PRESSURE: 126 MMHG | HEIGHT: 61 IN

## 2024-10-22 DIAGNOSIS — J34.89 SINUS PRESSURE: ICD-10-CM

## 2024-10-22 DIAGNOSIS — F41.8 MIXED ANXIETY AND DEPRESSIVE DISORDER: ICD-10-CM

## 2024-10-22 DIAGNOSIS — H04.203 WATERY EYES: Primary | ICD-10-CM

## 2024-10-22 DIAGNOSIS — F99 INSOMNIA DUE TO OTHER MENTAL DISORDER: ICD-10-CM

## 2024-10-22 DIAGNOSIS — R09.81 CHRONIC NASAL CONGESTION: ICD-10-CM

## 2024-10-22 DIAGNOSIS — F51.05 INSOMNIA DUE TO OTHER MENTAL DISORDER: ICD-10-CM

## 2024-10-22 PROCEDURE — 3074F SYST BP LT 130 MM HG: CPT | Performed by: NURSE PRACTITIONER

## 2024-10-22 PROCEDURE — 3079F DIAST BP 80-89 MM HG: CPT | Performed by: NURSE PRACTITIONER

## 2024-10-22 PROCEDURE — 99215 OFFICE O/P EST HI 40 MIN: CPT | Performed by: NURSE PRACTITIONER

## 2024-10-22 RX ORDER — DULOXETIN HYDROCHLORIDE 30 MG/1
30 CAPSULE, DELAYED RELEASE ORAL 2 TIMES DAILY
Qty: 180 CAPSULE | Refills: 1 | Status: SHIPPED | OUTPATIENT
Start: 2024-10-22

## 2024-10-22 RX ORDER — AZELASTINE 1 MG/ML
2 SPRAY, METERED NASAL 2 TIMES DAILY
Qty: 120 ML | Refills: 1 | Status: SHIPPED | OUTPATIENT
Start: 2024-10-22

## 2024-10-22 SDOH — ECONOMIC STABILITY: INCOME INSECURITY: HOW HARD IS IT FOR YOU TO PAY FOR THE VERY BASICS LIKE FOOD, HOUSING, MEDICAL CARE, AND HEATING?: NOT VERY HARD

## 2024-10-22 SDOH — ECONOMIC STABILITY: FOOD INSECURITY: WITHIN THE PAST 12 MONTHS, THE FOOD YOU BOUGHT JUST DIDN'T LAST AND YOU DIDN'T HAVE MONEY TO GET MORE.: NEVER TRUE

## 2024-10-22 SDOH — ECONOMIC STABILITY: FOOD INSECURITY: WITHIN THE PAST 12 MONTHS, YOU WORRIED THAT YOUR FOOD WOULD RUN OUT BEFORE YOU GOT MONEY TO BUY MORE.: NEVER TRUE

## 2024-10-22 ASSESSMENT — PATIENT HEALTH QUESTIONNAIRE - PHQ9
SUM OF ALL RESPONSES TO PHQ QUESTIONS 1-9: 21
3. TROUBLE FALLING OR STAYING ASLEEP: NEARLY EVERY DAY
SUM OF ALL RESPONSES TO PHQ QUESTIONS 1-9: 21
SUM OF ALL RESPONSES TO PHQ QUESTIONS 1-9: 21
7. TROUBLE CONCENTRATING ON THINGS, SUCH AS READING THE NEWSPAPER OR WATCHING TELEVISION: NEARLY EVERY DAY
SUM OF ALL RESPONSES TO PHQ9 QUESTIONS 1 & 2: 6
2. FEELING DOWN, DEPRESSED OR HOPELESS: NEARLY EVERY DAY
10. IF YOU CHECKED OFF ANY PROBLEMS, HOW DIFFICULT HAVE THESE PROBLEMS MADE IT FOR YOU TO DO YOUR WORK, TAKE CARE OF THINGS AT HOME, OR GET ALONG WITH OTHER PEOPLE: VERY DIFFICULT
1. LITTLE INTEREST OR PLEASURE IN DOING THINGS: NEARLY EVERY DAY
5. POOR APPETITE OR OVEREATING: NEARLY EVERY DAY
SUM OF ALL RESPONSES TO PHQ QUESTIONS 1-9: 21
6. FEELING BAD ABOUT YOURSELF - OR THAT YOU ARE A FAILURE OR HAVE LET YOURSELF OR YOUR FAMILY DOWN: NOT AT ALL
4. FEELING TIRED OR HAVING LITTLE ENERGY: NEARLY EVERY DAY
8. MOVING OR SPEAKING SO SLOWLY THAT OTHER PEOPLE COULD HAVE NOTICED. OR THE OPPOSITE, BEING SO FIGETY OR RESTLESS THAT YOU HAVE BEEN MOVING AROUND A LOT MORE THAN USUAL: NEARLY EVERY DAY
9. THOUGHTS THAT YOU WOULD BE BETTER OFF DEAD, OR OF HURTING YOURSELF: NOT AT ALL

## 2024-10-22 ASSESSMENT — COLUMBIA-SUICIDE SEVERITY RATING SCALE - C-SSRS
2. HAVE YOU ACTUALLY HAD ANY THOUGHTS OF KILLING YOURSELF?: NO
1. WITHIN THE PAST MONTH, HAVE YOU WISHED YOU WERE DEAD OR WISHED YOU COULD GO TO SLEEP AND NOT WAKE UP?: NO
6. HAVE YOU EVER DONE ANYTHING, STARTED TO DO ANYTHING, OR PREPARED TO DO ANYTHING TO END YOUR LIFE?: NO

## 2024-10-22 NOTE — PROGRESS NOTES
Anjelica Mcgee is a 54 y.o. year old female who presents today for   Chief Complaint   Patient presents with    Follow-Up from Hospital    Edema        \"Have you been to the ER, urgent care clinic since your last visit?  Hospitalized since your last visit?\"   YES - When: approximately 10 days ago.  Where and Why: [facility].     “Have you seen or consulted any other health care providers outside our system since your last visit?”   NO             Brenda Epstein [facility]  Phone: [phone]  Fax: [phone]

## 2024-10-22 NOTE — PROGRESS NOTES
Faxed CT of Facial Bones W/O CONTRAST performed on 10/11/2024 to [facility] Fax # [phone]  Per PCP Magali Smalls's request.

## 2024-10-22 NOTE — PROGRESS NOTES
Anjelica Mcgee is a 54 y.o. female  established patient, here for evaluation of the following chief complaint(s):  Chief Complaint   Patient presents with    Follow-Up from Hospital    Edema      Assessment and Plan  1. Watery eyes  -     azelastine (ASTELIN) 0.1 % nasal spray; 2 sprays by Nasal route 2 times daily Use in each nostril as directed, Disp-120 mL, R-1Normal  2. Sinus pressure  -     azelastine (ASTELIN) 0.1 % nasal spray; 2 sprays by Nasal route 2 times daily Use in each nostril as directed, Disp-120 mL, R-1Normal  3. Chronic nasal congestion  -     azelastine (ASTELIN) 0.1 % nasal spray; 2 sprays by Nasal route 2 times daily Use in each nostril as directed, Disp-120 mL, R-1Normal  4. Mixed anxiety and depressive disorder  -     DULoxetine (CYMBALTA) 30 MG extended release capsule; Take 1 capsule by mouth 2 times daily, Disp-180 capsule, R-1Normal  5. Insomnia due to other mental disorder  -     DULoxetine (CYMBALTA) 30 MG extended release capsule; Take 1 capsule by mouth 2 times daily, Disp-180 capsule, R-1Normal       Return in about 1 month (around 11/22/2024) for CHF, sinus diseae, 15.   HPI:   In office visit.    -Pt feels she still has a lot sinus congestion. ENT cancelled her appt til next month. She sees ENT again next month. She is frustrated. She has is taking \"allergy pill daily\" and Flonase twice day. Stop Flonase and start Astelin.    -ED visit 10/11/2024 related to poor dentition and dental caries.    Hospital stay 10/3/2024 discharge 10/5/2024 related to shortness of breath.  Patient diagnosed with dyspnea on exertion. Pt sees Dr. Joey hu Cardiology this week.    Active problems:  Atherosclerosis of native coronary artery of native heart with unstable angina pectoris (HCC)  Chronic combined systolic and diastolic congestive heart failure (HCC)  Acute hypoxemic respiratory failure (HCC)  Chronic respiratory failure with hypoxia, on home O2 therapy (HCC)   Medication change  amiodarone 200 mg

## 2024-10-24 ENCOUNTER — OFFICE VISIT (OUTPATIENT)
Age: 54
End: 2024-10-24
Payer: MEDICAID

## 2024-10-24 VITALS
BODY MASS INDEX: 49.13 KG/M2 | DIASTOLIC BLOOD PRESSURE: 86 MMHG | HEART RATE: 70 BPM | SYSTOLIC BLOOD PRESSURE: 148 MMHG | HEIGHT: 62 IN | OXYGEN SATURATION: 98 % | WEIGHT: 267 LBS

## 2024-10-24 DIAGNOSIS — Z95.1 S/P CABG X 4: ICD-10-CM

## 2024-10-24 DIAGNOSIS — R60.9 EDEMA, UNSPECIFIED TYPE: ICD-10-CM

## 2024-10-24 DIAGNOSIS — E78.2 MIXED HYPERLIPIDEMIA: ICD-10-CM

## 2024-10-24 DIAGNOSIS — I10 ESSENTIAL HYPERTENSION WITH GOAL BLOOD PRESSURE LESS THAN 140/90: Primary | ICD-10-CM

## 2024-10-24 DIAGNOSIS — I48.0 PAF (PAROXYSMAL ATRIAL FIBRILLATION) (HCC): ICD-10-CM

## 2024-10-24 PROCEDURE — 3079F DIAST BP 80-89 MM HG: CPT | Performed by: PHYSICIAN ASSISTANT

## 2024-10-24 PROCEDURE — 99214 OFFICE O/P EST MOD 30 MIN: CPT | Performed by: PHYSICIAN ASSISTANT

## 2024-10-24 PROCEDURE — 3077F SYST BP >= 140 MM HG: CPT | Performed by: PHYSICIAN ASSISTANT

## 2024-10-24 RX ORDER — METOLAZONE 2.5 MG/1
2.5 TABLET ORAL
Qty: 30 TABLET | Refills: 0 | Status: SHIPPED | OUTPATIENT
Start: 2024-10-28

## 2024-10-24 NOTE — PATIENT INSTRUCTIONS
New Medication/Medication Changes/Medication Refill  Metolazone 2.5 mg tab on Monday and Friday     **please allow 24-48 hrs for medication to be escribed to pharmacy** If you need any refills on medications please contact your pharmacy so that the request can be escribed to the provider for review seven to 10 days prior to being out of medication.    Labs  BMP x 2 weeks     *[facility], [address]  ( M - Thur 8am to 3:30pm.) - You must call to make an appointment for blood work at this site.   Contact :[phone]  *[facility] [address] [facility]  *[facility], [address] [place] [address]  *[facility], [address] [place] [address]  *[facility], [address] [facility]

## 2024-10-24 NOTE — PROGRESS NOTES
[facility]    Anjelica Mcgee is a 54 y.o. female, pmhx of CAD inferior STEMI s/p YANCI, HTN, HLD, Obesity     Patient had inferior STEMI in 03/2020 and underwent emergent cardiac catheterization.  He she was found to have a three-vessel coronary disease.  She had proximal as well as mid- distal RCA stenting with 3.0 x 33 mm as well as 3.0 x 18 mm Xience YANCI.  For unstable anginal symptoms, patient underwent cardiac catheterization again in 05/2022 which showed severe three-vessel coronary artery disease and patient underwent CABG x4 with  LIMA to LAD, left radial to OM, SVG to PDA and SVG to diagonal by Dr. Murrieta at [facility] in 05/2022.  Hospital admission and 11/2023 with HFpEF.  Echo in 08/2023 showed EF 45-50% an echo in 11/2023 showed EF of 55%     Patient admitted to the ER for DUVALL, 15lb wt gain and worsening LE edema 10/2024.  She states her swelling improved after her ER visit but has occurred again despite taking her diuretics and avoidanec of salt. Denies CP, diaphoresis, N/V/D,  orthopnea, PND, palpitations, near syncope or syncope, dizziness, melena, BRBPR, hemoptysis, recent travel    Past Medical History:   Diagnosis Date    CAD (coronary artery disease)     Displacement of lumbar intervertebral disc     HLD (hyperlipidemia)     Hypertension     Lumbar radiculitis     Lumbar sprain     Numbness     legs and arms    S/P CABG x 4 5/27/2022    LIMA-LAD, Left radial to OM, rSVG to PDA, rSVG to diag by Dr. Murrieta    STEMI (ST elevation myocardial infarction) ([facility]) 03/2020       Past Surgical History:   Procedure Laterality Date    APPENDECTOMY  2014    CHOLECYSTECTOMY  2013    GI      multiple esophageal surgeries in childhood    HEENT      \"surgery on esophagus\"    HYSTERECTOMY (CERVIX STATUS UNKNOWN)  2003    LUMBAR DISCECTOMY         Current Outpatient Medications   Medication Sig    azelastine (ASTELIN) 0.1 % nasal spray 2 sprays by Nasal route 2 times daily Use in each nostril as directed

## 2024-10-24 NOTE — PROGRESS NOTES
Identified pt with two pt identifiers(name and ). Reviewed record in preparation for visit and have obtained necessary documentation.    Anjelica Mcgee presents today for   Chief Complaint   Patient presents with    Follow-up      4mo f/u         Pt denied DIZZINESS, SOB, CHEST PAIN/ PRESSURE, FATIGUE/WEAKNESS, HEADACHES, SWELLING.             Anjelica Mcgee preferred language for health care discussion is english/other.    Personal Protective Equipment:   Personal Protective Equipment was used including: mask-surgical and hands-gloves. Patient was placed on no precaution(s). Patient was masked.    Precautions:   Patient currently on None  Patient currently roomed with door closed.    Is someone accompanying this pt? no    Is the patient using any DME equipment during OV? yes    Depression Screening:      10/22/2024    10:41 AM 10/2/2024    11:14 AM 2024    11:10 AM 2024    10:54 AM 1/15/2024     2:22 PM 2023     1:28 PM 2023     8:26 AM   PHQ-9 Questionaire   Little interest or pleasure in doing things 3 0 0 0 0 0 0   Feeling down, depressed, or hopeless 3 0 0 0 0 0 0   Trouble falling or staying asleep, or sleeping too much 3 0 0 0 0     Feeling tired or having little energy 3 0 0  0     Poor appetite or overeating 3 0 0  0     Feeling bad about yourself - or that you are a failure or have let yourself or your family down 0 0 0  0     Trouble concentrating on things, such as reading the newspaper or watching television 3 0 0  0     Moving or speaking so slowly that other people could have noticed. Or the opposite - being so fidgety or restless that you have been moving around a lot more than usual 3 0 0  0     Thoughts that you would be better off dead, or of hurting yourself in some way 0 0 0  0     PHQ-9 Total Score 21 0 0 0 0 0 0   If you checked off any problems, how difficult have these problems made it for you to do your work, take care of things at home, or get along with other people? 2 0 0

## 2024-11-07 DIAGNOSIS — Z95.1 S/P CABG X 4: ICD-10-CM

## 2024-11-07 DIAGNOSIS — R60.9 EDEMA, UNSPECIFIED TYPE: ICD-10-CM

## 2024-11-07 DIAGNOSIS — E78.2 MIXED HYPERLIPIDEMIA: ICD-10-CM

## 2024-11-11 SDOH — HEALTH STABILITY: PHYSICAL HEALTH: ON AVERAGE, HOW MANY MINUTES DO YOU ENGAGE IN EXERCISE AT THIS LEVEL?: 20 MIN

## 2024-11-11 SDOH — HEALTH STABILITY: PHYSICAL HEALTH: ON AVERAGE, HOW MANY DAYS PER WEEK DO YOU ENGAGE IN MODERATE TO STRENUOUS EXERCISE (LIKE A BRISK WALK)?: 4 DAYS

## 2024-11-12 ENCOUNTER — TELEPHONE (OUTPATIENT)
Age: 54
End: 2024-11-12

## 2024-11-12 LAB
ALBUMIN SERPL-MCNC: 4.4 G/DL (ref 3.8–4.9)
ALP SERPL-CCNC: 147 IU/L (ref 44–121)
ALT SERPL-CCNC: 37 IU/L (ref 0–32)
AST SERPL-CCNC: 27 IU/L (ref 0–40)
BILIRUB SERPL-MCNC: 0.3 MG/DL (ref 0–1.2)
BUN SERPL-MCNC: 12 MG/DL (ref 6–24)
BUN/CREAT SERPL: 15 (ref 9–23)
CALCIUM SERPL-MCNC: 9.7 MG/DL (ref 8.7–10.2)
CHLORIDE SERPL-SCNC: 98 MMOL/L (ref 96–106)
CHOLEST SERPL-MCNC: 180 MG/DL (ref 100–199)
CO2 SERPL-SCNC: 22 MMOL/L (ref 20–29)
CREAT SERPL-MCNC: 0.81 MG/DL (ref 0.57–1)
EGFRCR SERPLBLD CKD-EPI 2021: 86 ML/MIN/1.73
GLOBULIN SER CALC-MCNC: 2.4 G/DL (ref 1.5–4.5)
GLUCOSE SERPL-MCNC: 127 MG/DL (ref 70–99)
HDLC SERPL-MCNC: 58 MG/DL
LDLC SERPL CALC-MCNC: 101 MG/DL (ref 0–99)
POTASSIUM SERPL-SCNC: 3.3 MMOL/L (ref 3.5–5.2)
PROT SERPL-MCNC: 6.8 G/DL (ref 6–8.5)
SODIUM SERPL-SCNC: 140 MMOL/L (ref 134–144)
TRIGL SERPL-MCNC: 118 MG/DL (ref 0–149)
VLDLC SERPL CALC-MCNC: 21 MG/DL (ref 5–40)

## 2024-11-12 RX ORDER — POTASSIUM CHLORIDE 750 MG/1
10 TABLET, EXTENDED RELEASE ORAL DAILY
Qty: 30 TABLET | Refills: 5 | Status: SHIPPED | OUTPATIENT
Start: 2024-11-12 | End: 2024-11-14 | Stop reason: SDUPTHER

## 2024-11-12 NOTE — TELEPHONE ENCOUNTER
PCP: Magali Smalls APRN - CNP    Last appt:  6/20/2024   Future Appointments   Date Time Provider Department Center   11/14/2024  9:30 AM Sudhakar Beck PA-C [facility]   11/14/2024 10:30 AM Colin Cook MD CAG [facility]   11/22/2024 10:30 AM Magali Smalls APRN - CNP [facility] DEP       Requested Prescriptions     Pending Prescriptions Disp Refills    potassium chloride (KLOR-CON M) 10 MEQ extended release tablet 30 tablet 5     Sig: Take 1 tablet by mouth daily       Request for a 30 or 90 day supply? Provider Discretion    Pharmacy: n/a    Other Comments:Message from Anita Estrada PA-C sent at 11/12/2024 12:58 PM EST -----  Can we send in a prescription for po K+ replacement if she is not already on it.   10 mEq daily.

## 2024-11-12 NOTE — TELEPHONE ENCOUNTER
----- Message from Anita Estrada PA-C sent at 11/12/2024 12:58 PM EST -----  Can we send in a prescription for po K+ replacement if she is not already on it.   10 mEq daily.     Thank you  ----- Message -----  From: Cynthia Abdul Incoming Amb Ref Lab Orders To [facility]  Sent: 11/12/2024   9:38 AM EST  To: Anita Estrada PA-C

## 2024-11-12 NOTE — TELEPHONE ENCOUNTER
Contacted pt at  number.  Two patient Identifiers confirmed. Informed pt per notes below. Pt verbalized understanding.

## 2024-11-14 ENCOUNTER — OFFICE VISIT (OUTPATIENT)
Age: 54
End: 2024-11-14
Payer: MEDICAID

## 2024-11-14 ENCOUNTER — OFFICE VISIT (OUTPATIENT)
Age: 54
End: 2024-11-14

## 2024-11-14 VITALS — WEIGHT: 272 LBS | BODY MASS INDEX: 51.35 KG/M2 | HEIGHT: 61 IN

## 2024-11-14 VITALS
SYSTOLIC BLOOD PRESSURE: 106 MMHG | OXYGEN SATURATION: 95 % | DIASTOLIC BLOOD PRESSURE: 67 MMHG | WEIGHT: 272 LBS | HEIGHT: 61 IN | BODY MASS INDEX: 51.35 KG/M2 | HEART RATE: 50 BPM

## 2024-11-14 DIAGNOSIS — E78.00 PURE HYPERCHOLESTEROLEMIA: ICD-10-CM

## 2024-11-14 DIAGNOSIS — I10 ESSENTIAL HYPERTENSION WITH GOAL BLOOD PRESSURE LESS THAN 140/90: ICD-10-CM

## 2024-11-14 DIAGNOSIS — M75.41 IMPINGEMENT SYNDROME OF RIGHT SHOULDER: ICD-10-CM

## 2024-11-14 DIAGNOSIS — I25.10 CORONARY ARTERY DISEASE DUE TO LIPID RICH PLAQUE: Primary | ICD-10-CM

## 2024-11-14 DIAGNOSIS — I25.83 CORONARY ARTERY DISEASE DUE TO LIPID RICH PLAQUE: Primary | ICD-10-CM

## 2024-11-14 DIAGNOSIS — G56.03 CARPAL TUNNEL SYNDROME ON BOTH SIDES: ICD-10-CM

## 2024-11-14 DIAGNOSIS — M75.51 SUBACROMIAL BURSITIS OF RIGHT SHOULDER JOINT: ICD-10-CM

## 2024-11-14 DIAGNOSIS — I48.0 PAF (PAROXYSMAL ATRIAL FIBRILLATION) (HCC): ICD-10-CM

## 2024-11-14 DIAGNOSIS — M54.9 UPPER BACK PAIN: ICD-10-CM

## 2024-11-14 DIAGNOSIS — M25.511 RIGHT SHOULDER PAIN, UNSPECIFIED CHRONICITY: Primary | ICD-10-CM

## 2024-11-14 DIAGNOSIS — R29.898 DECREASED GRIP STRENGTH OF RIGHT HAND: ICD-10-CM

## 2024-11-14 DIAGNOSIS — M25.611 DECREASED RANGE OF MOTION OF RIGHT SHOULDER: ICD-10-CM

## 2024-11-14 PROCEDURE — 3074F SYST BP LT 130 MM HG: CPT | Performed by: INTERNAL MEDICINE

## 2024-11-14 PROCEDURE — 99214 OFFICE O/P EST MOD 30 MIN: CPT | Performed by: INTERNAL MEDICINE

## 2024-11-14 PROCEDURE — 3078F DIAST BP <80 MM HG: CPT | Performed by: INTERNAL MEDICINE

## 2024-11-14 RX ORDER — TRIAMCINOLONE ACETONIDE 40 MG/ML
80 INJECTION, SUSPENSION INTRA-ARTICULAR; INTRAMUSCULAR ONCE
Status: COMPLETED | OUTPATIENT
Start: 2024-11-14 | End: 2024-11-14

## 2024-11-14 RX ORDER — POTASSIUM CHLORIDE 750 MG/1
20 TABLET, EXTENDED RELEASE ORAL DAILY
Qty: 30 TABLET | Refills: 5 | Status: SHIPPED | OUTPATIENT
Start: 2024-11-14

## 2024-11-14 RX ORDER — BUPIVACAINE HYDROCHLORIDE 5 MG/ML
7 INJECTION, SOLUTION PERINEURAL ONCE
Status: COMPLETED | OUTPATIENT
Start: 2024-11-14 | End: 2024-11-14

## 2024-11-14 RX ORDER — METOLAZONE 2.5 MG/1
2.5 TABLET ORAL
Qty: 30 TABLET | Refills: 5 | Status: SHIPPED | OUTPATIENT
Start: 2024-11-14

## 2024-11-14 RX ADMIN — TRIAMCINOLONE ACETONIDE 80 MG: 40 INJECTION, SUSPENSION INTRA-ARTICULAR; INTRAMUSCULAR at 11:40

## 2024-11-14 RX ADMIN — BUPIVACAINE HYDROCHLORIDE 35 MG: 5 INJECTION, SOLUTION PERINEURAL at 11:40

## 2024-11-14 NOTE — PROGRESS NOTES
lIdentified pt with two pt identifiers(name and ). Reviewed record in preparation for visit and have obtained necessary documentation.    Anjelica Mcgee presents today for   Chief Complaint   Patient presents with    Follow-up      3wk f/u         Pt c/o DIZZINESS, SOB, CHEST PAIN/ PRESSURE, FATIGUE/WEAKNESS, HEADACHES, SWELLING.             Anjelica Mcgee preferred language for health care discussion is english/other.    Personal Protective Equipment:   Personal Protective Equipment was used including: mask-surgical and hands-gloves. Patient was placed on no precaution(s). Patient was masked.    Precautions:   Patient currently on None  Patient currently roomed with door closed.    Is someone accompanying this pt? no    Is the patient using any DME equipment during OV? no    Depression Screening:      10/22/2024    10:41 AM 10/2/2024    11:14 AM 2024    11:10 AM 2024    10:54 AM 1/15/2024     2:22 PM 2023     1:28 PM 2023     8:26 AM   PHQ-9 Questionaire   Little interest or pleasure in doing things 3 0 0 0 0 0 0   Feeling down, depressed, or hopeless 3 0 0 0 0 0 0   Trouble falling or staying asleep, or sleeping too much 3 0 0 0 0     Feeling tired or having little energy 3 0 0  0     Poor appetite or overeating 3 0 0  0     Feeling bad about yourself - or that you are a failure or have let yourself or your family down 0 0 0  0     Trouble concentrating on things, such as reading the newspaper or watching television 3 0 0  0     Moving or speaking so slowly that other people could have noticed. Or the opposite - being so fidgety or restless that you have been moving around a lot more than usual 3 0 0  0     Thoughts that you would be better off dead, or of hurting yourself in some way 0 0 0  0     PHQ-9 Total Score 21 0 0 0 0 0 0   If you checked off any problems, how difficult have these problems made it for you to do your work, take care of things at home, or get along with other people? 2 0 0  0 
MG/0.5ML SOAJ SC injection Inject 0.25 mg into the skin every 7 days (Patient not taking: Reported on 10/2/2024)    omeprazole (PRILOSEC) 20 MG delayed release capsule Take 1 capsule by mouth daily as needed (acid reflux)    fluticasone (FLONASE) 50 MCG/ACT nasal spray 1 spray by Each Nostril route daily    acetaminophen (TYLENOL) 650 MG extended release tablet Take 1 tablet by mouth in the morning and 1 tablet at noon and 1 tablet in the evening.     No current facility-administered medications for this visit.       Allergies and Sensitivities:  Allergies   Allergen Reactions    Penicillins Hives, Itching and Rash     Mild pruritis as a child    Other Reaction(s): rash/itching, Unknown    Pt endorses hive, rash    Has tolerated Cefepime       Family History:  Family History   Problem Relation Age of Onset    Hypertension Paternal Grandmother     Heart Disease Paternal Grandmother     Heart Disease Maternal Grandmother     Hypertension Maternal Grandmother     Heart Disease Father     Hypertension Father     Hypertension Mother        Social History:  Social History     Tobacco Use    Smoking status: Former     Current packs/day: 0.00     Average packs/day: 1 pack/day for 34.0 years (34.0 ttl pk-yrs)     Types: Cigarettes     Start date: 1986     Quit date: 2020     Years since quittin.7    Smokeless tobacco: Former     Quit date: 2017   Vaping Use    Vaping status: Every Day   Substance Use Topics    Alcohol use: Yes     Alcohol/week: 4.0 standard drinks of alcohol    Drug use: No     She  reports that she quit smoking about 4 years ago. Her smoking use included cigarettes. She started smoking about 38 years ago. She has a 34 pack-year smoking history. She quit smokeless tobacco use about 7 years ago.  She  reports current alcohol use of about 4.0 standard drinks of alcohol per week.    Review of Systems:  Cardiac symptoms as noted above in HPI. All others negative.    Denies fatigue, malaise,

## 2024-11-14 NOTE — PATIENT INSTRUCTIONS
Testing   Echo/ Nuclear Stress/ Treadmill Stress/ 24/48 HR Holter    Please call [facility] central scheduling at [phone]/ [facility] central scheduling at [phone] to schedule testing.     Echo  PATIENT PREPS:   -Wear easy to remove shirt to your appointment for easier accessibility.    Nuclear Stress Instructions-  PATIENT PREPS:   -NPO (Nothing to eat or drink) after midnight the night prior to the exam.    -No medications on the day of exam. You may bring them with you.   -Wear comfortable clothing and shoes suitable for walking on a treadmill. (NO sandals, flip flops, high heels, etc)   -The duration of this exam is approximately 2 to 4 hours.      **call office 3-5 days after testing is completed for results** Please ensure testing is completed prior to scheduled follow up appointment. If it is not completed your appointment may be rescheduled**    New Medication/Medication Changes/Medication Refill  Metolazone 2.5 mg tab x Monday through Friday only     **please allow 24-48 hrs for medication to be escribed to pharmacy** If you need any refills on medications please contact your pharmacy so that the request can be escribed to the provider for review seven to 10 days prior to being out of medication.    Labs  BMP x 1 week     *[facility], [address]  ( M - Thur 8am to 3:30pm.) - You must call to make an appointment for blood work at this site.   Contact :[phone]  *[facility] [address] [facility]  *[facility], [address] [place] [address]  *[facility], [address] [facility]  *[facility], [address] [facility]

## 2024-11-14 NOTE — PROGRESS NOTES
[facility] AMBULATORY OFFICE NOTE    Patient: Anjelica Mcgee                MRN: [id number]       SSN: [id number]  YOB: 1970        AGE: 54 y.o.        SEX: female      OFFICE NOTE DICTATED    PCP: Magali Smalls APRN - CNP  11/14/24    Chief Complaint   Patient presents with    Back Problem     Upper back        HISTORY:    Anjelica Mcgee is a 54 y.o. female presents with a complaint of upper right back and shoulder pain in addition to numbness tingling in both hands with weakness developing on the right greater than left.  No history of trauma.  Patient has a heart failure history.  She has had an open bypass x 4.  She uses Tylenol for symptom management with poor results regarding her right shoulder and upper back pain.    Pain in the right shoulder limits her ability to reach above her breast line and behind her back.  Pain in this area with gross limitation as it is progressed started about 2 months ago.      No results found for: \"HBA1C\", \"LPW5BXOC\"      11/14/2024     9:28 AM 10/24/2024     2:03 PM 10/22/2024    10:43 AM 10/2/2024    11:17 AM 8/6/2024    11:12 AM 6/20/2024    10:57 AM 4/29/2024     9:24 AM   Weight Metrics   Weight 272 lb 267 lb 266 lb 6.4 oz 270 lb 225 lb 238 lb 237 lb   BMI (Calculated) 51.5 kg/m2 48.9 kg/m2 50.4 kg/m2 51.1 kg/m2 42.6 kg/m2 45.1 kg/m2 43.4 kg/m2          Problem List Items Addressed This Visit    None  Visit Diagnoses       Upper back pain    -  Primary    Relevant Orders    [02293] C Spine 4V (Completed)    [16651] Shoulder 2V or more (Completed)    Right shoulder pain, unspecified chronicity        Relevant Orders    [85060] Shoulder 2V or more (Completed)    Carpal tunnel syndrome on both sides        Decreased range of motion of right shoulder        Subacromial bursitis of right shoulder joint        Impingement syndrome of right shoulder        Decreased  strength of right hand                PAST MEDICAL HISTORY:

## 2024-12-03 ENCOUNTER — OFFICE VISIT (OUTPATIENT)
Age: 54
End: 2024-12-03
Payer: MEDICAID

## 2024-12-03 VITALS
SYSTOLIC BLOOD PRESSURE: 161 MMHG | BODY MASS INDEX: 50.41 KG/M2 | HEART RATE: 62 BPM | OXYGEN SATURATION: 98 % | DIASTOLIC BLOOD PRESSURE: 101 MMHG | WEIGHT: 267 LBS | HEIGHT: 61 IN

## 2024-12-03 DIAGNOSIS — R06.02 SHORTNESS OF BREATH: ICD-10-CM

## 2024-12-03 DIAGNOSIS — I25.10 CORONARY ARTERY DISEASE DUE TO LIPID RICH PLAQUE: ICD-10-CM

## 2024-12-03 DIAGNOSIS — I25.83 CORONARY ARTERY DISEASE DUE TO LIPID RICH PLAQUE: Primary | ICD-10-CM

## 2024-12-03 DIAGNOSIS — I25.10 CORONARY ARTERY DISEASE DUE TO LIPID RICH PLAQUE: Primary | ICD-10-CM

## 2024-12-03 DIAGNOSIS — I25.83 CORONARY ARTERY DISEASE DUE TO LIPID RICH PLAQUE: ICD-10-CM

## 2024-12-03 DIAGNOSIS — I10 ESSENTIAL HYPERTENSION WITH GOAL BLOOD PRESSURE LESS THAN 140/90: ICD-10-CM

## 2024-12-03 PROCEDURE — 99214 OFFICE O/P EST MOD 30 MIN: CPT | Performed by: INTERNAL MEDICINE

## 2024-12-03 PROCEDURE — 3075F SYST BP GE 130 - 139MM HG: CPT | Performed by: INTERNAL MEDICINE

## 2024-12-03 PROCEDURE — 3080F DIAST BP >= 90 MM HG: CPT | Performed by: INTERNAL MEDICINE

## 2024-12-03 RX ORDER — POTASSIUM CHLORIDE 1500 MG/1
40 TABLET, EXTENDED RELEASE ORAL DAILY
Qty: 60 TABLET | Refills: 5 | Status: SHIPPED | OUTPATIENT
Start: 2024-12-03

## 2024-12-03 RX ORDER — METOLAZONE 5 MG/1
5 TABLET ORAL
Qty: 30 TABLET | Refills: 2 | Status: SHIPPED | OUTPATIENT
Start: 2024-12-03

## 2024-12-03 NOTE — PATIENT INSTRUCTIONS
New Medication/Medication Changes/Medication Refill  Potassium 40 mg tab daily   Metolazone 5 mg tab x 5 days a week    **please allow 24-48 hrs for medication to be escribed to pharmacy** If you need any refills on medications please contact your pharmacy so that the request can be escribed to the provider for review seven to 10 days prior to being out of medication.    Labs    BMP x 1 week   *[facility], [address]  ( M - Thur 8am to 3:30pm.) - You must call to make an appointment for blood work at this site.   Contact :[phone]  *[facility] [address] [facility]  *[facility], [address] [place] [address]  *[facility], 2 [facility]  *[facility], [address] [facility]      Other Testing  Chest X Ray

## 2024-12-03 NOTE — PROGRESS NOTES
Identified pt with two pt identifiers(name and ). Reviewed record in preparation for visit and have obtained necessary documentation.    Anjelica Mcgee presents today for   Chief Complaint   Patient presents with    Follow-up     2 Week Post f/u BMP       Pt c/o DIZZINESS, SOB, CHEST PAIN/ PRESSURE,           Anjelica Mcgee preferred language for health care discussion is english/other.    Personal Protective Equipment:   Personal Protective Equipment was used including: mask-surgical and hands-gloves. Patient was placed on no precaution(s). Patient was not masked.    Precautions:   Patient currently on None  Patient currently roomed with door closed.    Is someone accompanying this pt? No    Is the patient using any DME equipment during OV? Oxygen 3L NC    Depression Screening:      10/22/2024    10:41 AM 10/2/2024    11:14 AM 2024    11:10 AM 2024    10:54 AM 1/15/2024     2:22 PM 2023     1:28 PM 2023     8:26 AM   PHQ-9 Questionaire   Little interest or pleasure in doing things 3 0 0 0 0 0 0   Feeling down, depressed, or hopeless 3 0 0 0 0 0 0   Trouble falling or staying asleep, or sleeping too much 3 0 0 0 0     Feeling tired or having little energy 3 0 0  0     Poor appetite or overeating 3 0 0  0     Feeling bad about yourself - or that you are a failure or have let yourself or your family down 0 0 0  0     Trouble concentrating on things, such as reading the newspaper or watching television 3 0 0  0     Moving or speaking so slowly that other people could have noticed. Or the opposite - being so fidgety or restless that you have been moving around a lot more than usual 3 0 0  0     Thoughts that you would be better off dead, or of hurting yourself in some way 0 0 0  0     PHQ-9 Total Score 21 0 0 0 0 0 0   If you checked off any problems, how difficult have these problems made it for you to do your work, take care of things at home, or get along with other people? 2 0 0  0          Learning 
Encounters:   12/03/24 (!) 161/101   11/14/24 106/67   10/24/24 (!) 148/86         Pulse Readings from Last 3 Encounters:   12/03/24 62   11/14/24 50   10/24/24 70          Wt Readings from Last 3 Encounters:   12/03/24 121.1 kg (267 lb)   11/14/24 123.4 kg (272 lb)   11/14/24 123.4 kg (272 lb)       Constitutional: Alert, non toxic, NAD    HENT: Head: Normocephalic and atraumatic.  Eyes: Conjunctivae and extraocular motions are normal.   Neck: No JVD appreciated. Supple.   Cardiovascular: RRR rhythm.   No M/R/G appreciated.   Lung: Breath sounds normal. No respiratory distress. No rhonchi or rales appreciated  Abdominal: No tenderness. No rebound or guarding.   Musculoskeletal: Trace/1+ edema    Review of Data  LABS:   Lab Results   Component Value Date/Time     11/11/2024 01:34 PM    K 3.3 11/11/2024 01:34 PM    CL 98 11/11/2024 01:34 PM    CO2 22 11/11/2024 01:34 PM    BUN 12 11/11/2024 01:34 PM         Latest Ref Rng & Units 11/11/2024     1:34 PM 8/23/2023     3:14 PM 6/13/2023     9:04 AM 6/1/2023    10:13 AM 5/28/2022     4:00 AM   Lipids   Chol 100 - 199 mg/dL 180    258     HDL >39 mg/dL 58    59     LDL Calc 0 - 99 mg/dL 101    175     VLDL Calc 5 - 40 mg/dL 21    24     Trig 0 - 149 mg/dL 118    135     ALT 0 - 32 IU/L 37  22  28   43    AST 0 - 40 IU/L 27  20  21   129    LDL 0 - 99 mg/dL 101    175       Lab Results   Component Value Date/Time    ALT 37 11/11/2024 01:34 PM     No results found for: \"HBA1C\", \"HZB5DOQV\"    EKG 5/5/2022: Sinus rhythm at 66 bpm.  Inferior Q waves.  No ST changes of ischemia     TRANSTHORACIC ECHOCARDIOGRAM (TTE) LIMITED (CONTRAST/BUBBLE/3D PRN) 08/07/2023     Left Ventricle: Mildly reduced left ventricular systolic function with a visually estimated EF of 45 - 50%. Left ventricle size is normal. Normal wall thickness. Mild global hypokinesis present.    Right Ventricle: Right ventricle is mildly dilated. Reduced systolic function. TAPSE is abnormal.    Aortic Valve:

## 2024-12-09 ENCOUNTER — TELEPHONE (OUTPATIENT)
Age: 54
End: 2024-12-09

## 2024-12-09 NOTE — TELEPHONE ENCOUNTER
Attempted to contact pt at  number, no answer. Lvm for pt to return call to office at [phone]. Will continue to try to contact pt.       Re: need appt for post hosp

## 2024-12-09 NOTE — TELEPHONE ENCOUNTER
Incoming from [facility] provider. Two patient identifiers confirmed. She stated pt has   1+ edema at OV today and 30 plus weigh gain since July 2024. BMP elevated at hospital visit recently. She stated she did not think the pts issue she is currenlty having was because of pulmonary but more circulation and heart. Informed I will review with provider however he was not in office today. Provider verbalized understanding.

## 2024-12-11 ENCOUNTER — OFFICE VISIT (OUTPATIENT)
Facility: CLINIC | Age: 54
End: 2024-12-11

## 2024-12-11 VITALS
TEMPERATURE: 97.6 F | HEART RATE: 67 BPM | DIASTOLIC BLOOD PRESSURE: 80 MMHG | RESPIRATION RATE: 18 BRPM | OXYGEN SATURATION: 96 % | SYSTOLIC BLOOD PRESSURE: 130 MMHG | HEIGHT: 61 IN | WEIGHT: 266 LBS | BODY MASS INDEX: 50.22 KG/M2

## 2024-12-11 DIAGNOSIS — E66.813 CLASS 3 SEVERE OBESITY DUE TO EXCESS CALORIES WITH SERIOUS COMORBIDITY AND BODY MASS INDEX (BMI) OF 50.0 TO 59.9 IN ADULT: ICD-10-CM

## 2024-12-11 DIAGNOSIS — J45.20 MILD INTERMITTENT ASTHMA WITHOUT STATUS ASTHMATICUS WITHOUT COMPLICATION: ICD-10-CM

## 2024-12-11 DIAGNOSIS — I50.9 CHRONIC CONGESTIVE HEART FAILURE, UNSPECIFIED HEART FAILURE TYPE (HCC): ICD-10-CM

## 2024-12-11 DIAGNOSIS — E66.01 CLASS 3 SEVERE OBESITY DUE TO EXCESS CALORIES WITH SERIOUS COMORBIDITY AND BODY MASS INDEX (BMI) OF 50.0 TO 59.9 IN ADULT: ICD-10-CM

## 2024-12-11 DIAGNOSIS — E11.65 TYPE 2 DIABETES MELLITUS WITH HYPERGLYCEMIA, WITHOUT LONG-TERM CURRENT USE OF INSULIN (HCC): ICD-10-CM

## 2024-12-11 DIAGNOSIS — Z09 HOSPITAL DISCHARGE FOLLOW-UP: Primary | ICD-10-CM

## 2024-12-11 LAB — HBA1C MFR BLD: 7.8 %

## 2024-12-11 RX ORDER — TIOTROPIUM BROMIDE INHALATION SPRAY 3.12 UG/1
5 SPRAY, METERED RESPIRATORY (INHALATION) DAILY
COMMUNITY
Start: 2024-12-09

## 2024-12-11 RX ORDER — IPRATROPIUM BROMIDE AND ALBUTEROL SULFATE 2.5; .5 MG/3ML; MG/3ML
SOLUTION RESPIRATORY (INHALATION)
COMMUNITY
Start: 2024-12-09

## 2024-12-11 RX ORDER — SEMAGLUTIDE 1.34 MG/ML
INJECTION, SOLUTION SUBCUTANEOUS
Qty: 2 ADJUSTABLE DOSE PRE-FILLED PEN SYRINGE | Refills: 1 | Status: SHIPPED | OUTPATIENT
Start: 2024-12-11 | End: 2024-12-11

## 2024-12-11 RX ORDER — POTASSIUM CHLORIDE 750 MG/1
TABLET, EXTENDED RELEASE ORAL
COMMUNITY
Start: 2024-12-09

## 2024-12-11 RX ORDER — SEMAGLUTIDE 1.34 MG/ML
INJECTION, SOLUTION SUBCUTANEOUS
Qty: 2 ADJUSTABLE DOSE PRE-FILLED PEN SYRINGE | Refills: 1 | Status: SHIPPED | OUTPATIENT
Start: 2024-12-11

## 2024-12-11 SDOH — ECONOMIC STABILITY: FOOD INSECURITY: WITHIN THE PAST 12 MONTHS, THE FOOD YOU BOUGHT JUST DIDN'T LAST AND YOU DIDN'T HAVE MONEY TO GET MORE.: NEVER TRUE

## 2024-12-11 SDOH — ECONOMIC STABILITY: FOOD INSECURITY: WITHIN THE PAST 12 MONTHS, YOU WORRIED THAT YOUR FOOD WOULD RUN OUT BEFORE YOU GOT MONEY TO BUY MORE.: NEVER TRUE

## 2024-12-11 SDOH — ECONOMIC STABILITY: INCOME INSECURITY: HOW HARD IS IT FOR YOU TO PAY FOR THE VERY BASICS LIKE FOOD, HOUSING, MEDICAL CARE, AND HEATING?: NOT HARD AT ALL

## 2024-12-11 ASSESSMENT — PATIENT HEALTH QUESTIONNAIRE - PHQ9
SUM OF ALL RESPONSES TO PHQ QUESTIONS 1-9: 0
2. FEELING DOWN, DEPRESSED OR HOPELESS: NOT AT ALL
SUM OF ALL RESPONSES TO PHQ QUESTIONS 1-9: 0
SUM OF ALL RESPONSES TO PHQ QUESTIONS 1-9: 0
1. LITTLE INTEREST OR PLEASURE IN DOING THINGS: NOT AT ALL
SUM OF ALL RESPONSES TO PHQ QUESTIONS 1-9: 0
SUM OF ALL RESPONSES TO PHQ9 QUESTIONS 1 & 2: 0

## 2024-12-11 NOTE — PROGRESS NOTES
Anjelica Mcgee is a 54 y.o. female  established patient, here for evaluation of the following chief complaint(s):  Chief Complaint   Patient presents with    Follow-up      Assessment and Plan  1. Hospital discharge follow-up  -     External Referral To Home Health  2. Mild intermittent asthma without status asthmaticus without complication  3. Chronic congestive heart failure, unspecified heart failure type (HCC)  -     External Referral To Home Health  -     Semaglutide,0.25 or 0.5MG/DOS, (OZEMPIC, 0.25 OR 0.5 MG/DOSE,) 2 MG/1.5ML SOPN; Inject 0.25 mg for 1 month every 7 days then 0.50 mg thereafter, Disp-2 Adjustable Dose Pre-filled Pen Syringe, R-1Normal  4. Type 2 diabetes mellitus with hyperglycemia, without long-term current use of insulin (HCC)  -     AMB POC HEMOGLOBIN A1C  -     Semaglutide,0.25 or 0.5MG/DOS, (OZEMPIC, 0.25 OR 0.5 MG/DOSE,) 2 MG/1.5ML SOPN; Inject 0.25 mg for 1 month every 7 days then 0.50 mg thereafter, Disp-2 Adjustable Dose Pre-filled Pen Syringe, R-1Normal  5. Class 3 severe obesity due to excess calories with serious comorbidity and body mass index (BMI) of 50.0 to 59.9 in adult  -     Semaglutide,0.25 or 0.5MG/DOS, (OZEMPIC, 0.25 OR 0.5 MG/DOSE,) 2 MG/1.5ML SOPN; Inject 0.25 mg for 1 month every 7 days then 0.50 mg thereafter, Disp-2 Adjustable Dose Pre-filled Pen Syringe, R-1Normal       Return in about 1 month (around 1/11/2025) for Routine, 15.   HPI:   In office visit.    Hospital follow-up 12/3/2024 related to a fall, shortness of breath and head injury.  Patient reports she was in the ED for 4 hours and released home. She has seen pulmonary 12/9/2024 and put on Sprivia inhaler.     She has seen cardiology Dr. Cook w/ last week and increased her metolazone 5 times a week. Pt was started on potassium.   Weight today, 12/11/2024 at 266 lbs and at pulmonary appt 12/9/2004 she was 268 lbs. I have ordered home health for vitals and monitor weight.  Patient's weight 12/3/2024 at ED

## 2024-12-11 NOTE — PROGRESS NOTES
Anjelica Mcgee is a 54 y.o. year old female who presents today for   Chief Complaint   Patient presents with    Follow-up        \"Have you been to the ER, urgent care clinic since your last visit?  Hospitalized since your last visit?\"   Yes [facility] Ortega last Thursday chest pain because of fluid build up    “Have you seen or consulted any other health care providers outside our system since your last visit?”   Yes Cardiologist and Pulmonary (Michell)     Have you had a mammogram?”   NO    Date of last Mammogram: 11/1/2022            Click Here for Release of Records Request

## 2024-12-13 NOTE — TELEPHONE ENCOUNTER
Attempted to contact pt at  number, no answer. Lvm for pt to return call to office at [phone]. Will continue to try to contact pt.

## 2024-12-16 NOTE — TELEPHONE ENCOUNTER
Attempted to contact pt at  number, no answer. Lvm for pt to return call to office at [phone]. After x 3 unsuccessful attempts to contact pt and no return will send letter on address on file.

## 2024-12-27 ENCOUNTER — TELEPHONE (OUTPATIENT)
Facility: CLINIC | Age: 54
End: 2024-12-27

## 2024-12-27 NOTE — TELEPHONE ENCOUNTER
Marianna called in w/ [facility] wanting to confirm if a start of care delay is acceptable due to staffing shortages. Earliest pt can be seen is the end of next week, requesting a c/b

## 2025-01-09 ENCOUNTER — CLINICAL DOCUMENTATION (OUTPATIENT)
Facility: CLINIC | Age: 55
End: 2025-01-09

## 2025-01-09 NOTE — PROGRESS NOTES
Called pt, no answer and left message I didn't understand home health and monitoring MyChart message.  If patient was talking about cardiac monitoring at home she would need to see her cardiologist

## 2025-01-10 ENCOUNTER — TELEPHONE (OUTPATIENT)
Facility: CLINIC | Age: 55
End: 2025-01-10

## 2025-01-10 DIAGNOSIS — R20.2 NUMBNESS AND TINGLING OF BOTH FEET: ICD-10-CM

## 2025-01-10 DIAGNOSIS — R20.0 NUMBNESS AND TINGLING OF BOTH FEET: ICD-10-CM

## 2025-01-10 RX ORDER — PREGABALIN 50 MG/1
50 CAPSULE ORAL EVERY EVENING
Qty: 90 CAPSULE | Refills: 1 | Status: CANCELLED | OUTPATIENT
Start: 2025-01-10 | End: 2025-07-09

## 2025-01-10 NOTE — TELEPHONE ENCOUNTER
[facility] requesting medication refill for the following:     Pregabalin (LYRICA) 50 MG capsule     LOV: 12/11/24  NOV: 1/22/25     Please be advised, thank you.

## 2025-01-10 NOTE — TELEPHONE ENCOUNTER
Charlotte from [facility] called in stating that a referral was sent over to ther office for patient to reciveved services due to patient will like telehealth visit attach to their services from [facility]. At this time they don't have those services. Please be advised, thank you.

## 2025-01-16 ENCOUNTER — OFFICE VISIT (OUTPATIENT)
Age: 55
End: 2025-01-16
Payer: MEDICAID

## 2025-01-16 ENCOUNTER — TELEPHONE (OUTPATIENT)
Facility: CLINIC | Age: 55
End: 2025-01-16

## 2025-01-16 VITALS
SYSTOLIC BLOOD PRESSURE: 135 MMHG | HEART RATE: 59 BPM | BODY MASS INDEX: 51.16 KG/M2 | OXYGEN SATURATION: 97 % | WEIGHT: 271 LBS | HEIGHT: 61 IN | DIASTOLIC BLOOD PRESSURE: 84 MMHG

## 2025-01-16 DIAGNOSIS — I25.10 CORONARY ARTERY DISEASE DUE TO LIPID RICH PLAQUE: ICD-10-CM

## 2025-01-16 DIAGNOSIS — I10 ESSENTIAL HYPERTENSION WITH GOAL BLOOD PRESSURE LESS THAN 140/90: Primary | ICD-10-CM

## 2025-01-16 DIAGNOSIS — R20.2 NUMBNESS AND TINGLING OF BOTH FEET: ICD-10-CM

## 2025-01-16 DIAGNOSIS — J30.1 NON-SEASONAL ALLERGIC RHINITIS DUE TO POLLEN: Primary | ICD-10-CM

## 2025-01-16 DIAGNOSIS — R20.0 NUMBNESS AND TINGLING OF BOTH FEET: ICD-10-CM

## 2025-01-16 DIAGNOSIS — I25.83 CORONARY ARTERY DISEASE DUE TO LIPID RICH PLAQUE: ICD-10-CM

## 2025-01-16 DIAGNOSIS — I48.0 PAF (PAROXYSMAL ATRIAL FIBRILLATION) (HCC): ICD-10-CM

## 2025-01-16 PROCEDURE — 99214 OFFICE O/P EST MOD 30 MIN: CPT | Performed by: PHYSICIAN ASSISTANT

## 2025-01-16 PROCEDURE — 3075F SYST BP GE 130 - 139MM HG: CPT | Performed by: PHYSICIAN ASSISTANT

## 2025-01-16 PROCEDURE — 3079F DIAST BP 80-89 MM HG: CPT | Performed by: PHYSICIAN ASSISTANT

## 2025-01-16 RX ORDER — CETIRIZINE HYDROCHLORIDE 10 MG/1
10 TABLET ORAL DAILY
Qty: 90 TABLET | Refills: 1 | Status: SHIPPED | OUTPATIENT
Start: 2025-01-16

## 2025-01-16 RX ORDER — PREGABALIN 50 MG/1
50 CAPSULE ORAL EVERY EVENING
Qty: 90 CAPSULE | Refills: 1 | Status: SHIPPED | OUTPATIENT
Start: 2025-01-16 | End: 2025-07-15

## 2025-01-16 ASSESSMENT — PATIENT HEALTH QUESTIONNAIRE - PHQ9
SUM OF ALL RESPONSES TO PHQ QUESTIONS 1-9: 5
SUM OF ALL RESPONSES TO PHQ QUESTIONS 1-9: 6
SUM OF ALL RESPONSES TO PHQ QUESTIONS 1-9: 6
6. FEELING BAD ABOUT YOURSELF - OR THAT YOU ARE A FAILURE OR HAVE LET YOURSELF OR YOUR FAMILY DOWN: SEVERAL DAYS
7. TROUBLE CONCENTRATING ON THINGS, SUCH AS READING THE NEWSPAPER OR WATCHING TELEVISION: SEVERAL DAYS
8. MOVING OR SPEAKING SO SLOWLY THAT OTHER PEOPLE COULD HAVE NOTICED. OR THE OPPOSITE, BEING SO FIGETY OR RESTLESS THAT YOU HAVE BEEN MOVING AROUND A LOT MORE THAN USUAL: NOT AT ALL
5. POOR APPETITE OR OVEREATING: SEVERAL DAYS
2. FEELING DOWN, DEPRESSED OR HOPELESS: NOT AT ALL
4. FEELING TIRED OR HAVING LITTLE ENERGY: SEVERAL DAYS
3. TROUBLE FALLING OR STAYING ASLEEP: SEVERAL DAYS
9. THOUGHTS THAT YOU WOULD BE BETTER OFF DEAD, OR OF HURTING YOURSELF: SEVERAL DAYS
SUM OF ALL RESPONSES TO PHQ QUESTIONS 1-9: 6

## 2025-01-16 ASSESSMENT — COLUMBIA-SUICIDE SEVERITY RATING SCALE - C-SSRS
6. HAVE YOU EVER DONE ANYTHING, STARTED TO DO ANYTHING, OR PREPARED TO DO ANYTHING TO END YOUR LIFE?: NO
1. WITHIN THE PAST MONTH, HAVE YOU WISHED YOU WERE DEAD OR WISHED YOU COULD GO TO SLEEP AND NOT WAKE UP?: NO
2. HAVE YOU ACTUALLY HAD ANY THOUGHTS OF KILLING YOURSELF?: NO

## 2025-01-16 NOTE — TELEPHONE ENCOUNTER
[facility] requesting medication refill on the following:     Cetirizine HCL 10 MG Tablet     LOV: 12/11/24  NOV: 1/22/25     Please be advised, thank you.

## 2025-01-16 NOTE — PATIENT INSTRUCTIONS
Labs  Hold Metolazone until you receive a call from provider post labs     *[facility], [address] [place] [address]  ( M - Thur 8am to 3:30pm.) - You must call to make an appointment for blood work at this site.   Contact :[phone]  *[facility] [address] [place] [address]  *[facility], [address] [place] [address]  *[facility], [address] [facility]  *[facility], [address] [facility]

## 2025-01-16 NOTE — PROGRESS NOTES
[facility]    Anjelica Mcgee is a 54 y.o. female, pmhx of CAD inferior STEMI s/p YANCI, HTN, HLD, Obesity     Patient had inferior STEMI in 03/2020 and underwent emergent cardiac catheterization.  He she was found to have a three-vessel coronary disease.  She had proximal as well as mid- distal RCA stenting with 3.0 x 33 mm as well as 3.0 x 18 mm Xience YANCI.  For unstable anginal symptoms, patient underwent cardiac catheterization again in 05/2022 which showed severe three-vessel coronary artery disease and patient underwent CABG x4 with  LIMA to LAD, left radial to OM, SVG to PDA and SVG to diagonal by Dr. Murrieta at [facility] in 05/2022.  Hospital admission and 11/2023 with HFpEF.  Echo in 08/2023 showed EF 45-50% an echo in 11/2023 showed EF of 55%.     Presents to the office today for follow up. At her last follow up appt, her metolazone frequency was changed from three times a week to five times a week. She reports she has been taking Metolazone three times a day, in addition to her bumex 2 mg daily. She reports feeling nauseous with taking Metolazone. Despite taking Bumex 2 mg daily and Metolazone TID, she reports no improvement in her swelling. On exam, I do not appreciate significant leg swelling or rales.      Denies CP, diaphoresis, N/V/D, orthopnea, PND, palpitations, near syncope or syncope, dizziness, melena, BRBPR, hemoptysis    Past Medical History:   Diagnosis Date    CAD (coronary artery disease)     Displacement of lumbar intervertebral disc     HLD (hyperlipidemia)     Hypertension     Lumbar radiculitis     Lumbar sprain     Numbness     legs and arms    S/P CABG x 4 5/27/2022    LIMA-LAD, Left radial to OM, rSVG to PDA, rSVG to diag by Dr. Murrieta    STEMI (ST elevation myocardial infarction) (HCC) 03/2020       Past Surgical History:   Procedure Laterality Date    APPENDECTOMY  2014    CHOLECYSTECTOMY  2013    GI      multiple esophageal surgeries in childhood    HEENT      \"surgery on 
  What is the preferred language for health care of the primary learner? ENGLISH    How does the primary learner prefer to learn new concepts? DEMONSTRATION    Answered By patient    Relationship to Learner SELF        Abuse Screenin/16/2025     8:00 AM 12/3/2024    11:00 AM 2024    10:00 AM 10/24/2024     1:00 PM 2024    10:00 AM 2023    10:00 AM 2023     8:00 AM   AMB Abuse Screening   Do you ever feel afraid of your partner? N N N N N N N   Are you in a relationship with someone who physically or mentally threatens you? N N N N N N N   Is it safe for you to go home? Y Y Y Y Y Y Y          Fall Risk      2025     8:00 AM 12/3/2024    11:22 AM 2024    10:30 AM 10/24/2024     1:55 PM 2024    10:54 AM 2023    10:31 AM 2023     9:43 AM   Fall Risk   Do you feel unsteady or are you worried about falling?  no no no no no no no   2 or more falls in past year? no no no no no no no   Fall with injury in past year? yes no no no no no no         Pt currently taking Anticoagulant /Antiplatelet therapy? Aspirin 81mg    Coordination of Care:  1. Have you been to the ER, urgent care clinic since your last visit? Hospitalized since your last visit? ([facility]) SOB - 10/03/2024, ( [facility]) Fcacial swelling - 10/11/2024, ([facility]) Fall - 2024    2. Have you seen or consulted any other health care providers outside of the [facility] since your last visit? Include any pap smears or colon screening. no      Please see Red banners under Allergies and Med Rec to remove outside inquires. All correct information has been verified with patient and added to chart.     Medication's patient's would liked removed has been marked not taking to be removed per Verbal order and read back per Anita Estrada PA-C

## 2025-01-17 LAB
BUN SERPL-MCNC: 14 MG/DL (ref 6–24)
BUN/CREAT SERPL: 17 (ref 9–23)
CALCIUM SERPL-MCNC: 9.6 MG/DL (ref 8.7–10.2)
CHLORIDE SERPL-SCNC: 95 MMOL/L (ref 96–106)
CO2 SERPL-SCNC: 25 MMOL/L (ref 20–29)
CREAT SERPL-MCNC: 0.84 MG/DL (ref 0.57–1)
EGFRCR SERPLBLD CKD-EPI 2021: 83 ML/MIN/1.73
GLUCOSE SERPL-MCNC: 148 MG/DL (ref 70–99)
POTASSIUM SERPL-SCNC: 3.5 MMOL/L (ref 3.5–5.2)
SODIUM SERPL-SCNC: 142 MMOL/L (ref 134–144)

## 2025-01-20 ENCOUNTER — TELEPHONE (OUTPATIENT)
Age: 55
End: 2025-01-20

## 2025-01-20 NOTE — PROGRESS NOTES
Tried to reach patient by phone.  Patient is interested in telemonitoring related to her heart health.  [facility] does not offer this service.

## 2025-01-20 NOTE — TELEPHONE ENCOUNTER
Contacted pt at  number.  Two patient Identifiers confirmed. Informed pt per instructions from PA regarding metolazone.  Pt verbalized understanding.

## 2025-01-20 NOTE — TELEPHONE ENCOUNTER
----- Message from Anita Estrada PA-C sent at 1/20/2025 12:44 PM EST -----  Can you let her know her kidney labs came back okay and that she can resume her Metolazone but only to take three times a week: Mon, Wed and Fridays ONCE a day.     Thank you

## 2025-01-23 ENCOUNTER — OFFICE VISIT (OUTPATIENT)
Age: 55
End: 2025-01-23

## 2025-01-23 VITALS — BODY MASS INDEX: 52.3 KG/M2 | HEIGHT: 61 IN | WEIGHT: 277 LBS

## 2025-01-23 DIAGNOSIS — M25.611 DECREASED RANGE OF MOTION OF RIGHT SHOULDER: ICD-10-CM

## 2025-01-23 DIAGNOSIS — M24.811 INTERNAL DERANGEMENT OF RIGHT SHOULDER: Primary | ICD-10-CM

## 2025-01-23 RX ORDER — BUPIVACAINE HYDROCHLORIDE 5 MG/ML
7 INJECTION, SOLUTION PERINEURAL ONCE
Status: CANCELLED | OUTPATIENT
Start: 2025-01-23 | End: 2025-01-23

## 2025-01-23 RX ORDER — TRIAMCINOLONE ACETONIDE 40 MG/ML
80 INJECTION, SUSPENSION INTRA-ARTICULAR; INTRAMUSCULAR ONCE
Status: CANCELLED | OUTPATIENT
Start: 2025-01-23 | End: 2025-01-23

## 2025-01-23 NOTE — PROGRESS NOTES
Vaping Use    Vaping status: Never Used   Substance and Sexual Activity    Alcohol use: Yes     Alcohol/week: 4.0 standard drinks of alcohol    Drug use: No    Sexual activity: Defer     Social Determinants of Health     Financial Resource Strain: Low Risk  (12/11/2024)    Overall Financial Resource Strain (CARDIA)     Difficulty of Paying Living Expenses: Not hard at all   Food Insecurity: No Food Insecurity (12/11/2024)    Hunger Vital Sign     Worried About Running Out of Food in the Last Year: Never true     Ran Out of Food in the Last Year: Never true   Transportation Needs: Unknown (10/22/2024)    PRAPARE - Transportation     Lack of Transportation (Non-Medical): No   Recent Concern: Transportation Needs - Unmet Transportation Needs (10/4/2024)    [facility]    PRAPARE - Transportation     Lack of Transportation (Medical): Yes     Lack of Transportation (Non-Medical): Yes   Physical Activity: Insufficiently Active (11/11/2024)    Exercise Vital Sign     Days of Exercise per Week: 4 days     Minutes of Exercise per Session: 20 min   Social Connections: Feeling Socially Integrated (1/31/2024)    [facility]    OASIS : Social Isolation     Frequency of experiencing loneliness or isolation: Never   Intimate Partner Violence: Not At Risk (10/4/2024)    Received from [employer]    Humiliation, Afraid, Rape, and Kick questionnaire     Fear of Current or Ex-Partner: No     Emotionally Abused: No     Physically Abused: No     Sexually Abused: No   Housing Stability: Unknown (12/11/2024)    Housing Stability Vital Sign     Homeless in the Last Year: No       ROS:  No CP, chronic SOB with patient on cannula delivered oxygen via oxygen generator, No fever/chills nor night sweats. No headaches, vision abnormalities to include double and or loss of vision. No dizziness. No hearing abnormalities.   Pt denies h/o spinal surgery, injections, or PT/chiropractor.

## 2025-01-24 RX ORDER — TRIAMCINOLONE ACETONIDE 40 MG/ML
80 INJECTION, SUSPENSION INTRA-ARTICULAR; INTRAMUSCULAR ONCE
Status: COMPLETED | OUTPATIENT
Start: 2025-01-24 | End: 2025-01-24

## 2025-01-24 RX ADMIN — TRIAMCINOLONE ACETONIDE 80 MG: 40 INJECTION, SUSPENSION INTRA-ARTICULAR; INTRAMUSCULAR at 11:27

## 2025-02-07 DIAGNOSIS — M24.811 INTERNAL DERANGEMENT OF RIGHT SHOULDER: ICD-10-CM

## 2025-02-07 DIAGNOSIS — M25.611 DECREASED RANGE OF MOTION OF RIGHT SHOULDER: ICD-10-CM

## 2025-02-18 ENCOUNTER — PROCEDURE VISIT (OUTPATIENT)
Age: 55
End: 2025-02-18
Payer: MEDICAID

## 2025-02-18 VITALS
SYSTOLIC BLOOD PRESSURE: 155 MMHG | WEIGHT: 272.8 LBS | RESPIRATION RATE: 16 BRPM | DIASTOLIC BLOOD PRESSURE: 99 MMHG | HEIGHT: 61 IN | HEART RATE: 64 BPM | BODY MASS INDEX: 51.5 KG/M2

## 2025-02-18 DIAGNOSIS — R20.0 NUMBNESS AND TINGLING IN BOTH HANDS: Primary | ICD-10-CM

## 2025-02-18 DIAGNOSIS — R20.2 NUMBNESS AND TINGLING IN BOTH HANDS: Primary | ICD-10-CM

## 2025-02-18 PROCEDURE — 95911 NRV CNDJ TEST 9-10 STUDIES: CPT | Performed by: PHYSICAL MEDICINE & REHABILITATION

## 2025-02-18 PROCEDURE — 95886 MUSC TEST DONE W/N TEST COMP: CPT | Performed by: PHYSICAL MEDICINE & REHABILITATION

## 2025-02-18 NOTE — PROGRESS NOTES
[facility]  [address] [place] [address], [address]  [place] [address]  Phone: [phone]  Fax: [phone]    Anjelica Mcgee  : 1970  PCP: Magali Smalls APRN - CNP  2025    ELECTROMYOGRAPHY AND NERVE CONDUCTION STUDIES    Anjelica Mcgee was referred by Sudhakar Beck PA-C for electrodiagnostic evaluation of numbness/tingling of both hands    NCV & EMG Findings:  All nerve conduction studies (as indicated in the following tables) were within normal limits.   All remaining left vs. right side differences were within normal limits.      All examined muscles (as indicated in the following table) showed no evidence of electrical instability     INTERPRETATION  This was a normal nerve conduction and EMG study showing there to be no signs of neuropathy, myopathy, or radiculopathy in the nerves and muscles tested.         CLINICAL INTERPRETATION  The electrodiagnostic findings do not appear to explain her bilateral hand symptoms.       HISTORY OF PRESENT ILLNESS  Anjelica Mcgee is a 54 y.o. female.    Pt presents today with BUE EMG evaluation for numbness/tingling of both hands.    PAST MEDICAL HISTORY   Past Medical History:   Diagnosis Date    Asthma     CAD (coronary artery disease)     Displacement of lumbar intervertebral disc     Heart disease     HLD (hyperlipidemia)     Hypertension     Lumbar radiculitis     Lumbar sprain     Numbness     legs and arms    S/P CABG x 4 2022    LIMA-LAD, Left radial to OM, rSVG to PDA, rSVG to diag by Dr. Murrieta    STEMI (ST elevation myocardial infarction) ([facility]) 2020       Past Surgical History:   Procedure Laterality Date    APPENDECTOMY  2014    BACK SURGERY  2016    CARDIAC SURGERY  2020    CHOLECYSTECTOMY  2013    GI      multiple esophageal surgeries in childhood    HEENT      \"surgery on esophagus\"    HYSTERECTOMY (CERVIX STATUS UNKNOWN)  2003    LUMBAR DISCECTOMY         MEDICATIONS    Current Outpatient

## 2025-02-21 NOTE — TELEPHONE ENCOUNTER
Medication(s) requesting:   Requested Prescriptions     Pending Prescriptions Disp Refills    tiZANidine (ZANAFLEX) 4 MG tablet 30 tablet 0     Sig: Take 1 tablet by mouth every 6 hours as needed (Muscle spasm)       Last office visit:  12/11/2024  Next office visit DMA: 2/25/2025

## 2025-02-25 ENCOUNTER — OFFICE VISIT (OUTPATIENT)
Facility: CLINIC | Age: 55
End: 2025-02-25
Payer: MEDICAID

## 2025-02-25 VITALS
OXYGEN SATURATION: 94 % | DIASTOLIC BLOOD PRESSURE: 81 MMHG | TEMPERATURE: 97.2 F | RESPIRATION RATE: 18 BRPM | WEIGHT: 271 LBS | HEART RATE: 71 BPM | HEIGHT: 61 IN | BODY MASS INDEX: 51.16 KG/M2 | SYSTOLIC BLOOD PRESSURE: 130 MMHG

## 2025-02-25 DIAGNOSIS — J30.9 ALLERGIC RHINITIS, UNSPECIFIED SEASONALITY, UNSPECIFIED TRIGGER: ICD-10-CM

## 2025-02-25 DIAGNOSIS — E11.65 TYPE 2 DIABETES MELLITUS WITH HYPERGLYCEMIA, WITHOUT LONG-TERM CURRENT USE OF INSULIN (HCC): ICD-10-CM

## 2025-02-25 DIAGNOSIS — B96.20 E. COLI UTI: ICD-10-CM

## 2025-02-25 DIAGNOSIS — G47.33 OBSTRUCTIVE SLEEP APNEA ON CPAP: ICD-10-CM

## 2025-02-25 DIAGNOSIS — I50.9 CHRONIC CONGESTIVE HEART FAILURE, UNSPECIFIED HEART FAILURE TYPE (HCC): Primary | ICD-10-CM

## 2025-02-25 DIAGNOSIS — J34.89 SINUS PRESSURE: ICD-10-CM

## 2025-02-25 DIAGNOSIS — R30.0 DYSURIA: ICD-10-CM

## 2025-02-25 DIAGNOSIS — J45.20 MILD INTERMITTENT ASTHMA WITHOUT STATUS ASTHMATICUS WITHOUT COMPLICATION: ICD-10-CM

## 2025-02-25 DIAGNOSIS — E66.01 CLASS 3 SEVERE OBESITY DUE TO EXCESS CALORIES WITH SERIOUS COMORBIDITY AND BODY MASS INDEX (BMI) OF 50.0 TO 59.9 IN ADULT: ICD-10-CM

## 2025-02-25 DIAGNOSIS — N39.0 E. COLI UTI: ICD-10-CM

## 2025-02-25 DIAGNOSIS — E66.813 CLASS 3 SEVERE OBESITY DUE TO EXCESS CALORIES WITH SERIOUS COMORBIDITY AND BODY MASS INDEX (BMI) OF 50.0 TO 59.9 IN ADULT: ICD-10-CM

## 2025-02-25 LAB
BILIRUBIN, URINE, POC: NEGATIVE
BLOOD URINE, POC: NEGATIVE
GLUCOSE URINE, POC: NEGATIVE
KETONES, URINE, POC: NEGATIVE
LEUKOCYTE ESTERASE, URINE, POC: ABNORMAL
NITRITE, URINE, POC: POSITIVE
PH, URINE, POC: 6.5 (ref 4.6–8)
PROTEIN,URINE, POC: NEGATIVE
SPECIFIC GRAVITY, URINE, POC: 1.01 (ref 1–1.03)
URINALYSIS CLARITY, POC: ABNORMAL
URINALYSIS COLOR, POC: YELLOW
UROBILINOGEN, POC: ABNORMAL MG/DL

## 2025-02-25 PROCEDURE — 99214 OFFICE O/P EST MOD 30 MIN: CPT | Performed by: NURSE PRACTITIONER

## 2025-02-25 PROCEDURE — 3079F DIAST BP 80-89 MM HG: CPT | Performed by: NURSE PRACTITIONER

## 2025-02-25 PROCEDURE — 81001 URINALYSIS AUTO W/SCOPE: CPT | Performed by: NURSE PRACTITIONER

## 2025-02-25 PROCEDURE — 3075F SYST BP GE 130 - 139MM HG: CPT | Performed by: NURSE PRACTITIONER

## 2025-02-25 RX ORDER — CEPHALEXIN 500 MG/1
500 CAPSULE ORAL 2 TIMES DAILY
Qty: 14 CAPSULE | Refills: 0 | Status: SHIPPED | OUTPATIENT
Start: 2025-02-25 | End: 2025-03-04

## 2025-02-25 RX ORDER — FLUTICASONE PROPIONATE 50 MCG
1 SPRAY, SUSPENSION (ML) NASAL DAILY
Qty: 16 G | Refills: 5 | Status: SHIPPED | OUTPATIENT
Start: 2025-02-25

## 2025-02-25 RX ORDER — ALBUTEROL SULFATE 90 UG/1
2 INHALANT RESPIRATORY (INHALATION) EVERY 4 HOURS PRN
Qty: 18 G | Refills: 5 | Status: SHIPPED | OUTPATIENT
Start: 2025-02-25

## 2025-02-25 RX ORDER — SEMAGLUTIDE 1.34 MG/ML
INJECTION, SOLUTION SUBCUTANEOUS
Qty: 2 ADJUSTABLE DOSE PRE-FILLED PEN SYRINGE | Refills: 1 | Status: SHIPPED
Start: 2025-02-25 | End: 2025-02-25

## 2025-02-25 SDOH — ECONOMIC STABILITY: FOOD INSECURITY: WITHIN THE PAST 12 MONTHS, THE FOOD YOU BOUGHT JUST DIDN'T LAST AND YOU DIDN'T HAVE MONEY TO GET MORE.: NEVER TRUE

## 2025-02-25 SDOH — ECONOMIC STABILITY: FOOD INSECURITY: WITHIN THE PAST 12 MONTHS, YOU WORRIED THAT YOUR FOOD WOULD RUN OUT BEFORE YOU GOT MONEY TO BUY MORE.: NEVER TRUE

## 2025-02-25 NOTE — PROGRESS NOTES
Anjelica Mcgee is a 54 y.o. female  established patient, here for evaluation of the following chief complaint(s):  Chief Complaint   Patient presents with    possible uti      Assessment and Plan  1. Chronic congestive heart failure, unspecified heart failure type (HCC)  -     Tirzepatide 2.5 MG/0.5ML SOAJ; Inject 2.5 mg into the skin every 7 days, Disp-2 mL, R-2Normal  2. Type 2 diabetes mellitus with hyperglycemia, without long-term current use of insulin (HCC)  -     Tirzepatide 2.5 MG/0.5ML SOAJ; Inject 2.5 mg into the skin every 7 days, Disp-2 mL, R-2Normal  3. Dysuria  -     AMB POC URINALYSIS DIP STICK AUTO W/ MICRO  4. Class 3 severe obesity due to excess calories with serious comorbidity and body mass index (BMI) of 50.0 to 59.9 in adult  -     Tirzepatide 2.5 MG/0.5ML SOAJ; Inject 2.5 mg into the skin every 7 days, Disp-2 mL, R-2Normal  -     Semaglutide 3 MG TABS; Take 3 mg by mouth daily, Disp-30 tablet, R-0NO PRINT  5. Obstructive sleep apnea on CPAP  -     Tirzepatide 2.5 MG/0.5ML SOAJ; Inject 2.5 mg into the skin every 7 days, Disp-2 mL, R-2Normal  6. E. coli UTI  -     cephALEXin (KEFLEX) 500 MG capsule; Take 1 capsule by mouth 2 times daily for 7 days, Disp-14 capsule, R-0Normal  7. Mild intermittent asthma without status asthmaticus without complication  -     albuterol sulfate HFA (PROVENTIL;VENTOLIN;PROAIR) 108 (90 Base) MCG/ACT inhaler; Inhale 2 puffs into the lungs every 4 hours as needed for Shortness of Breath or Wheezing, Disp-18 g, R-5Normal  8. Allergic rhinitis, unspecified seasonality, unspecified trigger  -     fluticasone (FLONASE) 50 MCG/ACT nasal spray; 1 spray by Each Nostril route daily, Disp-16 g, R-5Normal  9. Sinus pressure  -     fluticasone (FLONASE) 50 MCG/ACT nasal spray; 1 spray by Each Nostril route daily, Disp-16 g, R-5Normal       Return in about 1 month (around 3/25/2025), or if symptoms worsen or fail to improve, for Mounjaro, Routine, 15.   HPI:   In office

## 2025-02-27 ENCOUNTER — OFFICE VISIT (OUTPATIENT)
Age: 55
End: 2025-02-27
Payer: MEDICAID

## 2025-02-27 ENCOUNTER — TELEPHONE (OUTPATIENT)
Age: 55
End: 2025-02-27

## 2025-02-27 VITALS — HEIGHT: 62 IN | WEIGHT: 273 LBS | BODY MASS INDEX: 50.24 KG/M2

## 2025-02-27 DIAGNOSIS — M75.101 NONTRAUMATIC TEAR OF RIGHT SUPRASPINATUS TENDON: Primary | ICD-10-CM

## 2025-02-27 PROCEDURE — 99214 OFFICE O/P EST MOD 30 MIN: CPT | Performed by: PHYSICIAN ASSISTANT

## 2025-02-27 NOTE — PROGRESS NOTES
the following per recommended dosing.    (  ) Flector patch 1.3% topically twice daily to area of concern    (  ) Flexeril 10mg po 3 x daily as needed muscle spasm / pain    (yes  ) Physical therapy ordered for range of motion all modalities, stretching, range of motion of specific functional group associated     with primary treating condition,  gentle strengthening of musculature with attention to increasing endurance, gait training,balance and fine motor coordination.   Gait    Special note: I reviewed and agree with the PFSH and the ROS as well as the intake form in the chart in the record.  I have reviewed prior medical record(s) in detail regarding this patient in this care appointment.      Additionally today we discussed the diagnosis of obesity and the importance of weight management for both cardiovascular health.  The patient was recommended to decrease carbohydrate and sugar intake.  In light of the patient's osteoarthritic findings I am making a recommendation for aerobic exercise to include but not limited to stationary bicycle, elliptical, therapeutic walking with good shoes and or swimming.  Patient should avoid any running or jumping.  If using the treadmill then recommendation for no elevation and no running or jogging.       Appropriate for outpatient management. Will discuss supportive treatment, NSAIDS, RICE and orthopedic follow-up. Discussed treatment plan, return precautions, symptomatic relief, and expected time to improvement. All questions answered. Patient is stable for discharge and outpatient management. Medication use, risk/benefit, side effects, and precautions discussed.        Care plan outlined and precautions discussed.  Results were reviewed with the patient. All medications were reviewed with the patient. All of pt's questions and concerns were addressed.  Alarm symptoms and return precautions associated with chief complaint and evaluation were reviewed with the patient in

## 2025-02-27 NOTE — TELEPHONE ENCOUNTER
Patient called to let KIMBERLY Beck nurse know that she has an appt with a Surgeon at [facility] on 6/26/25 for her Right Shoulder.     She just wanted the nurse to know this.     Patient tel. [phone].

## 2025-03-13 DIAGNOSIS — M62.838 MUSCLE SPASM: Primary | ICD-10-CM

## 2025-03-13 NOTE — TELEPHONE ENCOUNTER
Medication(s) requesting:   Requested Prescriptions     Pending Prescriptions Disp Refills    tiZANidine (ZANAFLEX) 4 MG tablet 30 tablet 0     Sig: Take 1 tablet by mouth every 6 hours as needed (Muscle spasm)       Last office visit:  2/25/2025  Next office visit DMA: 4/2/2025

## 2025-03-25 DIAGNOSIS — I48.0 PAF (PAROXYSMAL ATRIAL FIBRILLATION) (HCC): ICD-10-CM

## 2025-03-25 NOTE — TELEPHONE ENCOUNTER
[facility] requesting medication refill on the following:     Requested Prescriptions     Pending Prescriptions Disp Refills    apixaban (ELIQUIS) 5 MG TABS tablet 180 tablet 1     Sig: Take 1 tablet by mouth 2 times daily     LOV: 2/25/25   NOV: 4/30/25    Please be advised, thank you.

## 2025-04-01 DIAGNOSIS — K21.9 GASTROESOPHAGEAL REFLUX DISEASE WITHOUT ESOPHAGITIS: ICD-10-CM

## 2025-04-01 RX ORDER — OMEPRAZOLE 20 MG/1
20 CAPSULE, DELAYED RELEASE ORAL DAILY PRN
Qty: 90 CAPSULE | Refills: 1 | Status: SHIPPED | OUTPATIENT
Start: 2025-04-01

## 2025-04-15 ENCOUNTER — TELEPHONE (OUTPATIENT)
Facility: CLINIC | Age: 55
End: 2025-04-15

## 2025-04-15 NOTE — TELEPHONE ENCOUNTER
Nithya with [facility] called to f/u on Cert of Med Necessity for incontinent supplies size L.     Request faxed on 4/2/25     Phone:  [phone]  Fax:  [phone] or [phone]     Please assist. Thank you.

## 2025-05-20 DIAGNOSIS — M62.838 MUSCLE SPASM: ICD-10-CM

## 2025-05-20 NOTE — TELEPHONE ENCOUNTER
Medication(s) requesting:   Requested Prescriptions     Pending Prescriptions Disp Refills    tiZANidine (ZANAFLEX) 4 MG tablet 90 tablet 1     Sig: Take 1 tablet by mouth every 8 hours as needed (Muscle spasm)       Last office visit:  2/25/2025  Next office visit DMA: 6/30/2025

## 2025-06-30 ENCOUNTER — OFFICE VISIT (OUTPATIENT)
Facility: CLINIC | Age: 55
End: 2025-06-30
Payer: MEDICAID

## 2025-06-30 VITALS
BODY MASS INDEX: 50.98 KG/M2 | DIASTOLIC BLOOD PRESSURE: 80 MMHG | SYSTOLIC BLOOD PRESSURE: 124 MMHG | WEIGHT: 270 LBS | OXYGEN SATURATION: 97 % | HEART RATE: 60 BPM | TEMPERATURE: 97.4 F | HEIGHT: 61 IN

## 2025-06-30 DIAGNOSIS — I10 PRIMARY HYPERTENSION: ICD-10-CM

## 2025-06-30 DIAGNOSIS — I50.9 HEART FAILURE, UNSPECIFIED HF CHRONICITY, UNSPECIFIED HEART FAILURE TYPE (HCC): ICD-10-CM

## 2025-06-30 DIAGNOSIS — I25.83 CORONARY ARTERY DISEASE DUE TO LIPID RICH PLAQUE: ICD-10-CM

## 2025-06-30 DIAGNOSIS — E11.65 TYPE 2 DIABETES MELLITUS WITH HYPERGLYCEMIA, WITHOUT LONG-TERM CURRENT USE OF INSULIN (HCC): ICD-10-CM

## 2025-06-30 DIAGNOSIS — M25.511 CHRONIC RIGHT SHOULDER PAIN: ICD-10-CM

## 2025-06-30 DIAGNOSIS — I21.3 ST ELEVATION MYOCARDIAL INFARCTION (STEMI), UNSPECIFIED ARTERY (HCC): ICD-10-CM

## 2025-06-30 DIAGNOSIS — E66.813 CLASS 3 SEVERE OBESITY DUE TO EXCESS CALORIES WITH SERIOUS COMORBIDITY AND BODY MASS INDEX (BMI) OF 50.0 TO 59.9 IN ADULT (HCC): ICD-10-CM

## 2025-06-30 DIAGNOSIS — M54.6 CHRONIC BILATERAL THORACIC BACK PAIN: ICD-10-CM

## 2025-06-30 DIAGNOSIS — I25.10 CORONARY ARTERY DISEASE DUE TO LIPID RICH PLAQUE: ICD-10-CM

## 2025-06-30 DIAGNOSIS — Z13.228 SCREENING FOR METABOLIC DISORDER: ICD-10-CM

## 2025-06-30 DIAGNOSIS — G47.33 OBSTRUCTIVE SLEEP APNEA ON CPAP: ICD-10-CM

## 2025-06-30 DIAGNOSIS — G89.29 CHRONIC BILATERAL THORACIC BACK PAIN: ICD-10-CM

## 2025-06-30 DIAGNOSIS — Z00.00 ANNUAL PHYSICAL EXAM: Primary | ICD-10-CM

## 2025-06-30 DIAGNOSIS — G47.33 OBSTRUCTIVE SLEEP APNEA SYNDROME: ICD-10-CM

## 2025-06-30 DIAGNOSIS — G89.29 CHRONIC RIGHT SHOULDER PAIN: ICD-10-CM

## 2025-06-30 DIAGNOSIS — I48.0 PAF (PAROXYSMAL ATRIAL FIBRILLATION) (HCC): ICD-10-CM

## 2025-06-30 DIAGNOSIS — Z13.220 SCREENING FOR CHOLESTEROL LEVEL: ICD-10-CM

## 2025-06-30 DIAGNOSIS — Z12.31 ENCOUNTER FOR SCREENING MAMMOGRAM FOR MALIGNANT NEOPLASM OF BREAST: ICD-10-CM

## 2025-06-30 DIAGNOSIS — Z13.89 SCREENING FOR BLOOD OR PROTEIN IN URINE: ICD-10-CM

## 2025-06-30 DIAGNOSIS — Z13.0 SCREENING FOR DEFICIENCY ANEMIA: ICD-10-CM

## 2025-06-30 DIAGNOSIS — I50.9 CHRONIC CONGESTIVE HEART FAILURE, UNSPECIFIED HEART FAILURE TYPE (HCC): ICD-10-CM

## 2025-06-30 DIAGNOSIS — Z13.29 SCREENING FOR THYROID DISORDER: ICD-10-CM

## 2025-06-30 DIAGNOSIS — K21.9 GASTROESOPHAGEAL REFLUX DISEASE WITHOUT ESOPHAGITIS: ICD-10-CM

## 2025-06-30 PROCEDURE — 3079F DIAST BP 80-89 MM HG: CPT | Performed by: NURSE PRACTITIONER

## 2025-06-30 PROCEDURE — 99396 PREV VISIT EST AGE 40-64: CPT | Performed by: NURSE PRACTITIONER

## 2025-06-30 PROCEDURE — 3074F SYST BP LT 130 MM HG: CPT | Performed by: NURSE PRACTITIONER

## 2025-06-30 RX ORDER — METFORMIN HYDROCHLORIDE 500 MG/1
500 TABLET, EXTENDED RELEASE ORAL
Qty: 60 TABLET | Refills: 5 | Status: SHIPPED | OUTPATIENT
Start: 2025-06-30

## 2025-06-30 RX ORDER — TRAMADOL HYDROCHLORIDE 50 MG/1
50 TABLET ORAL EVERY 8 HOURS PRN
Qty: 15 TABLET | Refills: 0 | Status: SHIPPED | OUTPATIENT
Start: 2025-06-30 | End: 2025-07-05

## 2025-06-30 RX ORDER — GLUCOSAMINE HCL/CHONDROITIN SU 500-400 MG
CAPSULE ORAL
Qty: 100 STRIP | Refills: 5 | Status: SHIPPED | OUTPATIENT
Start: 2025-06-30

## 2025-06-30 RX ORDER — LIDOCAINE 50 MG/G
1 PATCH TOPICAL DAILY
Qty: 30 PATCH | Refills: 5 | Status: SHIPPED | OUTPATIENT
Start: 2025-06-30 | End: 2025-12-27

## 2025-06-30 RX ORDER — AVOBENZONE, HOMOSALATE, OCTISALATE, OCTOCRYLENE 30; 40; 45; 26 MG/ML; MG/ML; MG/ML; MG/ML
1 CREAM TOPICAL DAILY
Qty: 100 EACH | Refills: 5 | Status: SHIPPED | OUTPATIENT
Start: 2025-06-30

## 2025-06-30 ASSESSMENT — PATIENT HEALTH QUESTIONNAIRE - PHQ9
2. FEELING DOWN, DEPRESSED OR HOPELESS: NOT AT ALL
SUM OF ALL RESPONSES TO PHQ QUESTIONS 1-9: 0
1. LITTLE INTEREST OR PLEASURE IN DOING THINGS: NOT AT ALL
SUM OF ALL RESPONSES TO PHQ QUESTIONS 1-9: 0

## 2025-06-30 NOTE — PATIENT INSTRUCTIONS
[facility]  Address: [address] [place] [address]  Phone: [phone]

## 2025-06-30 NOTE — PROGRESS NOTES
Anjelica Mcgee is a 55 y.o. female  established patient, here for evaluation of the following chief complaint(s):  Chief Complaint   Patient presents with    Discuss Medications     1 mth follow up to discuss Mounjaro - pt states that she did not start medication because insurance did not pay for it.    Discuss pain patches.        Assessment and Plan  1. Annual physical exam  2. Chronic right shoulder pain  -     traMADol (ULTRAM) 50 MG tablet; Take 1 tablet by mouth every 8 hours as needed for Pain for up to 5 days. Intended supply: 7 days. Take lowest dose possible to manage pain Max Daily Amount: 150 mg, Disp-15 tablet, R-0Normal  -     lidocaine (LIDODERM) 5 %; Place 1 patch onto the skin daily 12 hours on, 12 hours off., Disp-30 patch, R-5Normal  3. Obstructive sleep apnea syndrome  -     Tirzepatide (MOUNJARO) 2.5 MG/0.5ML SOAJ pen; Inject 2.5 mg into the skin every 7 days, Disp-2 mL, R-3Normal  4. Obstructive sleep apnea on CPAP  -     Tirzepatide (MOUNJARO) 2.5 MG/0.5ML SOAJ pen; Inject 2.5 mg into the skin every 7 days, Disp-2 mL, R-3Normal  5. Class 3 severe obesity due to excess calories with serious comorbidity and body mass index (BMI) of 50.0 to 59.9 in adult (HCC)  -     Tirzepatide (MOUNJARO) 2.5 MG/0.5ML SOAJ pen; Inject 2.5 mg into the skin every 7 days, Disp-2 mL, R-3Normal  -     empagliflozin (JARDIANCE) 10 MG tablet; Take 1 tablet by mouth daily, Disp-30 tablet, R-5Normal  6. Type 2 diabetes mellitus with hyperglycemia, without long-term current use of insulin (HCC)  -     Tirzepatide (MOUNJARO) 2.5 MG/0.5ML SOAJ pen; Inject 2.5 mg into the skin every 7 days, Disp-2 mL, R-3Normal  -     metFORMIN (GLUCOPHAGE-XR) 500 MG extended release tablet; Take 1 tablet by mouth 2 times daily (before meals), Disp-60 tablet, R-5Normal  -     Hemoglobin A1C; Future  -     Albumin/Creatinine Ratio, Urine; Future  -     HM DIABETES FOOT EXAM  -     empagliflozin (JARDIANCE) 10 MG tablet; Take 1 tablet by mouth

## 2025-07-08 DIAGNOSIS — I10 ESSENTIAL HYPERTENSION WITH GOAL BLOOD PRESSURE LESS THAN 140/90: ICD-10-CM

## 2025-07-08 DIAGNOSIS — I10 ESSENTIAL (PRIMARY) HYPERTENSION: ICD-10-CM

## 2025-07-08 DIAGNOSIS — M62.838 MUSCLE SPASM: ICD-10-CM

## 2025-07-08 DIAGNOSIS — E78.00 PURE HYPERCHOLESTEROLEMIA: ICD-10-CM

## 2025-07-08 NOTE — TELEPHONE ENCOUNTER
Medication(s) requesting:   Requested Prescriptions     Pending Prescriptions Disp Refills    tiZANidine (ZANAFLEX) 4 MG tablet 90 tablet 1     Sig: Take 1 tablet by mouth every 8 hours as needed (Muscle spasm)       Last office visit:  6/30/2025  Next office visit DMA: 7/30/2025

## 2025-07-09 NOTE — TELEPHONE ENCOUNTER
PCP: Magali Smalls APRN - CNP    Last appt:  12/3/2024   Future Appointments   Date Time Provider Department Center   7/30/2025  1:15 PM Magali Smalls APRN - CNP [facility]   8/26/2025  1:15 PM Colin Cook MD [facility]       Requested Prescriptions     Pending Prescriptions Disp Refills    amiodarone (PACERONE) 100 MG tablet 90 tablet 2     Sig: Take 1 tablet by mouth daily       Request for a 30 or 90 day supply? Provider Discretion    Pharmacy: confirmed    Other Comments: n/a

## 2025-07-10 RX ORDER — AMIODARONE HYDROCHLORIDE 100 MG/1
100 TABLET ORAL DAILY
Qty: 90 TABLET | Refills: 2 | Status: SHIPPED | OUTPATIENT
Start: 2025-07-10

## 2025-07-30 ENCOUNTER — OFFICE VISIT (OUTPATIENT)
Facility: CLINIC | Age: 55
End: 2025-07-30
Payer: MEDICAID

## 2025-07-30 VITALS
BODY MASS INDEX: 51.16 KG/M2 | DIASTOLIC BLOOD PRESSURE: 76 MMHG | RESPIRATION RATE: 16 BRPM | SYSTOLIC BLOOD PRESSURE: 138 MMHG | OXYGEN SATURATION: 95 % | HEART RATE: 60 BPM | WEIGHT: 271 LBS | TEMPERATURE: 98 F | HEIGHT: 61 IN

## 2025-07-30 DIAGNOSIS — G47.33 OBSTRUCTIVE SLEEP APNEA SYNDROME: ICD-10-CM

## 2025-07-30 DIAGNOSIS — I25.83 CORONARY ARTERY DISEASE DUE TO LIPID RICH PLAQUE: ICD-10-CM

## 2025-07-30 DIAGNOSIS — I25.10 CORONARY ARTERY DISEASE DUE TO LIPID RICH PLAQUE: ICD-10-CM

## 2025-07-30 DIAGNOSIS — Z13.220 SCREENING FOR CHOLESTEROL LEVEL: ICD-10-CM

## 2025-07-30 DIAGNOSIS — Z01.818 PRE-OP EXAM: Primary | ICD-10-CM

## 2025-07-30 DIAGNOSIS — I10 PRIMARY HYPERTENSION: ICD-10-CM

## 2025-07-30 DIAGNOSIS — Z13.0 SCREENING FOR DEFICIENCY ANEMIA: ICD-10-CM

## 2025-07-30 DIAGNOSIS — Z13.29 SCREENING FOR THYROID DISORDER: ICD-10-CM

## 2025-07-30 DIAGNOSIS — Z13.228 SCREENING FOR METABOLIC DISORDER: ICD-10-CM

## 2025-07-30 DIAGNOSIS — Z13.89 SCREENING FOR BLOOD OR PROTEIN IN URINE: ICD-10-CM

## 2025-07-30 DIAGNOSIS — E66.813 CLASS 3 SEVERE OBESITY DUE TO EXCESS CALORIES WITH SERIOUS COMORBIDITY AND BODY MASS INDEX (BMI) OF 50.0 TO 59.9 IN ADULT (HCC): ICD-10-CM

## 2025-07-30 DIAGNOSIS — G89.29 CHRONIC RIGHT SHOULDER PAIN: ICD-10-CM

## 2025-07-30 DIAGNOSIS — I50.9 CHRONIC CONGESTIVE HEART FAILURE, UNSPECIFIED HEART FAILURE TYPE (HCC): ICD-10-CM

## 2025-07-30 DIAGNOSIS — M25.511 CHRONIC RIGHT SHOULDER PAIN: ICD-10-CM

## 2025-07-30 DIAGNOSIS — E11.65 TYPE 2 DIABETES MELLITUS WITH HYPERGLYCEMIA, WITHOUT LONG-TERM CURRENT USE OF INSULIN (HCC): ICD-10-CM

## 2025-07-30 DIAGNOSIS — E78.00 PURE HYPERCHOLESTEROLEMIA: ICD-10-CM

## 2025-07-30 DIAGNOSIS — Z95.1 S/P CABG X 4: ICD-10-CM

## 2025-07-30 PROCEDURE — 99215 OFFICE O/P EST HI 40 MIN: CPT | Performed by: NURSE PRACTITIONER

## 2025-07-30 PROCEDURE — 3078F DIAST BP <80 MM HG: CPT | Performed by: NURSE PRACTITIONER

## 2025-07-30 PROCEDURE — 3075F SYST BP GE 130 - 139MM HG: CPT | Performed by: NURSE PRACTITIONER

## 2025-07-30 RX ORDER — ROSUVASTATIN CALCIUM 40 MG/1
40 TABLET, COATED ORAL DAILY
Qty: 90 TABLET | Refills: 1 | Status: CANCELLED | OUTPATIENT
Start: 2025-07-30

## 2025-07-30 NOTE — PROGRESS NOTES
Anjelica Mcgee is a 55 y.o. female  established patient, here for evaluation of the following chief complaint(s):  Chief Complaint   Patient presents with    Discuss Labs      Assessment and Plan  1. Pre-op exam  -     tirzepatide-weight management (ZEPBOUND) 2.5 MG/0.5ML SOAJ subCUTAneous auto-injector pen; Inject 2.5 mg into the skin every 7 days, Disp-2 mL, R-2Normal  2. Type 2 diabetes mellitus with hyperglycemia, without long-term current use of insulin (HCC)  -     tirzepatide-weight management (ZEPBOUND) 2.5 MG/0.5ML SOAJ subCUTAneous auto-injector pen; Inject 2.5 mg into the skin every 7 days, Disp-2 mL, R-2Normal  -     Albumin/Creatinine Ratio, Urine  -     Hemoglobin A1C  3. Chronic congestive heart failure, unspecified heart failure type (HCC)  -     tirzepatide-weight management (ZEPBOUND) 2.5 MG/0.5ML SOAJ subCUTAneous auto-injector pen; Inject 2.5 mg into the skin every 7 days, Disp-2 mL, R-2Normal  4. Class 3 severe obesity due to excess calories with serious comorbidity and body mass index (BMI) of 50.0 to 59.9 in adult (HCC)  -     tirzepatide-weight management (ZEPBOUND) 2.5 MG/0.5ML SOAJ subCUTAneous auto-injector pen; Inject 2.5 mg into the skin every 7 days, Disp-2 mL, R-2Normal  5. Chronic right shoulder pain  6. Obstructive sleep apnea syndrome  -     tirzepatide-weight management (ZEPBOUND) 2.5 MG/0.5ML SOAJ subCUTAneous auto-injector pen; Inject 2.5 mg into the skin every 7 days, Disp-2 mL, R-2Normal  7. Primary hypertension  -     tirzepatide-weight management (ZEPBOUND) 2.5 MG/0.5ML SOAJ subCUTAneous auto-injector pen; Inject 2.5 mg into the skin every 7 days, Disp-2 mL, R-2Normal  8. Pure hypercholesterolemia  -     tirzepatide-weight management (ZEPBOUND) 2.5 MG/0.5ML SOAJ subCUTAneous auto-injector pen; Inject 2.5 mg into the skin every 7 days, Disp-2 mL, R-2Normal  9. Coronary artery disease due to lipid rich plaque  -     tirzepatide-weight management (ZEPBOUND) 2.5 MG/0.5ML SOAJ

## 2025-07-31 LAB
ALBUMIN SERPL-MCNC: 4.4 G/DL (ref 3.8–4.9)
ALBUMIN/CREAT UR: 9 MG/G CREAT (ref 0–29)
ALP SERPL-CCNC: 138 IU/L (ref 44–121)
ALT SERPL-CCNC: 34 IU/L (ref 0–32)
APPEARANCE UR: ABNORMAL
AST SERPL-CCNC: 31 IU/L (ref 0–40)
BACTERIA #/AREA URNS HPF: ABNORMAL /[HPF]
BASOPHILS # BLD AUTO: 0.1 X10E3/UL (ref 0–0.2)
BASOPHILS NFR BLD AUTO: 1 %
BILIRUB SERPL-MCNC: 0.3 MG/DL (ref 0–1.2)
BILIRUB UR QL STRIP: ABNORMAL
BUN SERPL-MCNC: 8 MG/DL (ref 6–24)
BUN/CREAT SERPL: 12 (ref 9–23)
CALCIUM SERPL-MCNC: 10 MG/DL (ref 8.7–10.2)
CASTS URNS QL MICRO: ABNORMAL /LPF
CHLORIDE SERPL-SCNC: 102 MMOL/L (ref 96–106)
CHOLEST SERPL-MCNC: 176 MG/DL (ref 100–199)
CO2 SERPL-SCNC: 20 MMOL/L (ref 20–29)
COLOR UR: YELLOW
CREAT SERPL-MCNC: 0.68 MG/DL (ref 0.57–1)
CREAT UR-MCNC: 202.8 MG/DL
EGFRCR SERPLBLD CKD-EPI 2021: 103 ML/MIN/1.73
EOSINOPHIL # BLD AUTO: 0.2 X10E3/UL (ref 0–0.4)
EOSINOPHIL NFR BLD AUTO: 2 %
EPI CELLS #/AREA URNS HPF: ABNORMAL /HPF (ref 0–10)
ERYTHROCYTE [DISTWIDTH] IN BLOOD BY AUTOMATED COUNT: 15.7 % (ref 11.7–15.4)
EST. AVERAGE GLUCOSE BLD GHB EST-MCNC: 154 MG/DL
GLOBULIN SER CALC-MCNC: 2.9 G/DL (ref 1.5–4.5)
GLUCOSE SERPL-MCNC: 99 MG/DL (ref 70–99)
GLUCOSE UR QL STRIP: NEGATIVE
HBA1C MFR BLD: 7 % (ref 4.8–5.6)
HCT VFR BLD AUTO: 45.6 % (ref 34–46.6)
HDLC SERPL-MCNC: 49 MG/DL
HGB BLD-MCNC: 14.2 G/DL (ref 11.1–15.9)
HGB UR QL STRIP: NEGATIVE
IMM GRANULOCYTES # BLD AUTO: 0 X10E3/UL (ref 0–0.1)
IMM GRANULOCYTES NFR BLD AUTO: 0 %
KETONES UR QL STRIP: ABNORMAL
LDLC SERPL CALC-MCNC: 104 MG/DL (ref 0–99)
LEUKOCYTE ESTERASE UR QL STRIP: ABNORMAL
LYMPHOCYTES # BLD AUTO: 2.6 X10E3/UL (ref 0.7–3.1)
LYMPHOCYTES NFR BLD AUTO: 30 %
MCH RBC QN AUTO: 25.7 PG (ref 26.6–33)
MCHC RBC AUTO-ENTMCNC: 31.1 G/DL (ref 31.5–35.7)
MCV RBC AUTO: 83 FL (ref 79–97)
MICRO URNS: ABNORMAL
MICROALBUMIN UR-MCNC: 17.9 UG/ML
MONOCYTES # BLD AUTO: 0.7 X10E3/UL (ref 0.1–0.9)
MONOCYTES NFR BLD AUTO: 8 %
NEUTROPHILS # BLD AUTO: 5.2 X10E3/UL (ref 1.4–7)
NEUTROPHILS NFR BLD AUTO: 59 %
NITRITE UR QL STRIP: POSITIVE
PH UR STRIP: 5.5 [PH] (ref 5–7.5)
PLATELET # BLD AUTO: 268 X10E3/UL (ref 150–450)
POTASSIUM SERPL-SCNC: 4.2 MMOL/L (ref 3.5–5.2)
PROT SERPL-MCNC: 7.3 G/DL (ref 6–8.5)
PROT UR QL STRIP: ABNORMAL
RBC # BLD AUTO: 5.53 X10E6/UL (ref 3.77–5.28)
RBC #/AREA URNS HPF: ABNORMAL /HPF (ref 0–2)
SODIUM SERPL-SCNC: 142 MMOL/L (ref 134–144)
SP GR UR STRIP: 1.02 (ref 1–1.03)
T4 FREE SERPL-MCNC: 1.1 NG/DL (ref 0.82–1.77)
TRIGL SERPL-MCNC: 132 MG/DL (ref 0–149)
TSH SERPL DL<=0.005 MIU/L-ACNC: 1.18 UIU/ML (ref 0.45–4.5)
UROBILINOGEN UR STRIP-MCNC: 1 MG/DL (ref 0.2–1)
VLDLC SERPL CALC-MCNC: 23 MG/DL (ref 5–40)
WBC # BLD AUTO: 8.8 X10E3/UL (ref 3.4–10.8)
WBC #/AREA URNS HPF: ABNORMAL /HPF (ref 0–5)

## 2025-08-08 DIAGNOSIS — I50.9 CHRONIC CONGESTIVE HEART FAILURE, UNSPECIFIED HEART FAILURE TYPE (HCC): ICD-10-CM

## 2025-08-08 DIAGNOSIS — G89.29 CHRONIC RIGHT SHOULDER PAIN: ICD-10-CM

## 2025-08-08 DIAGNOSIS — G89.18 POST-OP PAIN: ICD-10-CM

## 2025-08-08 DIAGNOSIS — E11.65 TYPE 2 DIABETES MELLITUS WITH HYPERGLYCEMIA, WITHOUT LONG-TERM CURRENT USE OF INSULIN (HCC): Primary | ICD-10-CM

## 2025-08-08 DIAGNOSIS — M25.511 CHRONIC RIGHT SHOULDER PAIN: ICD-10-CM

## 2025-08-08 DIAGNOSIS — Z96.611 HISTORY OF RIGHT SHOULDER REPLACEMENT: ICD-10-CM

## 2025-08-22 ENCOUNTER — TELEPHONE (OUTPATIENT)
Facility: CLINIC | Age: 55
End: 2025-08-22

## 2025-08-26 ENCOUNTER — OFFICE VISIT (OUTPATIENT)
Age: 55
End: 2025-08-26
Payer: MEDICAID

## 2025-08-26 VITALS
HEART RATE: 64 BPM | SYSTOLIC BLOOD PRESSURE: 145 MMHG | HEIGHT: 61 IN | OXYGEN SATURATION: 96 % | DIASTOLIC BLOOD PRESSURE: 112 MMHG | BODY MASS INDEX: 50.79 KG/M2 | WEIGHT: 269 LBS

## 2025-08-26 DIAGNOSIS — Z95.1 S/P CABG X 4: ICD-10-CM

## 2025-08-26 DIAGNOSIS — I25.83 CORONARY ARTERY DISEASE DUE TO LIPID RICH PLAQUE: Primary | ICD-10-CM

## 2025-08-26 DIAGNOSIS — R60.9 EDEMA, UNSPECIFIED TYPE: ICD-10-CM

## 2025-08-26 DIAGNOSIS — I25.10 CORONARY ARTERY DISEASE DUE TO LIPID RICH PLAQUE: Primary | ICD-10-CM

## 2025-08-26 PROCEDURE — 99214 OFFICE O/P EST MOD 30 MIN: CPT | Performed by: INTERNAL MEDICINE

## 2025-08-26 PROCEDURE — 3080F DIAST BP >= 90 MM HG: CPT | Performed by: INTERNAL MEDICINE

## 2025-08-26 PROCEDURE — 93000 ELECTROCARDIOGRAM COMPLETE: CPT | Performed by: INTERNAL MEDICINE

## 2025-08-26 PROCEDURE — 3077F SYST BP >= 140 MM HG: CPT | Performed by: INTERNAL MEDICINE

## 2025-08-26 RX ORDER — SACUBITRIL AND VALSARTAN 49; 51 MG/1; MG/1
1 TABLET ORAL 2 TIMES DAILY
Qty: 60 TABLET | Refills: 5 | Status: SHIPPED | OUTPATIENT
Start: 2025-08-26

## 2025-08-26 ASSESSMENT — PATIENT HEALTH QUESTIONNAIRE - PHQ9
1. LITTLE INTEREST OR PLEASURE IN DOING THINGS: NOT AT ALL
6. FEELING BAD ABOUT YOURSELF - OR THAT YOU ARE A FAILURE OR HAVE LET YOURSELF OR YOUR FAMILY DOWN: SEVERAL DAYS
9. THOUGHTS THAT YOU WOULD BE BETTER OFF DEAD, OR OF HURTING YOURSELF: NOT AT ALL
4. FEELING TIRED OR HAVING LITTLE ENERGY: NOT AT ALL
SUM OF ALL RESPONSES TO PHQ QUESTIONS 1-9: 3
SUM OF ALL RESPONSES TO PHQ QUESTIONS 1-9: 3
8. MOVING OR SPEAKING SO SLOWLY THAT OTHER PEOPLE COULD HAVE NOTICED. OR THE OPPOSITE, BEING SO FIGETY OR RESTLESS THAT YOU HAVE BEEN MOVING AROUND A LOT MORE THAN USUAL: NOT AT ALL
5. POOR APPETITE OR OVEREATING: SEVERAL DAYS
7. TROUBLE CONCENTRATING ON THINGS, SUCH AS READING THE NEWSPAPER OR WATCHING TELEVISION: SEVERAL DAYS
3. TROUBLE FALLING OR STAYING ASLEEP: NOT AT ALL
SUM OF ALL RESPONSES TO PHQ QUESTIONS 1-9: 3
SUM OF ALL RESPONSES TO PHQ QUESTIONS 1-9: 3
2. FEELING DOWN, DEPRESSED OR HOPELESS: NOT AT ALL
10. IF YOU CHECKED OFF ANY PROBLEMS, HOW DIFFICULT HAVE THESE PROBLEMS MADE IT FOR YOU TO DO YOUR WORK, TAKE CARE OF THINGS AT HOME, OR GET ALONG WITH OTHER PEOPLE: NOT DIFFICULT AT ALL

## 2025-08-26 ASSESSMENT — LIFESTYLE VARIABLES
HOW OFTEN DO YOU HAVE A DRINK CONTAINING ALCOHOL: NEVER
HOW MANY STANDARD DRINKS CONTAINING ALCOHOL DO YOU HAVE ON A TYPICAL DAY: PATIENT DOES NOT DRINK

## (undated) DEVICE — ROTATING SURGICAL PUNCHES, 1 PER POUCH: Brand: A&E MEDICAL / ROTATING SURGICAL PUNCHES

## (undated) DEVICE — SUTURE PROL SZ 7-0 L24IN NONABSORBABLE BLU L9.3MM CC 3/8 8704H

## (undated) DEVICE — Device: Brand: ZDRIVE™

## (undated) DEVICE — DEVICE INFL W/ HEM VLV TORQ

## (undated) DEVICE — CANNULA PERF 22FR L305CM W 3 8IN ART BLNT TIP HI FLOW VENT

## (undated) DEVICE — SYS VSL HARV HEMOPRO2 VASOVIEW -- HARV SYS MINIMUM ORDER 5/EA

## (undated) DEVICE — SUTURE PERMAHAND SZ 2-0 L12X18IN NONABSORBABLE BLK SILK A185H

## (undated) DEVICE — GLIDESHEATH SLENDER STAINLESS STEEL KIT: Brand: GLIDESHEATH SLENDER

## (undated) DEVICE — TREK CORONARY DILATATION CATHETER 2.50 MM X 12 MM / RAPID-EXCHANGE: Brand: TREK

## (undated) DEVICE — TREK CORONARY DILATATION CATHETER 3.0 MM X 12 MM / RAPID-EXCHANGE: Brand: TREK

## (undated) DEVICE — HI-TORQUE BALANCE GUIDE WIRE W/HYDROCOAT .014 STRAIGHT TIP 3.0 CM X 190 CM: Brand: HI-TORQUE BALANCE

## (undated) DEVICE — PRESSURE MONITORING SET: Brand: TRUWAVE

## (undated) DEVICE — AIRLIFE™ ADULT CUSHION NASAL CANNULA 14 FOOT (4.3) CRUSH-RESISTANT OXYGEN TUBING, AND U/CONNECT-IT ADAPTER: Brand: AIRLIFE™

## (undated) DEVICE — BRUSH SCRB 4% CHG RED DISP --

## (undated) DEVICE — Device

## (undated) DEVICE — GAUZE,SPONGE,4"X4",16PLY,STRL,LF,10/TRAY: Brand: MEDLINE

## (undated) DEVICE — SUTURE ETHBND EXCEL SZ 2-0 L36IN NONABSORBABLE GRN SH-1 X763H

## (undated) DEVICE — CATH FOL URETH C CARE MTR 16FR -- LUBRI-SIL

## (undated) DEVICE — SPONGE DISSECT PNUT SM 3/8IN -- 5/PK

## (undated) DEVICE — SUT SLK 2 60IN TIE MP BLK --

## (undated) DEVICE — DRAIN SURG 19FR SIL RND HUBLESS W/ 0.25IN BEND TRCR BLAK

## (undated) DEVICE — BLOWER FLUID GAS MIX L165CM S STL SHFT MAL AND HNDPC

## (undated) DEVICE — X-RAY SPONGES,12 PLY: Brand: DERMACEA

## (undated) DEVICE — STERILE POLYISOPRENE POWDER-FREE SURGICAL GLOVES: Brand: PROTEXIS

## (undated) DEVICE — SOLUTION IV 1000ML 0.9% SOD CHL

## (undated) DEVICE — CLIP INT SM WIDE RED TI TRNSVRS GRV CHEVRON SHP W PRECIS

## (undated) DEVICE — ADAPTER CARDPLG SET MGMT FEM LUER W/ CLR CODE CLMP DLP

## (undated) DEVICE — SUTURE MCRYL SZ 4 0 L18IN ABSRB VLT PS 1 L24MM 3 8 CIR REV Y682H

## (undated) DEVICE — ANGIO-SEAL STS PLUS VASCULAR CLOSURE DEVICE: Brand: ANGIO-SEAL

## (undated) DEVICE — NEEDLE HYPO 25GA L0.625IN BLU POLYPR HUB S STL REG BVL STR

## (undated) DEVICE — SUT SLK 1 30IN TIE MP BLK --

## (undated) DEVICE — DISPOSABLE TOURNIQUET CUFF SINGLE BLADDER, DUAL PORT AND QUICK CONNECT CONNECTOR: Brand: COLOR CUFF

## (undated) DEVICE — CANNULA PERF 28FR L15IN VEN 3/8IN CONN SGL STG MAL 1 PC

## (undated) DEVICE — REM POLYHESIVE ADULT PATIENT RETURN ELECTRODE: Brand: VALLEYLAB

## (undated) DEVICE — SUTURE PROL SZ 5-0 L36IN NONABSORBABLE BLU L13MM C-1 3/8 8720H

## (undated) DEVICE — BARD® PTFE FELT PLEDGETS, (RECTANGLE), 4.8 MM X 6 MM: Brand: BARD® PTFE FELT PLEDGETS

## (undated) DEVICE — HEART II: Brand: MEDLINE INDUSTRIES, INC.

## (undated) DEVICE — CONN PERF Y 0.5X3/8X3/8IN --

## (undated) DEVICE — RADIFOCUS OPTITORQUE ANGIOGRAPHIC CATHETER: Brand: OPTITORQUE

## (undated) DEVICE — LINER,SOFT,SUCTION CANISTER,1500CC: Brand: MEDLINE

## (undated) DEVICE — NDL PRT INJ NSAF BLNT 18GX1.5 --

## (undated) DEVICE — TUBE SUCT 20FR RIG MACRO SUC

## (undated) DEVICE — STANDARD SURGICAL GOWN, L: Brand: CONVERTORS

## (undated) DEVICE — SUTURE PROL SZ 8-0 L24IN NONABSORBABLE BLU BV175-6 L8MM 3/8 8741H

## (undated) DEVICE — SUT SLK 0 30IN SH BLK --

## (undated) DEVICE — SUTURE ETHLN 1 L30IN NONABSORBABLE BLK CTX L48MM 1/2 CIR 830H

## (undated) DEVICE — SUT PROL 6-0 30IN C1 DA BLU --

## (undated) DEVICE — SUTURE PERMAHAND SZ 4-0 L12X30IN NONABSORBABLE BLK SILK A303H

## (undated) DEVICE — SUTURE PROL 7-0 L30IN N ABSRB BV175-8 DBL ARMED MFIL 8755H

## (undated) DEVICE — CLIP INT L ORNG TI TRNSVRS GRV CHEVRON SHP W/ PRECIS TIP TO

## (undated) DEVICE — 450 ML BOTTLE OF 0.05% CHLORHEXIDINE GLUCONATE IN 99.95% STERILE WATER FOR IRRIGATION, USP AND APPLICATOR.: Brand: IRRISEPT ANTIMICROBIAL WOUND LAVAGE

## (undated) DEVICE — APPLICATOR FBR TIP L6IN COT TIP WOOD SHFT SWAB 2000 PER CA

## (undated) DEVICE — DRAIN CHN 28FR L9.3MM SIL RND HUBLESS FULL FLUT DEPTH MRK

## (undated) DEVICE — SUT SLK 0 30IN FSL BLK --

## (undated) DEVICE — COPILOT BLEEDBACK CONTROL VALVE: Brand: COPILOT

## (undated) DEVICE — SET ANGIO L6.5IN L BOR 3 W STPCOCK SPIK TBNG

## (undated) DEVICE — DR LANCEY ON PUMP PACK: Brand: MEDLINE INDUSTRIES, INC.

## (undated) DEVICE — NEEDLE ANGIO 1 WALL ULT SMOOTH 19GAX7CM MERIT AVANCE

## (undated) DEVICE — POWDER HEMOSTAT GEL 1GR --

## (undated) DEVICE — SURGICAL PROCEDURE KIT LT HRT CUST

## (undated) DEVICE — SUTURE VCRL SZ 2-0 L27IN ABSRB UD L26MM CT-2 1/2 CIR J269H

## (undated) DEVICE — CLIP LIG M BLU TI HRT SHP WIRE HORZ 600 PER BX

## (undated) DEVICE — KENDALL 500 SERIES DIAPHORETIC FOAM MONITORING ELECTRODE - TEAR DROP SHAPE ( 5/PK): Brand: KENDALL

## (undated) DEVICE — INTRODUCER SHTH 6FR CANN L11CM W/ MINI GWIRE UNIQUE SLIX

## (undated) DEVICE — GOWN,SIRUS,NONRNF,SETINSLV,XL,20/CS: Brand: MEDLINE

## (undated) DEVICE — SUTURE S STL SZ 6 L18IN NONABSORBABLE SIL L48MM CCS 1/2 CIR M654G

## (undated) DEVICE — SPONGE HEMOSTAT CELLULS 4X8IN -- SURGICEL

## (undated) DEVICE — ARGYLE FRAZIER SURGICAL SUCTION INSTRUMENT 8 FR/CH (2.7 MM): Brand: ARGYLE

## (undated) DEVICE — FOGARTY SPRING CLIPS 6MM: Brand: FOGARTY SOFTJAW

## (undated) DEVICE — CATHETER IV 14GA L1.25IN ORNG POLY SFTY SYS FULL ENCASED

## (undated) DEVICE — BAND RADIAL COMPR ARTERY 24CM -- REG BX/10

## (undated) DEVICE — DRAPE,ANGIO,BRACH,STERILE,38X44: Brand: MEDLINE

## (undated) DEVICE — GUIDEWIRE VASC L190CM DIA0.014IN ELASTINITE NIT HYDRPHLC

## (undated) DEVICE — DECANTER BAG 9": Brand: MEDLINE INDUSTRIES, INC.

## (undated) DEVICE — CATHETER GUID 6FR L100CM GRN PTFE JR4 TRUELUMEN HYBRID

## (undated) DEVICE — SYR LR LCK 1ML GRAD NSAF 30ML --

## (undated) DEVICE — PERCUTANEOUS ENTRY THINWALL NEEDLE  ONE-PART: Brand: COOK

## (undated) DEVICE — SUT PROL 4-0 30IN SH1 DA BLU --

## (undated) DEVICE — SEALANT HEMOSTAT W/THROM 8ML -- SURGIFLO MATRIX

## (undated) DEVICE — CATHETER ART 20 GAX4 CM 22 GA RADIAL FEP

## (undated) DEVICE — CATHETER DIAG AD 6FR L100CM COR GRN HYDRPHLC NYL JR 4 W/O

## (undated) DEVICE — TOWEL SURG W16XL26IN WHT NONFENESTRATED ST 4 PER PK

## (undated) DEVICE — CATHETER DIAG 6FR L100CM COR NYL JUDKINS L 4 RADPQ W/O SIDE

## (undated) DEVICE — PREP SKN CHLRAPRP APL 26ML STR --

## (undated) DEVICE — EZ GLIDE AORTIC CANNULA: Brand: EDWARDS LIFESCIENCES EZ GLIDE AORTIC CANNULA

## (undated) DEVICE — TUBING PRSS MON L6IN PVC M FEM CONN